# Patient Record
Sex: FEMALE | Race: OTHER | Employment: UNEMPLOYED | URBAN - METROPOLITAN AREA
[De-identification: names, ages, dates, MRNs, and addresses within clinical notes are randomized per-mention and may not be internally consistent; named-entity substitution may affect disease eponyms.]

---

## 2017-01-04 ENCOUNTER — HOSPITAL ENCOUNTER (EMERGENCY)
Age: 52
Discharge: HOME | End: 2017-01-04
Attending: EMERGENCY MEDICINE
Payer: SELF-PAY

## 2017-01-04 ENCOUNTER — HOSPITAL ENCOUNTER (EMERGENCY)
Age: 52
Discharge: STILL ADMITTED OR ADMINISTRATIVE | End: 2017-01-04
Attending: EMERGENCY MEDICINE

## 2017-01-04 ENCOUNTER — APPOINTMENT (OUTPATIENT)
Dept: GENERAL RADIOLOGY | Age: 52
End: 2017-01-04
Attending: PHYSICIAN ASSISTANT
Payer: SELF-PAY

## 2017-01-04 VITALS
WEIGHT: 285.5 LBS | RESPIRATION RATE: 20 BRPM | BODY MASS INDEX: 56.05 KG/M2 | TEMPERATURE: 97.8 F | HEIGHT: 60 IN | DIASTOLIC BLOOD PRESSURE: 71 MMHG | SYSTOLIC BLOOD PRESSURE: 147 MMHG | OXYGEN SATURATION: 97 % | HEART RATE: 87 BPM

## 2017-01-04 DIAGNOSIS — S43.004A SHOULDER DISLOCATION, RIGHT, INITIAL ENCOUNTER: Primary | ICD-10-CM

## 2017-01-04 DIAGNOSIS — S42.91XA SHOULDER FRACTURE, RIGHT, CLOSED, INITIAL ENCOUNTER: ICD-10-CM

## 2017-01-04 PROCEDURE — 73030 X-RAY EXAM OF SHOULDER: CPT

## 2017-01-04 PROCEDURE — 99283 EMERGENCY DEPT VISIT LOW MDM: CPT

## 2017-01-04 PROCEDURE — 75810000301 HC ER LEVEL 1 CLOSED TREATMNT FRACTURE/DISLOCATION

## 2017-01-04 PROCEDURE — L3650 SO 8 ABD RESTRAINT PRE OTS: HCPCS

## 2017-01-04 PROCEDURE — 73020 X-RAY EXAM OF SHOULDER: CPT

## 2017-01-04 RX ORDER — OXYCODONE AND ACETAMINOPHEN 5; 325 MG/1; MG/1
1 TABLET ORAL
Qty: 20 TAB | Refills: 0 | Status: SHIPPED | OUTPATIENT
Start: 2017-01-04 | End: 2017-03-04 | Stop reason: ALTCHOICE

## 2017-01-04 NOTE — DISCHARGE INSTRUCTIONS
Dislocated Shoulder: Care Instructions  Your Care Instructions    When the upper arm comes out of the shoulder socket, it is called a dislocated shoulder. After the doctor puts the shoulder back in place, he or she may put your arm in a sling or shoulder immobilizer. This will keep it from moving. Exercise and physical therapy can help your shoulder get strong and move normally again. It may take up to a year for your shoulder to heal and be free of pain. If your shoulder keeps coming out of place, talk to your doctor about surgery. It can prevent dislocations. You may have had a sedative to help you relax. You may be unsteady after having sedation. It can take a few hours for the medicine's effects to wear off. Common side effects of sedation include nausea, vomiting, and feeling sleepy or tired. The doctor has checked you carefully, but problems can develop later. If you notice any problems or new symptoms, get medical treatment right away. Follow-up care is a key part of your treatment and safety. Be sure to make and go to all appointments, and call your doctor if you are having problems. It's also a good idea to know your test results and keep a list of the medicines you take. How can you care for yourself at home? · If the doctor gave you a sedative:  ¨ For 24 hours, don't do anything that requires attention to detail. It takes time for the medicine's effects to completely wear off. ¨ For your safety, do not drive or operate any machinery that could be dangerous. Wait until the medicine wears off and you can think clearly and react easily. · If your doctor put your arm in a sling or shoulder immobilizer, wear it as directed. · Take pain medicines exactly as directed. ¨ If the doctor gave you a prescription medicine for pain, take it as prescribed. ¨ If you are not taking a prescription pain medicine, ask your doctor if you can take an over-the-counter medicine.   · Put ice or a cold pack on your shoulder for 10 to 20 minutes at a time. Try to do this every 1 to 2 hours for the next 3 days (when you are awake). Put a thin cloth between the ice and your skin. · You may use warm packs after the first 3 days for 15 to 20 minutes at a time. This can ease pain. · If your doctor gave you exercises to do at home, do them exactly as your doctor told you. · Do not do anything that makes the pain worse. When should you call for help? Call 911 anytime you think you may need emergency care. For example, call if:  · You have trouble breathing. · You passed out (lost consciousness). Call your doctor now or seek immediate medical care if:  · You have new or worse nausea or vomiting. · Your shoulder dislocates again. · Your pain gets worse. · Your hand turns cold or changes color. · You have tingling, weakness, or numbness in your arm, hand, or fingers. Watch closely for changes in your health, and be sure to contact your doctor if:  · You do not get better as expected. Where can you learn more? Go to http://shima-alyssa.info/. Enter A816 in the search box to learn more about \"Dislocated Shoulder: Care Instructions. \"  Current as of: May 23, 2016  Content Version: 11.1  © 6182-8054 [employer]. Care instructions adapted under license by [employer] (which disclaims liability or warranty for this information). If you have questions about a medical condition or this instruction, always ask your healthcare professional. [place] [address] any warranty or liability for your use of this information. We hope that we have addressed all of your medical concerns. The examination and treatment you received in the Emergency Department were for an emergent problem and were not intended as complete care. It is important that you follow up with your healthcare provider(s) for ongoing care.  If your symptoms worsen or do not improve as expected, and you are unable to reach your usual health care provider(s), you should return to the Emergency Department. Today's healthcare is undergoing tremendous change, and patient satisfaction surveys are one of the many tools to assess the quality of medical care. You may receive a survey from the [employer] regarding your experience in the Emergency Department. I hope that your experience has been completely positive, particularly the medical care that I provided. As such, please participate in the survey; anything less than excellent does not meet my expectations or intentions. [facility] and [facility] participate in nationally recognized quality of care measures. If your blood pressure is greater than 120/80, as reported below, we urge that you seek medical care to address the potential of high blood pressure, commonly known as hypertension. Hypertension can be hereditary or can be caused by certain medical conditions, pain, stress, or \"white coat syndrome. \"       Please make an appointment with your health care provider(s) for follow up of your Emergency Department visit. VITALS:   Patient Vitals for the past 8 hrs:   Temp Pulse Resp BP SpO2   01/04/17 1426 - - - - 97 %   01/04/17 1313 97.8 °F (36.6 °C) 87 20 147/71 97 %          Thank you for allowing us to provide you with medical care today. We realize that you have many choices for your emergency care needs. Please choose us in the future for any continued health care needs. Fernando Blevins [facility], [address] [facility] Dante Alfredo Daly: [phone]            No results found for this or any previous visit (from the past 24 hour(s)). Xr Shoulder Rt 1v    Result Date: 1/4/2017  EXAM:  CR shoulder one view right INDICATION:  Right shoulder dislocation status post reduction COMPARISON: 1/4/2017. TECHNIQUE: One view right shoulder.  FINDINGS: There is normal glenohumeral alignment. The previously seen Bankart fracture is not as conspicuous on the current exam. No new bony abnormality is demonstrated. IMPRESSION: Satisfactory right glenohumeral alignment. Xr Shoulder Rt Ap/lat Min 2 V    Addendum Date: 1/4/2017    Addendum: There is a typographical error in the findings section of the report. The findings section should read: 3 views of the right shoulder demonstrate anterior shoulder dislocation with an associated Bankart fracture of the glenoid. Result Date: 1/4/2017  EXAM:  XR SHOULDER RT AP/LAT MIN 2 V INDICATION:   right shoulder pain. COMPARISON: None. FINDINGS: Three views of the right shoulder demonstrate anterior shoulder dislocation with an associated pancreatic fracture of the glenoid. IMPRESSION:  Anterior right shoulder dislocation with associated Bankart fracture.

## 2017-01-04 NOTE — ED TRIAGE NOTES
Pt reports falling last night and hit her right shoulder against the wall. Has continued pain in shoulder. Denies hitting her head or LOC. Limited movement in arm/shoulder. Pulses equal bilat. Taken tylenol 650mg, last dose at 0100.

## 2017-01-04 NOTE — ED NOTES
Verbal shift change report given to Loly LOZA RN (oncoming nurse) by Alejandro Ornelas RN (offgoing nurse). Report included the following information SBAR, Kardex, ED Summary, [facility] and Recent Results.

## 2017-01-04 NOTE — ED PROVIDER NOTES
HPI Comments: 54 y.o. female with no significant past medical history presents with complaints of right shoulder pain. The pt reports tripping and falling into wall last night and \"over extended her right arm. \"  She denies hitting her head or any other injury. Her main complaint is the right shoulder pain. She reports taking tylenol at home last night with minimal relief of symptoms. The pt states that nothing makes the pain better or worse. The pt rates the pain as a 10/10 in severity. There are no other acute medical complaints at this time. Denies fever, chills, HA, dizziness, light headedness, dyspnea, chest pain, abdominal pain, N/V/D, melena, hematochezia, loss of bowel/bladder functioning, saddle anesthesia, urinary symptoms or any other acute medical conditions. PCP: DO Cassandra Payne PA-C      Patient is a 54 y.o. female presenting with fall and shoulder pain. Fall   Pertinent negatives include no fever, no numbness, no abdominal pain, no nausea, no vomiting and no hematuria. Shoulder Pain    Pertinent negatives include no numbness.         Past Medical History:   Diagnosis Date    Anxiety and depression     Flu      2 wks ago    GERD (gastroesophageal reflux disease)     Headache(784.0)     Hypertension     Incidental lung nodule, > 3mm and < 8mm 2016     left lung nodule in smoker, needs follow up CT in Oct 2016        Past Surgical History:   Procedure Laterality Date    Hx appendectomy      Hx  section       X 3 Births    Pr removal gallbladder      Pr appendectomy      Hx cholecystectomy      Hx  section      Hx wrist fracture tx       metal         Family History:   Problem Relation Age of Onset    Diabetes Mother     Hypertension Brother     Hypertension Brother     Hypertension Brother        Social History     Social History    Marital status:      Spouse name: N/A    Number of children: N/A    Years of education: N/A     Occupational History    Not on file. Social History Main Topics    Smoking status: Current Every Day Smoker     Packs/day: 0.25     Years: 25.00     Types: Cigarettes    Smokeless tobacco: Never Used    Alcohol use No    Drug use: No    Sexual activity: Yes     Partners: Male     Other Topics Concern    Not on file     Social History Narrative    ** Merged History Encounter **         ** Merged History Encounter **              ALLERGIES: Review of patient's allergies indicates no known allergies. Review of Systems   Constitutional: Negative for activity change, appetite change, diaphoresis and fever. HENT: Negative for ear discharge, ear pain, facial swelling, rhinorrhea, sore throat, tinnitus, trouble swallowing and voice change. Eyes: Negative for photophobia, pain, discharge, redness and visual disturbance. Respiratory: Negative for cough, chest tightness, shortness of breath, wheezing and stridor. Cardiovascular: Negative for chest pain and palpitations. Gastrointestinal: Negative for abdominal pain, constipation, diarrhea, nausea and vomiting. Endocrine: Negative for polydipsia and polyuria. Genitourinary: Negative for dysuria, flank pain and hematuria. Musculoskeletal: Positive for arthralgias and myalgias. Negative for back pain. Skin: Negative for color change and rash. Neurological: Negative for dizziness, syncope, speech difficulty, light-headedness and numbness. Psychiatric/Behavioral: Negative for behavioral problems. Vitals:    01/04/17 1313 01/04/17 1426   BP: 147/71    Pulse: 87    Resp: 20    Temp: 97.8 °F (36.6 °C)    SpO2: 97% 97%   Weight: 129.5 kg (285 lb 7.9 oz)    Height: 5' (1.524 m)             Physical Exam   Constitutional: She is oriented to person, place, and time. She appears well-developed and well-nourished. HENT:   Head: Normocephalic and atraumatic.    Eyes: Conjunctivae are normal. Pupils are equal, round, and reactive to light. Right eye exhibits no discharge. Left eye exhibits no discharge. Neck: Normal range of motion. Neck supple. No thyromegaly present. Cardiovascular: Normal rate, regular rhythm and normal heart sounds. Exam reveals no gallop and no friction rub. No murmur heard. Pulmonary/Chest: Effort normal and breath sounds normal. No respiratory distress. She has no wheezes. Musculoskeletal: Normal range of motion. She exhibits edema and tenderness. Pt right arm in adduction and internally rotated. Shoulder appears to be anteriorly dislocated. There is marked tenderness over the right shoulder. Pt is NVI. Neurological: She is alert and oriented to person, place, and time. Skin: Skin is warm. Psychiatric: She has a normal mood and affect. MDM  Number of Diagnoses or Management Options  Shoulder dislocation, right, initial encounter:   Shoulder fracture, right, closed, initial encounter:   Diagnosis management comments: Pt presents with right shoulder pain. Pt presents with arm adducted and internally rotated. Pt is markedly tender over the right shoulder. Imaging revealed anterior dislocation and bankart fracture. Was able to reduce the shoulder with traction countertraction method with attendings help. Will rx pain medicines and have pt follow up with Dr. Mlio Chong (ortho) for further evaluation. Reviewed treatment plan with attending and they agree.   Lucy Huertas PA-C      ED Course       Procedures

## 2017-01-04 NOTE — ED NOTES
Verbal shift change report given to VERA Maldonado (oncoming nurse) by Maikel Goodwin (offgoing nurse). Report included the following information SBAR, ED Summary and Recent Results.

## 2017-01-23 ENCOUNTER — HOSPITAL ENCOUNTER (OUTPATIENT)
Dept: PHYSICAL THERAPY | Age: 52
Discharge: HOME | End: 2017-01-23
Payer: SELF-PAY

## 2017-01-23 PROCEDURE — 97140 MANUAL THERAPY 1/> REGIONS: CPT

## 2017-01-23 PROCEDURE — 97162 PT EVAL MOD COMPLEX 30 MIN: CPT

## 2017-01-23 NOTE — PROGRESS NOTES
[address]  [facility]  [address] [facility] [address] [address]  [address]  [place]      INITIAL EVALUATION    NAME: Eliot Samuels AGE: 54 y.o. GENDER: female  DATE: 1/23/2017  REFERRING PHYSICIAN: Andrade Phan DO  HISTORY AND BACKGROUND:  Pt is a 53 y/o female with history of LE/UE swelling, present for about a year at current level of severity, with LE being focus of treatment today. Pt reports prolonged history of LE swelling which waxed and waned, however, currently edema is persistent. Pt reports \"my mom's legs looked like this\" per her daughter, who is present to translate for patient. Pt states she has attempted wear of over the counter compression garments without success. Pt reports LE swelling presented initially in feet, progressing to legs more recently. Pt states limited improvement noted with oral diuretic at this time regarding LE edema. Arrives with R UE in sling secondary to shoulder dislocation/Bankart lesion s/p fall in bedroom 2 weeks ago. ED at [facility] for dislocation, which was reduced at that time. Language barrier, however, dtr present to assist today. Primary Diagnosis:    · Primary lymphedema (Q82.0)  Other Treatment Diagnoses:   Abnormality of gait (other abnormalities of gait and mobility R26.89)   Swelling not relieved by elevation  R60.1 generalized edema)   Venous insufficiency I87.2; Morbid Obesity (E66.01)  Date of Onset: most recent severity of swelling 1/2015  Present Symptoms and Functional Limitations: bilateral LE swelling most concerning, with UE swelling also reported by swelling. Pt stating \"heaviness of legs\" limiting her ability to walk. Reports fall 2 weeks ago, resulting in R shoulder dislocation. LLIS functional outcomes measure:  59/72; 87% impairment.   Past Medical History:   Past Medical History   Diagnosis Date    Anxiety and depression     Flu      2 wks ago    GERD (gastroesophageal reflux disease)     Headache(784.0)     Hypertension     Incidental lung nodule, > 3mm and < 8mm 2016     left lung nodule in smoker, needs follow up CT in Oct 2016      Past Surgical History   Procedure Laterality Date    Hx appendectomy      Hx  section       X 3 Births    Pr removal gallbladder      Pr appendectomy      Hx cholecystectomy      Hx  section      Hx wrist fracture tx       metal     Current Medications:    Current Outpatient Prescriptions   Medication Sig    oxyCODONE-acetaminophen (PERCOCET) 5-325 mg per tablet Take 1 Tab by mouth every four (4) hours as needed for Pain. Max Daily Amount: 6 Tabs.  furosemide (LASIX) 20 mg tablet Take 2 Tabs by mouth daily.  HYDROcodone-acetaminophen (NORCO) 5-325 mg per tablet Take  by mouth.  fexofenadine (ALLEGRA) 180 mg tablet Take 1 Tab by mouth daily.  traMADol (ULTRAM) 50 mg tablet Take 1 Tab by mouth every eight (8) hours as needed for Pain. Max Daily Amount: 150 mg.    clotrimazole (LOTRIMIN) 1 % topical cream Apply  to affected area two (2) times a day.  losartan (COZAAR) 100 mg tablet Take 1 Tab by mouth daily.  albuterol (PROVENTIL HFA, VENTOLIN HFA, PROAIR HFA) 90 mcg/actuation inhaler Take 2 Puffs by inhalation every four (4) hours as needed for Wheezing. No current facility-administered medications for this encounter. Allergies: No Known Allergies   Prior Level of Function/Social/Work History/Personal factors and/or comorbidities impacting plan of care: Prior to swelling, pt reports being more active, improved ambulation prior to experiencing recent weight gain, attributed by patient to \"fluid building up. \"    Living Situation: daughter present with patient for translation, another child also lives with patient, along with her spouse. Family is [facility]. Dtr is bilingual and translates for patient. 3      Trainable Caregiver?: Dtr, 25years old, present to assist patient today. Self-care/ADLs: limited currently, possibly due to most recent shoulder injury. Daughter assists pt with lower body dressing in clinic today. Mobility: gait, increased base of support, decreased stride length bilateral LE. Sleeping Arrangement:  bed   Adaptive Equipment Owned: sling   Other: Recent fall. Previous Therapy:  Attempted wear of OTC circular knit compression stockings which she did not tolerate. Compression/Lymphedema Equipment:  none    SUBJECTIVE:   Pt via dtr as  reports LE swelling for \"years\", stating her mother also had LE swelling. States recent upper extremity swelling also, however, LE swelling has been prolonged. Pt reports foot swelling initially, spreading to legs. States swelling is limiting her ability to walk and exercise. Patients goals for therapy: reduce swelling. OBJECTIVE DATA SUMMARY:   EXAMINATION/PRESENTATION/DECISION MAKING:   Pain:  Pain Scale 1: Numeric (0 - 10)  Pain Intensity 1: 6  Pain Location 1: Leg  Self-Care and ADLs:  Grooming: assist secondary to R shoulder dislocation and reduction s/p 2 weeks. Bathing: assisted with limited use of R UE>   UB Dressing: not assessed. LB Dressing: daughter dons/doff's pants in clinic. Don/Doff Shoes/Socks: assisted by dtr in clinic. Toileting: indep   Other: na    Skin and Tissue Assessment:  Dermal Status:  ( )  Intact ( x)  Dry   ( )  Tenuous (x )  Flaky   ( )  Wound/lesion present (x )  Scars: large vertical abdominal incision bikini line above umbilicus, 24 cm. ( )  Dermatitis    Texture/Consistency:  (x )  Boggy ( )  Pitting Edema   ( )  Brawny ( )  Combination   ( x)  Fibrotic/Woody Bilateral foot involvement noted, along with full leg and lower quadrant involvement.    Pigmentation/Color Change:  ( )  Normal ( )  Hemosiderin   ( )  Red ( )  Erythematous   ( x)  Hyperpigmented ( )  Hyperlipodermatosclerosis   Anomalies: na  ( )  Lymphorrhea ( )  Vesicles   ( ) Petechiae ( )  Warty Vercusis   ( )  Bullae ( )  Papilloma   Circulatory:  ( )  MARCELO ( )  Varicosities:   (x )  Pulses: located dorsal pedal and posterior tibialis with doppler bilateral LE in clinic. ( )  Vascular studies ruled out DVT in past   ( )  DVT History    Nails:  ( )  Normal  ( x)  Fungus  Stemmers Sign: positive  Height:  Height: 5' 1\" (154.9 cm)  Weight:  Weight: 128.8 kg (284 lb)   BMI:  BMI (calculated): 53.8  (36 or greater: adversely affecting lymphedema)  Volumetric Measurements:   Right:  13,371.34 mL Left:  12,767. 44 mL   % Difference: 4.73%    (See scanned graph)  Range of Motion: bilateral ankle dorsiflexion to neutral.  LE ROM functional.  Strength: bilateral hip m strength 3+/5;  Quad/ham/anterior tib 4/5 with MMT. Sensation:  intact  Mobility:  Bed/Chair Mobility:  Mod I, additional time. Transfers: Mod I, additional time. Sitting Balance:  good Standing Balance:  good   Gait:  Increased base of support, reduced stride length bilateral.  Pt reports fall at home 2 weeks ago. Wheelchair Mobility:  na   Endurance: Tolerates gait community distances to ambulate in and out of clinic. Stairs:  Not assessed. Safety:  Patient is alert and oriented:  x4   Safety awareness:  Appears intact, 1 fall reported, 2 weeks ago. Fall Risk?:  moderate   Patient given written fall prevention handout: Yes   Precautions:  Standard lymphedema precautions to include avoiding blood pressure readings, injections and IVs or other procedures/acts that could lead to broken skin on affected area, and avoiding excessive heat, resistive activity or altitude without compression garment    Functional Measure:   LLIS  In compliance with CMSs Claims Based Outcome Reporting, the following G-code set was chosen for this patient based on their primary functional limitation being treated:     The outcome measure chosen to determine the severity of the functional limitation was the LLIS with a score of 59/72 which was correlated with the impairment scale. ? Other PT/OT Primary Functional Limitations:     - CURRENT STATUS: CM - 80%-99% impaired, limited or restricted    - GOAL STATUS: CK - 40%-59% impaired, limited or restricted    - D/C STATUS:  ---------------To be determined---------------     Physical Therapy Evaluation Charge Determination   History Examination Presentation Decision-Making   MEDIUM  Complexity : 1-2 comorbidities / personal factors will impact the outcome/ POC  MEDIUM Complexity : 3 Standardized tests and measures addressing body structure, function, activity limitation and / or participation in recreation  MEDIUM Complexity : Evolving with changing characteristics  MEDIUM Complexity : FOTO score of 26-74      Based on the above components, the patient evaluation is determined to be of the following complexity level: MEDIUM    Evaluation Time: 11:00-11:30 am    TREATMENT PROVIDED:   1. Treatment description: The patient and daughter were educated regarding the role and function of the lymphatic system, pathophysiology of primary lymphedema, and instructed in the lymphedema management protocol of complete decongestive therapy (CDT). This includes skin care to prevent breakdown or infection, lymphedema exercises, custom compression therapy options (bandaging, compression garments, compression pump, Rande Lair, [employer], [employer], etc. as needed), and decongestion with manual lymphatic drainage as indicated. We discussed the need for consistent compression system for lymphedema management. Diaphragmatic breathing instruction and skin care education was performed,applying low pH lotion to extremity using upward strokes to stimulate lymphatic vessels. Pt will likely require donated compression bandaging supplies due to financial hardship. Pt and daughter advises in s/s of cellulitis infection including redness/warmth to touch of skin, fever and flu-like symptoms.   Pt advised to see PCP immediately with onset of potential cellulitis. Pt and dtr educated in the need for lifetime management of primary lymphedema, with patient history, family history, and presentation, including position stemmer's sign and initial onset of condition being distal and progressing proximal, all indicative of primary lymphedema. Pt advised compression bandaging will be required to reduce LE limb volumes and fullness dorsal foot/ankle, prior to fit of appropriate compression garments. Pt advised that custom flat knit compression stockings with toe cap or velcro compression system with foot piece and toe cap likely indicated for appropriate home management of lymphedema. Pt and daughter both instructed in the importance of daily skin care to prevent onset of cellulitis infection. Treatment time: 11:32-12:15 am  Minutes: 43 minutes   Manual therapy 3       ASSESSMENT:   Meka Hood is a 54 y.o. female who presents with stage II primary lymphedema, longstanding LE swelling, with foot involvement, positive Stemmer's sign noted, as well as family history reported. Pt reporting accummulation of fluid in lower extremities resulting in limited activity, including ambulation. Note BMI 53.8, bilateral LE weakness, sedentary lifestyle all barriers to treatment. Pt's daughter is present to translate, with language barrier present also. Pt has [employer] care card, however, states she has been unable to find orthopedist to see regarding shoulder dislocation who will take the care card, and that she cannot afford to pay out of pocket to see physician. Financial barriers prevent patient from purchasing bandaging supplies potentially, with plan to discuss with patient at follow up appt. Patient will benefit from complete decongestive therapy (CDT) including manual lymphatic drainage (MLD) technique, short-stretch textile bandages/compression system to decongest limb, and kinesiotaping as appropriate.   Patient will receive instruction in proper skin care to recognize signs/symptoms of and prevent infection, therapeutic exercise, and self-MLD for independent home program and restorative lymphatic performance. Plan to address LE lymphedema at this time to improve mobility, reduce pain, and improve overall function. This care is medically necessary due to the infection risk with lymphedema, and to improve functional activities. CDT is necessary to resolve swelling to allow patient to return to wearing normal clothes/footwear, and prevent worsening of symptoms, such as venous stasis ulcerations, infections, or hospitalizations. Patient will be independent with home program strategies to allow improved ADL ability and mobility and to allow patient to return to greatest functional independence. Rehabilitation potential is considered to be Good/Fair. Factors which may influence rehabilitation potential include obesity, pain which limits mobility, longstanding lymphedema. Patient will benefit from 2x/week physical therapy visits over 6-8 weeks weeks to optimize improvement in these areas. PLAN OF CARE:   Recommendations and Planned Interventions:  Manual lymph drainage/complete decongestive therapy  Multi-layer compression bandaging (short-stretch)  Compression garment fitting/provision  Lymphedema therapeutic exercise  AROM/PROM/Strength/Coordination  Self-care training  Functional mobility training  Education in skin care and lymphedema precautions  Self-MLD education per home program  Self-bandaging education per home program  Caregiver education as needed  Wound care as needed     GOALS--upon initiation of treatment:  Short term goals  Time frame:2-4 weeks  1. Instruct the patient to be independent with proper skin care to prevent future skin breakdown and decrease the potential risk for infections that are associated with Lymphedema.   2. Patient will be independent with a personal lymphedema exercise program to assist with the lymphatic flow and reduce limb volume. 3. Patient will understand the signs and symptoms of acute infection. Long term goals  Time frame: 4-8 weeks    1. Patient will have knowledge of the compression options and acquire a safe and   appropriate daytime and night time compression system to prevent    re-accumulation of fluid. 2.  Patient or family will be able to don/doff garments independently. Garment   system effectiveness will be evaluated prior to discharge for better long-term   management and outcomes. 3.  Improvement in functional outcomes measure as noted above. 4.  Patient able to ambulate with 50% less pain community distances with appropriate compression garments in place bilateral LE. 5.   To transition patient to independent restorative phase of CDT. Patient has participated in goal setting and agrees to work toward plan of care. Patient was instructed to call if questions or concerns arise. Thank you for this referral.  Guilherme Blas, PT, CLT   Time Calculation: 75 mins    TREATMENT PLAN EFFECTIVE DATES:   1/23/2017 TO 3/20/2017  I have read the above plan of care for Blaire Savage. I certify the above prescribed services are required by this patient and are medically necessary.   The above plan of care has been developed in conjunction with the lymphedema/physical therapist.       Physician Signature: ____________________________________Date:______________

## 2017-01-24 VITALS
HEIGHT: 61 IN | BODY MASS INDEX: 53.62 KG/M2 | SYSTOLIC BLOOD PRESSURE: 142 MMHG | WEIGHT: 284 LBS | HEART RATE: 91 BPM | OXYGEN SATURATION: 94 % | DIASTOLIC BLOOD PRESSURE: 82 MMHG

## 2017-03-04 ENCOUNTER — OFFICE VISIT (OUTPATIENT)
Dept: FAMILY MEDICINE CLINIC | Age: 52
End: 2017-03-04

## 2017-03-04 VITALS
HEART RATE: 82 BPM | WEIGHT: 284.4 LBS | BODY MASS INDEX: 53.69 KG/M2 | HEIGHT: 61 IN | OXYGEN SATURATION: 90 % | SYSTOLIC BLOOD PRESSURE: 132 MMHG | TEMPERATURE: 98.1 F | DIASTOLIC BLOOD PRESSURE: 82 MMHG | RESPIRATION RATE: 18 BRPM

## 2017-03-04 DIAGNOSIS — Z72.0 TOBACCO USE: ICD-10-CM

## 2017-03-04 DIAGNOSIS — R06.2 WHEEZING: ICD-10-CM

## 2017-03-04 DIAGNOSIS — Z23 ENCOUNTER FOR IMMUNIZATION: ICD-10-CM

## 2017-03-04 DIAGNOSIS — R91.1 INCIDENTAL LUNG NODULE, > 3MM AND < 8MM: ICD-10-CM

## 2017-03-04 DIAGNOSIS — J06.9 UPPER RESPIRATORY TRACT INFECTION, UNSPECIFIED TYPE: Primary | ICD-10-CM

## 2017-03-04 DIAGNOSIS — R05.9 COUGH: ICD-10-CM

## 2017-03-04 DIAGNOSIS — F17.200 SMOKER: ICD-10-CM

## 2017-03-04 DIAGNOSIS — J20.9 ACUTE BRONCHITIS, UNSPECIFIED ORGANISM: ICD-10-CM

## 2017-03-04 PROBLEM — R73.09 ELEVATED HEMOGLOBIN A1C: Status: ACTIVE | Noted: 2017-03-04

## 2017-03-04 RX ORDER — IPRATROPIUM BROMIDE AND ALBUTEROL SULFATE 2.5; .5 MG/3ML; MG/3ML
3 SOLUTION RESPIRATORY (INHALATION)
Qty: 3 ML | Refills: 0
Start: 2017-03-04 | End: 2017-03-04

## 2017-03-04 RX ORDER — AZITHROMYCIN 250 MG/1
TABLET, FILM COATED ORAL
Qty: 6 TAB | Refills: 0 | Status: SHIPPED | OUTPATIENT
Start: 2017-03-04 | End: 2017-03-09

## 2017-03-04 RX ORDER — ALBUTEROL SULFATE 90 UG/1
2 AEROSOL, METERED RESPIRATORY (INHALATION)
Qty: 1 INHALER | Refills: 2 | Status: SHIPPED | OUTPATIENT
Start: 2017-03-04 | End: 2018-12-30

## 2017-03-04 RX ORDER — METHYLPREDNISOLONE 4 MG/1
TABLET ORAL
Qty: 1 DOSE PACK | Refills: 0 | Status: SHIPPED | OUTPATIENT
Start: 2017-03-04 | End: 2017-09-18

## 2017-03-04 NOTE — PROGRESS NOTES
Allen Lennon is a 54 y.o. female      Issues discussed today include:        Signs and symptoms:  Cough producing yellow sputum  Duration: one week  Context:  +exposures  Location:  Head and chest  Quality:  congestion  Severity:  Preventing rest  Timing:  now  Modifying factors:  No fevers, +smoker    She felt much better after JA neb Rx in the office      Data reviewed or ordered today:  CXR    Other problems include:  Patient Active Problem List   Diagnosis Code    History of normal resting EKG [id number]    Gallstone K80.20    Appendicitis K37    H/O  section Z98.891    Left hand fracture S62. 92XA    H. pylori infection A04.8    Thyromegaly E04.9    Smoker F17.200    DDD (degenerative disc disease), lumbar M51.36    Incidental lung nodule, > 3mm and < 8mm R91.1    Morbid obesity with BMI of 50.0-59.9, adult (HCC) E66.01, Z68.43    Venous insufficiency of both lower extremities I87.2    Prediabetes R73.03    Elevated hemoglobin A1c R73.09       Medications:  Current Outpatient Prescriptions   Medication Sig Dispense Refill    methylPREDNISolone (MEDROL DOSEPACK) 4 mg tablet Take with food 1 Dose Pack 0    albuterol (PROVENTIL HFA, VENTOLIN HFA, PROAIR HFA) 90 mcg/actuation inhaler Take 2 Puffs by inhalation every four (4) hours as needed for Wheezing. 1 Inhaler 2    azithromycin (ZITHROMAX) 250 mg tablet Take 2 tablets today, then take 1 tablet daily 6 Tab 0    furosemide (LASIX) 20 mg tablet Take 2 Tabs by mouth daily. 90 Tab 1    fexofenadine (ALLEGRA) 180 mg tablet Take 1 Tab by mouth daily. 90 Tab 1    losartan (COZAAR) 100 mg tablet Take 1 Tab by mouth daily. 90 Tab 2       Allergies:  No Known Allergies    LMP:  Patient's last menstrual period was 2012. Social History     Social History    Marital status:      Spouse name: N/A    Number of children: N/A    Years of education: N/A     Occupational History    Not on file.      Social History Main Topics    Smoking status: Current Every Day Smoker     Packs/day: 0.25     Years: 25.00     Types: Cigarettes    Smokeless tobacco: Never Used    Alcohol use No    Drug use: No    Sexual activity: Yes     Partners: Male     Other Topics Concern    Not on file     Social History Narrative    ** Merged History Encounter **         ** Merged History Encounter **              Family History   Problem Relation Age of Onset    Diabetes Mother     Hypertension Brother     Hypertension Brother     Hypertension Brother          Meaningful use:  done      ROS:  Headaches:  no  Chest Pain:  no  SOB:  +NOVAK  Fevers:  no  Other significant ROS:  +smoker, she feels under stress    Patient's last menstrual period was 12/31/2012. Physical Exam  Visit Vitals    /82 (BP 1 Location: Left arm, BP Patient Position: Sitting)    Pulse 84    Temp 98.1 °F (36.7 °C) (Oral)    Resp 18    Ht 5' 1\" (1.549 m)    Wt 284 lb 6.4 oz (129 kg)    LMP 12/31/2012    SpO2 90%    BMI 53.74 kg/m2     BP Readings from Last 3 Encounters:   03/04/17 132/82   01/23/17 142/82   01/04/17 147/71     Constitutional:  Appears well,  No Acute Distress, Vitals noted  Psychiatric:   Affect normal, Alert and cooperative, Oriented to person/place/time    Eyes:   Pupils equally round and reactive, EOMI, conjunctiva clear, eyelids normal  ENT:   External ears and nose normal/lips, teeth=OK/gums normal, TMs and Orophyarynx normal  Neck:   general inspection and Thyroid normal.  No abnormal cervical or supraclavicular nodes    Lungs:  +wheezing to auscultation, good respiratory effort  Heart: Ausculation normal.  Regular rhythm. No cardiac murmurs.   No carotid bruits or palpable thrills  Chest wall normal  Abd:  benign  Extremities:   without edema, good peripheral pulses  Skin:   Warm to palpation, without rashes, bruising, or suspicious lesions           Assessment:    Patient Active Problem List   Diagnosis Code    History of normal resting EKG [id number]    Gallstone K80.20    Appendicitis K37    H/O  section Z98.891    Left hand fracture S62. 92XA    H. pylori infection A04.8    Thyromegaly E04.9    Smoker F17.200    DDD (degenerative disc disease), lumbar M51.36    Incidental lung nodule, > 3mm and < 8mm R91.1    Morbid obesity with BMI of 50.0-59.9, adult (HCC) E66.01, Z68.43    Venous insufficiency of both lower extremities I87.2    Prediabetes R73.03    Elevated hemoglobin A1c R73.09       Today's diagnoses are:    ICD-10-CM ICD-9-CM    1. Upper respiratory tract infection, unspecified type J06.9 465.9 azithromycin (ZITHROMAX) 250 mg tablet   2. Cough R05 786.2 XR CHEST PA LAT   3. Smoker F17.200 305.1 XR CHEST PA LAT   4. Encounter for immunization Z23 V03.89 PNEUMOCOCCAL POLYSACCHARIDE VACCINE, 23-VALENT, ADULT OR IMMUNOSUPPRESSED PT DOSE,   5. Acute bronchitis, unspecified organism J20.9 466.0 albuterol (PROVENTIL HFA, VENTOLIN HFA, PROAIR HFA) 90 mcg/actuation inhaler   6. Tobacco use Z72.0 305.1 albuterol (PROVENTIL HFA, VENTOLIN HFA, PROAIR HFA) 90 mcg/actuation inhaler   7. Wheezing R06.2 786.07 methylPREDNISolone (MEDROL DOSEPACK) 4 mg tablet       Plan:  Orders Placed This Encounter    XR CHEST PA LAT     Standing Status:   Future     Number of Occurrences:   1     Standing Expiration Date:   2018     Order Specific Question:   Reason for Exam     Answer:   cough     Order Specific Question:   Is Patient Allergic to Contrast Dye? Answer:   Unknown     Order Specific Question:   Is Patient Pregnant? Answer:   Unknown    PNEUMOCOCCAL POLYSACCHARIDE VACCINE, 23-VALENT, ADULT OR IMMUNOSUPPRESSED PT DOSE,    methylPREDNISolone (MEDROL DOSEPACK) 4 mg tablet     Sig: Take with food     Dispense:  1 Dose Pack     Refill:  0    albuterol (PROVENTIL HFA, VENTOLIN HFA, PROAIR HFA) 90 mcg/actuation inhaler     Sig: Take 2 Puffs by inhalation every four (4) hours as needed for Wheezing.      Dispense:  1 Inhaler Refill:  2    azithromycin (ZITHROMAX) 250 mg tablet     Sig: Take 2 tablets today, then take 1 tablet daily     Dispense:  6 Tab     Refill:  0       See patient instructions  Patient Instructions   Focus on regular exercise (150 minutes each week) and healthy eating. Eat more fruits and vegetables. Eat more protein (egg whites, beans, and nuts you know you tolerate) and less carbohydrates (white bread, white rice, white pasta, white potatoes, sodas, and sweets). Eat appropriately small portion sizes.     Stop smoking    WELLNESS exam soon    If worse go to ER            refresh note:  done    AVS Printed:  done

## 2017-03-04 NOTE — PROGRESS NOTES
Chief Complaint   Patient presents with    Breathing Problem     cough, cold, body pains and hard time breathing since one week.

## 2017-03-04 NOTE — MR AVS SNAPSHOT
Visit Information Date & Time Provider Department Dept. Phone Encounter #  
 3/4/2017 10:30 AM Hugh Rowley MD [address] [place] [address] [phone] [id number] Upcoming Health Maintenance Date Due Pneumococcal 19-64 Medium Risk (1 of 1 - PPSV23) 10/10/1986 PAP AKA CERVICAL CYTOLOGY 10/10/1988 DTaP/Tdap/Td series (2 - Td) 11/25/2023 Allergies as of 3/4/2017  Review Complete On: 3/4/2017 By: Hugh Rowley MD  
 No Known Allergies Current Immunizations  Reviewed on 11/25/2013 Name Date Influenza Vaccine 10/22/2012 Pneumococcal Conjugate (PCV-7) 11/25/2013 Pneumococcal Polysaccharide (PPSV-23)  Incomplete Tdap 11/25/2013 Not reviewed this visit You Were Diagnosed With   
  
 Codes Comments Upper respiratory tract infection, unspecified type    -  Primary ICD-10-CM: J06.9 ICD-9-CM: 465.9 Cough     ICD-10-CM: R05 ICD-9-CM: 786.2 Smoker     ICD-10-CM: Y39.238 ICD-9-CM: 305.1 Encounter for immunization     ICD-10-CM: T50 ICD-9-CM: V03.89 Acute bronchitis, unspecified organism     ICD-10-CM: J20.9 ICD-9-CM: 466.0 Tobacco use     ICD-10-CM: Z72.0 ICD-9-CM: 305.1 Wheezing     ICD-10-CM: R06.2 ICD-9-CM: 786.07 Vitals BP Pulse Temp Resp Height(growth percentile) Weight(growth percentile) 132/82 (BP 1 Location: Left arm, BP Patient Position: Sitting) 84 98.1 °F (36.7 °C) (Oral) 18 5' 1\" (1.549 m) 284 lb 6.4 oz (129 kg) LMP SpO2 BMI OB Status Smoking Status 12/31/2012 90% 53.74 kg/m2 Postmenopausal Current Every Day Smoker Vitals History BMI and BSA Data Body Mass Index Body Surface Area 53.74 kg/m 2 2.36 m 2 Preferred Pharmacy Pharmacy Name Phone [facility], [address] [phone] Your Updated Medication List  
  
   
This list is accurate as of: 3/4/17 10:52 AM.  Always use your most recent med list.  
  
  
  
  
 albuterol 90 mcg/actuation inhaler Commonly known as:  PROVENTIL HFA, VENTOLIN HFA, PROAIR HFA Take 2 Puffs by inhalation every four (4) hours as needed for Wheezing. azithromycin 250 mg tablet Commonly known as:  Charrianta Primrose Take 2 tablets today, then take 1 tablet daily  
  
 clotrimazole 1 % topical cream  
Commonly known as:  Dmitri Macy Apply  to affected area two (2) times a day. fexofenadine 180 mg tablet Commonly known as:  José Antonio [place] Take 1 Tab by mouth daily. furosemide 20 mg tablet Commonly known as:  LASIX Take 2 Tabs by mouth daily. HYDROcodone-acetaminophen 5-325 mg per tablet Commonly known as:  Chely Humphrey Take  by mouth.  
  
 losartan 100 mg tablet Commonly known as:  COZAAR Take 1 Tab by mouth daily. methylPREDNISolone 4 mg tablet Commonly known as:  Dee Rist Take with food  
  
 oxyCODONE-acetaminophen 5-325 mg per tablet Commonly known as:  PERCOCET Take 1 Tab by mouth every four (4) hours as needed for Pain. Max Daily Amount: 6 Tabs. traMADol 50 mg tablet Commonly known as:  ULTRAM  
Take 1 Tab by mouth every eight (8) hours as needed for Pain. Max Daily Amount: 150 mg.  
  
  
  
  
Prescriptions Printed Refills  
 methylPREDNISolone (MEDROL DOSEPACK) 4 mg tablet 0 Sig: Take with food Class: Print  
 albuterol (PROVENTIL HFA, VENTOLIN HFA, PROAIR HFA) 90 mcg/actuation inhaler 2 Sig: Take 2 Puffs by inhalation every four (4) hours as needed for Wheezing. Class: Print Route: Inhalation  
 azithromycin (ZITHROMAX) 250 mg tablet 0 Sig: Take 2 tablets today, then take 1 tablet daily Class: Print We Performed the Following PNEUMOCOCCAL POLYSACCHARIDE VACCINE, 23-VALENT, ADULT OR IMMUNOSUPPRESSED PT DOSE, [39742 CPT(R)] To-Do List   
 03/04/2017 Imaging:  XR CHEST PA LAT Patient Instructions Focus on regular exercise (150 minutes each week) and healthy eating. Eat more fruits and vegetables. Eat more protein (egg whites, beans, and nuts you know you tolerate) and less carbohydrates (white bread, white rice, white pasta, white potatoes, sodas, and sweets). Eat appropriately small portion sizes. Stop smoking WELLNESS exam soon If worse go to ER Please provide this summary of care documentation to your next provider. Your primary care clinician is listed as Azalia Ibrahim. If you have any questions after today's visit, please call [phone].

## 2017-03-04 NOTE — LETTER
3/4/2017 10:49 AM 
 
Ms. Philip Onofre [id number] I strongly suggest that you stop smoking or use of any tobacco products. This may be the most important thing you can do for your health. While medicines may help, the most important thing for you is the decision to quit. If you need a \"Quit \", please call [phone] ([phone]). If you need help with nicotine withdrawal, I suggest over-the-counter nicotine replacement aids such as nicotine gum or patches, used as directed. As you may know, use of tobacco products increases your risk for many forms of cancer, heart disease, stroke, and blood clotting. Your body is very forgiving. Quit now and improve your health! Sincerely, Farhan Diego MD

## 2017-03-04 NOTE — PATIENT INSTRUCTIONS
Focus on regular exercise (150 minutes each week) and healthy eating. Eat more fruits and vegetables. Eat more protein (egg whites, beans, and nuts you know you tolerate) and less carbohydrates (white bread, white rice, white pasta, white potatoes, sodas, and sweets). Eat appropriately small portion sizes. Stop smoking    WELLNESS exam soon    If worse go to ER    Follow up soon with PCP.   Consider PFT and sleep evaluation

## 2017-03-07 ENCOUNTER — OFFICE VISIT (OUTPATIENT)
Dept: FAMILY MEDICINE CLINIC | Age: 52
End: 2017-03-07

## 2017-03-07 ENCOUNTER — HOSPITAL ENCOUNTER (OUTPATIENT)
Dept: CT IMAGING | Age: 52
Discharge: HOME | End: 2017-03-07
Attending: FAMILY MEDICINE
Payer: SELF-PAY

## 2017-03-07 VITALS
TEMPERATURE: 98 F | DIASTOLIC BLOOD PRESSURE: 85 MMHG | HEIGHT: 61 IN | BODY MASS INDEX: 52.67 KG/M2 | SYSTOLIC BLOOD PRESSURE: 142 MMHG | OXYGEN SATURATION: 92 % | WEIGHT: 279 LBS | RESPIRATION RATE: 22 BRPM | HEART RATE: 85 BPM

## 2017-03-07 DIAGNOSIS — R10.9 ABDOMINAL PAIN, UNSPECIFIED LOCATION: Primary | ICD-10-CM

## 2017-03-07 DIAGNOSIS — R10.9 ABDOMINAL PAIN, UNSPECIFIED LOCATION: ICD-10-CM

## 2017-03-07 LAB
BILIRUB UR QL STRIP: NEGATIVE
GLUCOSE UR-MCNC: NEGATIVE MG/DL
KETONES P FAST UR STRIP-MCNC: NEGATIVE MG/DL
PH UR STRIP: 5 [PH] (ref 4.6–8)
PROT UR QL STRIP: NORMAL MG/DL
SP GR UR STRIP: 1.02 (ref 1–1.03)
UA UROBILINOGEN AMB POC: NORMAL (ref 0.2–1)
URINALYSIS CLARITY POC: NORMAL
URINALYSIS COLOR POC: YELLOW
URINE BLOOD POC: NEGATIVE
URINE LEUKOCYTES POC: NEGATIVE
URINE NITRITES POC: POSITIVE

## 2017-03-07 PROCEDURE — 74177 CT ABD & PELVIS W/CONTRAST: CPT

## 2017-03-07 PROCEDURE — 74011636320 HC RX REV CODE- 636/320: Performed by: RADIOLOGY

## 2017-03-07 RX ADMIN — IOPAMIDOL 100 ML: 755 INJECTION, SOLUTION INTRAVENOUS at 18:00

## 2017-03-07 NOTE — MR AVS SNAPSHOT
Visit Information Date & Time Provider Department Dept. Phone Encounter #  
 3/7/2017  2:20 PM Belinda Phillips MD [address] [place] [address] [phone] [id number] Upcoming Health Maintenance Date Due  
 PAP AKA CERVICAL CYTOLOGY 3/4/2020 DTaP/Tdap/Td series (2 - Td) 11/25/2023 Allergies as of 3/7/2017  Review Complete On: 3/4/2017 By: Maxx Loredo MD  
 No Known Allergies Current Immunizations  Reviewed on 3/4/2017 Name Date Influenza Vaccine 10/22/2012 Pneumococcal Conjugate (PCV-7) 11/25/2013 Pneumococcal Polysaccharide (PPSV-23) 3/4/2017 Tdap 11/25/2013 Not reviewed this visit You Were Diagnosed With   
  
 Codes Comments Cough    -  Primary ICD-10-CM: W47 ICD-9-CM: 786.2 Abdominal pain, unspecified location     ICD-10-CM: R10.9 ICD-9-CM: 789.00 Vitals BP Pulse Temp Resp Height(growth percentile) Weight(growth percentile) 142/85 (BP 1 Location: Left arm, BP Patient Position: Sitting) 85 98 °F (36.7 °C) (Oral) 22 5' 1\" (1.549 m) 279 lb (126.6 kg) LMP SpO2 BMI OB Status Smoking Status 12/31/2012 92% 52.72 kg/m2 Postmenopausal Current Every Day Smoker Vitals History BMI and BSA Data Body Mass Index Body Surface Area 52.72 kg/m 2 2.33 m 2 Preferred Pharmacy Pharmacy Name Phone [facility], [address] [phone] Your Updated Medication List  
  
   
This list is accurate as of: 3/7/17  3:08 PM.  Always use your most recent med list.  
  
  
  
  
 albuterol 90 mcg/actuation inhaler Commonly known as:  PROVENTIL HFA, VENTOLIN HFA, PROAIR HFA Take 2 Puffs by inhalation every four (4) hours as needed for Wheezing. azithromycin 250 mg tablet Commonly known as:  Noe Wilson Take 2 tablets today, then take 1 tablet daily  
  
 fexofenadine 180 mg tablet Commonly known as:  Stefania Mueller  
 Take 1 Tab by mouth daily. furosemide 20 mg tablet Commonly known as:  LASIX Take 2 Tabs by mouth daily. losartan 100 mg tablet Commonly known as:  COZAAR Take 1 Tab by mouth daily. methylPREDNISolone 4 mg tablet Commonly known as:  Acquanetta Kanner Take with food We Performed the Following AMB POC URINALYSIS DIP STICK AUTO W/O MICRO [43598 CPT(R)] CBC WITH AUTOMATED DIFF [70645 CPT(R)] METABOLIC PANEL, COMPREHENSIVE [81862 CPT(R)] To-Do List   
 03/07/2017 Imaging:  CT ABD W WO CONT Patient Instructions Abdominal Pain: Care Instructions Your Care Instructions Abdominal pain has many possible causes. Some aren't serious and get better on their own in a few days. Others need more testing and treatment. If your pain continues or gets worse, you need to be rechecked and may need more tests to find out what is wrong. You may need surgery to correct the problem. Don't ignore new symptoms, such as fever, nausea and vomiting, urination problems, pain that gets worse, and dizziness. These may be signs of a more serious problem. Your doctor may have recommended a follow-up visit in the next 8 to 12 hours. If you are not getting better, you may need more tests or treatment. The doctor has checked you carefully, but problems can develop later. If you notice any problems or new symptoms, get medical treatment right away. Follow-up care is a key part of your treatment and safety. Be sure to make and go to all appointments, and call your doctor if you are having problems. It's also a good idea to know your test results and keep a list of the medicines you take. How can you care for yourself at home? · Rest until you feel better. · To prevent dehydration, drink plenty of fluids, enough so that your urine is light yellow or clear like water. Choose water and other caffeine-free clear liquids until you feel better.  If you have kidney, heart, or liver disease and have to limit fluids, talk with your doctor before you increase the amount of fluids you drink. · If your stomach is upset, eat mild foods, such as rice, dry toast or crackers, bananas, and applesauce. Try eating several small meals instead of two or three large ones. · Wait until 48 hours after all symptoms have gone away before you have spicy foods, alcohol, and drinks that contain caffeine. · Do not eat foods that are high in fat. · Avoid anti-inflammatory medicines such as aspirin, ibuprofen (Advil, Motrin), and naproxen (Aleve). These can cause stomach upset. Talk to your doctor if you take daily aspirin for another health problem. When should you call for help? Call 911 anytime you think you may need emergency care. For example, call if: 
· You passed out (lost consciousness). · You pass maroon or very bloody stools. · You vomit blood or what looks like coffee grounds. · You have new, severe belly pain. Call your doctor now or seek immediate medical care if: 
· Your pain gets worse, especially if it becomes focused in one area of your belly. · You have a new or higher fever. · Your stools are black and look like tar, or they have streaks of blood. · You have unexpected vaginal bleeding. · You have symptoms of a urinary tract infection. These may include: 
¨ Pain when you urinate. ¨ Urinating more often than usual. 
¨ Blood in your urine. · You are dizzy or lightheaded, or you feel like you may faint. Watch closely for changes in your health, and be sure to contact your doctor if: 
· You are not getting better after 1 day (24 hours). Where can you learn more? Go to http://shima-alyssa.info/. Enter H520 in the search box to learn more about \"Abdominal Pain: Care Instructions. \" Current as of: May 27, 2016 Content Version: 11.1 © 4087-2936 [employer].  Care instructions adapted under license by [address] (which disclaims liability or warranty for this information). If you have questions about a medical condition or this instruction, always ask your healthcare professional. [address] any warranty or liability for your use of this information. Please provide this summary of care documentation to your next provider. Your primary care clinician is listed as Kina Perez. If you have any questions after today's visit, please call [phone].

## 2017-03-07 NOTE — PROGRESS NOTES
Subjective:      Sanju Cope is a 54 y.o. female who presents for abdominal pain and cough. Comes in with daughter who serves as . Patient declines [facility] . Patient was recently seen in clinic 3 days ago for cough and shortness of breath. Patient was started on Azithromycin, Prednisone and Albuterol. XR chest showed no pleural effusion or consolidation at that visit. Reports onset of abdominal pain that started yesterday. Reports that pain has got worse today. Does endorse some nausea. Denies any fevers, chills, chest pain, vomiting or diarrhea. Is passing gas and having regular BM's  Exacerbated by coughing. Improved when she sits still. ROS:  General/Constitutional:   No headache, fever, fatigue,   Cardiac:    No chest pain      Respiratory:   Cough. No SOB. GI:   As above  :   No dysuria or  hematuria    Neurological:   No loss of consciousness, dizziness,   Skin: No rash     PMHx:  Past Medical History:   Diagnosis Date    Anxiety and depression     Flu     2 wks ago    GERD (gastroesophageal reflux disease)     Headache(784.0)     Hypertension     Incidental lung nodule, > 3mm and < 8mm 4/20/2016    left lung nodule in smoker, needs follow up CT in Oct 2016        Meds:   Current Outpatient Prescriptions   Medication Sig Dispense Refill    methylPREDNISolone (MEDROL DOSEPACK) 4 mg tablet Take with food 1 Dose Pack 0    albuterol (PROVENTIL HFA, VENTOLIN HFA, PROAIR HFA) 90 mcg/actuation inhaler Take 2 Puffs by inhalation every four (4) hours as needed for Wheezing. 1 Inhaler 2    azithromycin (ZITHROMAX) 250 mg tablet Take 2 tablets today, then take 1 tablet daily 6 Tab 0    furosemide (LASIX) 20 mg tablet Take 2 Tabs by mouth daily. 90 Tab 1    fexofenadine (ALLEGRA) 180 mg tablet Take 1 Tab by mouth daily. 90 Tab 1    losartan (COZAAR) 100 mg tablet Take 1 Tab by mouth daily.  90 Tab 2       Allergies:   No Known Allergies    Smoker:  History   Smoking Status    Current Every Day Smoker    Packs/day: 0.25    Years: 25.00    Types: Cigarettes   Smokeless Tobacco    Never Used       ETOH:   History   Alcohol Use No       FH:   Family History   Problem Relation Age of Onset    Diabetes Mother     Hypertension Brother     Hypertension Brother     Hypertension Brother          Objective:     Visit Vitals    /85 (BP 1 Location: Left arm, BP Patient Position: Sitting)    Pulse 85    Temp 98 °F (36.7 °C) (Oral)    Resp 22    Ht 5' 1\" (1.549 m)    Wt 279 lb (126.6 kg)    LMP 12/31/2012    SpO2 92%    BMI 52.72 kg/m2       Physical Examination:   GEN: No apparent distress. Alert and oriented and responds to all questions appropriately. OROPHYARYNX: No oral lesions or exudates. NECK:  Supple; no masses; thyroid normal           LUNGS: Respirations unlabored; clear to auscultation bilaterally  CARDIOVASCULAR: Regular, rate, and rhythm without murmurs, gallops or rubs   ABDOMEN: TTP over left lower abdomen. No rebound tenderness or guarding; no masses or organomegaly. EXT: Well perfused. No edema. SKIN: No obvious rashes. Assessment:     55 yo who comes in complaining of one day history of abdominal pain. ICD-10-CM ICD-9-CM    1. Abdominal pain, unspecified location R10.9 789.00 CBC WITH AUTOMATED DIFF      METABOLIC PANEL, COMPREHENSIVE      AMB POC URINALYSIS DIP STICK AUTO W/O MICRO      CANCELED: CT ABD W WO CONT           Plan:     Differential diagnosis includes diverticulitis, colitis and diverticulosis. Concern for diverticulitis due to prior history of diverticulitis. - Follow up CT abdomen  - Follow up CBC, CMP and urinalysis   - Hold of on antibiotics for now, start based on above results. Patient is counseled to return to the office if symptoms do not improve as expected. Urgent consultation with the nearest Emergency Department is strongly recommended if condition worsens. Patient is counseled to follow up as recommended and to inform the office if any changes in treatment are recommended.       Patient discussed with Dr. Grisel Souza By:  Salvador Mistry MD    Family Medicine Resident

## 2017-03-07 NOTE — PATIENT INSTRUCTIONS

## 2017-03-08 ENCOUNTER — TELEPHONE (OUTPATIENT)
Dept: FAMILY MEDICINE CLINIC | Age: 52
End: 2017-03-08

## 2017-03-08 LAB
ALBUMIN SERPL-MCNC: 4.3 G/DL (ref 3.5–5.5)
ALBUMIN/GLOB SERPL: 1.3 {RATIO} (ref 1.1–2.5)
ALP SERPL-CCNC: 61 IU/L (ref 39–117)
ALT SERPL-CCNC: 15 IU/L (ref 0–32)
AST SERPL-CCNC: 15 IU/L (ref 0–40)
BASOPHILS # BLD AUTO: 0.1 X10E3/UL (ref 0–0.2)
BASOPHILS NFR BLD AUTO: 0 %
BILIRUB SERPL-MCNC: 0.5 MG/DL (ref 0–1.2)
BUN SERPL-MCNC: 17 MG/DL (ref 6–24)
BUN/CREAT SERPL: 27 (ref 9–23)
CALCIUM SERPL-MCNC: 9.8 MG/DL (ref 8.7–10.2)
CHLORIDE SERPL-SCNC: 100 MMOL/L (ref 96–106)
CO2 SERPL-SCNC: 27 MMOL/L (ref 18–29)
CREAT SERPL-MCNC: 0.62 MG/DL (ref 0.57–1)
EOSINOPHIL # BLD AUTO: 0.1 X10E3/UL (ref 0–0.4)
EOSINOPHIL NFR BLD AUTO: 1 %
ERYTHROCYTE [DISTWIDTH] IN BLOOD BY AUTOMATED COUNT: 15.7 % (ref 12.3–15.4)
GLOBULIN SER CALC-MCNC: 3.3 G/DL (ref 1.5–4.5)
GLUCOSE SERPL-MCNC: 95 MG/DL (ref 65–99)
HCT VFR BLD AUTO: 45.2 % (ref 34–46.6)
HGB BLD-MCNC: 15.2 G/DL (ref 11.1–15.9)
IMM GRANULOCYTES # BLD: 0 X10E3/UL (ref 0–0.1)
IMM GRANULOCYTES NFR BLD: 0 %
LYMPHOCYTES # BLD AUTO: 4 X10E3/UL (ref 0.7–3.1)
LYMPHOCYTES NFR BLD AUTO: 25 %
MCH RBC QN AUTO: 30.3 PG (ref 26.6–33)
MCHC RBC AUTO-ENTMCNC: 33.6 G/DL (ref 31.5–35.7)
MCV RBC AUTO: 90 FL (ref 79–97)
MONOCYTES # BLD AUTO: 1.2 X10E3/UL (ref 0.1–0.9)
MONOCYTES NFR BLD AUTO: 7 %
NEUTROPHILS # BLD AUTO: 10.8 X10E3/UL (ref 1.4–7)
NEUTROPHILS NFR BLD AUTO: 67 %
PLATELET # BLD AUTO: 290 X10E3/UL (ref 150–379)
POTASSIUM SERPL-SCNC: 4.8 MMOL/L (ref 3.5–5.2)
PROT SERPL-MCNC: 7.6 G/DL (ref 6–8.5)
RBC # BLD AUTO: 5.02 X10E6/UL (ref 3.77–5.28)
SODIUM SERPL-SCNC: 144 MMOL/L (ref 134–144)
WBC # BLD AUTO: 16.2 X10E3/UL (ref 3.4–10.8)

## 2017-04-03 DIAGNOSIS — M79.89 LEG SWELLING: ICD-10-CM

## 2017-04-03 RX ORDER — FUROSEMIDE 20 MG/1
TABLET ORAL
Qty: 90 TAB | Refills: 0 | Status: SHIPPED | OUTPATIENT
Start: 2017-04-03 | End: 2017-05-22 | Stop reason: SDUPTHER

## 2017-04-03 NOTE — TELEPHONE ENCOUNTER
Patient needs a refill of the following  Requested Prescriptions     Pending Prescriptions Disp Refills    furosemide (LASIX) 20 mg tablet 90 Tab 1     Sig: Take 2 Tabs by mouth daily.

## 2017-04-05 RX ORDER — FUROSEMIDE 20 MG/1
40 TABLET ORAL DAILY
Qty: 90 TAB | Refills: 1 | OUTPATIENT
Start: 2017-04-05

## 2017-04-25 DIAGNOSIS — I10 ESSENTIAL HYPERTENSION: ICD-10-CM

## 2017-04-25 NOTE — TELEPHONE ENCOUNTER
Requested Prescriptions     Pending Prescriptions Disp Refills    losartan (COZAAR) 100 mg tablet 90 Tab 2     Sig: Take 1 Tab by mouth daily.      Qty 90

## 2017-04-26 RX ORDER — LOSARTAN POTASSIUM 100 MG/1
100 TABLET ORAL DAILY
Qty: 90 TAB | Refills: 2 | Status: SHIPPED | OUTPATIENT
Start: 2017-04-26 | End: 2017-08-19

## 2017-04-26 NOTE — TELEPHONE ENCOUNTER
This patient saw you recently for abdominal pain. He is looking for refill of losartan. Please advise.

## 2017-05-05 ENCOUNTER — OFFICE VISIT (OUTPATIENT)
Dept: FAMILY MEDICINE CLINIC | Age: 52
End: 2017-05-05

## 2017-05-05 VITALS
BODY MASS INDEX: 54.75 KG/M2 | SYSTOLIC BLOOD PRESSURE: 121 MMHG | WEIGHT: 290 LBS | HEIGHT: 61 IN | TEMPERATURE: 97.7 F | DIASTOLIC BLOOD PRESSURE: 75 MMHG | HEART RATE: 87 BPM | RESPIRATION RATE: 16 BRPM | OXYGEN SATURATION: 93 %

## 2017-05-05 DIAGNOSIS — F17.200 SMOKER: ICD-10-CM

## 2017-05-05 DIAGNOSIS — M99.03 SOMATIC DYSFUNCTION OF LUMBAR REGION: ICD-10-CM

## 2017-05-05 DIAGNOSIS — I87.2 VENOUS INSUFFICIENCY OF BOTH LOWER EXTREMITIES: ICD-10-CM

## 2017-05-05 DIAGNOSIS — M54.50 ACUTE LEFT-SIDED LOW BACK PAIN WITHOUT SCIATICA: Primary | ICD-10-CM

## 2017-05-05 DIAGNOSIS — F32.2 SEVERE SINGLE CURRENT EPISODE OF MAJOR DEPRESSIVE DISORDER, WITHOUT PSYCHOTIC FEATURES (HCC): ICD-10-CM

## 2017-05-05 DIAGNOSIS — E66.01 MORBID OBESITY WITH BMI OF 50.0-59.9, ADULT (HCC): ICD-10-CM

## 2017-05-05 RX ORDER — NAPROXEN 500 MG/1
500 TABLET ORAL 2 TIMES DAILY WITH MEALS
Qty: 30 TAB | Refills: 0 | Status: SHIPPED | OUTPATIENT
Start: 2017-05-05 | End: 2017-05-22 | Stop reason: SDUPTHER

## 2017-05-05 RX ORDER — BUPROPION HYDROCHLORIDE 100 MG/1
100 TABLET ORAL 2 TIMES DAILY
Qty: 60 TAB | Refills: 1 | Status: SHIPPED | OUTPATIENT
Start: 2017-05-05 | End: 2017-05-22 | Stop reason: SDUPTHER

## 2017-05-05 NOTE — PROGRESS NOTES
Subjective  Pt is Greenlandic speaking. Clay Dash is an 54 y.o. female who presents for back pain. Back pain: Pt shares that she developed lower back pain 10 days ago. Mostly on left. No trauma or falls. She woke up with pain. Localized mostly however, some motions lead to radiation to left ankle. 8/10 at worse. Nothing relief pain, attempted tylenol. Denies loss of bladder or bowls, inner thigh sensation loss. Depressed: Pt states that she has been depressed since her parents passed away in 2016. She lost interest in many things, easily cries, not taking care of herself resulting in 40 +lbs weight gain. No suicidal ideation    Lymphedema: unchanged reports the pt. Continue to take Lasix daily which seems to be helping from getting worse. Chart review:  -ECHO in 3/2016 normal. Refused CPAP or sleep study as she can not afford. Unable to tolerate compression stocking secondary to pain despite having them properly fitted. Too heavy to exercise and knee pain. Does not use salt anymore in diet. No elevation of feet at home.     -Physical therapy saw 1/2017. Recommended the following: Rehabilitation potential is considered to be Good/Fair. Factors which may influence rehabilitation potential include obesity, pain which limits mobility, longstanding lymphedema. Patient will benefit from 2x/week physical therapy visits over 6-8 weeks weeks to optimize improvement in these areas. However, patient did not follow-up as she does not have insurence    -Smokes secondary to stress. 10 cigarettes per day. Currently attempting to cut down. Denies SOB when upright, wheezing, or cough. 4/1 CTA chest found left lung nodule, did not follow up for CT in 10/2016. Repeated 12/2016 showed 5 mm pulmonary nodule laterally left upper lobe is unchanged. Follow-up noncontrast CT scan in 18 months.     -Previous workup showed normal CMP and TSH.  CT pelvis 2/2016 showed no pelvic mass.     Denies erythema or skin changes. Allergies - reviewed:   No Known Allergies      Medications - reviewed:   Current Outpatient Prescriptions   Medication Sig    buPROPion (WELLBUTRIN) 100 mg tablet Take 1 Tab by mouth two (2) times a day.  naproxen (NAPROSYN) 500 mg tablet Take 1 Tab by mouth two (2) times daily (with meals).  losartan (COZAAR) 100 mg tablet Take 1 Tab by mouth daily.  furosemide (LASIX) 20 mg tablet TAKE 1 TABLET BY MOUTH DAILY    methylPREDNISolone (MEDROL DOSEPACK) 4 mg tablet Take with food    albuterol (PROVENTIL HFA, VENTOLIN HFA, PROAIR HFA) 90 mcg/actuation inhaler Take 2 Puffs by inhalation every four (4) hours as needed for Wheezing.  fexofenadine (ALLEGRA) 180 mg tablet Take 1 Tab by mouth daily. No current facility-administered medications for this visit. Past Medical History - reviewed:  Past Medical History:   Diagnosis Date    Anxiety and depression     Flu     2 wks ago    GERD (gastroesophageal reflux disease)     Headache     Hypertension     Incidental lung nodule, > 3mm and < 8mm 2016    left lung nodule in smoker, needs follow up CT in Oct 2016          Past Surgical History - reviewed:   Past Surgical History:   Procedure Laterality Date    APPENDECTOMY      HX APPENDECTOMY      HX  SECTION      X 3 Births    HX  SECTION      HX CHOLECYSTECTOMY      HX WRIST FRACTURE TX      metal    REMOVAL GALLBLADDER           Social History - reviewed:  Social History     Social History    Marital status:      Spouse name: N/A    Number of children: N/A    Years of education: N/A     Occupational History    Not on file.      Social History Main Topics    Smoking status: Current Every Day Smoker     Packs/day: 0.50     Years: 25.00     Types: Cigarettes    Smokeless tobacco: Never Used    Alcohol use No    Drug use: No    Sexual activity: Yes     Partners: Male     Other Topics Concern    Not on file     Social History Narrative    ** Merged History Encounter **         ** Merged History Encounter **              Family History - reviewed:  Family History   Problem Relation Age of Onset    Diabetes Mother     Hypertension Brother     Hypertension Brother     Hypertension Brother          Immunizations - reviewed:   Immunization History   Administered Date(s) Administered    Influenza Vaccine 10/22/2012    Pneumococcal Conjugate (PCV-7) 11/25/2013    Pneumococcal Polysaccharide (PPSV-23) 03/04/2017    Tdap 11/25/2013     ROS  CONSTITUTIONAL: weakness, fatigue, weight gain, Denies: fever, chills  CARDIOVASCULAR: dyspnea on exertion, orthopnea, edema, Denies: chest pain, palpitations  RESPIRATORY: Denies: cough, wheezing  ENDOCRINE: Denies: polydipsia/polyuria, palpitations, skin changes, temperature intolerance  GI: Denies: abdominal pain, nausea, vomiting, diarrhea, constipation, blood in stool  : Denies: dysuria, frequency/urgency, pelvic pain  NEURO: Denies: dizzy/vertigo, headache, focal weakness, speech problems, confusion  MUSCULOSKELETAL: back pain, muscle weakness, Denies: joint stiffness, joint swelling  SKIN: itching, hives, Denies: rash  PSYCH: Depressed. Denies: anxiety, suicidal ideation      Physical Exam  Visit Vitals    /75    Pulse 87    Temp 97.7 °F (36.5 °C) (Oral)    Resp 16    Ht 5' 1\" (1.549 m)    Wt 290 lb (131.5 kg)    LMP 12/31/2012    SpO2 93%    BMI 54.8 kg/m2       GEN: No apparent distress. Alert and oriented and responds to all questions appropriately. Morbidly Obese. Faroese speaker. EYES: Conjunctiva clear; pupils round and reactive to light; extraocular movements are intact. EAR: External ears are normal.   NOSE: Turbinates are within normal limits. No drainage  OROPHYARYNX: No oral lesions or exudates. NECK: Supple; no masses; thyroid normal. Large neck circumference.   LUNGS: Respirations unlabored; clear to auscultation bilaterally  CARDIOVASCULAR: Regular, rate, and rhythm without murmurs, gallops or rubs   ABDOMEN: Soft; nontender; nondistended; normoactive bowel sounds  NEUROLOGIC: No focal neurologic deficits. Strength and sensation grossly intact. Coordination and gait grossly intact. BACK: No ecchymosis. Left paraspinal tenderness. No vertebral bone pain with palpation. Negative straight leg test.  OSTEOPATHIC: L2-4NSrRL. Left posterior innominate. EXT: 2+ pitting edema in lower ext extending to thighs. Non-pitting edema in BUE extending to shoulder. SKIN: No obvious rashes or erythema or hives. Edema present without signs of infection. PHQ over the last two weeks 5/5/2017   PHQ Not Done Medical Reason (indicate in comments)   Little interest or pleasure in doing things Nearly every day   Feeling down, depressed or hopeless Nearly every day   Total Score PHQ 2 6   Trouble falling or staying asleep, or sleeping too much Nearly every day   Feeling tired or having little energy Nearly every day   Poor appetite or overeating More than half the days   Feeling bad about yourself - or that you are a failure or have let yourself or your family down Several days   Trouble concentrating on things such as school, work, reading or watching TV More than half the days   Moving or speaking so slowly that other people could have noticed; or the opposite being so fidgety that others notice Not at all   Thoughts of being better off dead, or hurting yourself in some way Not at all   PHQ 9 Score 17   How difficult have these problems made it for you to do your work, take care of your home and get along with others Very difficult           Assessment/Plan    ICD-10-CM ICD-9-CM    1. Acute left-sided low back pain without sciatica M54.5 724.2 naproxen (NAPROSYN) 500 mg tablet   2. Smoker F17.200 305.1    3. Venous insufficiency of both lower extremities I87.2 459.81    4. Morbid obesity with BMI of 50.0-59.9, adult ([facility]) E66.01 278.01     Z68.43 V85.43    5.  Severe single current episode of major depressive disorder, without psychotic features ([facility] F32.2 296.23 NC PATIENT SCREENED FOR DEPRESSION      buPROPion (WELLBUTRIN) 100 mg tablet   6. Somatic dysfunction of lumbar region M99.03 739.3      Depression: PHQ showed pt moderatly severe depression. Educated pt about findings. Ok to start antidepressant. Pt will start Wellbutrin as she is depressed and hoping to stop smoking. Will follow-up in 2 weeks. Will call physician if having any side effects one being suicidal ideation. Back pain: No xray indicated. Will start Naproxen. Will use warm packs to apply to area. Gave pt back exercises  -osteopathic treatment given: Pt was educate about osteopathy and the science behind the treatment. Pt agreed and understood the benefits and risks (one being being more sore in the area of treatment). Muscle release and indirect ME used for lumbar region. After treatment completed, findings were rechecked and improvement illustrated. Pt had increased ROM. Will return in 2 weeks for second session as she does not have insurance for PT.    -Pt to start Vit D    LE edema and venous insufficiency:  Lasix. Since no insurance pt declined to follow up for referrals to lymphedema clinic or physical therapy. Pt declined to wear compression stockings. Will attempt elevation of LE. -Smoking cessation education. Pt agreed to start decreasing amount of cigarettes from 10 to 8 per day. Then decrease 1-2 cigarettes each week or 2. Wellbutrin will also help.    -Pt declined ordering sleep study to evaluate for sleep apnea, and for need for CPAP machine. Follow-up Disposition:  Return in about 2 weeks (around 5/19/2017) for back pain and depression.     I have discussed the diagnosis with the patient and the intended plan as seen in the above orders. Patient verbalized understanding of the plan and agrees with the plan. The patient has received an after-visit summary and questions were answered concerning future plans.  I have discussed medication side effects and warnings with the patient as well. Informed patient to return to the office if new symptoms arise.         Fanny Humphrey DO  Family Medicine Resident

## 2017-05-05 NOTE — PROGRESS NOTES
Chief Complaint   Patient presents with    Back Pain     pt states having lower back pain     1. Have you been to the ER, urgent care clinic since your last visit? Hospitalized since your last visit? No    2. Have you seen or consulted any other health care providers outside of the [facility] since your last visit? Include any pap smears or colon screening.  No

## 2017-05-05 NOTE — PATIENT INSTRUCTIONS
ÈæÈÑæÈíæä (Úä ØÑíÞ ÇáÝã  ÈæÈÑæÈíæä (akv-[id number]-bxq-on)  áÚáÇÌ ÇáÇßÊÆÇÈ æÇáãÓÇÚÏÉ Úáì ÇáÅÞáÇÚ Úä ÇáÊÏÎíä. æíãäÚ ÃíÖðÇ ÇáÇßÊÆÇÈ ÇáäÇÌã Úä [id number] ÇáÚÇØÝíÉ ÇáãæÓãíÉ (SAD). ÇÓãÇáãÇÑßÉÇáÊÌÇÑí : Aplenzin, Forfivo XL, Wellbutrin, Wellbutrin SR, Wellbutrin XL, Zyban   ÞÏ íßæä åäÇß ÃÓãÇÁ ãÇÑßÇÊ ÃÎÑì áåÐÇ ÇáÏæÇÁ. ãÇ åí ãæÇäÚ VVVHIHO åÐÇ ÇáÏæÇÁ:   áÇ íäÇÓÈ åÐÇ ÇáÏæÇÁ ÌãíÚ ÇáÃÔÎÇÕ. áÇ ÊÓÊÎÏã ÇáÏæÇÁ ÅÐÇ áÏíß ÊÝÇÚá ÊÍÓÓí ÊÌÇå ÈæÈÑæÈíæä¡ Ãæ Ýí ÍÇá ÊÚÑÖß áäæÈÇÊ¡ Ãæ ÅÕÇÈÊß ÈÝÞÏÇä ÇáÔåíÉ¡ Ãæ ÈÇáäåÇã. [facility] åÐÇ ÇáÏæÇÁ:   ÞÑÕ, ÞÑÕ ããÊÏ DZLWSFG  · ÊäÇæá ÇáÏæÇÁ æÝÞÇ OYFSNPOKR. ÞÏ Êßæä åäÇß ÍÇÌÉ Åáì ÊÛííÑ ÌÑÚÊß ÚÏÉ ãÑÇÊ ááæÕæá Åáì ÇáÌÑÚÉ ÇáãËáì áß. · ÞÏ ÊÍÊÇÌ Åáì ÊäÇæá Wellbutrin® áãÏÉ ÊÕá Åáì 4 ÃÓÇÈíÚ ÞÈá Ãä ÊÔÚÑ ÈÊÍÓä. æÞÏ ÊÍÊÇÌ Åáì ÊäÇæá Zyban®? áãÏÉ ÊÊÑÇæÍ ãä ÃÓÈæÚ æÇÍÏ Åáì ÃÓÈæÚíä ÞÈá ÇáÊÇÑíÎ ÇáÐí ÎØØÊ Ýíå ááÊæÞÝ Úä ÇáÊÏÎíä. · íÌÈ ÇÈÊáÇÚ ÇáÞÑÕ ßÇãáÇð. áÇ ÊÞã ÈßÓÑå Ãæ ÓÍÞå Ãæ ÊÞÓíãå Ãæ ãÖÛå. · íÝÖá ÊäÇæá Aplenzin®? Ýí ÇáÕÈÇÍ. · áÇ ÊÊäÇæá Wellbutrin® Ãæ Zyban® MYDW ÝÊÑÉ ÞÕíÑÉ ÞÈá Çáäæã ÅÐÇ ßäÊ ÊÚÇäì ãä ÕÚæÈÇÊ Ýì Çáäæã. · íãßäß ÊäÇæá åÐÇ ÇáÏæÇÁ ãÚ ÇáØÚÇã Ãæ ÈÏæäå. ÅÐÇ ßäÊ ÊÚÇäì ãä ÇáÛËíÇä ÝÞÏ íÝíÏ ÊäÇæá ÇáÏæÇÁ ãÚ ÇáØÚÇã. · ÅÐÇ RHFIPT ÃÞÑÇÕðÇ ããÊÏÉ BHQXYRJ¡ ÞÏ íäÊÞá ÌÒÁ ãä ÇáÞÑÕ Åáì ÈÑÇÒß. æåÐÇ ÃãÑ ØÈíÚí æáÇ íÏÚæ Åáì ÇáÞáÞ. · íÌÈ Ãä íÃÊí åÐÇ ÇáÏæÇÁ ãÚ Ïáíá ÅÑÔÇÏÇÊ ÇáÏæÇÁ. æÇØáÈ ãä ÇáÕíÏáí äÓÎÉ ãäå ÅÐÇ áã íßä íÊæÝÑ áÏíß. · ÇáÌÑÚÉ ÇáÝÇÆÊÉ: Þã ÈÊÌÇæÒ ÇáÌÑÚÉ ÇáÝÇÆÊÉ æÇáÚæÏÉ Åáì ÌÏæá ÌÑÚÊß ÇáãÚÊÇÏÉ. áÇ ÊÊäÇæá ÇáãÒíÏ ãä ÇáÏæÇÁ áÊÚæíÖ ÌÑÚÉ ãÝÞæÏÉ. · ÇÍÊÝÙ ÈÇáÏæÇÁ Ýí ÍÇæíÉ ãÛáÞÉ Ýí ÏÑÌÉ ÍÑÇÑÉ ÇáÛÑÝÉ¡ ÈÚíÏðÇ Úä ÇáÍÑÇÑÉ¡ æÇáÑØæÈÉ¡ æÇáÖæÁ ÇáãÈÇÔÑ. ÇáÚÞÇÞíÑ æÇáÃÛÐíÉ ÇáæÇÌÈ ÊÌäÈåÇ:   ÇÓÊÔíÑ ØÈíÈß Ãæ ÇáÕíÏáí áÏíß ÞÈá ÇÓÊÎÏÇã Ãí ÏæÇÁ ÂÎÑ¡ æíÊÖãä Ðáß ÇáÃÏæíÉ ÇáãÊÇÍÉ Ïæä æÕÝÉ¡ æÇáÝíÊÇãíäÇÊ¡ æãäÊÌÇÊ ÇáÃÚÔÇÈ. · áÇ ÊÓÊÎÏã åÐÇ ÇáÏæÇÁ æãËÈØ ÃßÓíÏÇÒ ÃÍÇÏí ÇáÃãíä (MAOI) ãËá áíäíÒæáíÏ¡ Ãæ ÍÞä ÇáãíËáíä ÇáÃÒÑÞ Ýí ÎáÇá 14 íæãðÇ ãä ÊÇÑíÎ ÇÓÊÎÏÇã ÃÍÏ åÐå ÇáÃÏæíÉ. áÇ ÊÓÊÎÏã Zyban® ááÅÞáÇÚ Úä ÇáÊÏÎíä ÅÐÇ ßäÊ LRFRYH ÈÇáÝÚá Aplenzin®?  Ãæ Wellbutrin® MZCKL LGPKLYPM¡ äÙÑðÇ áÃäåãÇ ãä ÇáÏæÇÁ äÝÓå. · íãßä Ãä ÊÄËÑ ÈÚÖ ÇáÃÏæíÉ Úáì ãÏì ãÝÚæá ÈæÈÑæÈíæä. ÃÎÈÑ ØÈíÈß ÅÐÇ ßäÊ ÊÓÊÎÏã ÇáÃÏæíÉ ÇáÊÇáíÉ:   ¨ ÃãÇäÊÇÏíä (amantadine)¡ ÓíãíÊíÏíä (cimetidine)¡ ßáæÈíÏæÌÑíá (clopidogrel)¡ ÓíßáæÝæÓÝÇãíÏ (cyclophosphamide)¡ áíÝæÏæÈÇ (levodopa)¡ áÕÞÉ ÇáäíßæÊíä¡ ÃæÑÝíäÇÏÑíä (orphenadrine)¡ ÊÇãæßÓíÝíä (tamoxifen)¡ ËíæÝíáíä (theophylline)¡ ËíæÊíÈÇ (thiotepa)¡ ÊíßáæÈíÏíä (ticlopidine)  ¨ ãÍÕÑÇÊ ÇáÈíÊÇ (ãËá IMTLHSARWP)¡ Ãæ ÇáÅäÓæáíä Ãæ ÏæÇÁ ÇáÓßÑí ÇáÐí GGFRBQS Úä ØÑíÞ ÇáÝã¡ Ãæ ÏæÇÁ áÚáÇÌ ãÔßáÇÊ ÈÅíÞÇÚ ÇáÞáÈ (ÈÑæÈÇÝíäæä æÝáíßÇíäíÏ)¡ Ãæ ÏæÇÁ áÚáÇÌ ÝíÑæÓ ÇáÚæÒ ÇáãäÇÚí ÇáÈÔÑí (HIV) Ãæ ÇáÅíÏÒ (ÅíÝÇÝíÑíäÒ¡ áæÈíäÇÝíÑ¡ äíáÝíäÇÝíÑ¡ ÑíÊæäÇÝíÑ)¡ Ãæ ÏæÇÁ áÚáÇÌ ÇáäæÈÇÊ (ßÑÈÇãÇÒíÈíä¡ ÝíäæÈÇÑÈíÊÇá¡ ÝäíÊæíä)¡ Ãæ ÃÏæíÉ ÃÎÑì áÚáÇÌ ÇáÇßÊÆÇÈ (ÏíÓíÈÑÇãíä¡ ÝáæßÓÊíä¡ ÅíãíÈÑÇãíä¡ äæÑÊÑíÈÊíáíä¡ ÈÇÑæßÓíÊíä¡ ÓíÑÊÑÇáíä)¡ Ãæ ÃÏæíÉ áÚáÇÌ ÇáãÑÖ ÇáÚÞáí (åÇáæÈíÑíÏæá¡ ÑíÓÈíÑíÏæä¡ ËíæÑíÏÇÒíä)¡ Ãæ ÃÏæíÉ ÓÊíÑæíÏíÉ (åíÏÑæßæÊíÒæä¡ ãíËíá ÇáÈÑíÏäíÒæáæä¡ ÇáÈÑíÏäíÒæä¡ ÇáÈÑíÏäíÒæáæä¡ ÏíßÓÇãíÊÇÒæä) Ãæ ÏæÇÁ ãÇäÚ ááÊÎËÑ  · Þáá ãä ÊäÇæá WFKOJKEJZ Ãæ áÇ HDSYGD TTWCNQ ãØáÞÇð ÃËäÇÁ QQUXVPB åÐÇ ÇáÏæÇÁ. WXUTXFW ÃËäÇÁ GEXFMTA åÐÇ ÇáÏæÇÁ:   · ÃÎÈÑí ÇáØÈíÈ ÈÃäß ÍÇãá Ãæ ÊÑÖÚíä ØÝáß ÑÖÇÚÉ ØÈíÚíÉ¡ ßãÇ íÌÈ Úáì ÇáãÑÖì ÚÇãÉð ÅÎÈÇÑ ÇáØÈíÈ Ýí ÍÇáÉ ÇáÅÕÇÈÉ ÈãÑÖ JQTJPE Ãæ ÈÇáßÈÏ Ãæ ÇáÓßÑí ÃæÇáãíÇå ÇáÒÑÞÇÁ Ãæ ÈãÑÖ RDLJKC¡ Ãæ Ýí ÍÇáÉ ÇÑÊÝÇÚ ÖÛØ ÇáÏã  · ÃÎÈÑ ØÈíÈß ÅÐÇ ßäÊ TJOKCK ÇáÈÇÑÈíæÑÇÊ (barbiturates)¡ Ãæ ÇáÈäÒæÏíÇÒíÈíäÇÊ (benzodiazepines)¡ Ãæ ÃÏæíÉ ãÖÇÏÉ PHUREBD¡ Ãæ ãåÏÆÇÊ¡ Ãæ Ýí ÍÇáÉ ÊæÞÝß Úä ÊäÇæá åÐå ÇáÃÏæíÉ ãÄÎÑðÇ. ÃÎÈÑ ØÈíÈß ÅÐÇ ßÇä áÏíß ÊÇÑíÎ ãÑÖí áÅÏãÇä OHAMCVFP¡ Ãæ ÅÐÇ ßäÊ EQPOSM JEXJNGDJS. · ÞÏ íÒíÏ åÐÇ ÇáÏæÇÁ ãä RXNFDRYR ÇáÚÞáíÉ Ãæ ÇáäÝÓíÉ ÚäÏ ÈÚÖ GCGFVLT¡ VSYGEANUHJ¡ æÇáÔÈÇÈ. æÞÏ íÄÏí Ðáß Åáì ÇáÊÝßíÑ Ýí CMZXJDOW Ãæ ÇáÚäÝ. ÃÎÈÑ ØÈíÈß Úáì ÇáÝæÑ ÅÐÇ ßÇä áÏíß ÃÝßÇÑ Ãæ ÊÛíÑÇÊ ÓáæßíÉ ÊãËá ãÔßáÇÊ áß. ÃÎÈÑ ØÈíÈß ÅÐÇ ßÇä áÏíß Ãæ áÏì Ãí ÝÑÏ ãä ÃÝÑÇÏ ÚÇÆáÊß ÊÇÑíÎ ÈÇÖØÑÇÈ ËäÇÆí ÇáÞØÈ Ãæ HAJHCKD ÇäÊÍÇÑ.   · ÞÏ Buddy Enter åÐÇ ÇáÏæÇÁ LDJZCDWG ÇáÊÇáíÉ:  ¨ ÃßËÑ ÚÑÖÉ ááäæÈÇÊ  ¨ ÊÛíÑÇÊ ãÒÇÌíÉ æÓáæßíÉ  ¨ ÇÑÊÝÇÚ ÖÛØ ÇáÏã  ¨ ÍÓÇÓíÉ ÎØíÑÉ ÈÇáÌáÏ  · ÞÏ íÌÚáß åÐÇ ÇáÏæÇÁ ÊÔÚÑ DZZYEXO Ãæ ÇáäÚÇÓ. áÇ ÊÞÏ ÇáÓíÇÑÉ æáÇ ÊÞã ÈÃí ÔíÁ íãßä Ãä íßæä ÎØÑðÇ Åáì Ãä ÊÚÑÝ ßíÝíÉ ÊÃËíÑ åÐÇ ÇáÏæÇÁ Úáíß. · íõÚÊÈÑ Zyban® ÌÒÁðÇ ÝÞØ ãä ÈÑäÇãÌ ßÇãá íÓÇÚÏß Úáì ÇáÅÞáÇÚ Úä ÇáÊÏÎíä. ÞÏ áÇ ÊÒÇá áÏíß ÑÛÈÉ Ýí ÇáÊÏÎíä Ýí ÈÚÖ ÇáÃÍíÇä. ÖÚ ÎØÉ OWDZHOY ãÚ åÐå ÇáãæÇÞÝ. · áÇ ÊÊæÞÝ Úä ÇÓÊÎÏÇã åÐÇ ÇáÏæÇÁ ÈÕæÑÉ ãÝÇÌÆÉ. ÓíÍÊÇÌ ØÈíÈß Åáì ÊÞáíá ÌÑÚÊß ÊÏÑíÌíÇ ÞÈá ÇáÊæÞÝ ÊãÇãðÇ. · ÃÎÈÑ Ãí ØÈíÈ Ãæ ØÈíÈ ÃÓäÇä íÚÇáÌß ÈÃäß ÊÓÊÎÏã åÐÇ ÇáÏæÇÁ. ÞÏ íÄËÑ åÐÇ ÇáÏæÇÁ Úáì äÊÇÆÌ ÈÚÖ ÇáÝÍæÕÇÊ ÇáØÈíÉ. · ÓíÞæã ØÈíÈß ÈÝÍÕ ÊÞÏãß æÊÃËíÑÇÊ åÐÇ ÇáÏæÇÁ Ýí ÒíÇÑÇÊ ãäÊÙãÉ. ÍÇÝÙ Úáì ÌãíÚ ÇáãæÇÚíÏ. · ÇÍÊÝÙ ÈÌãíÚ ÇáÃÏæíÉ ÈÚíÏðÇ Úä WOQJZP ÇáÃØÝÇá. áÇ ÊÊÔÇÑß Ýí ÏæÇÆß ÃÈÏðÇ ãÚ Ãí ÔÎÕ. ÇáÂËÇÑ ÇáÌÇäÈíÉ CLHSWNYP ÃËäÇÁ ÇÓÊÎÏÇã åÐÇ ÇáÏæÇÁ:   ÇÊÕá ÈØÈíÈß Úáì ÇáÝæÑ ÅÐÇ áÇÍÙÊ ÃíðÇ ãä ÇáÂËÇÑ ÇáÌÇäÈíÉ ÇáÊÇáíÉ:  · ÇáÍÓÇÓíÉ: KLOME¡ Ãæ ÇáÔÑì¡ Ãæ ÊæÑã ÈæÌåß¡ Ãæ íÏíß¡ Ãæ ÊæÑã Ãæ æÎÒ ÈÝãß¡ Ãæ ÍáÞß¡ Ãæ ÖíÞ ÈÇáÕÏÑ¡ Ãæ ÕÚæÈÉ Ýí ÇáÊäÝÓ. · Êßæä ÈËæÑ Ãæ ÇáÊÞÔÑ Ãæ ØÝÍ ÌáÏí ÃÍãÑ  · Ãáã Ýí ÇáÕÏÑ¡ Ãæ ãÔßáÇÊ Ýí ÇáÊäÝÓ¡ Ãæ äÈÖÇÊ ÞáÈ ÓÑíÚÉ Ãæ ÈØíÆÉ Ãæ ÛíÑ ãäÊÙãÉ  · Ãáã Ýí CYBPFXT Ãæ YJQMSQT¡ Íãì ãÚ ØÝÍ ÌáÏí  · ÑÄíÉ Ãæ ÓãÇÚ ÃÔíÇÁ ÛíÑ ãæÌæÏÉ¡ ÇáÔÚæÑ ÈÃä ÇáÃÔÎÇÕ ÇáÂÎÑíä ÖÏß  · ÇáäæÈÇÊ Ãæ ÇáÑÌÝÇÊ  · ÒíÇÏÉ ãÝÇÌÆÉ Ýí ÇáØÇÞÉ æÃÝßÇÑ ãÊáÇÍÞÉ æÕÚæÈÉ Ýí Çáäæã  · ÇáÊÝßíÑ Ýí ÅíÐÇÁ ÇáäÝÓ¡ ÊÝÇÞã ÇáÇßÊÆÇÈ¡ åíÇÌ Ãæ ÇÑÊÈÇß ÔÏíÏíä  ÇÓÊÔíÑí ØÈíÈß ÅÐÇ áÇÍÙÊ ÇáÂËÇÑ ÇáÌÇäÈíÉ ÇáÊÇáíÉ ÇáÃÞá ÎØæÑÉ:   · ÌÝÇÝ ÇáÝã  · Ãáã ÈÇáÚíä¡ ÊÛíÑÇÊ ÈÇáÑÄíÉ¡ ãÔÇåÏÉ åÇáÇÊ Íæá ÇáÃÖæÇÁ  · ÇáÕÏÇÚ Ãæ ÇáÏæÇÑ  · ÇáÔÚæÑ ÈÇáÛËíÇä Ãæ ÇáÞíÁ Ãæ ÇáÅãÓÇß Ãæ PSNHPBD Ãæ ÇáÛÇÒÇÊ Ãæ Ãáã ÈÇáãÚÏÉ  · ÒíÇÏÉ Ãæ ÝÞÏÇä ÇáæÒä  ÃÎÈÑ ØÈíÈß ÅÐÇ áÇÍÙÊ ÂËÇÑðÇ ÌÇäÈíÉ ÃÎÑì ÊÚÊÞÏ ÃäåÇ äÇÊÌÉ Úä åÐÇ ÇáÏæÇÁ. ÇÊÕá ÈØÈíÈß ááÍÕæá Úáì ÇÓÊÔÇÑÉ ØÈíÉ Íæá ÇáÂËÇÑ ÇáÌÇäÈíÉ.  íãßä Ãä ÊÈáÛ Úä åÐå ÇáÂËÇÑ ÇáÌÇäÈíÉ Åáì ÅÏÇÑÉ ÇáÃÛÐíÉ æÇáÃÏæíÉ ÇáÃãÑíßíÉ Úáì [phone]  © 2017 [address] Information is for End User's use only and may not be sold, redistributed or otherwise used for commercial purposes. The above information is an  only. It is not intended as medical advice for individual conditions or treatments. Talk to your doctor, nurse or pharmacist before following any medical regimen to see if it is safe and effective for you. Bupropion (By mouth)   Bupropion ([id number]-on)  Treats depression and aids in quitting smoking. Also prevents depression caused by seasonal affective disorder (SAD). Brand Name(s): Aplenzin, Forfivo XL, Wellbutrin, Wellbutrin SR, Wellbutrin XL, Zyban   There may be other brand names for this medicine. When This Medicine Should Not Be Used: This medicine is not right for everyone. Do not use it if you had an allergic reaction to bupropion, or if you have seizures, anorexia, or bulimia. How to Use This Medicine:   Tablet, Long Acting Tablet  · Take your medicine as directed. Your dose may need to be changed several times to find what works best for you. · You may need to take Wellbutrin® for up to 4 weeks before you feel better. You may need to take Zyban® for 1 to 2 weeks before the date that you plan to stop smoking. · Swallow the tablet whole. Do not break, crush, divide, or chew it. · It is best to take Aplenzin® in the morning. · Do not take Wellbutrin® or Zyban® close to bedtime if you have trouble sleeping. · You may take this medicine with or without food. If you have nausea, it may help to take it with food. · If you take the extended-release tablet, part of the tablet may pass into your stools. This is normal and is nothing to worry about. · This medicine should come with a Medication Guide. Ask your pharmacist for a copy if you do not have one. · Missed dose: Skip the missed dose and go back to your regular dosing schedule. Never take extra medicine to make up for a missed dose.   · Store the medicine in a closed container at room temperature, away from heat, moisture, and direct light. Drugs and Foods to Avoid:   Ask your doctor or pharmacist before using any other medicine, including over-the-counter medicines, vitamins, and herbal products. · Do not use this medicine and an MAO inhibitor (MAOI), such as linezolid or methylene blue injection, within 14 days of each other. Do not use Zyban® to quit smoking if you already take Aplenzin® or Wellbutrin® for depression, because they are the same medicine. · Some medicines can affect how bupropion works. Tell your doctor if you are using the following:   ¨ Amantadine, cimetidine, clopidogrel, cyclophosphamide, levodopa, nicotine patch, orphenadrine, tamoxifen, theophylline, thiotepa, ticlopidine  ¨ Beta blocker (such as metoprolol), insulin or diabetes medicine that you take by mouth, medicine for heart rhythm problems (propafenone, flecainide), medicine to treat HIV or AIDS (efavirenz, lopinavir, nelfinavir, ritonavir), medicine for seizures (carbamazepine, phenobarbital, phenytoin), other medicine for depression (desipramine, fluoxetine, imipramine, nortriptyline, paroxetine, sertraline), medicine to treat mental illness (haloperidol, risperidone, thioridazine), steroid medicine (hydrocortisone, methylprednisolone, prednisone, prednisolone, dexamethasone), or a blood thinner  · Limit alcohol, or do not drink alcohol at all while you are using this medicine. Warnings While Using This Medicine:   · Tell your doctor if you are pregnant or breastfeeding, or if you have kidney disease, liver disease, diabetes, glaucoma, heart disease, or high blood pressure. · Tell your doctor if you take barbiturates, benzodiazepines, antiseizure medicine, or sedatives, or if you recently stopped taking them. Tell your doctor if you have a history of drug addiction, or if you drink alcohol. · For some children, teenagers, and young adults, this medicine may increase mental or emotional problems. This may lead to thoughts of suicide and violence. Talk with your doctor right away if you have any thoughts or behavior changes that concern you. Tell your doctor if you or anyone in your family has a history of bipolar disorder or suicide attempts. · This medicine may cause the following problems:  ¨ An increased risk of seizures  ¨ Changes in mood or behavior  ¨ High blood pressure  ¨ Serious skin reactions  · This medicine may make you dizzy or drowsy. Do not drive or do anything that could be dangerous until you know how this medicine affects you. · Zyban® is only part of a complete program to help you quit smoking. You may still want to smoke at times. Have a plan to cope with these situations. · Do not stop using this medicine suddenly. Your doctor will need to slowly decrease your dose before you stop it completely. · Tell any doctor or dentist who treats you that you are using this medicine. This medicine may affect certain medical test results. · Your doctor will check your progress and the effects of this medicine at regular visits. Keep all appointments. · Keep all medicine out of the reach of children. Never share your medicine with anyone.   Possible Side Effects While Using This Medicine:   Call your doctor right away if you notice any of these side effects:  · Allergic reaction: Itching or hives, swelling in your face or hands, swelling or tingling in your mouth or throat, chest tightness, trouble breathing  · Blistering, peeling, or red skin rash  · Chest pain, trouble breathing, fast, slow, or uneven heartbeat  · Muscle or joint pain, fever with rash  · Seeing or hearing things that are not there, feeling like people are against you  · Seizures or tremors  · Sudden increase in energy, racing thoughts, trouble sleeping  · Thoughts of hurting yourself, worsening depression, severe agitation or confusion  If you notice these less serious side effects, talk with your doctor:   · Dry mouth  · Eye pain, vision changes, seeing halos around lights  · Headache or dizziness  · Nausea, vomiting, constipation, diarrhea, gas, stomach pain  · Weight gain or loss  If you notice other side effects that you think are caused by this medicine, tell your doctor. Call your doctor for medical advice about side effects. You may report side effects to FDA at 5-097-FDA-2900  © 2017 [address] Information is for End User's use only and may not be sold, redistributed or otherwise used for commercial purposes. The above information is an  only. It is not intended as medical advice for individual conditions or treatments. Talk to your doctor, nurse or pharmacist before following any medical regimen to see if it is safe and effective for you. Bipolar Disorder: Care Instructions  Your Care Instructions  Bipolar disorder is an illness that causes extreme mood changes, from times of very high energy (manic episodes) to times of depression. But many people with bipolar disorder show only the symptoms of depression. These moods may cause problems with your work, school, family life, friendships, and how well you function. This disease is also called manic-depression. There is no cure for bipolar disorder, but it can be helped with medicines. Counseling may also help. It is important to take your medicines exactly as prescribed, even when you feel well. You may need lifelong treatment. Follow-up care is a key part of your treatment and safety. Be sure to make and go to all appointments, and call your doctor if you are having problems. It's also a good idea to know your test results and keep a list of the medicines you take. How can you care for yourself at home? · Be safe with medicines. Take your medicines exactly as prescribed. Do not stop or change a medicine without talking to your doctor first. Dilan Enrique and your doctor may need to try different combinations of medicines to find what works for you.   · Take your medicines on schedule to keep your moods even. When you feel good, you may think that you do not need your medicines. But it is important to keep taking them. · Go to your counseling sessions. Call and talk with your counselor if you can't go to a session or if you don't think the sessions are helping. Do not just stop going. · Get at least 30 minutes of activity on most days of the week. Walking is a good choice. You also may want to do other things, such as running, swimming, or cycling. · Get enough sleep. Keep your room dark and quiet. Try to go to bed at the same time every night. · Eat a healthy diet. This includes whole grains, dairy, fruits, vegetables, and protein. Eat foods from each of these groups. · Try to lower your stress. Manage your time, build a strong support system, and lead a healthy lifestyle. To lower your stress, try physical activity, slow deep breathing, or getting a massage. · Do not use alcohol or illegal drugs. · Learn the early signs of your mood changes. You can then take steps to help yourself feel better. · Ask for help from friends and family when you need it. You may need help with daily chores when you are depressed. When you are manic, you may need support to control your high energy levels. What should you do if someone in your family has bipolar disorder? · Learn about the disease and the signs that it is getting worse. · Remind your family member that you love him or her. · Make a plan with all family members about how to take care of your loved one when his or her symptoms are bad. · Talk about your fears and concerns and those of other family members. Seek counseling if needed. · Do not focus attention only on the person who is in treatment. · Remind yourself that it will take time for changes to occur. · Do not blame yourself for the disease. · Know your legal rights and the legal rights of your family member. Support groups or counselors can help you with this information.   · Take care of yourself. Keep up with your own interests, such as your career, hobbies, and friends. Use exercise, positive self-talk, deep breathing, and other relaxing exercises to help lower your stress. · Give yourself time to grieve. You may need to deal with emotions such as anger, fear, and frustration. After you work through your feelings, you will be better able to care for yourself and your family. · If you are having a hard time with your feelings or with your relationship with your family member, talk with a counselor. When should you call for help? Call 911 anytime you think you may need emergency care. For example, call if:  · You feel like hurting yourself or someone else. · Someone who has bipolar disorder displays dangerous behavior, and you think the person might hurt himself or herself or someone else. Call your doctor now or seek immediate medical care if:  · You hear voices. · Someone you know has bipolar disorder and talks about suicide. Keep the numbers for these national suicide hotlines: 4-960-467-TALK (5-756-575-364.404.5590) and 7-631-FEABNGB (2-692.882.2691). If a suicide threat seems real, with a specific plan and a way to carry it out, stay with the person, or ask someone you trust to stay with the person, until you can get help. · Someone you know has bipolar disorder and:  ¨ Starts to give away possessions. ¨ Is using illegal drugs or drinking alcohol heavily. ¨ Talks or writes about death, including writing suicide notes or talking about guns, knives, or pills. ¨ Talks or writes about hurting someone else. ¨ Starts to spend a lot of time alone. ¨ Acts very aggressively or suddenly appears calm. ¨ Talks about beliefs that are not based in reality (delusions). Watch closely for changes in your health, and be sure to contact your doctor if:  · You cannot go to your counseling sessions. Where can you learn more? Go to http://shima-alyssa.info/.   Enter K052 in the search box to learn more about \"Bipolar Disorder: Care Instructions. \"  Current as of: July 26, 2016  Content Version: 11.2  © 6607-1304 [employer]. Care instructions adapted under license by [employer] (which disclaims liability or warranty for this information). If you have questions about a medical condition or this instruction, always ask your healthcare professional. [address] any warranty or liability for your use of this information. Back Pain: Care Instructions  Your Care Instructions    Back pain has many possible causes. It is often related to problems with muscles and ligaments of the back. It may also be related to problems with the nerves, discs, or bones of the back. Moving, lifting, standing, sitting, or sleeping in an awkward way can strain the back. Sometimes you don't notice the injury until later. Arthritis is another common cause of back pain. Although it may hurt a lot, back pain usually improves on its own within several weeks. Most people recover in 12 weeks or less. Using good home treatment and being careful not to stress your back can help you feel better sooner. Follow-up care is a key part of your treatment and safety. Be sure to make and go to all appointments, and call your doctor if you are having problems. Its also a good idea to know your test results and keep a list of the medicines you take. How can you care for yourself at home? · Sit or lie in positions that are most comfortable and reduce your pain. Try one of these positions when you lie down:  ¨ Lie on your back with your knees bent and supported by large pillows. ¨ Lie on the floor with your legs on the seat of a sofa or chair. [place]avian Greet on your side with your knees and hips bent and a pillow between your legs. ¨ Lie on your stomach if it does not make pain worse. · Do not sit up in bed, and avoid soft couches and twisted positions.  Bed rest can help relieve pain at first, but it delays healing. Avoid bed rest after the first day of back pain. · Change positions every 30 minutes. If you must sit for long periods of time, take breaks from sitting. Get up and walk around, or lie in a comfortable position. · Try using a heating pad on a low or medium setting for 15 to 20 minutes every 2 or 3 hours. Try a warm shower in place of one session with the heating pad. · You can also try an ice pack for 10 to 15 minutes every 2 to 3 hours. Put a thin cloth between the ice pack and your skin. · Take pain medicines exactly as directed. ¨ If the doctor gave you a prescription medicine for pain, take it as prescribed. ¨ If you are not taking a prescription pain medicine, ask your doctor if you can take an over-the-counter medicine. · Take short walks several times a day. You can start with 5 to 10 minutes, 3 or 4 times a day, and work up to longer walks. Walk on level surfaces and avoid hills and stairs until your back is better. · Return to work and other activities as soon as you can. Continued rest without activity is usually not good for your back. · To prevent future back pain, do exercises to stretch and strengthen your back and stomach. Learn how to use good posture, safe lifting techniques, and proper body mechanics. When should you call for help? Call your doctor now or seek immediate medical care if:  · You have new or worsening numbness in your legs. · You have new or worsening weakness in your legs. (This could make it hard to stand up.)  · You lose control of your bladder or bowels. Watch closely for changes in your health, and be sure to contact your doctor if:  · Your pain gets worse. · You are not getting better after 2 weeks. Where can you learn more? Go to http://shima-alyssa.info/. Enter K413 in the search box to learn more about \"Back Pain: Care Instructions. \"  Current as of: May 23, 2016  Content Version: 11.2  © 1584-5755 [employer]. Care instructions adapted under license by [employer] (which disclaims liability or warranty for this information). If you have questions about a medical condition or this instruction, always ask your healthcare professional. [address] any warranty or liability for your use of this information.

## 2017-05-22 ENCOUNTER — OFFICE VISIT (OUTPATIENT)
Dept: FAMILY MEDICINE CLINIC | Age: 52
End: 2017-05-22

## 2017-05-22 VITALS
TEMPERATURE: 98.1 F | OXYGEN SATURATION: 96 % | HEIGHT: 61 IN | HEART RATE: 83 BPM | SYSTOLIC BLOOD PRESSURE: 144 MMHG | BODY MASS INDEX: 55.32 KG/M2 | WEIGHT: 293 LBS | DIASTOLIC BLOOD PRESSURE: 82 MMHG | RESPIRATION RATE: 16 BRPM

## 2017-05-22 DIAGNOSIS — Z79.899 ENCOUNTER FOR LONG-TERM (CURRENT) USE OF MEDICATIONS: ICD-10-CM

## 2017-05-22 DIAGNOSIS — M54.50 ACUTE LOW BACK PAIN WITHOUT SCIATICA, UNSPECIFIED BACK PAIN LATERALITY: ICD-10-CM

## 2017-05-22 DIAGNOSIS — F17.200 SMOKER: ICD-10-CM

## 2017-05-22 DIAGNOSIS — F32.2 SEVERE SINGLE CURRENT EPISODE OF MAJOR DEPRESSIVE DISORDER, WITHOUT PSYCHOTIC FEATURES (HCC): Primary | ICD-10-CM

## 2017-05-22 DIAGNOSIS — M79.89 LEG SWELLING: ICD-10-CM

## 2017-05-22 RX ORDER — BUPROPION HYDROCHLORIDE 100 MG/1
100 TABLET ORAL 3 TIMES DAILY
Qty: 90 TAB | Refills: 0 | Status: SHIPPED | OUTPATIENT
Start: 2017-05-22 | End: 2017-09-01 | Stop reason: SDUPTHER

## 2017-05-22 RX ORDER — NAPROXEN 500 MG/1
500 TABLET ORAL 2 TIMES DAILY WITH MEALS
Qty: 30 TAB | Refills: 0 | Status: SHIPPED | OUTPATIENT
Start: 2017-05-22 | End: 2017-09-18

## 2017-05-22 RX ORDER — FUROSEMIDE 20 MG/1
20 TABLET ORAL 2 TIMES DAILY
Qty: 60 TAB | Refills: 2 | Status: SHIPPED | OUTPATIENT
Start: 2017-05-22 | End: 2017-09-01 | Stop reason: SDUPTHER

## 2017-05-22 NOTE — PATIENT INSTRUCTIONS
[place] 211. Eating Healthy Foods: Care Instructions  Your Care Instructions  Eating healthy foods can help lower your risk for disease. Healthy food gives you energy and keeps your heart strong, your brain active, your muscles working, and your bones strong. A healthy diet includes a variety of foods from the basic food groups: grains, vegetables, fruits, milk and milk products, and meat and beans. Some people may eat more of their favorite foods from only one food group and, as a result, miss getting the nutrients they need. So, it is important to pay attention not only to what you eat but also to what you are missing from your diet. You can eat a healthy, balanced diet by making a few small changes. Follow-up care is a key part of your treatment and safety. Be sure to make and go to all appointments, and call your doctor if you are having problems. Its also a good idea to know your test results and keep a list of the medicines you take. How can you care for yourself at home? Look at what you eat  · Keep a food diary for a week or two and record everything you eat or drink. Track the number of servings you eat from each food group. · For a balanced diet every day, eat a variety of:  ¨ 6 or more ounce-equivalents of grains, such as cereals, breads, crackers, rice, or pasta, every day. An ounce-equivalent is 1 slice of bread, 1 cup of ready-to-eat cereal, or ½ cup of cooked rice, cooked pasta, or cooked cereal.  ¨ 2½ cups of vegetables, especially:  § Dark-green vegetables such as broccoli and spinach. § Orange vegetables such as carrots and sweet potatoes. § Dry beans (such as lny and kidney beans) and peas (such as lentils). ¨ 2 cups of fresh, frozen, or canned fruit. A small apple or 1 banana or orange equals 1 cup. ¨ 3 cups of nonfat or low-fat milk, yogurt, or other milk products. ¨ 5½ ounces of meat and beans, such as chicken, fish, lean meat, beans, nuts, and seeds.  One egg, 1 tablespoon of peanut butter, ½ ounce nuts or seeds, or ¼ cup of cooked beans equals 1 ounce of meat. · Learn how to read food labels for serving sizes and ingredients. Fast-food and convenience-food meals often contain few or no fruits or vegetables. Make sure you eat some fruits and vegetables to make the meal more nutritious. · Look at your food diary. For each food group, add up what you have eaten and then divide the total by the number of days. This will give you an idea of how much you are eating from each food group. See if you can find some ways to change your diet to make it more healthy. Start small  · Do not try to make dramatic changes to your diet all at once. You might feel that you are missing out on your favorite foods and then be more likely to fail. · Start slowly, and gradually change your habits. Try some of the following:  ¨ Use whole wheat bread instead of white bread. ¨ Use nonfat or low-fat milk instead of whole milk. ¨ Eat brown rice instead of white rice, and eat whole wheat pasta instead of white-flour pasta. ¨ Try low-fat cheeses and low-fat yogurt. ¨ Add more fruits and vegetables to meals and have them for snacks. ¨ Add lettuce, tomato, cucumber, and onion to sandwiches. ¨ Add fruit to yogurt and cereal.  Enjoy food  · You can still eat your favorite foods. You just may need to eat less of them. If your favorite foods are high in fat, salt, and sugar, limit how often you eat them, but do not cut them out entirely. · Eat a wide variety of foods. Make healthy choices when eating out  · The type of restaurant you choose can help you make healthy choices. Even fast-food chains are now offering more low-fat or healthier choices on the menu. · Choose smaller portions, or take half of your meal home. · When eating out, try:  ¨ A veggie pizza with a whole wheat crust or grilled chicken (instead of sausage or pepperoni).   ¨ Pasta with roasted vegetables, grilled chicken, or marinara sauce instead of cream sauce. ¨ A vegetable wrap or grilled chicken wrap. ¨ Broiled or poached food instead of fried or breaded items. Make healthy choices easy  · Buy packaged, prewashed, ready-to-eat fresh vegetables and fruits, such as baby carrots, salad mixes, and chopped or shredded broccoli and cauliflower. · Buy packaged, presliced fruits, such as melon or pineapple. · Choose 100% fruit or vegetable juice instead of soda. Limit juice intake to 4 to 6 oz (½ to ¾ cup) a day. · Blend low-fat yogurt, fruit juice, and canned or frozen fruit to make a smoothie for breakfast or a snack. Where can you learn more? Go to http://shima-alyssa.info/. Enter T756 in the search box to learn more about \"Eating Healthy Foods: Care Instructions. \"  Current as of: November 20, 2015  Content Version: 11.2  © 5481-1302 [employer]. Care instructions adapted under license by [employer] (which disclaims liability or warranty for this information). If you have questions about a medical condition or this instruction, always ask your healthcare professional. [place] [address] any warranty or liability for your use of this information.

## 2017-05-22 NOTE — PROGRESS NOTES
Subjective  Pt is Icelandic speaking.     Oneal Quezada is an 54 y.o. female who presents for follow-up concerning back pain, depression, lymphedema.     Back pain: Pt states that Naproxen has helped. She has been attempting to exercises. She is only taking medication as needed. Today there a minor back pain on the lower back, mostly on right yet minor discomfort on the left. Worse when she changes position. No trauma or falls. Denies loss of bladder or bowls, inner thigh sensation loss.      Depressed: Pt started Wellbutrin last visit, 5/5 as it was concluded that she was depressed 2/2 to her parents passing away in 2016. She lost interest in many things, easily cries, not taking care of herself resulting in 40 +lbs weight gain. Pt noted that when she started to take the medication, she was more emotional. However last week she was at baseline, taking 100 mg BID. Today she states that the medication has not done anything except for making her mouth dry. She does not mind the dry mouth as it is making her drink more, 5 cups of water from her baseline 2 cups of water a day.      Lymphedema: Pt taking 20 mg BID. She states that she ran out of medication on Friday, 5/19. She states that she has pin and needle sensation on bilateral medial calves. Also not having medication has made her use 3 pillows rather her 1 pillow she used to use last week as she feels the extra fluids making her breathing harder.     Chart review:  -ECHO in 3/2016 normal (EF 65%). Refused CPAP or sleep study as she can not afford. Unable to tolerate compression stocking secondary to pain despite having them properly fitted. Too heavy to exercise and knee pain. Does not use salt anymore in diet. No elevation of feet at home.      -Physical therapy saw 1/2017. Recommended the following: Rehabilitation potential is considered to be Good/Fair.  Factors which may influence rehabilitation potential include obesity, pain which limits mobility, longstanding lymphedema. Patient will benefit from 2x/week physical therapy visits over 6-8 weeks weeks to optimize improvement in these areas. However, patient did not follow-up as she does not have insurence     -Smokes secondary to stress. 10 cigarettes per day. Currently attempting to cut down. Denies SOB when upright, wheezing, or cough. 4/1 CTA chest found left lung nodule, did not follow up for CT in 10/2016. Repeated 12/2016 showed 5 mm pulmonary nodule laterally left upper lobe is unchanged. Follow-up noncontrast CT scan in 18 months.      -Previous workup showed normal CMP and TSH. CT pelvis 2/2016 showed no pelvic mass.      Denies erythema or skin changes. Allergies - reviewed:   No Known Allergies      Medications - reviewed:   Current Outpatient Prescriptions   Medication Sig    buPROPion (WELLBUTRIN) 100 mg tablet Take 1 Tab by mouth three (3) times daily.  furosemide (LASIX) 20 mg tablet Take 1 Tab by mouth two (2) times a day.  naproxen (NAPROSYN) 500 mg tablet Take 1 Tab by mouth two (2) times daily (with meals).  losartan (COZAAR) 100 mg tablet Take 1 Tab by mouth daily.  methylPREDNISolone (MEDROL DOSEPACK) 4 mg tablet Take with food    albuterol (PROVENTIL HFA, VENTOLIN HFA, PROAIR HFA) 90 mcg/actuation inhaler Take 2 Puffs by inhalation every four (4) hours as needed for Wheezing.  fexofenadine (ALLEGRA) 180 mg tablet Take 1 Tab by mouth daily. No current facility-administered medications for this visit.           Past Medical History - reviewed:  Past Medical History:   Diagnosis Date    Anxiety and depression     Flu     2 wks ago    GERD (gastroesophageal reflux disease)     Headache     Hypertension     Incidental lung nodule, > 3mm and < 8mm 4/20/2016    left lung nodule in smoker, needs follow up CT in Oct 2016          Past Surgical History - reviewed:   Past Surgical History:   Procedure Laterality Date    APPENDECTOMY      HX APPENDECTOMY      HX  SECTION      X 3 Births    HX  SECTION      HX CHOLECYSTECTOMY      HX WRIST FRACTURE TX      metal    REMOVAL GALLBLADDER           Social History - reviewed:  Social History     Social History    Marital status:      Spouse name: N/A    Number of children: N/A    Years of education: N/A     Occupational History    Not on file. Social History Main Topics    Smoking status: Current Every Day Smoker     Packs/day: 0.50     Years: 25.00     Types: Cigarettes    Smokeless tobacco: Never Used    Alcohol use No    Drug use: No    Sexual activity: Yes     Partners: Male     Other Topics Concern    Not on file     Social History Narrative    ** Merged History Encounter **         ** Merged History Encounter **              Family History - reviewed:  Family History   Problem Relation Age of Onset    Diabetes Mother     Hypertension Brother     Hypertension Brother     Hypertension Brother          Immunizations - reviewed:   Immunization History   Administered Date(s) Administered    Influenza Vaccine 10/22/2012    Pneumococcal Conjugate (PCV-7) 2013    Pneumococcal Polysaccharide (PPSV-23) 2017    Tdap 2013     ROS  CONSTITUTIONAL: weakness, fatigue, weight gain, Denies: fever, chills  CARDIOVASCULAR: dyspnea on exertion, orthopnea, bilateral edema, Denies: chest pain, palpitations  RESPIRATORY: Denies: cough, wheezing  ENDOCRINE: Denies: polydipsia/polyuria, palpitations, skin changes, temperature intolerance  GI: Denies: abdominal pain, nausea, vomiting, diarrhea, constipation, blood in stool  : Denies: dysuria, frequency/urgency, pelvic pain  NEURO: Denies: dizzy/vertigo, headache, focal weakness, speech problems, confusion  MUSCULOSKELETAL: back pain Denies: joint stiffness, joint swelling  SKIN: Denies: rash  PSYCH: Depressed.  Denies: anxiety, suicidal ideation      Physical Exam  Visit Vitals    /82 (BP 1 Location: Left arm, BP Patient Position: Sitting)    Pulse 83    Temp 98.1 °F (36.7 °C) (Oral)    Resp 16    Ht 5' 1\" (1.549 m)    Wt 295 lb (133.8 kg)    LMP 12/31/2012    SpO2 96%    BMI 55.74 kg/m2       GEN: No apparent distress. Alert and oriented and responds to all questions appropriately. Morbidly Obese. Kiswahili speaker. EYES: Conjunctiva clear; pupils round and reactive to light; extraocular movements are intact. EAR: External ears are normal.   NOSE: Turbinates are within normal limits. No drainage  OROPHYARYNX: No oral lesions or exudates. NECK: Supple; no masses; thyroid normal. Large neck circumference. LUNGS: Respirations unlabored; clear to auscultation bilaterally  CARDIOVASCULAR: Regular, rate, and rhythm without murmurs, gallops or rubs   ABDOMEN: Soft; nontender; nondistended; normoactive bowel sounds  NEUROLOGIC: No focal neurologic deficits. Strength and sensation grossly intact. Coordination and gait grossly intact. BACK: No ecchymosis. Left paraspinal tenderness. No vertebral bone pain with palpation. Negative straight leg test.  EXT: 2+ pitting edema in lower ext extending to thighs bilaterally. Non-pitting edema in BUE extending to shoulder. SKIN: No obvious rashes or erythema or hives. Edema present without signs of infection      Assessment/Plan    ICD-10-CM ICD-9-CM    1. Smoker F17.200 305.1    2. Encounter for long-term (current) use of medications I22.873 X95.62 METABOLIC PANEL, COMPREHENSIVE   3. Severe single current episode of major depressive disorder, without psychotic features ([facility] F32.2 296.23 buPROPion (WELLBUTRIN) 100 mg tablet   4. Leg swelling M79.89 729.81 furosemide (LASIX) 20 mg tablet   5. Acute left-sided low back pain without sciatica M54.5 724.2 naproxen (NAPROSYN) 500 mg tablet     Depression (moderatly severe depression):  Current dosing of 100 mg BID Wellbutrin not helping pt. Will increase to 100 mg TID (Daily max dose of 300 mg), if dosing helps pt will switch to 150 mg BID.  Pt agreed to call physician if having any side effects one being suicidal ideation. Pt does not want counseling at this time.      Back pain: Naproxen PRN helping pt. She is to continue doing back exercises and use medication only if back pain restricts her from doing exercising. Will use warm packs to apply to area. -Plan to use Osteopathic treatment next visit or she may make appt prior to help pt obtain relief.     LE edema and venous insufficiency: Lasix. Since no insurance pt declined to follow up for referrals to lymphedema clinic or physical therapy. Pt declined to wear compression stockings. Will attempt elevation of LE.      -Smoking cessation education. Pt attempt to quite. Discussed possible options. Wellbutrin will also help.     -Pt declined ordering sleep study to evaluate for sleep apnea, and for need for CPAP machine.     -Pt states that she has submitted paper work for Care card. She will follow-up with resources clinic to make sure how processing is going. Also gave resource of \"[place] [address]\". Follow-up Disposition:  Return in about 1 month (around 6/22/2017) for depression follow-up. I have discussed the diagnosis with the patient and the intended plan as seen in the above orders. Patient verbalized understanding of the plan and agrees with the plan. The patient has received an after-visit summary and questions were answered concerning future plans. I have discussed medication side effects and warnings with the patient as well. Informed patient to return to the office if new symptoms arise.         Mehul Damian DO  Family Medicine Resident

## 2017-05-22 NOTE — MR AVS SNAPSHOT
Visit Information Date & Time Provider Department Dept. Phone Encounter #  
 5/22/2017 11:00 AM Tami Sol DO [facility] [phone] [id number] Follow-up Instructions Return in about 1 month (around 6/22/2017) for depression follow-up. Upcoming Health Maintenance Date Due INFLUENZA AGE 9 TO ADULT 8/1/2017 PAP AKA CERVICAL CYTOLOGY 3/4/2020 DTaP/Tdap/Td series (2 - Td) 11/25/2023 Allergies as of 5/22/2017  Review Complete On: 5/22/2017 By: Spencer Ohara LPN No Known Allergies Current Immunizations  Reviewed on 3/4/2017 Name Date Influenza Vaccine 10/22/2012 Pneumococcal Conjugate (PCV-7) 11/25/2013 Pneumococcal Polysaccharide (PPSV-23) 3/4/2017 Tdap 11/25/2013 Not reviewed this visit You Were Diagnosed With   
  
 Codes Comments Smoker    -  Primary ICD-10-CM: R74.201 ICD-9-CM: 305.1 Encounter for long-term (current) use of medications     ICD-10-CM: Z79.899 ICD-9-CM: V58.69 Severe single current episode of major depressive disorder, without psychotic features ([facility]     ICD-10-CM: F32.2 ICD-9-CM: 296.23 Leg swelling     ICD-10-CM: M79.89 ICD-9-CM: 729.81 Acute left-sided low back pain without sciatica     ICD-10-CM: M54.5 ICD-9-CM: 724.2 Vitals BP Pulse Temp Resp Height(growth percentile) Weight(growth percentile) 144/82 (BP 1 Location: Left arm, BP Patient Position: Sitting) 83 98.1 °F (36.7 °C) (Oral) 16 5' 1\" (1.549 m) 295 lb (133.8 kg) LMP SpO2 BMI OB Status Smoking Status 12/31/2012 96% 55.74 kg/m2 Postmenopausal Current Every Day Smoker Vitals History BMI and BSA Data Body Mass Index Body Surface Area 55.74 kg/m 2 2.4 m 2 Preferred Pharmacy Pharmacy Name Phone [facility] [address] [phone] Your Updated Medication List  
  
   
 This list is accurate as of: 5/22/17 12:40 PM.  Always use your most recent med list.  
  
  
  
  
 albuterol 90 mcg/actuation inhaler Commonly known as:  PROVENTIL HFA, VENTOLIN HFA, PROAIR HFA Take 2 Puffs by inhalation every four (4) hours as needed for Wheezing. buPROPion 100 mg tablet Commonly known as:  [facility] Take 1 Tab by mouth three (3) times daily. fexofenadine 180 mg tablet Commonly known as:  [facility] Take 1 Tab by mouth daily. furosemide 20 mg tablet Commonly known as:  LASIX Take 1 Tab by mouth two (2) times a day. losartan 100 mg tablet Commonly known as:  COZAAR Take 1 Tab by mouth daily. methylPREDNISolone 4 mg tablet Commonly known as:  [facility] Blew Take with food  
  
 naproxen 500 mg tablet Commonly known as:  NAPROSYN Take 1 Tab by mouth two (2) times daily (with meals). Prescriptions Printed Refills buPROPion (WELLBUTRIN) 100 mg tablet 0 Sig: Take 1 Tab by mouth three (3) times daily. Class: Print Route: Oral  
 furosemide (LASIX) 20 mg tablet 2 Sig: Take 1 Tab by mouth two (2) times a day. Class: Print Route: Oral  
 naproxen (NAPROSYN) 500 mg tablet 0 Sig: Take 1 Tab by mouth two (2) times daily (with meals). Class: Print Route: Oral  
  
We Performed the Following METABOLIC PANEL, COMPREHENSIVE [79591 CPT(R)] Follow-up Instructions Return in about 1 month (around 6/22/2017) for depression follow-up. Patient Instructions [place] [id number]. Eating Healthy Foods: Care Instructions Your Care Instructions Eating healthy foods can help lower your risk for disease. Healthy food gives you energy and keeps your heart strong, your brain active, your muscles working, and your bones strong. A healthy diet includes a variety of foods from the basic food groups: grains, vegetables, fruits, milk and milk products, and meat and beans. Some people may eat more of their favorite foods from only one food group and, as a result, miss getting the nutrients they need. So, it is important to pay attention not only to what you eat but also to what you are missing from your diet. You can eat a healthy, balanced diet by making a few small changes. Follow-up care is a key part of your treatment and safety. Be sure to make and go to all appointments, and call your doctor if you are having problems. Its also a good idea to know your test results and keep a list of the medicines you take. How can you care for yourself at home? Look at what you eat · Keep a food diary for a week or two and record everything you eat or drink. Track the number of servings you eat from each food group. · For a balanced diet every day, eat a variety of: ¨ 6 or more ounce-equivalents of grains, such as cereals, breads, crackers, rice, or pasta, every day. An ounce-equivalent is 1 slice of bread, 1 cup of ready-to-eat cereal, or ½ cup of cooked rice, cooked pasta, or cooked cereal. 
¨ 2½ cups of vegetables, especially: § Dark-green vegetables such as broccoli and spinach. § Orange vegetables such as carrots and sweet potatoes. § Dry beans (such as lyn and kidney beans) and peas (such as lentils). ¨ 2 cups of fresh, frozen, or canned fruit. A small apple or 1 banana or orange equals 1 cup. ¨ 3 cups of nonfat or low-fat milk, yogurt, or other milk products. ¨ 5½ ounces of meat and beans, such as chicken, fish, lean meat, beans, nuts, and seeds. One egg, 1 tablespoon of peanut butter, ½ ounce nuts or seeds, or ¼ cup of cooked beans equals 1 ounce of meat. · Learn how to read food labels for serving sizes and ingredients. Fast-food and convenience-food meals often contain few or no fruits or vegetables. Make sure you eat some fruits and vegetables to make the meal more nutritious. · Look at your food diary.  For each food group, add up what you have eaten and then divide the total by the number of days. This will give you an idea of how much you are eating from each food group. See if you can find some ways to change your diet to make it more healthy. Start small · Do not try to make dramatic changes to your diet all at once. You might feel that you are missing out on your favorite foods and then be more likely to fail. · Start slowly, and gradually change your habits. Try some of the following: ¨ Use whole wheat bread instead of white bread. ¨ Use nonfat or low-fat milk instead of whole milk. ¨ Eat brown rice instead of white rice, and eat whole wheat pasta instead of white-flour pasta. ¨ Try low-fat cheeses and low-fat yogurt. ¨ Add more fruits and vegetables to meals and have them for snacks. ¨ Add lettuce, tomato, cucumber, and onion to sandwiches. ¨ Add fruit to yogurt and cereal. 
Enjoy food · You can still eat your favorite foods. You just may need to eat less of them. If your favorite foods are high in fat, salt, and sugar, limit how often you eat them, but do not cut them out entirely. · Eat a wide variety of foods. Make healthy choices when eating out · The type of restaurant you choose can help you make healthy choices. Even fast-food chains are now offering more low-fat or healthier choices on the menu. · Choose smaller portions, or take half of your meal home. · When eating out, try: ¨ A veggie pizza with a whole wheat crust or grilled chicken (instead of sausage or pepperoni). ¨ Pasta with roasted vegetables, grilled chicken, or marinara sauce instead of cream sauce. ¨ A vegetable wrap or grilled chicken wrap. ¨ Broiled or poached food instead of fried or breaded items. Make healthy choices easy · Buy packaged, prewashed, ready-to-eat fresh vegetables and fruits, such as baby carrots, salad mixes, and chopped or shredded broccoli and cauliflower. · Buy packaged, presliced fruits, such as melon or pineapple. · Choose 100% fruit or vegetable juice instead of soda. Limit juice intake to 4 to 6 oz (½ to ¾ cup) a day. · Blend low-fat yogurt, fruit juice, and canned or frozen fruit to make a smoothie for breakfast or a snack. Where can you learn more? Go to http://shima-alyssa.info/. Enter T756 in the search box to learn more about \"Eating Healthy Foods: Care Instructions. \" Current as of: November 20, 2015 Content Version: 11.2 © 8667-6588 [employer]. Care instructions adapted under license by [employer] (which disclaims liability or warranty for this information). If you have questions about a medical condition or this instruction, always ask your healthcare professional. [address] any warranty or liability for your use of this information. Please provide this summary of care documentation to your next provider. Your primary care clinician is listed as Maria Ines Almanza. If you have any questions after today's visit, please call [phone].

## 2017-05-23 LAB
ALBUMIN SERPL-MCNC: 4 G/DL (ref 3.5–5.5)
ALBUMIN/GLOB SERPL: 1.3 {RATIO} (ref 1.2–2.2)
ALP SERPL-CCNC: 69 IU/L (ref 39–117)
ALT SERPL-CCNC: 14 IU/L (ref 0–32)
AST SERPL-CCNC: 15 IU/L (ref 0–40)
BILIRUB SERPL-MCNC: 0.5 MG/DL (ref 0–1.2)
BUN SERPL-MCNC: 12 MG/DL (ref 6–24)
BUN/CREAT SERPL: 24 (ref 9–23)
CALCIUM SERPL-MCNC: 9.1 MG/DL (ref 8.7–10.2)
CHLORIDE SERPL-SCNC: 103 MMOL/L (ref 96–106)
CO2 SERPL-SCNC: 22 MMOL/L (ref 18–29)
CREAT SERPL-MCNC: 0.49 MG/DL (ref 0.57–1)
GLOBULIN SER CALC-MCNC: 3.2 G/DL (ref 1.5–4.5)
GLUCOSE SERPL-MCNC: 87 MG/DL (ref 65–99)
POTASSIUM SERPL-SCNC: 4.3 MMOL/L (ref 3.5–5.2)
PROT SERPL-MCNC: 7.2 G/DL (ref 6–8.5)
SODIUM SERPL-SCNC: 143 MMOL/L (ref 134–144)

## 2017-08-19 ENCOUNTER — HOSPITAL ENCOUNTER (EMERGENCY)
Age: 52
Discharge: HOME | End: 2017-08-19
Attending: EMERGENCY MEDICINE
Payer: SELF-PAY

## 2017-08-19 VITALS
DIASTOLIC BLOOD PRESSURE: 90 MMHG | RESPIRATION RATE: 24 BRPM | HEART RATE: 90 BPM | WEIGHT: 293 LBS | SYSTOLIC BLOOD PRESSURE: 171 MMHG | TEMPERATURE: 97.8 F | HEIGHT: 61 IN | BODY MASS INDEX: 55.32 KG/M2 | OXYGEN SATURATION: 96 %

## 2017-08-19 DIAGNOSIS — T78.3XXA ANGIOEDEMA, INITIAL ENCOUNTER: Primary | ICD-10-CM

## 2017-08-19 LAB
ATRIAL RATE: 78 BPM
CALCULATED P AXIS, ECG09: 54 DEGREES
CALCULATED R AXIS, ECG10: 64 DEGREES
CALCULATED T AXIS, ECG11: 29 DEGREES
DIAGNOSIS, 93000: NORMAL
P-R INTERVAL, ECG05: 152 MS
Q-T INTERVAL, ECG07: 376 MS
QRS DURATION, ECG06: 80 MS
QTC CALCULATION (BEZET), ECG08: 428 MS
VENTRICULAR RATE, ECG03: 78 BPM

## 2017-08-19 PROCEDURE — 74011250636 HC RX REV CODE- 250/636: Performed by: EMERGENCY MEDICINE

## 2017-08-19 PROCEDURE — 74011000250 HC RX REV CODE- 250: Performed by: EMERGENCY MEDICINE

## 2017-08-19 PROCEDURE — 96374 THER/PROPH/DIAG INJ IV PUSH: CPT

## 2017-08-19 PROCEDURE — 96375 TX/PRO/DX INJ NEW DRUG ADDON: CPT

## 2017-08-19 PROCEDURE — 99284 EMERGENCY DEPT VISIT MOD MDM: CPT

## 2017-08-19 PROCEDURE — 93005 ELECTROCARDIOGRAM TRACING: CPT

## 2017-08-19 RX ORDER — FAMOTIDINE 10 MG/ML
20 INJECTION INTRAVENOUS
Status: COMPLETED | OUTPATIENT
Start: 2017-08-19 | End: 2017-08-19

## 2017-08-19 RX ORDER — DIPHENHYDRAMINE HYDROCHLORIDE 50 MG/ML
25 INJECTION, SOLUTION INTRAMUSCULAR; INTRAVENOUS ONCE
Status: COMPLETED | OUTPATIENT
Start: 2017-08-19 | End: 2017-08-19

## 2017-08-19 RX ORDER — LORATADINE 10 MG/1
10 TABLET ORAL
COMMUNITY
End: 2017-09-18

## 2017-08-19 RX ORDER — SODIUM CHLORIDE 0.9 % (FLUSH) 0.9 %
5-10 SYRINGE (ML) INJECTION AS NEEDED
Status: DISCONTINUED | OUTPATIENT
Start: 2017-08-19 | End: 2017-08-19 | Stop reason: HOSPADM

## 2017-08-19 RX ORDER — SODIUM CHLORIDE 0.9 % (FLUSH) 0.9 %
5-10 SYRINGE (ML) INJECTION EVERY 8 HOURS
Status: DISCONTINUED | OUTPATIENT
Start: 2017-08-19 | End: 2017-08-19 | Stop reason: HOSPADM

## 2017-08-19 RX ORDER — DEXAMETHASONE SODIUM PHOSPHATE 10 MG/ML
10 INJECTION INTRAMUSCULAR; INTRAVENOUS
Status: COMPLETED | OUTPATIENT
Start: 2017-08-19 | End: 2017-08-19

## 2017-08-19 RX ADMIN — DEXAMETHASONE SODIUM PHOSPHATE 10 MG: 10 INJECTION, SOLUTION INTRAMUSCULAR; INTRAVENOUS at 08:05

## 2017-08-19 RX ADMIN — DIPHENHYDRAMINE HYDROCHLORIDE 25 MG: 50 INJECTION, SOLUTION INTRAMUSCULAR; INTRAVENOUS at 06:11

## 2017-08-19 RX ADMIN — METHYLPREDNISOLONE SODIUM SUCCINATE 125 MG: 125 INJECTION, POWDER, FOR SOLUTION INTRAMUSCULAR; INTRAVENOUS at 06:11

## 2017-08-19 RX ADMIN — FAMOTIDINE 20 MG: 10 INJECTION, SOLUTION INTRAVENOUS at 08:09

## 2017-08-19 NOTE — ED NOTES
Pt resting in room with eyes closed. Vs stable as noted. Bed in low position and locked, call bell within reach. Pt has no new complaints at this time.  at bedside.

## 2017-08-19 NOTE — ED TRIAGE NOTES
Pt complaining of swelling and pt states that she feels like she cannot breathe and that her throat is closing, Dr Fredrick Ricci immediately to bedside.   Pt o2 sats on RA are 95%

## 2017-08-19 NOTE — ED NOTES
Pt snoring and resting with eyes closed and O2 Sats dropped to 86-89% on room air. RN stimulated patient, instructed to take deep breaths, pt sat up in bed and O2 sats immediately increased to 94$ on room air. Dr. Alejandro Traore told. No new orders at this time.

## 2017-08-19 NOTE — ED NOTES
The patient's ordered medications were given as directed. The patient is resting comfortably with her spouse at the bedside.

## 2017-08-19 NOTE — ED NOTES
Pt states she is feeling much better and feels comfortable going home at this time. Pt instructed to come back to ED if any more swelling occurs. Pt and family report understanding.

## 2017-08-19 NOTE — Clinical Note
Thank you for allowing us to provide you with medical care today. We realize that you have many choices for your emergency care needs. We thank you for choosing [facility]. Please choose us in the future for any continued health care needs. We hope we addressed all of your medical concerns. We strive to provide excellent quality care in the Emergency Department. Anything less than excellent does not meet our expectations. The exam and treatment you received in the Emergency Department  were for an emergent problem and are not intended as complete care. It is important that you follow up with a doctor, nurse practitioner, or physician's assistant for ongoing care. If your symptoms worsen or you do not improve as expected and you are un able to reach your usual health care provider, you should return to the Emergency Department. We are available 24 hours a day. Take this sheet with you when you go to your follow-up visit. If you have any problem arranging the follow-up visit, co ntact the Emergency Department immediately. Make an appointment your family doctor for follow up of this visit. Return to the ER if you are unable to be seen in a timely manner.

## 2017-08-19 NOTE — PROGRESS NOTES
7: 23 AM  Change of shift. Care of patient taken over from Dr Misael Villatoro; H&P reviewed, handoff complete. Pt speaking in foreign language, no stridor, handling secrections; will continue to monitor; Pt told of need to stop ARB;     7:55 AM  'feels worse/ unchanged'; still speaking clearly/ no stridor/ controlling secrections/ airway intact; will give additional meds, cont to monitor;     8:57 AM  'doing ok'; 'I think she is improving' per  *(); will continue to monitor;     10:24 AM 'feeling much better now'; will discharge home; Pt debby advised to stop ARB/ see PCP;   [place]  results have been reviewed with her. She has been counseled regarding her diagnosis. She verbally conveys understanding and agreement of the signs, symptoms, diagnosis, treatment and prognosis and additionally agrees to Call/ Arrange follow up as recommended with Dr. Carroll López,  in 24 - 48 hours. She also agrees with the care-plan and conveys that all of her questions have been answered. I have also put together some discharge instructions for her that include: 1) educational information regarding their diagnosis, 2) how to care for their diagnosis at home, as well a 3) list of reasons why they would want to return to the ED prior to their follow-up appointment, should their condition change or for concerns.

## 2017-08-19 NOTE — ED PROVIDER NOTES
HPI Comments: Hx HTN, anxiety/depression, GERD; presents with a throat-swelling sensation; her  (translating) states her lips began to swell some last evening; about 3 AM today (3 hours ago), he states she began complaining of a throat-swelling sensation; he says her lips looked swollen then but not now; she reports having difficulty breathing due to the sensation; she took an antihistamine (generic Claritin) at 0300 today with no relief. Her  states she's had similar spells for the past 2 years and that no one has been able to figure out what's causing them. No rash. Patient is a 54 y.o. female presenting with angioedema. Angioedema          Past Medical History:   Diagnosis Date    Anxiety and depression     Flu     2 wks ago    GERD (gastroesophageal reflux disease)     Headache     Hypertension     Incidental lung nodule, > 3mm and < 8mm 2016    left lung nodule in smoker, needs follow up CT in Oct 2016        Past Surgical History:   Procedure Laterality Date    APPENDECTOMY      HX APPENDECTOMY      HX  SECTION      X 3 Births    HX  SECTION      HX CHOLECYSTECTOMY      HX WRIST FRACTURE TX      metal    REMOVAL GALLBLADDER           Family History:   Problem Relation Age of Onset    Diabetes Mother     Hypertension Brother     Hypertension Brother     Hypertension Brother        Social History     Social History    Marital status:      Spouse name: N/A    Number of children: N/A    Years of education: N/A     Occupational History    Not on file.      Social History Main Topics    Smoking status: Current Every Day Smoker     Packs/day: 0.50     Years: 25.00     Types: Cigarettes    Smokeless tobacco: Never Used    Alcohol use No    Drug use: No    Sexual activity: Yes     Partners: Male     Other Topics Concern    Not on file     Social History Narrative    ** Merged History Encounter **         ** Merged History Encounter ** ALLERGIES: Review of patient's allergies indicates no known allergies. Review of Systems   All other systems reviewed and are negative. Vitals:    08/19/17 0603   BP: (!) 178/99   Pulse: 90   Resp: 28   Temp: 97.8 °F (36.6 °C)   SpO2: 98%   Weight: 133.8 kg (295 lb)   Height: 5' 1\" (1.549 m)            Physical Exam   Constitutional: She is oriented to person, place, and time. She appears well-developed and well-nourished. Overweight; appears anxious. HENT:   Head: Normocephalic and atraumatic. Mouth/Throat: Oropharynx is clear and moist.   No tongue or throat swelling appreciated. Eyes: Conjunctivae are normal. No scleral icterus. Neck: Neck supple. No tracheal deviation present. Cardiovascular: Normal rate, regular rhythm, normal heart sounds and intact distal pulses. Exam reveals no gallop and no friction rub. No murmur heard. Pulmonary/Chest: Effort normal and breath sounds normal. She has no wheezes. She has no rales. Abdominal: Soft. She exhibits no distension. There is no tenderness. There is no rebound and no guarding. Musculoskeletal: She exhibits no edema. Neurological: She is alert and oriented to person, place, and time. Skin: Skin is warm and dry. No rash noted. Psychiatric:   appears anxious   Nursing note and vitals reviewed. [facility]  ED Course       Procedures    EKG: NSR; rate - 78; NSSTTW abnl.   Mariano Streeter MD  6:27 AM

## 2017-08-19 NOTE — DISCHARGE INSTRUCTIONS
Angioedema: Care Instructions  Your Care Instructions  Angioedema is an allergic reaction. It causes swelling and welts in the deep layers of the skin. Angioedema can sometimes occur along with hives. Hives are an allergic reaction in the outer layers of the skin. Angioedema can range from mild to severe. Painful welts can develop on the face. Angioedema can also occur on other parts of the body. In severe cases, the inside of the throat can swell and make it hard to breathe. Many things can cause this condition, including foods, insect bites, and medicines (such as aspirin and some blood pressure medicines). It also can run in families. Sometimes you may know what caused the reaction, but other times you may not know. Follow-up care is a key part of your treatment and safety. Be sure to make and go to all appointments, and call your doctor if you are having problems. It's also a good idea to know your test results and keep a list of the medicines you take. How can you care for yourself at home? · Take your medicines exactly as prescribed. Call your doctor if you think you are having a problem with your medicine. You will get more details on the specific medicines your doctor prescribes. Some medicines used to treat angioedema can make you too sleepy to drive safely. Do not drive if you take medicine that may make you sleepy. · Avoid foods or medicine that may have triggered the swelling. · For comfort:  ¨ Try taking a cool bath. Or place a cool, wet towel on the swollen area. ¨ Avoid hot baths and showers. ¨ Wear loose clothing. · Your doctor may prescribe a shot of epinephrine to carry with you in case you have a severe reaction. Learn how to give yourself the shot and keep it with you at all times. Make sure it has not . When should you call for help? Give an epinephrine shot if:  · You think you are having a severe allergic reaction.   After giving an epinephrine shot call 911, even if you feel better. Call 911 if:  · You have symptoms of a severe allergic reaction. These may include:  ¨ Sudden raised, red areas (hives) all over your body. ¨ Swelling of the throat, mouth, lips, or tongue. ¨ Trouble breathing. ¨ Passing out (losing consciousness). Or you may feel very lightheaded or suddenly feel weak, confused, or restless. · You have been given an epinephrine shot, even if you feel better. Call your doctor now or seek immediate medical care if:  · You have symptoms of an allergic reaction, such as:  ¨ A rash or hives (raised, red areas on the skin). ¨ Itching. ¨ Swelling. ¨ Belly pain, nausea, or vomiting. Watch closely for changes in your health, and be sure to contact your doctor if:  · You do not get better as expected. Where can you learn more? Go to http://shima-alyssa.info/. Enter A292 in the search box to learn more about \"Angioedema: Care Instructions. \"  Current as of: April 3, 2017  Content Version: 11.3  © 8739-3215 [employer]. Care instructions adapted under license by [employer] (which disclaims liability or warranty for this information). If you have questions about a medical condition or this instruction, always ask your healthcare professional. [address] any warranty or liability for your use of this information.

## 2017-08-19 NOTE — ED NOTES
Pt resting in room with eyes closed. Pt intermittently grunting. RR's 22-26 and BP elevated. Dr. Alejandro Traore told. No new orders at this time.

## 2017-08-19 NOTE — ED NOTES
Pt speaks some English. Pt offered interpretor services. Pt refused and states she would like her  to interpret for her. Pt signed refusal of [facility] interpretor form.

## 2017-08-19 NOTE — ED NOTES
Bedside and Verbal shift change report given to Peterson Ayers RN (oncoming nurse) by Anju Treadwell RN (offgoing nurse). Report included the following information SBAR, Kardex, ED Summary, MAR and Cardiac Rhythm NSR.

## 2017-08-19 NOTE — ED NOTES
Bedside and Verbal shift change report given to Pamela Moreira RN (oncoming nurse) by Anju Treadwell RN (offgoing nurse).  Report included the following information SBAR, ED Summary, Intake/Output, MAR, Recent Results and Cardiac Rhythm SR.

## 2017-09-01 ENCOUNTER — OFFICE VISIT (OUTPATIENT)
Dept: FAMILY MEDICINE CLINIC | Age: 52
End: 2017-09-01

## 2017-09-01 VITALS
HEIGHT: 61 IN | SYSTOLIC BLOOD PRESSURE: 130 MMHG | TEMPERATURE: 97.9 F | HEART RATE: 84 BPM | DIASTOLIC BLOOD PRESSURE: 80 MMHG | WEIGHT: 291.2 LBS | RESPIRATION RATE: 18 BRPM | OXYGEN SATURATION: 97 % | BODY MASS INDEX: 54.98 KG/M2

## 2017-09-01 DIAGNOSIS — R53.83 FATIGUE, UNSPECIFIED TYPE: ICD-10-CM

## 2017-09-01 DIAGNOSIS — M79.89 LEG SWELLING: ICD-10-CM

## 2017-09-01 DIAGNOSIS — F32.2 SEVERE SINGLE CURRENT EPISODE OF MAJOR DEPRESSIVE DISORDER, WITHOUT PSYCHOTIC FEATURES (HCC): ICD-10-CM

## 2017-09-01 DIAGNOSIS — I10 ESSENTIAL HYPERTENSION: Primary | ICD-10-CM

## 2017-09-01 RX ORDER — FUROSEMIDE 20 MG/1
20 TABLET ORAL DAILY
Qty: 60 TAB | Refills: 2 | Status: SHIPPED | OUTPATIENT
Start: 2017-09-01 | End: 2017-09-18

## 2017-09-01 RX ORDER — BUPROPION HYDROCHLORIDE 100 MG/1
100 TABLET ORAL 3 TIMES DAILY
Qty: 90 TAB | Refills: 1 | Status: SHIPPED | OUTPATIENT
Start: 2017-09-01 | End: 2017-09-18

## 2017-09-01 RX ORDER — LOSARTAN POTASSIUM 100 MG/1
100 TABLET ORAL DAILY
COMMUNITY
End: 2017-09-01

## 2017-09-01 RX ORDER — HYDROCHLOROTHIAZIDE 12.5 MG/1
12.5 TABLET ORAL DAILY
Qty: 30 TAB | Refills: 1 | Status: SHIPPED | OUTPATIENT
Start: 2017-09-01 | End: 2017-09-19

## 2017-09-01 NOTE — PROGRESS NOTES
Subjective  Katey Lopez is an 54 y.o. female who presents for BP. Pt is Kiswahili speaking. ED visit on 8/19 for tingling of the face and having trouble swallowing. Per chart review, no anaphylactic rxn noted. She notes that Losartan was contributing to these symptoms as she was told by ED physician. Medication was stopped at that time. Pt has been checking BP at home, and readings have been high and error signs noted. She checked it at [facility] several times and reading was high enough to lead pharmacist to advice ED visit. Denies vision change, dizziness, N/V. Today 130/81. Only taking Lasix that she is taking for Lymphedema.       Depressed: depressed 2/2 to her parents passing away in 2016 . Pt started Wellbutrin 5/2017. Pt shares medication did work. Pt stopped taking medication after medication ran out, she did not know she was to follow-up for refills. She was instructed to return. Pt asking to continue medication. No SE noted when she did take it.      Lymphedema: Pt taking Lasix 20 mg BID. She states that she ran out of medication on 8/28. No increase or SOB noted since she has stopped medication. But pt is reluctant to discontinue this medication. She states that without this medication, she will have hard time breathing and swelling in arms and legs will increase. Asking for refills. This pt has not been compliant with treatment plans set in past 2/2 to not having insurance. Denies erythema or skin changes. States she is applying for care-card once again, unsure of when she will have it. Continues to smoke 10 cigarettes daily. Not ready to quit. Denies SOB when upright, wheezing, or cough      Chart review:  -ECHO in 3/2016 normal (EF 65%). Refused CPAP or sleep study as she can not afford. Unable to tolerate compression stocking secondary to pain despite having them properly fitted. Too heavy to exercise and knee pain. Does not use salt anymore in diet.  No elevation of feet at home.       -Physical therapy saw 1/2017. Recommended the following: Rehabilitation potential is considered to be Good/Fair. Factors which may influence rehabilitation potential include obesity, pain which limits mobility, longstanding lymphedema. Patient will benefit from 2x/week physical therapy visits over 6-8 weeks weeks to optimize improvement in these areas. However, patient did not follow-up as she does not have insurence      .4/1 CTA chest found left lung nodule, did not follow up for CT in 10/2016. Repeated 12/2016 showed 5 mm pulmonary nodule laterally left upper lobe is unchanged. Follow-up noncontrast CT scan in 18 months.      -Previous workup showed normal CMP and TSH. CT pelvis 2/2016 showed no pelvic mass. Allergies - reviewed:   No Known Allergies      Medications - reviewed:   Current Outpatient Prescriptions   Medication Sig    hydroCHLOROthiazide (HYDRODIURIL) 12.5 mg tablet Take 1 Tab by mouth daily.  furosemide (LASIX) 20 mg tablet Take 1 Tab by mouth daily.  buPROPion (WELLBUTRIN) 100 mg tablet Take 1 Tab by mouth three (3) times daily.  albuterol (PROVENTIL HFA, VENTOLIN HFA, PROAIR HFA) 90 mcg/actuation inhaler Take 2 Puffs by inhalation every four (4) hours as needed for Wheezing.  fexofenadine (ALLEGRA) 180 mg tablet Take 1 Tab by mouth daily.  loratadine (CLARITIN) 10 mg tablet Take 10 mg by mouth.  naproxen (NAPROSYN) 500 mg tablet Take 1 Tab by mouth two (2) times daily (with meals).  methylPREDNISolone (MEDROL DOSEPACK) 4 mg tablet Take with food     No current facility-administered medications for this visit.           Past Medical History - reviewed:  Past Medical History:   Diagnosis Date    Anxiety and depression     Flu     2 wks ago    GERD (gastroesophageal reflux disease)     Headache     Hypertension     Incidental lung nodule, > 3mm and < 8mm 4/20/2016    left lung nodule in smoker, needs follow up CT in Oct 2016          Past Surgical History - reviewed:   Past Surgical History:   Procedure Laterality Date    APPENDECTOMY      HX APPENDECTOMY      HX  SECTION      X 3 Births    HX  SECTION      HX CHOLECYSTECTOMY      HX WRIST FRACTURE TX      metal    REMOVAL GALLBLADDER           Social History - reviewed:  Social History     Social History    Marital status:      Spouse name: N/A    Number of children: N/A    Years of education: N/A     Occupational History    Not on file. Social History Main Topics    Smoking status: Current Every Day Smoker     Packs/day: 0.50     Years: 25.00     Types: Cigarettes    Smokeless tobacco: Never Used    Alcohol use No    Drug use: No    Sexual activity: Yes     Partners: Male     Other Topics Concern    Not on file     Social History Narrative    ** Merged History Encounter **         ** Merged History Encounter **              Family History - reviewed:  Family History   Problem Relation Age of Onset    Diabetes Mother     Hypertension Brother     Hypertension Brother     Hypertension Brother          Immunizations - reviewed:   Immunization History   Administered Date(s) Administered    Influenza Vaccine 10/22/2012    Pneumococcal Conjugate (PCV-7) 2013    Pneumococcal Polysaccharide (PPSV-23) 2017    Tdap 2013     Flu: denies today      ROS  CONSTITUTIONAL: Denies: fever, chills, weakness  CARDIOVASCULAR: bilateral edema, Denies: chest pain, palpitations, orthopnea   RESPIRATORY: Denies: cough, wheezing  ENDOCRINE: Denies: polydipsia/polyuria, palpitations, skin changes, temperature intolerance  GI: Denies: abdominal pain, nausea, vomiting, diarrhea, constipation, blood in stool  : Denies: dysuria, frequency/urgency, pelvic pain  NEURO: Denies: dizzy/vertigo, focal weakness, speech problems, confusion  MUSCULOSKELETAL: Denies: joint stiffness, joint swelling  SKIN: Denies: rash  PSYCH: Depressed.  Denies: anxiety, suicidal ideation      Physical Exam  Visit Vitals  /80    Pulse 84    Temp 97.9 °F (36.6 °C) (Oral)    Resp 18    Ht 5' 1\" (1.549 m)    Wt 291 lb 3.2 oz (132.1 kg)    LMP 12/31/2012    SpO2 97%    BMI 55.02 kg/m2       GEN: No apparent distress. Alert and oriented and responds to all questions appropriately. Morbidly Obese. Kazakh speaker. Daughter with pt. Pt laughs at time to potential treatment plans as she believes them unrealistic as she cannot afford. Not ready to quit  EYES: Conjunctiva clear; pupils round and reactive to light; extraocular movements are intact. NOSE: Turbinates are within normal limits. No drainage  OROPHYARYNX: No oral lesions or exudates. NECK: Supple; no masses; thyroid normal. Large neck circumference. LUNGS: Respirations unlabored; clear to auscultation bilaterally  CARDIOVASCULAR: Regular, rate, and rhythm without murmurs, gallops or rubs   ABDOMEN: Soft; nontender; nondistended; normoactive bowel sounds  NEUROLOGIC: No focal neurologic deficits. Strength and sensation grossly intact. Coordination and gait grossly intact. EXT: 2+ pitting edema in lower ext extending to thighs bilaterally. Non-pitting edema in BUE extending to shoulder. SKIN: No obvious rashes or erythema or hives. Edema present without signs of infection    Assessment/Plan    ICD-10-CM ICD-9-CM    1. Essential hypertension I10 401.9 hydroCHLOROthiazide (HYDRODIURIL) 12.5 mg tablet   2. Leg swelling M79.89 729.81 furosemide (LASIX) 20 mg tablet      METABOLIC PANEL, COMPREHENSIVE   3. Severe single current episode of major depressive disorder, without psychotic features ([facility] [address] F32.2 296.23 buPROPion (WELLBUTRIN) 100 mg tablet   4. Fatigue, unspecified type R53.83 780.79 TSH RFX ON ABNORMAL TO FREE T4     HTN: Discontinue Losartan since 8/18. Only taking Lasix 20 BID. Today BP ok and asymptomatic, but pt hx of obtaining high readings at home.  Chart review shows that she used to be on HCTZ (pt stopped taking electively) then started on Hyzaar (which she did well with, but developed edema developed therefore losartan aspect continued and lasix started in 2/2016 to avoid hypotn). Will restart HCTZ 12.5 mg daily today and decrease lasix 20 mg daily from BID. Pt will keep a BP log. Precautions given. Pt to bring BP cuff with her 2 wk follow-up. Will check CMP today for kidney fxn, electrolytes. If medication not effective will try BB. LE edema and venous insufficiency: Lasix decreased to once daily and HCTZ. Since no insurance pt continues to decline to follow up for referrals to lymphedema clinic or physical therapy. Pt declined to wear compression stockings. I have reviewed chart and medication. No cause found. Based on my conversation with pt, will check TSH once again to rule out hypothyroid leading to weight gain. At 2wk follow-up, will re-eval, if edema not improved will repeat echo, check chest xray (if PE and symptoms of occasional SOB arise), stress need for exercise, and compliance with treatment plan. Need care-card to follow-up with lymphedema clinic and potential cardiology referral.     Depression (moderatly severe depression):  Refilled Wellbutrin 100 mg TID (Daily max dose of 300 mg), if dosing helps pt will switch to 150 mg BID. Pt agreed to call physician if having any side effects one being suicidal ideation. Pt does not want counseling at this time. Smoking cessation education. Pt seems not willing to quit as she was before. Discussed possible options. Wellbutrin in place which will also help. Exercise and diet modification education given. Pt does not believe she can lose weight as she believes she is doing the best she can. I will continue to encourage and re-educate her in f/u.      Pt declined ordering sleep study to evaluate for sleep apnea, and for need for CPAP machine.      Pt states that she has submitted paper work for Care card.      Re-offer flu at f/u    This pt has hx of not returning to clinic as advised in past, close phone follow-up will be made to make sure symptoms are improving and not worsening. Follow-up Disposition:  Return in about 2 weeks (around 9/15/2017) for BP. I have discussed the diagnosis with the patient and the intended plan as seen in the above orders. Patient verbalized understanding of the plan and agrees with the plan. The patient has received an after-visit summary and questions were answered concerning future plans. I have discussed medication side effects and warnings with the patient as well. Informed patient to return to the office if new symptoms arise.         Margaret Brock, DO  Family Medicine Resident

## 2017-09-01 NOTE — PROGRESS NOTES
Chief Complaint   Patient presents with    Medication Evaluation     bp med; losartan     Pt states she has been depressed in the past 2 weeks.

## 2017-09-01 NOTE — MR AVS SNAPSHOT
Visit Information Date & Time Provider Department Dept. Phone Encounter #  
 9/1/2017  2:45 PM Kory Cortez DO [facility] [phone] [id number] Follow-up Instructions Return in about 2 weeks (around 9/15/2017) for BP. Your Appointments 9/22/2017  3:25 PM  
ACUTE CARE with Kory Cortez  [address] [facility] ([facility]) Appt Note: fv from 09/01/17  
 [address] [place], [place] [address]. [address] [facility]  
[phone]  
  
   
 [address] [place] [address] [address] [address] Upcoming Health Maintenance Date Due INFLUENZA AGE 9 TO ADULT 8/1/2017 PAP AKA CERVICAL CYTOLOGY 3/4/2020 DTaP/Tdap/Td series (2 - Td) 11/25/2023 Allergies as of 9/1/2017  Review Complete On: 9/1/2017 By: Janes Velasquez LPN No Known Allergies Current Immunizations  Reviewed on 3/4/2017 Name Date Influenza Vaccine 10/22/2012 Pneumococcal Conjugate (PCV-7) 11/25/2013 Pneumococcal Polysaccharide (PPSV-23) 3/4/2017 Tdap 11/25/2013 Not reviewed this visit You Were Diagnosed With   
  
 Codes Comments Essential hypertension    -  Primary ICD-10-CM: I10 
ICD-9-CM: 401.9 Leg swelling     ICD-10-CM: M79.89 ICD-9-CM: 729.81 Severe single current episode of major depressive disorder, without psychotic features ([facility].)     ICD-10-CM: F32.2 ICD-9-CM: 296.23 Fatigue, unspecified type     ICD-10-CM: R53.83 ICD-9-CM: 780.79 Vitals BP Pulse Temp Resp Height(growth percentile) Weight(growth percentile) 130/80 84 97.9 °F (36.6 °C) (Oral) 18 5' 1\" (1.549 m) 291 lb 3.2 oz (132.1 kg) LMP SpO2 BMI OB Status Smoking Status 12/31/2012 97% 55.02 kg/m2 Postmenopausal Current Every Day Smoker Vitals History BMI and BSA Data Body Mass Index Body Surface Area 55.02 kg/m 2 2.38 m 2 [facility] [address] [phone] Your Updated Medication List  
  
   
This list is accurate as of: 9/1/17  4:42 PM.  Always use your most recent med list.  
  
  
  
  
 albuterol 90 mcg/actuation inhaler Commonly known as:  PROVENTIL HFA, VENTOLIN HFA, PROAIR HFA Take 2 Puffs by inhalation every four (4) hours as needed for Wheezing. buPROPion 100 mg tablet Commonly known as:  [facility] Take 1 Tab by mouth three (3) times daily. fexofenadine 180 mg tablet Commonly known as:  Kenith Fitting Take 1 Tab by mouth daily. furosemide 20 mg tablet Commonly known as:  LASIX Take 1 Tab by mouth daily. hydroCHLOROthiazide 12.5 mg tablet Commonly known as:  HYDRODIURIL Take 1 Tab by mouth daily. loratadine 10 mg tablet Commonly known as:  [place] Take 10 mg by mouth.  
  
 methylPREDNISolone 4 mg tablet Commonly known as:  Charolotte Grand Take with food  
  
 naproxen 500 mg tablet Commonly known as:  NAPROSYN Take 1 Tab by mouth two (2) times daily (with meals). Prescriptions Printed Refills  
 hydroCHLOROthiazide (HYDRODIURIL) 12.5 mg tablet 1 Sig: Take 1 Tab by mouth daily. Class: Print Route: Oral  
 furosemide (LASIX) 20 mg tablet 2 Sig: Take 1 Tab by mouth daily. Class: Print Route: Oral  
 buPROPion (WELLBUTRIN) 100 mg tablet 1 Sig: Take 1 Tab by mouth three (3) times daily. Class: Print Route: Oral  
  
We Performed the Following METABOLIC PANEL, COMPREHENSIVE [26646 CPT(R)] TSH RFX ON ABNORMAL TO FREE T4 [[id number] Custom] Follow-up Instructions Return in about 2 weeks (around 9/15/2017) for BP. Patient Instructions Home Blood Pressure Test: About This Test 
What is it?  
A home blood pressure test allows you to keep track of your blood pressure at home. Blood pressure is a measure of the force of blood against the walls of your arteries. Blood pressure readings include two numbers, such as 130/80 (say \"130 over 80\"). The first number is the systolic pressure. The second number is the diastolic pressure. Why is this test done? You may do this test at home to: · Find out if you have high blood pressure. · Track your blood pressure if you have high blood pressure. · Track how well medicine is working to reduce high blood pressure. · Check how lifestyle changes, such as weight loss and exercise, are affecting blood pressure. How can you prepare for the test? 
· Do not use caffeine, tobacco, or medicines known to raise blood pressure (such as nasal decongestant sprays) for at least 30 minutes before taking your blood pressure. · Do not exercise for at least 30 minutes before taking your blood pressure. What happens before the test? 
Take your blood pressure while you feel comfortable and relaxed. Sit quietly with both feet on the floor for at least 5 minutes before the test. 
What happens during the test? 
· Sit with your arm slightly bent and resting on a table so that your upper arm is at the same level as your heart. · Roll up your sleeve or take off your shirt to expose your upper arm. · Wrap the blood pressure cuff around your upper arm so that the lower edge of the cuff is about 1 inch above the bend of your elbow. Proceed with the following steps depending on if you are using an automatic or manual pressure monitor. Automatic blood pressure monitors · Press the on/off button on the automatic monitor and wait until the ready-to-measure \"heart\" symbol appears next to zero in the display window. · Press the start button. The cuff will inflate and deflate by itself. · Your blood pressure numbers will appear on the screen. · Write your numbers in your log book, along with the date and time. Manual blood pressure monitors · Place the earpieces of a stethoscope in your ears, and place the bell of the stethoscope over the artery, just below the cuff. · Close the valve on the rubber inflating bulb. · Squeeze the bulb rapidly with your opposite hand to inflate the cuff until the dial or column of mercury reads about 30 mm Hg higher than your usual systolic pressure. If you do not know your usual pressure, inflate the cuff to 210 mm Hg or until the pulse at your wrist disappears. · Open the pressure valve just slightly by twisting or pressing the valve on the bulb. · As you watch the pressure slowly fall, note the level on the dial at which you first start to hear a pulsing or tapping sound through the stethoscope. This is your systolic blood pressure. · Continue letting the air out slowly. The sounds will become muffled and will finally disappear. Note the pressure when the sounds completely disappear. This is your diastolic blood pressure. Let out all the remaining air. · Write your numbers in your log book, along with the date and time. What else should you know about the test? 
Results for adults ages 25 and older (mm Hg): · Normal (ideal): Systolic 774 or below. Diastolic 79 or below. · Prehypertension: Systolic 262 to 109. Diastolic 80 to 89. · Hypertension: Systolic 746 or above. Diastolic 90 or above. Follow-up care is a key part of your treatment and safety. Be sure to make and go to all appointments, and call your doctor if you are having problems. It's also a good idea to keep a list of the medicines you take. Where can you learn more? Go to http://shima-alyssa.info/. Enter C427 in the search box to learn more about \"Home Blood Pressure Test: About This Test.\" Current as of: November 3, 2016 Content Version: 11.3 © 0616-1144 [employer].  Care instructions adapted under license by [employer] (which disclaims liability or warranty for this information). If you have questions about a medical condition or this instruction, always ask your healthcare professional. [address] any warranty or liability for your use of this information. Please provide this summary of care documentation to your next provider. Your primary care clinician is listed as Carroll López. If you have any questions after today's visit, please call [phone].

## 2017-09-01 NOTE — PATIENT INSTRUCTIONS
Home Blood Pressure Test: About This Test  What is it? A home blood pressure test allows you to keep track of your blood pressure at home. Blood pressure is a measure of the force of blood against the walls of your arteries. Blood pressure readings include two numbers, such as 130/80 (say \"130 over 80\"). The first number is the systolic pressure. The second number is the diastolic pressure. Why is this test done? You may do this test at home to:  · Find out if you have high blood pressure. · Track your blood pressure if you have high blood pressure. · Track how well medicine is working to reduce high blood pressure. · Check how lifestyle changes, such as weight loss and exercise, are affecting blood pressure. How can you prepare for the test?  · Do not use caffeine, tobacco, or medicines known to raise blood pressure (such as nasal decongestant sprays) for at least 30 minutes before taking your blood pressure. · Do not exercise for at least 30 minutes before taking your blood pressure. What happens before the test?  Take your blood pressure while you feel comfortable and relaxed. Sit quietly with both feet on the floor for at least 5 minutes before the test.  What happens during the test?  · Sit with your arm slightly bent and resting on a table so that your upper arm is at the same level as your heart. · Roll up your sleeve or take off your shirt to expose your upper arm. · Wrap the blood pressure cuff around your upper arm so that the lower edge of the cuff is about 1 inch above the bend of your elbow. Proceed with the following steps depending on if you are using an automatic or manual pressure monitor. Automatic blood pressure monitors  · Press the on/off button on the automatic monitor and wait until the ready-to-measure \"heart\" symbol appears next to zero in the display window. · Press the start button. The cuff will inflate and deflate by itself.   · Your blood pressure numbers will appear on the screen. · Write your numbers in your log book, along with the date and time. Manual blood pressure monitors  · Place the earpieces of a stethoscope in your ears, and place the bell of the stethoscope over the artery, just below the cuff. · Close the valve on the rubber inflating bulb. · Squeeze the bulb rapidly with your opposite hand to inflate the cuff until the dial or column of mercury reads about 30 mm Hg higher than your usual systolic pressure. If you do not know your usual pressure, inflate the cuff to 210 mm Hg or until the pulse at your wrist disappears. · Open the pressure valve just slightly by twisting or pressing the valve on the bulb. · As you watch the pressure slowly fall, note the level on the dial at which you first start to hear a pulsing or tapping sound through the stethoscope. This is your systolic blood pressure. · Continue letting the air out slowly. The sounds will become muffled and will finally disappear. Note the pressure when the sounds completely disappear. This is your diastolic blood pressure. Let out all the remaining air. · Write your numbers in your log book, along with the date and time. What else should you know about the test?  Results for adults ages 25 and older (mm Hg):  · Normal (ideal): Systolic 582 or below. Diastolic 79 or below. · Prehypertension: Systolic 870 to 869. Diastolic 80 to 89. · Hypertension: Systolic 084 or above. Diastolic 90 or above. Follow-up care is a key part of your treatment and safety. Be sure to make and go to all appointments, and call your doctor if you are having problems. It's also a good idea to keep a list of the medicines you take. Where can you learn more? Go to http://shima-alyssa.info/. Enter C427 in the search box to learn more about \"Home Blood Pressure Test: About This Test.\"  Current as of: November 3, 2016  Content Version: 11.3  © 1031-2559 [employer].  Care instructions adapted under license by [employer] (which disclaims liability or warranty for this information). If you have questions about a medical condition or this instruction, always ask your healthcare professional. [address] any warranty or liability for your use of this information.

## 2017-09-01 NOTE — PROGRESS NOTES
53 yo lady with depression, HTN, leg swelling    ED follow up for reaction to Losartan (tingling in face); resolved    Per chart review, patient appears to have poor compliance. Refusing CPAP, compression hose, life style modification and PT. Current smoker. Discussed with resident the need for extensive counseling for weight loss and smoking cessation in this patient. We also addressed patient being on 2 diuretics; resident will follow up labs and eval patient in 2 wks. I was NOT present for the history and exam of this patient by resident. I reviewed the patient's medical history, the resident's findings on physical examination, the patient's diagnoses, and treatment plan as documented in the resident note. I concur with the treatment plan as documented. Additional suggestions noted.

## 2017-09-02 LAB
ALBUMIN SERPL-MCNC: 4 G/DL (ref 3.5–5.5)
ALBUMIN/GLOB SERPL: 1.3 {RATIO} (ref 1.2–2.2)
ALP SERPL-CCNC: 68 IU/L (ref 39–117)
ALT SERPL-CCNC: 13 IU/L (ref 0–32)
AST SERPL-CCNC: 13 IU/L (ref 0–40)
BILIRUB SERPL-MCNC: 0.4 MG/DL (ref 0–1.2)
BUN SERPL-MCNC: 14 MG/DL (ref 6–24)
BUN/CREAT SERPL: 24 (ref 9–23)
CALCIUM SERPL-MCNC: 9.3 MG/DL (ref 8.7–10.2)
CHLORIDE SERPL-SCNC: 102 MMOL/L (ref 96–106)
CO2 SERPL-SCNC: 28 MMOL/L (ref 18–29)
CREAT SERPL-MCNC: 0.59 MG/DL (ref 0.57–1)
GLOBULIN SER CALC-MCNC: 3 G/DL (ref 1.5–4.5)
GLUCOSE SERPL-MCNC: 114 MG/DL (ref 65–99)
POTASSIUM SERPL-SCNC: 4.5 MMOL/L (ref 3.5–5.2)
PROT SERPL-MCNC: 7 G/DL (ref 6–8.5)
SODIUM SERPL-SCNC: 143 MMOL/L (ref 134–144)
TSH SERPL DL<=0.005 MIU/L-ACNC: 0.88 UIU/ML (ref 0.45–4.5)

## 2017-09-18 ENCOUNTER — HOSPITAL ENCOUNTER (INPATIENT)
Age: 52
LOS: 1 days | Discharge: HOME | DRG: 916 | End: 2017-09-19
Attending: EMERGENCY MEDICINE | Admitting: FAMILY MEDICINE
Payer: SELF-PAY

## 2017-09-18 DIAGNOSIS — T78.2XXA ANAPHYLAXIS, INITIAL ENCOUNTER: Primary | ICD-10-CM

## 2017-09-18 PROBLEM — R06.00 DYSPNEA: Status: ACTIVE | Noted: 2017-09-18

## 2017-09-18 PROBLEM — E66.01 MORBID OBESITY (HCC): Status: ACTIVE | Noted: 2017-09-18

## 2017-09-18 PROBLEM — T78.3XXA ANGIOEDEMA: Status: ACTIVE | Noted: 2017-09-18

## 2017-09-18 PROBLEM — J39.2 SWELLING OF THROAT: Status: ACTIVE | Noted: 2017-09-18

## 2017-09-18 LAB
ANION GAP SERPL CALC-SCNC: 9 MMOL/L (ref 5–15)
BASOPHILS # BLD: 0 K/UL (ref 0–0.1)
BASOPHILS NFR BLD: 0 % (ref 0–1)
BUN SERPL-MCNC: 14 MG/DL (ref 6–20)
BUN/CREAT SERPL: 21 (ref 12–20)
CALCIUM SERPL-MCNC: 9.2 MG/DL (ref 8.5–10.1)
CHLORIDE SERPL-SCNC: 102 MMOL/L (ref 97–108)
CO2 SERPL-SCNC: 28 MMOL/L (ref 21–32)
CREAT SERPL-MCNC: 0.67 MG/DL (ref 0.55–1.02)
EOSINOPHIL # BLD: 0.5 K/UL (ref 0–0.4)
EOSINOPHIL NFR BLD: 3 % (ref 0–7)
ERYTHROCYTE [DISTWIDTH] IN BLOOD BY AUTOMATED COUNT: 15.2 % (ref 11.5–14.5)
GLUCOSE BLD STRIP.AUTO-MCNC: 107 MG/DL (ref 65–100)
GLUCOSE BLD STRIP.AUTO-MCNC: 312 MG/DL (ref 65–100)
GLUCOSE SERPL-MCNC: 93 MG/DL (ref 65–100)
HCT VFR BLD AUTO: 45.5 % (ref 35–47)
HGB BLD-MCNC: 15.8 G/DL (ref 11.5–16)
LYMPHOCYTES # BLD: 5.4 K/UL (ref 0.8–3.5)
LYMPHOCYTES NFR BLD: 35 % (ref 12–49)
MCH RBC QN AUTO: 30.6 PG (ref 26–34)
MCHC RBC AUTO-ENTMCNC: 34.7 G/DL (ref 30–36.5)
MCV RBC AUTO: 88 FL (ref 80–99)
MONOCYTES # BLD: 0.9 K/UL (ref 0–1)
MONOCYTES NFR BLD: 6 % (ref 5–13)
NEUTS SEG # BLD: 8.4 K/UL (ref 1.8–8)
NEUTS SEG NFR BLD: 56 % (ref 32–75)
PLATELET # BLD AUTO: 339 K/UL (ref 150–400)
POTASSIUM SERPL-SCNC: 4.1 MMOL/L (ref 3.5–5.1)
RBC # BLD AUTO: 5.17 M/UL (ref 3.8–5.2)
SERVICE CMNT-IMP: ABNORMAL
SERVICE CMNT-IMP: ABNORMAL
SODIUM SERPL-SCNC: 139 MMOL/L (ref 136–145)
WBC # BLD AUTO: 15.2 K/UL (ref 3.6–11)

## 2017-09-18 PROCEDURE — 96372 THER/PROPH/DIAG INJ SC/IM: CPT

## 2017-09-18 PROCEDURE — 94761 N-INVAS EAR/PLS OXIMETRY MLT: CPT

## 2017-09-18 PROCEDURE — 74011250636 HC RX REV CODE- 250/636: Performed by: INTERNAL MEDICINE

## 2017-09-18 PROCEDURE — 65660000000 HC RM CCU STEPDOWN

## 2017-09-18 PROCEDURE — 85025 COMPLETE CBC W/AUTO DIFF WBC: CPT | Performed by: EMERGENCY MEDICINE

## 2017-09-18 PROCEDURE — 99285 EMERGENCY DEPT VISIT HI MDM: CPT

## 2017-09-18 PROCEDURE — 82962 GLUCOSE BLOOD TEST: CPT

## 2017-09-18 PROCEDURE — 74011000250 HC RX REV CODE- 250

## 2017-09-18 PROCEDURE — 0CJS8ZZ INSPECTION OF LARYNX, VIA NATURAL OR ARTIFICIAL OPENING ENDOSCOPIC: ICD-10-PCS | Performed by: OTOLARYNGOLOGY

## 2017-09-18 PROCEDURE — 96374 THER/PROPH/DIAG INJ IV PUSH: CPT

## 2017-09-18 PROCEDURE — 80048 BASIC METABOLIC PNL TOTAL CA: CPT | Performed by: EMERGENCY MEDICINE

## 2017-09-18 PROCEDURE — 74011250636 HC RX REV CODE- 250/636

## 2017-09-18 PROCEDURE — 74011636637 HC RX REV CODE- 636/637: Performed by: FAMILY MEDICINE

## 2017-09-18 PROCEDURE — 74011250637 HC RX REV CODE- 250/637: Performed by: OTOLARYNGOLOGY

## 2017-09-18 PROCEDURE — 74011250636 HC RX REV CODE- 250/636: Performed by: FAMILY MEDICINE

## 2017-09-18 PROCEDURE — 74011636637 HC RX REV CODE- 636/637: Performed by: EMERGENCY MEDICINE

## 2017-09-18 PROCEDURE — 74011250636 HC RX REV CODE- 250/636: Performed by: EMERGENCY MEDICINE

## 2017-09-18 PROCEDURE — 77030027138 HC INCENT SPIROMETER -A

## 2017-09-18 RX ORDER — DIPHENHYDRAMINE HYDROCHLORIDE 50 MG/ML
25 INJECTION, SOLUTION INTRAMUSCULAR; INTRAVENOUS EVERY 6 HOURS
Status: COMPLETED | OUTPATIENT
Start: 2017-09-18 | End: 2017-09-19

## 2017-09-18 RX ORDER — ENOXAPARIN SODIUM 100 MG/ML
40 INJECTION SUBCUTANEOUS EVERY 12 HOURS
Status: DISCONTINUED | OUTPATIENT
Start: 2017-09-18 | End: 2017-09-19 | Stop reason: HOSPADM

## 2017-09-18 RX ORDER — DEXAMETHASONE SODIUM PHOSPHATE 100 MG/10ML
10 INJECTION INTRAMUSCULAR; INTRAVENOUS
Status: DISCONTINUED | OUTPATIENT
Start: 2017-09-18 | End: 2017-09-18

## 2017-09-18 RX ORDER — SODIUM CHLORIDE 0.9 % (FLUSH) 0.9 %
5-10 SYRINGE (ML) INJECTION EVERY 8 HOURS
Status: DISCONTINUED | OUTPATIENT
Start: 2017-09-18 | End: 2017-09-19 | Stop reason: HOSPADM

## 2017-09-18 RX ORDER — IBUPROFEN 200 MG
1 TABLET ORAL DAILY
Status: DISCONTINUED | OUTPATIENT
Start: 2017-09-19 | End: 2017-09-19 | Stop reason: HOSPADM

## 2017-09-18 RX ORDER — ALBUTEROL SULFATE 1.25 MG/3ML
2.5 SOLUTION RESPIRATORY (INHALATION)
Status: DISCONTINUED | OUTPATIENT
Start: 2017-09-18 | End: 2017-09-19 | Stop reason: HOSPADM

## 2017-09-18 RX ORDER — DEXTROSE 50 % IN WATER (D50W) INTRAVENOUS SYRINGE
12.5-25 AS NEEDED
Status: DISCONTINUED | OUTPATIENT
Start: 2017-09-18 | End: 2017-09-19 | Stop reason: HOSPADM

## 2017-09-18 RX ORDER — DIPHENHYDRAMINE HCL 25 MG
25 CAPSULE ORAL
COMMUNITY
End: 2018-03-16

## 2017-09-18 RX ORDER — MAGNESIUM SULFATE 100 %
4 CRYSTALS MISCELLANEOUS AS NEEDED
Status: DISCONTINUED | OUTPATIENT
Start: 2017-09-18 | End: 2017-09-19 | Stop reason: HOSPADM

## 2017-09-18 RX ORDER — EPINEPHRINE 1 MG/ML
0.3 INJECTION, SOLUTION, CONCENTRATE INTRAVENOUS
Status: COMPLETED | OUTPATIENT
Start: 2017-09-18 | End: 2017-09-18

## 2017-09-18 RX ORDER — INSULIN LISPRO 100 [IU]/ML
INJECTION, SOLUTION INTRAVENOUS; SUBCUTANEOUS
Status: DISCONTINUED | OUTPATIENT
Start: 2017-09-18 | End: 2017-09-19 | Stop reason: HOSPADM

## 2017-09-18 RX ORDER — OXYMETAZOLINE HCL 0.05 %
2 SPRAY, NON-AEROSOL (ML) NASAL
Status: COMPLETED | OUTPATIENT
Start: 2017-09-18 | End: 2017-09-18

## 2017-09-18 RX ORDER — SODIUM CHLORIDE 0.9 % (FLUSH) 0.9 %
5-10 SYRINGE (ML) INJECTION AS NEEDED
Status: DISCONTINUED | OUTPATIENT
Start: 2017-09-18 | End: 2017-09-19 | Stop reason: HOSPADM

## 2017-09-18 RX ORDER — PREDNISONE 20 MG/1
60 TABLET ORAL
Status: COMPLETED | OUTPATIENT
Start: 2017-09-18 | End: 2017-09-18

## 2017-09-18 RX ORDER — FAMOTIDINE 10 MG/ML
INJECTION INTRAVENOUS
Status: COMPLETED
Start: 2017-09-18 | End: 2017-09-18

## 2017-09-18 RX ORDER — FUROSEMIDE 20 MG/1
20 TABLET ORAL
COMMUNITY
End: 2017-09-19

## 2017-09-18 RX ORDER — DIPHENHYDRAMINE HYDROCHLORIDE 50 MG/ML
INJECTION, SOLUTION INTRAMUSCULAR; INTRAVENOUS
Status: COMPLETED
Start: 2017-09-18 | End: 2017-09-18

## 2017-09-18 RX ADMIN — DIPHENHYDRAMINE HYDROCHLORIDE 50 MG: 50 INJECTION, SOLUTION INTRAMUSCULAR; INTRAVENOUS at 13:54

## 2017-09-18 RX ADMIN — Medication 10 ML: at 18:13

## 2017-09-18 RX ADMIN — ENOXAPARIN SODIUM 40 MG: 40 INJECTION SUBCUTANEOUS at 21:18

## 2017-09-18 RX ADMIN — PREDNISONE 60 MG: 20 TABLET ORAL at 16:26

## 2017-09-18 RX ADMIN — FAMOTIDINE: 10 INJECTION, SOLUTION INTRAVENOUS at 13:54

## 2017-09-18 RX ADMIN — OXYMETAZOLINE HYDROCHLORIDE 2 SPRAY: 5 SPRAY NASAL at 18:13

## 2017-09-18 RX ADMIN — Medication 10 ML: at 22:30

## 2017-09-18 RX ADMIN — EPINEPHRINE 0.3 MG: 1 INJECTION, SOLUTION, CONCENTRATE INTRAVENOUS at 13:53

## 2017-09-18 RX ADMIN — METHYLPREDNISOLONE SODIUM SUCCINATE 60 MG: 125 INJECTION, POWDER, FOR SOLUTION INTRAMUSCULAR; INTRAVENOUS at 18:11

## 2017-09-18 RX ADMIN — INSULIN LISPRO 4 UNITS: 100 INJECTION, SOLUTION INTRAVENOUS; SUBCUTANEOUS at 21:25

## 2017-09-18 NOTE — H&P
[address] [place] [address] Medicine  History & Physical        Date of admission: 2017    Patient name: Krista Christensen  MRN: [de-identified]  : [id number]  Age: 54 y.o. Primary care provider: David Mireles DO     Source of Information: patient                               Chief complain: Facial swelling and SOB    History of present illness  Krista Christensen is a 54 y.o. female with PMH of HTN, chronic LE lymphedema, depression, Lung nodule who presents with facial swelling, hives on back, and SOB. Pt states this same rxn occurred when she was on Losartan. She was switched to HCTZ on . She was taking this medication daily alongside Lasix. However, today she took medication and drank coffee. Started to feel that her face was swelling. Eye lid began to swell, then the lips, tongue, and then throat swelling. Denies new foods, new exposures, different medication brand. No hx of asthma. In the ED, Pt was in distress with stridor and facial swelling. CBC showed chronic leukocytosis. BMP unremarkable. Given epinephrine, prednisone, and afrin. Pharmacist reviewed medications, possible sulfa allergy.  Admitted for observation and ENT evaluation    Medications   albuterol (ACCUNEB) nebulizer solution 2.5 mg (not administered)   diphenhydrAMINE (BENADRYL) injection 25 mg (not administered)   famotidine (PF) (PEPCID) 20 mg in sodium chloride 0.9 % 10 mL injection (not administered)   methylPREDNISolone (PF) (SOLU-MEDROL) injection 60 mg (not administered)   sodium chloride (NS) flush 5-10 mL (not administered)   sodium chloride (NS) flush 5-10 mL (not administered)   enoxaparin (LOVENOX) injection 40 mg (not administered)   oxymetazoline (AFRIN) 0.05 % nasal spray 2 Spray (not administered)   insulin lispro (HUMALOG) injection (not administered)   glucose chewable tablet 16 g (not administered)   dextrose (D50W) injection syrg 12.5-25 g (not administered)   glucagon (GLUCAGEN) injection 1 mg (not administered) diphenhydrAMINE (BENADRYL) 50 mg/mL injection (50 mg IntraVENous Given 17 1354)   famotidine (PF) (PEPCID) 20 mg/2 mL injection ( IntraVENous Given 17 1354)   EPINEPHrine HCl (PF) (ADRENALIN) 1 mg/mL (1 mL) injection 0.3 mg (0.3 mg IntraMUSCular Given 17 1353)   predniSONE (DELTASONE) tablet 60 mg (60 mg Oral Given 17 1626)       Past Medical History:   Diagnosis Date    Anxiety and depression     Flu     2 wks ago    GERD (gastroesophageal reflux disease)     Headache     Hypertension     Incidental lung nodule, > 3mm and < 8mm 2016    left lung nodule in smoker, needs follow up CT in Oct 2016       Past Surgical History:   Procedure Laterality Date    APPENDECTOMY      HX APPENDECTOMY      HX  SECTION      X 3 Births    HX  SECTION      HX CHOLECYSTECTOMY      HX WRIST FRACTURE TX      metal    REMOVAL GALLBLADDER       Prior to Admission medications    Medication Sig Start Date End Date Taking? Authorizing Provider   diphenhydrAMINE (BENADRYL) 25 mg capsule Take 25 mg by mouth every six (6) hours as needed. Yes Phys Other, MD   furosemide (LASIX) 20 mg tablet Take 20 mg by mouth daily as needed for Other (SOB due to increased swelling/fluid build up). Yes Historical Provider   hydroCHLOROthiazide (HYDRODIURIL) 12.5 mg tablet Take 1 Tab by mouth daily. 17  Yes Paras Jamison DO   albuterol (PROVENTIL HFA, VENTOLIN HFA, PROAIR HFA) 90 mcg/actuation inhaler Take 2 Puffs by inhalation every four (4) hours as needed for Wheezing. 3/4/17  Yes Es Velazquez MD     Allergies   Allergen Reactions    Eggplant Other (comments)     Severe stomach pain       Family History   Problem Relation Age of Onset    Diabetes Mother     Hypertension Brother     Hypertension Brother     Hypertension Brother       Family history reviewed and non-contributory.      Social history  Patient resides  x  Independently      With family care      Assisted living      SNF Ambulates  x  Independently      With cane       Assisted walker         Alcohol history   x  None     Social     Chronic   Smoking history    None     Former smoker   x  Current smoker     History   Smoking Status    Current Every Day Smoker    Packs/day: 0.50    Years: 25.00    Types: Cigarettes   Smokeless Tobacco    Never Used       Code status  x  Full code     DNR/DNI        Code status discussed with the patient/caregivers. Full Code    Review of systems  Constitutional: Negative for chills and fever. HENT: Positive for facial swelling. Negative for ear pain and sore throat. Eyes: Negative for photophobia and pain. Respiratory: Negative for chest tightness. Cardiovascular: Negative for chest pain and leg swelling. Gastrointestinal: Negative for abdominal pain, nausea and vomiting. Genitourinary: Negative for dysuria and flank pain. Musculoskeletal: Negative for back pain and neck pain. Skin: Negative for rash and wound. Neurological: Negative for dizziness, light-headedness and headaches. Physical Examination   Visit Vitals    /65    Pulse 97    Temp 97.6 °F (36.4 °C)    Resp 25    Ht 5' 1\" (1.549 m)    Wt 230 lb (104.3 kg)    LMP 12/31/2012    SpO2 97%    BMI 43.46 kg/m2          O2 Device: Room air    General: No acute distress. Alert and oriented x 3. MSK:Normal muscle bulk. Psych:Mood and affect appropriate. Neuro: CN II - XII grossly intact bilaterally. Eyes: PERRL/EOMI, no nystagmus. ENT: TMs clear and intact with normal anatomic landmarks. Nasal cavity patent. Oral mucosa moist. Tonsils enlarged. Airway patent  Lymph:Neck soft and supple without lymphadenopathy. Resp:No distress. No audible stridor or wheezing. Heart  : RRR without murmurs or gallops  Skin:Head and neck skin is without suspicious lesions.     Data Review      24 Hour Results:  Recent Results (from the past 24 hour(s))   METABOLIC PANEL, BASIC    Collection Time: 09/18/17  1:57 PM   Result Value Ref Range    Sodium 139 136 - 145 mmol/L    Potassium 4.1 3.5 - 5.1 mmol/L    Chloride 102 97 - 108 mmol/L    CO2 28 21 - 32 mmol/L    Anion gap 9 5 - 15 mmol/L    Glucose 93 65 - 100 mg/dL    BUN 14 6 - 20 MG/DL    Creatinine 0.67 0.55 - 1.02 MG/DL    BUN/Creatinine ratio 21 (H) 12 - 20      GFR est AA >60 >60 ml/min/1.73m2    GFR est non-AA >60 >60 ml/min/1.73m2    Calcium 9.2 8.5 - 10.1 MG/DL   CBC WITH AUTOMATED DIFF    Collection Time: 09/18/17  1:57 PM   Result Value Ref Range    WBC 15.2 (H) 3.6 - 11.0 K/uL    RBC 5.17 3.80 - 5.20 M/uL    HGB 15.8 11.5 - 16.0 g/dL    HCT 45.5 35.0 - 47.0 %    MCV 88.0 80.0 - 99.0 FL    MCH 30.6 26.0 - 34.0 PG    MCHC 34.7 30.0 - 36.5 g/dL    RDW 15.2 (H) 11.5 - 14.5 %    PLATELET 463 806 - 066 K/uL    NEUTROPHILS 56 32 - 75 %    LYMPHOCYTES 35 12 - 49 %    MONOCYTES 6 5 - 13 %    EOSINOPHILS 3 0 - 7 %    BASOPHILS 0 0 - 1 %    ABS. NEUTROPHILS 8.4 (H) 1.8 - 8.0 K/UL    ABS. LYMPHOCYTES 5.4 (H) 0.8 - 3.5 K/UL    ABS. MONOCYTES 0.9 0.0 - 1.0 K/UL    ABS. EOSINOPHILS 0.5 (H) 0.0 - 0.4 K/UL    ABS. BASOPHILS 0.0 0.0 - 0.1 K/UL     Recent Labs      09/18/17   1357   WBC  15.2*   HGB  15.8   HCT  45.5   PLT  339     Recent Labs      09/18/17   1357   NA  139   K  4.1   CL  102   CO2  28   GLU  93   BUN  14   CREA  0.67   CA  9.2       Imaging  None    Assessment and Plan   Katey Lopez is a 54 y.o. female with PMH of chronic LE edema, HTN, and DDD who presents with Angioedema. Anaphylactic reaction: Started in morning of 9/18. Hives, angioedema, and throat tightness developed. No new medication. Had similar rxn to Losartan 8/2017. Has been on HCTZ and Lasix. No new food or animal exposure, or medication. Received Epinephrine IM in ED, with 60 mg prednisone.   Pharm states may have sulfa reaction  -Admit to telemetry, stable vitals and able to communicate without distress  -ENT consult  -Solumedrol 60 mg for 4 doses, Benadryl 25 mg q6h, and albuterol nebs q4h  -NPO until pass bedside swallow and seen by ENT. Then start cardiac diet.   -Plan to stop all diuretics and avoid sulfa agents until she is evaluated OP by allergy specialist     Chronic Leukocytosis: 15.2 POA. -Will monitor with CBC. HTN: Holding HCTZ and Lasix  -Will monitor BP  -If needed can add metoprolol    Chronic LE edema: Last ECHO 3/2016 showed EF 60-65% without wall abnormality. 1+ pitting edema noted.  -Holding Lasix  -No insurance making proper referral and treatement difficult     Tobacco abuse: Smokes. 10 cigarettes daily.  - Nicotine patch    Depression: Diagnosed in 5/2017.   - Pt used to be on Wellbutrin. However, does not want to be on medication as she believes it to be ineffective and not willing to try other anti-depressents. Diet: Cardiac diet  Activity: With assistance   DVT prophylaxis: Lovenox   Isolation precautions: None  Consultations: ENT  Anticipated disposition: TBD. Plan to add Losartan and HCTZ to allergy list    CODE STATUS: FULL CODE       Signed by: Maryan Dotson DO, - Family Medicine Resident    September 18, 2017 at 5:58 PM     I saw and evaluated the patient, performing the key elements of the service. I discussed the findings, assessment and plan with the resident and agree with the resident's findings and plan as documented in the resident's note.

## 2017-09-18 NOTE — CONSULTS
The patient was seen and examined by me, but her PCP is [address] [facility] residency. ED will consult them for admission.   I started IV steroids, benadryl, famotidine

## 2017-09-18 NOTE — PROGRESS NOTES
Pharmacist spoke with patient's family regarding possible cause of angioedema. Per patient's family members, patient had episode of angioedema approximately 2 weeks ago. At that time, all BP medications were stopped for about one week. All symptoms resolved. Patient resumed Furosemide and Hydrochlorothiazide and reaction recurred. Pharmacist discussed with family possibility that patient is allergic to Furosemide or Hydrochlorothiazide or possibly both. In light of allergic reaction, pharmacist also counseled that if patient is having reaction to Furosemide or Hydrochlorothiazide, she may be allergic to sulfa.  Pharmacist recommended discussing with PCP and considering consulting an allergy specialist.    Stephie Shine

## 2017-09-18 NOTE — PROGRESS NOTES
Primary Nurse Stephanie Shah and Sarita Zavaal, RN performed a dual skin assessment on this patient No impairment noted  Jose score is 23

## 2017-09-18 NOTE — ED NOTES
Patient dozing on and off -- easy to arouse. Patient in less distress than upon arrival. Stridor barely audible at this time.  RR normalized since arrival at 18 bpm.

## 2017-09-18 NOTE — PROGRESS NOTES
[facility] Dosing Services: 9/17/17    Pharmacy note for Tracee Aquino DO regarding Lovenox dose adjustment for increased BMI:    Wt Readings from Last 1 Encounters:   09/18/17 104.3 kg (230 lb)       Ht Readings from Last 1 Encounters:   09/18/17 154.9 cm (61\")           Previous Dose 40mg sq q24h   Creatinine Clearance Estimated Creatinine Clearance: 112.9 mL/min (based on Cr of 0.67). Creatinine Lab Results   Component Value Date/Time    Creatinine 0.67 09/18/2017 01:57 PM       Platelet Lab Results   Component Value Date/Time    PLATELET 925 83/78/8297 01:57 PM      H/H Lab Results   Component Value Date/Time    HGB 15.8 09/18/2017 01:57 PM          Pharmacist made change to enoxaparin therapy based on:    [ x ] BMI: dose changed to: 40mg sq q 12h    - Pharmacy to automatically make dose adjustment for obesity (BMI greater than 40)  - Pharmacy to make dose rounding adjustments per [facility] dose adjustment scale.       Cachorro Oconnor PHARMD . Contact information: [phone]

## 2017-09-18 NOTE — Clinical Note
Status[de-identified] Inpatient [101] Type of Bed: Intensive Care [6] Inpatient Hospitalization Certified Necessary for the Following Reasons: 4. Patient requires ICU level of care interventions (further clarification in H&P documentation) Admitting Diagnosis: Angioedema [561292] Admitting Physician: Jeana Fatima Attending Physician: Michelle Devi [[id number]] Estimated Length of Stay: 3-4 Midnights Discharge Plan[de-identified] Home with Office Follow-up

## 2017-09-18 NOTE — CONSULTS
Dear Dr. Heidy Hall,    Please see below for my assessment of Ms. Linda Randle. Thank you for involving me in her care. -BF    OTOLARYNGOLOGY - HEAD AND NECK SURGERY HISTORY AND PHYSICAL    Requesting Physician:    Jose Raul Correia, *     CC:   Airway swelling    HPI:     Balta Aburto is a 54 y.o. female seen today in consultation at the request of Dr. Heidy Hall for airway evaluation. Patient presently in ER for respiratory distress, hives, and generalized edema. Recently placed on HCTZ and developed allergic reaction. Had a similar reaction in the past to an ARB. Not on lisinopril. Breathing and stridor improved after 60mg prednisone, SQ epi and benadryl. No dysphagia. Sitting up makes breathing worse. Moderate in intensity at first and mild now.     Past Medical History:   Diagnosis Date    Anxiety and depression     Flu     2 wks ago    GERD (gastroesophageal reflux disease)     Headache     Hypertension     Incidental lung nodule, > 3mm and < 8mm 2016    left lung nodule in smoker, needs follow up CT in Oct 2016      Past Surgical History:   Procedure Laterality Date    APPENDECTOMY      HX APPENDECTOMY      HX  SECTION      X 3 Births    HX  SECTION      HX CHOLECYSTECTOMY      HX WRIST FRACTURE TX      metal    REMOVAL GALLBLADDER       Current Facility-Administered Medications   Medication Dose Route Frequency    albuterol (ACCUNEB) nebulizer solution 2.5 mg  2.5 mg Nebulization Q4H PRN    diphenhydrAMINE (BENADRYL) injection 25 mg  25 mg IntraVENous Q6H    famotidine (PF) (PEPCID) 20 mg in sodium chloride 0.9 % 10 mL injection  20 mg IntraVENous Q12H    methylPREDNISolone (PF) (SOLU-MEDROL) injection 60 mg  60 mg IntraVENous Q6H    sodium chloride (NS) flush 5-10 mL  5-10 mL IntraVENous Q8H    sodium chloride (NS) flush 5-10 mL  5-10 mL IntraVENous PRN    enoxaparin (LOVENOX) injection 40 mg  40 mg SubCUTAneous Q12H    insulin lispro (HUMALOG) injection SubCUTAneous QID WITH MEALS    glucose chewable tablet 16 g  4 Tab Oral PRN    dextrose (D50W) injection syrg 12.5-25 g  12.5-25 g IntraVENous PRN    glucagon (GLUCAGEN) injection 1 mg  1 mg IntraMUSCular PRN     Current Outpatient Prescriptions   Medication Sig    diphenhydrAMINE (BENADRYL) 25 mg capsule Take 25 mg by mouth every six (6) hours as needed.  furosemide (LASIX) 20 mg tablet Take 20 mg by mouth daily as needed for Other (SOB due to increased swelling/fluid build up).  hydroCHLOROthiazide (HYDRODIURIL) 12.5 mg tablet Take 1 Tab by mouth daily.  albuterol (PROVENTIL HFA, VENTOLIN HFA, PROAIR HFA) 90 mcg/actuation inhaler Take 2 Puffs by inhalation every four (4) hours as needed for Wheezing. Allergies   Allergen Reactions    Eggplant Other (comments)     Severe stomach pain      Family History   Problem Relation Age of Onset    Diabetes Mother     Hypertension Brother     Hypertension Brother     Hypertension Brother      Social History   Substance Use Topics    Smoking status: Current Every Day Smoker     Packs/day: 0.50     Years: 25.00     Types: Cigarettes    Smokeless tobacco: Never Used    Alcohol use No         REVIEW OF SYSTEMS  A full 10 point review of systems was performed today. The review was non-pertinent other than the details already listed in the history of present illness. Visit Vitals    /65    Pulse 97    Temp 97.6 °F (36.4 °C)    Resp 25    Ht 5' 1\" (1.549 m)    Wt 104.3 kg (230 lb)    LMP 12/31/2012    SpO2 97%    BMI 43.46 kg/m2        PHYSICAL EXAM  General:  No acute distress. Alert and oriented x 3. Translation with daughter. MSK:   Normal muscle bulk. Psych:  Mood and affect appropriate. Neuro:   CN II - XII grossly intact bilaterally. Eyes:  PERRL/EOMI, no nystagmus. ENT:   EACs are patent, clean and dry. TMs clear and intact with normal anatomic landmarks. No middle ear fluid.   Anterior rhinoscopy without mucopurulence or polyps. OC/OP clear without masses or lesions. 2+ tonsils. Lymph:  Neck soft and supple without lymphadenopathy. Resp:   No audible stridor or wheezing. Skin:   Head and neck skin is without suspicious lesions. PROCEDURE:    Flexible Nasolaryngoscopy  Indication: stridor and anaphylaxis. Findings: No mass in NP. Fossae of Rosenmüller are free of tumor. Bilateral eustachian tube orifices are well defined with no overlying mass. Base of tongue symmetric. Epiglottis is crisp. No masses in pyriform sinuses. Left AE fold with watery edema. Partially obscured view of left TVC until I manipulate around the edema, then a full view of both cords. No edema on the glottis itself. Vocal cords move symmetrically. A timeout was performed in which the patient's name and procedure were verified. The patient's nose was topicalized with afrin. A flexible laryngoscope was then inserted into the left nostril and advanced posteriorly to the nasopharynx. Findings noted above. DATA REVIEW:  Lab Results   Component Value Date/Time    WBC 15.2 09/18/2017 01:57 PM    HGB 15.8 09/18/2017 01:57 PM    HCT 45.5 09/18/2017 01:57 PM    PLATELET 944 07/23/3315 01:57 PM    MCV 88.0 09/18/2017 01:57 PM    I personally discussed the case with the family medicine resident. Plan for overnight obs on telemetry. ASSESSMENT/PLAN:  53 yo F with moderate edema on the left AE fold only. I consider her at low risk for airway compromise. Continue IV steroids, benadryl, and H2 blocker. Keep HOB elevated. I rec allergy evaluation to determine which drug she reacted to. Please page overnight if subjective worsened breathing or stridor. Ezequiel Founds A. Levander Canavan, [address], [facility]  [address] [facility], [address]   (I) [phone]  (L) [phone]  (J) [phone]

## 2017-09-18 NOTE — IP AVS SNAPSHOT
[address] [facility] [address] [facility] 
[phone] Patient: Katey Lopez MRN: [id number] :1965 You are allergic to the following Allergen Reactions Eggplant Other (comments) Severe stomach pain Recent Documentation Height Weight Breastfeeding? BMI OB Status Smoking Status 1.549 m 135.5 kg No 56.44 kg/m2 Postmenopausal Current Every Day Smoker Unresulted Labs Order Current Status C1 ESTERASE INHIB, FUNCT In process Emergency Contacts Name Discharge Info Relation Home Work Mobile Anya Tiwari DISCHARGE CAREGIVER [3] Spouse [3] [phone] Philomena Amos CAREGIVER [3] Daughter [21] [phone] About your hospitalization You were admitted on:  2017 You last received care in the:  [facility] 3 PRO CARE TELE 2 You were discharged on:  2017 Unit phone number:  [phone] Why you were hospitalized Your primary diagnosis was: Angioedema Your diagnoses also included:  Swelling Of Throat, Dyspnea, Morbid Obesity (Hcc), Tobacco Abuse Providers Seen During Your Hospitalizations Provider Role Specialty Primary office phone Meghana Cote MD Attending Provider Emergency Medicine [phone] Janis Champagne MD Attending Provider [facility] [phone] Your Primary Care Physician (PCP) Primary Care Physician Office Phone Office Fax Zhao Brooke [phone] Follow-up Information Follow up With Details Comments Contact Info Amada Garza DO On 2017 @3:25pm Alfred Bowles 906 100[address] [facility] 
[phone] Maria Luz Blanchard MD Schedule an appointment as soon as possible for a visit for allergy testing [address] [place] [address] 
[phone] Your Appointments  2017  3:25 PM EDT  
 ACUTE CARE with Kasia Gordon DO [address] [facility] ([facility])  
 [address] [facility] 151 05[address] [facility]  
[phone] Current Discharge Medication List  
  
START taking these medications Dose & Instructions Dispensing Information Comments Morning Noon Evening Bedtime EPINEPHrine 0.3 mg/0.3 mL injection Commonly known as:  Ari Lopez Your last dose was: Your next dose is:    
   
   
 Dose:  0.3 mg  
0.3 mL by IntraMUSCular route once as needed for up to 1 dose. Quantity:  1 Syringe Refills:  1  
     
   
   
   
  
 metoprolol tartrate 25 mg tablet Commonly known as:  LOPRESSOR Your last dose was: Your next dose is:    
   
   
 Dose:  25 mg Take 1 Tab by mouth two (2) times a day. Quantity:  30 Tab Refills:  1  
     
   
   
   
  
 nicotine 21 mg/24 hr  
Commonly known as:  Katrin Kwong Your last dose was: Your next dose is:    
   
   
 Dose:  1 Patch 1 Patch by TransDERmal route every twenty-four (24) hours for 30 days. Quantity:  30 Patch Refills:  0  
     
   
   
   
  
 predniSONE 20 mg tablet Commonly known as:  Garrel Moron Your last dose was: Your next dose is:    
   
   
 Dose:  60 mg Take 3 Tabs by mouth daily (with breakfast) for 3 days. Quantity:  9 Tab Refills:  0 CONTINUE these medications which have NOT CHANGED Dose & Instructions Dispensing Information Comments Morning Noon Evening Bedtime  
 albuterol 90 mcg/actuation inhaler Commonly known as:  PROVENTIL HFA, VENTOLIN HFA, PROAIR HFA Your last dose was: Your next dose is:    
   
   
 Dose:  2 Puff Take 2 Puffs by inhalation every four (4) hours as needed for Wheezing. Quantity:  1 Inhaler Refills:  2 BENADRYL 25 mg capsule Generic drug:  diphenhydrAMINE Your last dose was: Your next dose is:    
   
   
 Dose:  25 mg Take 25 mg by mouth every six (6) hours as needed. Refills:  0 STOP taking these medications   
 hydroCHLOROthiazide 12.5 mg tablet Commonly known as:  HYDRODIURIL  
   
  
 LASIX 20 mg tablet Generic drug:  furosemide Where to Get Your Medications Information on where to get these meds will be given to you by the nurse or doctor. ! Ask your nurse or doctor about these medications EPINEPHrine 0.3 mg/0.3 mL injection  
 metoprolol tartrate 25 mg tablet  
 nicotine 21 mg/24 hr  
 predniSONE 20 mg tablet Discharge Instructions HOME DISCHARGE INSTRUCTIONS Jeana Justin / [id number] : 1965 Admission date: 2017 Discharge date: 2017 11:39 AM  
 
Please bring this form with you to show your care provider at your follow-up appointment. Primary care provider: Cipriano Pagan DO Discharging provider: Cipriano Pagan DO  - Family Medicine Resident Dr. Rocio Loredo MD - Family Medicine Attending FINAL DIAGNOSES: 
Anaphylactic rxn Mervin Case . . . . . . . . . . . . . . . . . . . . . . . . . . . . . . . . . . . . . . . . . . . . . . . . . . . . . . . . . . . . . . . . . . . . . . Mervin Case FOLLOW-UP CARE RECOMMENDATIONS: 
 
 
· We stopped HCTZ (blood pressure medication). We will add 25mg metoprolol BID to control · Please see allergist, to see if allergic to hctz, losartan, and lasix · Please continue 3 days of 60 mg prednisone · Prescription given for Adrenaclick + coupon for pt to get it for $60 total.   
 
Follow-up tests needed: None Pending test results: At the time of your discharge the following test results are still pending: C1 ESTERASE INHIB, FUNCT Mervin Case Please make sure you review these results with your outpatient follow-up provider(s).  
 
Specific symptoms to watch for: chest pain, shortness of breath, fever, chills, nausea, vomiting, diarrhea, change in mentation, falling, weakness, bleeding. DIET/what to eat:  Regular Diet ACTIVITY:  Activity as tolerated Wound care: none Equipment needed:  none What to do if new or unexpected symptoms occur? If you experience any of the above symptoms (or should other concerns or questions arise after discharge) please call your primary care physician. Return to the emergency room if you cannot get hold of your doctor. · It is very important that you keep your follow-up appointment(s). · Please bring discharge papers, medication list (and/or medication bottles) to your follow-up appointments for review by your outpatient provider(s). · Please check the list of medications and be sure it includes every medication (even non-prescription medications) that your provider wants you to take. · It is important that you take the medication exactly as they are prescribed. · Keep your medication in the bottles provided by the pharmacist and keep a list of the medication names, dosages, and times to be taken in your wallet. · Do not take other medications without consulting your doctor. · If you have any questions about your medications or other instructions, please talk to your nurse or care provider before you leave the hospital.  
 
Information obtained by:  
 
I understand that if any problems occur once I am at home I am to contact my physician. These instructions were explained to me and I had the opportunity to ask questions. I understand and acknowledge receipt of the instructions indicated above. Physician's or R.N.'s Signature                                                                  Date/Time Patient or Representative Signature                                                          Date/Time Discharge Orders None [facility] Announcement We are excited to announce that we are making your provider's discharge notes available to you in MyChart. You will see these notes when they are completed and signed by the physician that discharged you from your recent hospital stay. If you have any questions or concerns about any information you see in MyChart, please call the Health Information Department where you were seen or reach out to your Primary Care Provider for more information about your plan of care. General Information Please provide this summary of care documentation to your next provider. Patient Signature:  ____________________________________________________________ Date:  ____________________________________________________________  
  
[facility] Signature:  ____________________________________________________________ Date:  ____________________________________________________________

## 2017-09-18 NOTE — PROGRESS NOTES
Problem: Falls - Risk of  Goal: *Absence of Falls  Document Michelle Fall Risk and appropriate interventions in the flowsheet.   Outcome: Progressing Towards Goal  Fall Risk Interventions:  Mobility Interventions: Bed/chair exit alarm                 Elimination Interventions: Call light in reach

## 2017-09-18 NOTE — PROGRESS NOTES
Bedside shift change report given to [address] (oncoming nurse) by Bryan Mratinez RN (offgoing nurse). Report included the following information SBAR, Kardex, Intake/Output, MAR, Recent Results and Cardiac Rhythm NSR.

## 2017-09-18 NOTE — ED NOTES
TRANSFER - OUT REPORT:    Verbal report given to [facility] (name) on Doris Singh  being transferred to [facility] - room 329 (unit) for routine progression of care       Report consisted of patients Situation, Background, Assessment and   Recommendations(SBAR). Information from the following report(s) SBAR, ED Summary, Procedure Summary, Intake/Output, MAR and Recent Results was reviewed with the receiving nurse. Lines:   Peripheral IV 09/18/17 Inner;Left Arm (Active)   Site Assessment Clean, dry, & intact 9/18/2017  1:55 PM   Phlebitis Assessment 0 9/18/2017  1:55 PM   Infiltration Assessment 0 9/18/2017  1:55 PM   Dressing Status Clean, dry, & intact 9/18/2017  1:55 PM   Dressing Type Transparent 9/18/2017  1:55 PM   Hub Color/Line Status Pink;Flushed 9/18/2017  1:55 PM   Alcohol Cap Used Yes 9/18/2017  1:55 PM        Opportunity for questions and clarification was provided.       Patient to be transported after ENT consult with:   Monitor  Tech

## 2017-09-18 NOTE — ED PROVIDER NOTES
HPI Comments: 54 y.o. female with past medical history significant for HTN, GERD, anxiety, depression, and lung nodule who presents to the ED with chief complaint of SOB. Pt reports sudden onset SOB accompanied by facial swelling just prior to arrival. Pt states she was recently started on HCTZ to treat HTN. Denies any new foods. Pt denies hx of asthma. There are no other acute medical complaints voiced at this time. Social Hx: Smoker. PCP: Julien Velasquez DO    Note written by Erik Hyatt, as dictated by Faisal Davis MD 1:42 PM     The history is provided by the patient and a relative. The history is limited by a language barrier. No  was used. Past Medical History:   Diagnosis Date    Anxiety and depression     Flu     2 wks ago    GERD (gastroesophageal reflux disease)     Headache     Hypertension     Incidental lung nodule, > 3mm and < 8mm 2016    left lung nodule in smoker, needs follow up CT in Oct 2016        Past Surgical History:   Procedure Laterality Date    APPENDECTOMY      HX APPENDECTOMY      HX  SECTION      X 3 Births    HX  SECTION      HX CHOLECYSTECTOMY      HX WRIST FRACTURE TX      metal    REMOVAL GALLBLADDER           Family History:   Problem Relation Age of Onset    Diabetes Mother     Hypertension Brother     Hypertension Brother     Hypertension Brother        Social History     Social History    Marital status:      Spouse name: N/A    Number of children: N/A    Years of education: N/A     Occupational History    Not on file.      Social History Main Topics    Smoking status: Current Every Day Smoker     Packs/day: 0.50     Years: 25.00     Types: Cigarettes    Smokeless tobacco: Never Used    Alcohol use No    Drug use: No    Sexual activity: Yes     Partners: Male     Other Topics Concern    Not on file     Social History Narrative    ** Merged History Encounter ** ** Merged History Encounter **              ALLERGIES: Review of patient's allergies indicates no known allergies. Review of Systems   Constitutional: Negative for chills and fever. HENT: Positive for facial swelling. Negative for ear pain and sore throat. Eyes: Negative for photophobia and pain. Respiratory: Positive for shortness of breath. Negative for chest tightness. Cardiovascular: Negative for chest pain and leg swelling. Gastrointestinal: Negative for abdominal pain, nausea and vomiting. Genitourinary: Negative for dysuria and flank pain. Musculoskeletal: Negative for back pain and neck pain. Skin: Negative for rash and wound. Neurological: Negative for dizziness, light-headedness and headaches. All other systems reviewed and are negative. Vitals:    09/18/17 1341   BP: (!) 160/95   Pulse: 95   Resp: 28   Temp: 97.6 °F (36.4 °C)   SpO2: 97%   Weight: 104.3 kg (230 lb)   Height: 5' 1\" (1.549 m)            Physical Exam   Constitutional: She is oriented to person, place, and time. She appears well-developed and well-nourished. She appears distressed. HENT:   Head: Normocephalic and atraumatic. Mouth/Throat: Oropharynx is clear and moist.   Mild facial swelling noted   Eyes: Conjunctivae and EOM are normal. Pupils are equal, round, and reactive to light. Neck: Normal range of motion. Cardiovascular: Normal rate, regular rhythm, normal heart sounds and intact distal pulses. No murmur heard. Pulmonary/Chest: Effort normal. Stridor present. No respiratory distress. She has wheezes. Abdominal: Soft. Bowel sounds are normal. There is no tenderness. Musculoskeletal: Normal range of motion. She exhibits no edema or tenderness. Neurological: She is alert and oriented to person, place, and time. No cranial nerve deficit. Skin: Skin is warm and dry. She is not diaphoretic. Psychiatric: Her mood appears anxious. tearful   Nursing note and vitals reviewed. MDM  Number of Diagnoses or Management Options  Anaphylaxis, initial encounter:   Diagnosis management comments: Patient with anaphylaxis - treat in the ED with meds and observe in the ED.    1600 - plan is to stop diuretics and avoid all sulfa agents, BP is better, will consider metoprolol daily as alternative until can get back to PCP for re-eval and to arrange allergy testing. 1630 - patient has not improved enough to go home, still with throat tightness  Will admit, spoke to [facility] resident after Dr. Bre Beltran the hospitalist realized she was a family practice patient. Amount and/or Complexity of Data Reviewed  Clinical lab tests: ordered and reviewed  Discuss the patient with other providers: yes (Pharmacy staff reviewed meds and there is now a concern for a possible sulfa allergy)    Risk of Complications, Morbidity, and/or Mortality  Presenting problems: high  Diagnostic procedures: high  Management options: high    Critical Care  Total time providing critical care: 30-74 minutes (Total critical care time spend exclusive of procedures:  35 minutes)    ED Course       Procedures    CONSULT NOTE:  4:43 PM Marisela Christiansen MD spoke with Dr. Bre Beltran, Consult for Hospitalist.  Discussed available diagnostic tests and clinical findings. He is in agreement with care plans as outlined. Dr. Bre Beltran will admit pt. Pt is still experiencing tightness in her throat. Plan is to admit to the ICU for observation. Dr. Bre Beltran asks that ENT also be consulted. 65 - Dr. Bre Beltran advised me this is a FP patient and I discontinued his consult and contact family practice to admit, patient improved.   Still awaiting ENT to call back

## 2017-09-18 NOTE — ED NOTES
Patient requested to sit on side of bed and swing legs over. Stated, \"I feel like I'm suffocating when I'm laying down. \" Patient and family reassured -- O2 sats 98-99, patient with minimal to no stridor, and RR of 18. Questions answered.

## 2017-09-18 NOTE — ED NOTES
Patient still with facial swelling similar to presentation. Patient with minimal to no stridor -- still complains of \"throat and chest with tightness. \" Family remains at bedside.

## 2017-09-18 NOTE — ED NOTES
Family member c/o patient having SOB. LS CTA, no stridor exhibited, O2 sat 100%. VSS. RR 22 pt appears to be in no acute distress. Family remains at bedside.

## 2017-09-18 NOTE — ED TRIAGE NOTES
Pt brought into ED by her daughter for sudden SOB with angioedema. Daughter states the patient started a new BP medication. MD at bedside immediately. +stridor. VSS. Pt states she has generalized pain 10/10, first language is Tajik.

## 2017-09-18 NOTE — PROGRESS NOTES
[facility]: MED RECONCILIATION  Comments/Recommendations:    Patient alert and oriented for medication review and knew home medications. Family also present for interview.  Patient stopped taking losartan 3 weeks ago due to episode of angioedema. At that time patient was also told to stop her furosemide and HCTZ. She was off medications furosemide and HCTZ for one week.  Patient went to PCP who resumed her furosemide and HCTZ and patient did not have a reaction for 2 weeks until today.  Patient takes her furosemide PRN based on her level of SOB due to swelling/increased fluid build up. Per patient she takes it an average of 3 times/week.  Patient takes benadryl 25mg at the onset of tightness. She started taking 3 weeks ago due to her first angioedema incident. Patient last took this morning.  Patient checks her BP at home and it usually is in the range of 145-155/.  Updated patient allergies to include eggplant which causes severe stomach pain (intolerance). Allergies will also need to be reviewed prior to her discharge regarding losartan, HCTZ and furosemide.  Updated patients pharmacy. Medications added:     · none    Medications removed:    · none    Medications adjusted:    · Furosemide 20mg PRN adjusted from 20mg daily    Information obtained from: Patient and family    Allergies: Review of patient's allergies indicates no known allergies. Prior to Admission Medications:     Medication Documentation Review Audit      Prior to Admission Medications   Prescriptions Last Dose Informant Patient Reported? Taking? albuterol (PROVENTIL HFA, VENTOLIN HFA, PROAIR HFA) 90 mcg/actuation inhaler 8/4/2017 Self No Yes   Sig: Take 2 Puffs by inhalation every four (4) hours as needed for Wheezing. diphenhydrAMINE (BENADRYL) 25 mg capsule 9/18/2017 at AM Self Yes Yes   Sig: Take 25 mg by mouth every six (6) hours as needed.    furosemide (LASIX) 20 mg tablet 9/17/2017 at Afternoon Self Yes Yes Sig: Take 20 mg by mouth daily as needed for Other (SOB due to increased swelling/fluid build up). hydroCHLOROthiazide (HYDRODIURIL) 12.5 mg tablet 9/18/2017 at AM Self No Yes   Sig: Take 1 Tab by mouth daily.       Facility-Administered Medications: None              Thank You,  Kimberly Joseph,PharmD  Contact: [phone]

## 2017-09-19 VITALS
HEART RATE: 91 BPM | HEIGHT: 61 IN | OXYGEN SATURATION: 92 % | TEMPERATURE: 98.3 F | DIASTOLIC BLOOD PRESSURE: 66 MMHG | WEIGHT: 293 LBS | RESPIRATION RATE: 18 BRPM | BODY MASS INDEX: 55.32 KG/M2 | SYSTOLIC BLOOD PRESSURE: 119 MMHG

## 2017-09-19 LAB
ALBUMIN SERPL-MCNC: 3.2 G/DL (ref 3.5–5)
ALBUMIN/GLOB SERPL: 0.7 {RATIO} (ref 1.1–2.2)
ALP SERPL-CCNC: 69 U/L (ref 45–117)
ALT SERPL-CCNC: 21 U/L (ref 12–78)
ANION GAP SERPL CALC-SCNC: 12 MMOL/L (ref 5–15)
AST SERPL-CCNC: 13 U/L (ref 15–37)
BASOPHILS # BLD: 0 K/UL (ref 0–0.1)
BASOPHILS NFR BLD: 0 % (ref 0–1)
BILIRUB SERPL-MCNC: 0.4 MG/DL (ref 0.2–1)
BUN SERPL-MCNC: 13 MG/DL (ref 6–20)
BUN/CREAT SERPL: 14 (ref 12–20)
CALCIUM SERPL-MCNC: 8.9 MG/DL (ref 8.5–10.1)
CHLORIDE SERPL-SCNC: 103 MMOL/L (ref 97–108)
CO2 SERPL-SCNC: 25 MMOL/L (ref 21–32)
CREAT SERPL-MCNC: 0.91 MG/DL (ref 0.55–1.02)
EOSINOPHIL # BLD: 0 K/UL (ref 0–0.4)
EOSINOPHIL NFR BLD: 0 % (ref 0–7)
ERYTHROCYTE [DISTWIDTH] IN BLOOD BY AUTOMATED COUNT: 15.2 % (ref 11.5–14.5)
EST. AVERAGE GLUCOSE BLD GHB EST-MCNC: 154 MG/DL
GLOBULIN SER CALC-MCNC: 4.7 G/DL (ref 2–4)
GLUCOSE BLD STRIP.AUTO-MCNC: 173 MG/DL (ref 65–100)
GLUCOSE BLD STRIP.AUTO-MCNC: 215 MG/DL (ref 65–100)
GLUCOSE SERPL-MCNC: 288 MG/DL (ref 65–100)
HBA1C MFR BLD: 7 % (ref 4.2–6.3)
HCT VFR BLD AUTO: 44 % (ref 35–47)
HGB BLD-MCNC: 14.7 G/DL (ref 11.5–16)
LYMPHOCYTES # BLD: 1.7 K/UL (ref 0.8–3.5)
LYMPHOCYTES NFR BLD: 15 % (ref 12–49)
MCH RBC QN AUTO: 29.8 PG (ref 26–34)
MCHC RBC AUTO-ENTMCNC: 33.4 G/DL (ref 30–36.5)
MCV RBC AUTO: 89.2 FL (ref 80–99)
MONOCYTES # BLD: 0.2 K/UL (ref 0–1)
MONOCYTES NFR BLD: 2 % (ref 5–13)
NEUTS SEG # BLD: 9.4 K/UL (ref 1.8–8)
NEUTS SEG NFR BLD: 83 % (ref 32–75)
PLATELET # BLD AUTO: 240 K/UL (ref 150–400)
POTASSIUM SERPL-SCNC: 3.8 MMOL/L (ref 3.5–5.1)
PROT SERPL-MCNC: 7.9 G/DL (ref 6.4–8.2)
RBC # BLD AUTO: 4.93 M/UL (ref 3.8–5.2)
SERVICE CMNT-IMP: ABNORMAL
SERVICE CMNT-IMP: ABNORMAL
SODIUM SERPL-SCNC: 140 MMOL/L (ref 136–145)
WBC # BLD AUTO: 11.3 K/UL (ref 3.6–11)

## 2017-09-19 PROCEDURE — 82962 GLUCOSE BLOOD TEST: CPT

## 2017-09-19 PROCEDURE — 83036 HEMOGLOBIN GLYCOSYLATED A1C: CPT

## 2017-09-19 PROCEDURE — 74011636637 HC RX REV CODE- 636/637: Performed by: FAMILY MEDICINE

## 2017-09-19 PROCEDURE — 74011250637 HC RX REV CODE- 250/637: Performed by: FAMILY MEDICINE

## 2017-09-19 PROCEDURE — 74011250636 HC RX REV CODE- 250/636: Performed by: INTERNAL MEDICINE

## 2017-09-19 PROCEDURE — 36415 COLL VENOUS BLD VENIPUNCTURE: CPT | Performed by: FAMILY MEDICINE

## 2017-09-19 PROCEDURE — 74011250636 HC RX REV CODE- 250/636: Performed by: FAMILY MEDICINE

## 2017-09-19 PROCEDURE — 80053 COMPREHEN METABOLIC PANEL: CPT | Performed by: FAMILY MEDICINE

## 2017-09-19 PROCEDURE — 85025 COMPLETE CBC W/AUTO DIFF WBC: CPT | Performed by: FAMILY MEDICINE

## 2017-09-19 PROCEDURE — 86161 COMPLEMENT/FUNCTION ACTIVITY: CPT | Performed by: INTERNAL MEDICINE

## 2017-09-19 PROCEDURE — 97535 SELF CARE MNGMENT TRAINING: CPT

## 2017-09-19 PROCEDURE — 74011000250 HC RX REV CODE- 250: Performed by: INTERNAL MEDICINE

## 2017-09-19 PROCEDURE — 97165 OT EVAL LOW COMPLEX 30 MIN: CPT

## 2017-09-19 RX ORDER — METOPROLOL TARTRATE 25 MG/1
25 TABLET, FILM COATED ORAL 2 TIMES DAILY
Qty: 30 TAB | Refills: 1 | Status: SHIPPED | OUTPATIENT
Start: 2017-09-19 | End: 2018-03-19

## 2017-09-19 RX ORDER — EPINEPHRINE 0.3 MG/.3ML
0.3 INJECTION SUBCUTANEOUS
Qty: 1 SYRINGE | Refills: 1 | Status: SHIPPED | OUTPATIENT
Start: 2017-09-19 | End: 2018-12-30 | Stop reason: SDUPTHER

## 2017-09-19 RX ORDER — PREDNISONE 20 MG/1
60 TABLET ORAL
Qty: 3 TAB | Refills: 0 | Status: SHIPPED | OUTPATIENT
Start: 2017-09-19 | End: 2017-09-19

## 2017-09-19 RX ORDER — EPINEPHRINE 0.3 MG/.3ML
0.3 INJECTION SUBCUTANEOUS
Qty: 1 SYRINGE | Refills: 0 | Status: SHIPPED | OUTPATIENT
Start: 2017-09-19 | End: 2017-09-19

## 2017-09-19 RX ORDER — PREDNISONE 20 MG/1
60 TABLET ORAL
Qty: 9 TAB | Refills: 0 | Status: SHIPPED | OUTPATIENT
Start: 2017-09-19 | End: 2017-09-22

## 2017-09-19 RX ORDER — IBUPROFEN 200 MG
1 TABLET ORAL EVERY 24 HOURS
Qty: 30 PATCH | Refills: 0 | Status: SHIPPED | OUTPATIENT
Start: 2017-09-19 | End: 2017-10-19

## 2017-09-19 RX ORDER — DIPHENHYDRAMINE HYDROCHLORIDE 50 MG/ML
25 INJECTION, SOLUTION INTRAMUSCULAR; INTRAVENOUS ONCE
Status: DISCONTINUED | OUTPATIENT
Start: 2017-09-19 | End: 2017-09-19 | Stop reason: HOSPADM

## 2017-09-19 RX ORDER — METOPROLOL TARTRATE 25 MG/1
25 TABLET, FILM COATED ORAL EVERY 12 HOURS
Status: DISCONTINUED | OUTPATIENT
Start: 2017-09-19 | End: 2017-09-19 | Stop reason: HOSPADM

## 2017-09-19 RX ORDER — EPINEPHRINE 0.3 MG/.3ML
0.3 INJECTION SUBCUTANEOUS
Qty: 1 SYRINGE | Refills: 1 | Status: SHIPPED | OUTPATIENT
Start: 2017-09-19 | End: 2017-09-19

## 2017-09-19 RX ADMIN — DIPHENHYDRAMINE HYDROCHLORIDE 25 MG: 50 INJECTION, SOLUTION INTRAMUSCULAR; INTRAVENOUS at 12:41

## 2017-09-19 RX ADMIN — DIPHENHYDRAMINE HYDROCHLORIDE 25 MG: 50 INJECTION, SOLUTION INTRAMUSCULAR; INTRAVENOUS at 00:20

## 2017-09-19 RX ADMIN — FAMOTIDINE 20 MG: 10 INJECTION, SOLUTION INTRAVENOUS at 00:20

## 2017-09-19 RX ADMIN — ENOXAPARIN SODIUM 40 MG: 40 INJECTION SUBCUTANEOUS at 09:34

## 2017-09-19 RX ADMIN — METHYLPREDNISOLONE SODIUM SUCCINATE 60 MG: 125 INJECTION, POWDER, FOR SOLUTION INTRAMUSCULAR; INTRAVENOUS at 12:41

## 2017-09-19 RX ADMIN — METHYLPREDNISOLONE SODIUM SUCCINATE 60 MG: 125 INJECTION, POWDER, FOR SOLUTION INTRAMUSCULAR; INTRAVENOUS at 05:28

## 2017-09-19 RX ADMIN — DIPHENHYDRAMINE HYDROCHLORIDE 25 MG: 50 INJECTION, SOLUTION INTRAMUSCULAR; INTRAVENOUS at 05:28

## 2017-09-19 RX ADMIN — Medication 10 ML: at 12:41

## 2017-09-19 RX ADMIN — INSULIN LISPRO 2 UNITS: 100 INJECTION, SOLUTION INTRAVENOUS; SUBCUTANEOUS at 12:00

## 2017-09-19 RX ADMIN — Medication 10 ML: at 05:28

## 2017-09-19 RX ADMIN — METHYLPREDNISOLONE SODIUM SUCCINATE 60 MG: 125 INJECTION, POWDER, FOR SOLUTION INTRAMUSCULAR; INTRAVENOUS at 00:21

## 2017-09-19 RX ADMIN — INSULIN LISPRO 3 UNITS: 100 INJECTION, SOLUTION INTRAVENOUS; SUBCUTANEOUS at 09:34

## 2017-09-19 RX ADMIN — FAMOTIDINE 20 MG: 10 INJECTION, SOLUTION INTRAVENOUS at 09:35

## 2017-09-19 NOTE — PROGRESS NOTES
6674  Report received pt lying in bed daughter at bedside denies any pain call bell within reach     0934  Pt given meds without difficulty.  Nurse able to see reduction in facial swelling from this morning     1156  Pt vitals obtained denies any pain  at bedside called for lunch tray     1200  Pt given 2 units humalog for elevated blood sugar     1241  Held lopressor secondary to blood pressure Dr. Tracey Patten notified     1300  Pt made aware of discharge orders awaiting discharge nurse for instructions

## 2017-09-19 NOTE — DISCHARGE INSTRUCTIONS
HOME DISCHARGE INSTRUCTIONS    Cristian Celestin / [de-identified] : 1965    Admission date: 2017 Discharge date: 2017 11:39 AM     Please bring this form with you to show your care provider at your follow-up appointment. Primary care provider: Carroll López DO    Discharging provider: Carroll López DO  - Family Medicine Resident  Dr. Shahla Abraham MD - Family Medicine Attending      FINAL DIAGNOSES:  Anaphylactic rxn  . . . . . . . . . . . . . . . . . . . . . . . . . . . . . . . . . . . . . . . . . . . . . . . . . . . . . . . . . . . . . . . . . . . . . . . Rula Boles FOLLOW-UP CARE RECOMMENDATIONS:      · We stopped HCTZ (blood pressure medication). We will add 25mg metoprolol BID to control  · Please see allergist, to see if allergic to hctz, losartan, and lasix  · Please continue 3 days of 60 mg prednisone  · Prescription given for Adrenaclick + coupon for pt to get it for $60 total.      Follow-up tests needed: None    Pending test results: At the time of your discharge the following test results are still pending: C1 ESTERASE INHIB, FUNCT  . Please make sure you review these results with your outpatient follow-up provider(s). Specific symptoms to watch for: chest pain, shortness of breath, fever, chills, nausea, vomiting, diarrhea, change in mentation, falling, weakness, bleeding. DIET/what to eat:  Regular Diet    ACTIVITY:  Activity as tolerated    Wound care: none    Equipment needed:  none    What to do if new or unexpected symptoms occur? If you experience any of the above symptoms (or should other concerns or questions arise after discharge) please call your primary care physician. Return to the emergency room if you cannot get hold of your doctor. · It is very important that you keep your follow-up appointment(s).   · Please bring discharge papers, medication list (and/or medication bottles) to your follow-up appointments for review by your outpatient provider(s). · Please check the list of medications and be sure it includes every medication (even non-prescription medications) that your provider wants you to take. · It is important that you take the medication exactly as they are prescribed. · Keep your medication in the bottles provided by the pharmacist and keep a list of the medication names, dosages, and times to be taken in your wallet. · Do not take other medications without consulting your doctor. · If you have any questions about your medications or other instructions, please talk to your nurse or care provider before you leave the hospital.     Information obtained by:     I understand that if any problems occur once I am at home I am to contact my physician. These instructions were explained to me and I had the opportunity to ask questions. I understand and acknowledge receipt of the instructions indicated above.                                                                                                                                                Physician's or R.N.'s Signature                                                                  Date/Time                                                                                                                                              Patient or Representative Signature                                                          Date/Time

## 2017-09-19 NOTE — PROGRESS NOTES
Bedside and Verbal shift change report given to Ida GAMEZ (oncoming nurse) by Merissa Conley (offgoing nurse). Report included the following information SBAR, Kardex and MAR.     0212 Pt sleeping, daughter in room with pt.    0436 C1 ESTERASE INHIB, FUNCT collected. It got missed yestarday afternoon. 9501 Pt wasn't in any distress during the shift. Bedside and Verbal shift change report given to Roseanne (oncoming nurse) by Shelby Huitron RN (offgoing nurse). Report included the following information SBAR, Kardex and MAR, sinus rhythm.

## 2017-09-19 NOTE — PROGRESS NOTES
Shift Summary    1900  Bedside and Verbal shift change report given to Danial Carranza RN (oncoming nurse) by Krista Crabtree RN (offgoing nurse). Report included the following information SBAR, Kardex, MAR, Recent Results and Cardiac Rhythm NSR.       STAND completed with off going nurse, Krista Crabtree. Patient given cardiac meal. MD at bedside. Patient doing well at this time. BP has come down. Patient still has many family members at bedside. Patient given IV pepcid, benadryl, and solumedrol. No complaints at this time. Lungs clear. No SOB. Facial swelling slowly decreasing. Lab requesting single red tube for send out lab. Phlebotomy and oncoming nurse notified. 0200  Bedside and Verbal shift change report given to Mattie Palomino RN (oncoming nurse) by Danial Carranza RN (offgoing nurse). Report included the following information SBAR, Kardex, MAR, Recent Results and Cardiac Rhythm NSR.

## 2017-09-19 NOTE — PROGRESS NOTES
Problem: Falls - Risk of  Goal: *Absence of Falls  Document Michelle Fall Risk and appropriate interventions in the flowsheet.    Outcome: Progressing Towards Goal  Fall Risk Interventions:  Mobility Interventions: Bed/chair exit alarm, Communicate number of staff needed for ambulation/transfer, Patient to call before getting OOB                 Elimination Interventions: Bed/chair exit alarm, Call light in reach

## 2017-09-19 NOTE — DIABETES MGMT
Progress Note     Chart reviewed for elevated blood glucose ( > 180 mg/dL x 2 in the past 24 hours) . Recommendations/ Comments: May benefit from intensifying correction scale - from normal sensitivity to insulin resistant (obese, steroids). Fasting glucose today: 215 mg/dL (per am POCT Glucose). Required 7 units of correction since correction was started 9/18 at 1751. Receiving methylprednisolone 60mg every 6 hours. Inpatient medications:  1. Correction Scale: Lispro (Humalog) Normal Sensitivity scale to cover for glucose > 139 mg/dL before meals and for glucose >199 at bedtime      POC Glucose last 24hrs:   Lab Results   Component Value Date/Time     (H) 09/19/2017 01:11 AM    GLU 93 09/18/2017 01:57 PM    GLUCPOC 215 (H) 09/19/2017 07:32 AM    GLUCPOC 312 (H) 09/18/2017 09:12 PM    GLUCPOC 107 (H) 09/18/2017 05:54 PM        Estimated Creatinine Clearance: 97.9 mL/min (based on Cr of 0.91). Diet order:   Active Orders   Diet    DIET DIABETIC CONSISTENT CARB Regular      PO intake: No data found. HPI:   Avril Minor is a 54 y.o. female with no past medical history significant for DM per Dr. Sachi Parekh, 's H&P dated 9/18/2017. Prior to admission medications: per past medical records  -none for DM    A1C:   Lab Results   Component Value Date/Time    Hemoglobin A1c 6.2 12/09/2016 04:39 PM    Hemoglobin A1c 5.9 04/14/2016 03:11 PM       Reference range*:  Increased risk for diabetes: 5.7 - 6.4%  Diabetes: >6.4%  Glycemic control for adults with diabetes: <7.0 %    *CONSTANTIN JEFFERSON (2014). Diagnosis and classification of diabetes mellitus. Diabetes care, 37, S81. Thank you. Dominguez Leon. MIRNA Mcgovern, MPH  Diabetes [address] [place] [address]  [phone]    -For most hospitalized persons with hyperglycemia in the intensive care unit (ICU), a glucose range of 140 to 180 mg/dL is recommended, provided this target can be safely achieved. *  - For general medicine and surgery patients in non-ICU settings, a premeal glucose target <140 mg/dL and a random blood glucose <180 mg/dL are recommended. *    BERNIE Toscano., Juan Tilley., Emigdio Guillen, ... & Elsie Mosquera (0702). AMERICAN ASSOCIATION OF CLINICAL ENDOCRINOLOGISTS AND AMERICAN COLLEGE OF ENDOCRINOLOGY-CLINICAL PRACTICE GUIDELINES FOR DEVELOPING A DIABETES MELLITUS COMPREHENSIVE CARE PLAN-2015-EXECUTIVE SUMMARY: Complete guidelines are available at https://www. aace. com/publications/guidelines. Endocrine Practice, 21(4), [id number].

## 2017-09-19 NOTE — PROGRESS NOTES
Occupational Therapy EVALUATION/discharge  Patient: Lazarus Gowda (85 y.o. female)  Date: 2017  Primary Diagnosis: Angioedema  Angioedema        Precautions: universal       ASSESSMENT:   Based on the objective data described below, the patient presents with good overall activity tolerance following admission on  for angioedema. PTA, patient lives with her , was completely independent with care, required no AD for transfers/ambulation, remained active, and drives. Patient was independent with ADLs and functional transfers today. Mild dyspnea noted with activity however SpO2 remained >92% on room air with all activity. Education provided regarding pursed lip breathing, pacing, and energy conservation techniques. Patient is anticipates discharge home today and will have no OT needs at discharge. Further skilled acute occupational therapy is not indicated at this time. Discharge Recommendations: None  Further Equipment Recommendations for Discharge: none       SUBJECTIVE:   Patient agreeable to OT evaluation. OBJECTIVE DATA SUMMARY:   HISTORY:   Past Medical History:   Diagnosis Date    Anxiety and depression     Flu     2 wks ago    GERD (gastroesophageal reflux disease)     Headache     Hypertension     Incidental lung nodule, > 3mm and < 8mm 2016    left lung nodule in smoker, needs follow up CT in Oct 2016      Past Surgical History:   Procedure Laterality Date    APPENDECTOMY      HX APPENDECTOMY      HX  SECTION      X 3 Births    HX  SECTION      HX CHOLECYSTECTOMY      HX WRIST FRACTURE TX      metal    REMOVAL GALLBLADDER         Prior Level of Function/Home Situation: Patient lives with her . She was independent with all care PTA.       Home Situation  Home Environment: Private residence  # Steps to Enter: 2  One/Two Story Residence: One story  Living Alone: No  Support Systems: Spouse/Significant Other/Partner, Family member(s)  Patient Expects to be Discharged to[de-identified] Private residence  Current DME Used/Available at Home: None  Tub or Shower Type: Tub/Shower combination  [x]  Right hand dominant   []  Left hand dominant    EXAMINATION OF PERFORMANCE DEFICITS:  Cognitive/Behavioral Status:  Neurologic State: Alert  Orientation Level: Oriented X4  Cognition: Appropriate decision making; Appropriate for age attention/concentration; Appropriate safety awareness  Perception: Appears intact  Perseveration: No perseveration noted  Safety/Judgement: Awareness of environment;Good awareness of safety precautions    Skin: Intact in the uppers    Edema: None noted in the uppers    Hearing: Auditory  Auditory Impairment: None    Vision/Perceptual:    Tracking: Able to track stimulus in all quadrants w/o difficulty    Diplopia: No    Acuity: Within Defined Limits       Range of Motion:  WDL in the uppers     Strength:  WDL in the uppers    Coordination:  Fine Motor Skills-Upper: Left Intact; Right Intact    Gross Motor Skills-Upper: Left Intact; Right Intact    Tone & Sensation:  Tone: normal  Sensation: intact    Balance:  Sitting: Intact  Standing: Intact    Functional Mobility and Transfers for ADLs:  Bed Mobility:  Sit to Supine:  (pt was received up and remained up)  Scooting: Independent    Transfers:  Sit to Stand: Independent  Stand to Sit: Independent  Bed to Chair: Independent  Toilet Transfer : Independent    ADL Assessment:  Feeding: Independent    Oral Facial Hygiene/Grooming: Independent    Bathing: Independent    Upper Body Dressing: Independent    Lower Body Dressing: Independent    Toileting: Independent     Cognitive Retraining  Safety/Judgement: Awareness of environment;Good awareness of safety precautions    Functional Measure:  Barthel Index:    Bathin  Bladder: 10  Bowels: 10  Groomin  Dressing: 10  Feeding: 10  Mobility: 15  Stairs: 10  Toilet Use: 10  Transfer (Bed to Chair and Back): 15  Total: 100       Barthel and G-code impairment scale:  Percentage of impairment CH  0% CI  1-19% CJ  20-39% CK  40-59% CL  60-79% CM  80-99% CN  100%   Barthel Score 0-100 100 99-80 79-60 59-40 20-39 1-19   0   Barthel Score 0-20 20 17-19 13-16 9-12 5-8 1-4 0      The Barthel ADL Index: Guidelines  1. The index should be used as a record of what a patient does, not as a record of what a patient could do. 2. The main aim is to establish degree of independence from any help, physical or verbal, however minor and for whatever reason. 3. The need for supervision renders the patient not independent. 4. A patient's performance should be established using the best available evidence. Asking the patient, friends/relatives and nurses are the usual sources, but direct observation and common sense are also important. However direct testing is not needed. 5. Usually the patient's performance over the preceding 24-48 hours is important, but occasionally longer periods will be relevant. 6. Middle categories imply that the patient supplies over 50 per cent of the effort. 7. Use of aids to be independent is allowed. Marisela Man., Barthel, DAmberW. (4835). Functional evaluation: the Barthel Index. [address] [facility] [employer] (14)2. [facility] Press kendra Fannieoutcecilio, TONEF, Po Vasquez., [facility], [address] [place] [address] (1999). Measuring the change indisability after inpatient rehabilitation; comparison of the responsiveness of the Barthel Index and Functional Patrick Measure. Journal of Neurology, Neurosurgery, and Psychiatry, 66(4), 451-385. Nilo Mccormack, SUSI.HAYLEY.HEVER, COURTNEY Torre, & Chiquita Pena, M.A. (2004.) Assessment of post-stroke quality of life in cost-effectiveness studies: The usefulness of the Barthel Index and the EuroQoL-5D. Quality of Life Research, 13, 230-25       G codes: In compliance with CMSs Claims Based Outcome Reporting, the following G-code set was chosen for this patient based on their primary functional limitation being treated:     The outcome measure chosen to determine the severity of the functional limitation was the Barthel Index with a score of 100/100 which was correlated with the impairment scale. ? Self Care:     - CURRENT STATUS: CH - 0% impaired, limited or restricted    - GOAL STATUS: CH - 0% impaired, limited or restricted    - D/C STATUS:  CH - 0% impaired, limited or restricted       Occupational Therapy Evaluation Charge Determination   History Examination Decision-Making   LOW Complexity : Brief history review  LOW Complexity : 1-3 performance deficits relating to physical, cognitive , or psychosocial skils that result in activity limitations and / or participation restrictions  LOW Complexity : No comorbidities that affect functional and no verbal or physical assistance needed to complete eval tasks       Based on the above components, the patient evaluation is determined to be of the following complexity level: LOW   Pain:Pain Scale 1: Numeric (0 - 10)  Pain Intensity 1: 0              Activity Tolerance:   Patient tolerated eval well. After treatment:   [x]  Patient left in no apparent distress sitting up in chair  []  Patient left in no apparent distress in bed  [x]  Call bell left within reach  [x]  Nursing notified  []  Caregiver present  []  Bed alarm activated    COMMUNICATION/EDUCATION:   Communication/Collaboration:  [x]      Home safety education was provided and the patient/caregiver indicated understanding. [x]      Patient/family have participated as able and agree with findings and recommendations. []      Patient is unable to participate in plan of care at this time. Findings and recommendations were discussed with:  Registered Nurse and patient.     LIONEL Gutiérrez/L  Time Calculation: 16 mins

## 2017-09-19 NOTE — DISCHARGE SUMMARY
[facility] [facility] [address] [place] [address]   Office [phone]  Fax [phone]       Discharge / Transfer / Off-Service Note     Name: Katiuska Tomlinson MRN: [[id number]]  Sex: Female   YOB: 1965  Age: 54 y.o. PCP: Margaret Brock DO     Date of admission: 9/18/2017  Date of discharge/transfer: 9/19/2017    Attending physician at admission: Dr. Violeta Milligan  Attending physician at discharge/transfer: Dr. Violeta Milligan  Resident physician at discharge/transfer: Shadi Ku MD     Consultants during hospitalization  Dr. Harris Horton, ENT     Admission diagnoses   Angioedema  Angioedema    Recommended follow-up after discharge  · PCP - Dr. Afia Thao  · Allergist: Vincenzo Lemus, for allergy testing     History of Present Illness    Katiuska Tomlinson is a 54 y.o. female with PMH of HTN, chronic LE lymphedema, depression, Lung nodule who presents with facial swelling, hives on back, and SOB. Pt states this same rxn occurred when she was on Losartan. She was switched to HCTZ on 9/1. She was taking this medication daily alongside Lasix. However, today she took medication and drank coffee. Started to feel that her face was swelling. Eye lid began to swell, then the lips, tongue, and then throat swelling. Denies new foods, new exposures, different medication brand. No hx of asthma. Hospital course    Anaphylactic reaction: Started in morning of 9/18. Hives, angioedema, and throat tightness developed. No new medication. Had similar rxn to Losartan 8/2017. Has been on HCTZ and Lasix. No new food or animal exposure, or medication. Received Epinephrine IM in ED, with 60 mg prednisone. Pharmacy stated that patient may have sulfa reaction. ENT was consulted and after evaluation determined that patient only had moderate edema of left aryepiglottic fold only. Thus decision made to observe patient overnight. She was given IV steroids, epinephrine, benadryl, and famotidine during her hospital stay. It was thought that patient's reaction was most likely a sulfa allergy given the use of HCTZ. Patient will follow up with allergist for skin testing. Plan to stop all diuretics and avoid sulfa agents until she is evaluated OP by allergy specialist. Patient given coupon for [facility] Tirado for emergency treatment in case of a repeat anaphylactic attack.      Chronic Leukocytosis: 15.2 POA. At discharge, this decreased to 11.3. Patient will follow up with PCP.      HTN: Held HCTZ and lasix given their potential role in patient's anaphylactic attack. Patient not restart HCTZ or lasix until after being evaluated by allergist. Patient  will start metoprolol 25mg twice daily. Patient will follow up with PCP.      Chronic LE edema: Last ECHO 3/2016 showed EF 60-65% without wall abnormality. 1+ pitting edema noted. Patient has no insurance, making proper referral and treatement difficult. Lasix held during hospital stay. Patient will follow up with PCP.      Tobacco abuse: Smokes half a pack daily. Patient given Nicotine patch during hospital stay. Patient will follow up with PCP.      Depression: Diagnosed in 5/2017. Pt used to be on Wellbutrin. However, does not want to be on medication as she believes it to be ineffective and not willing to try other anti-depressents. Patient will follow up with PCP. Physical exam at discharge:    Vitals Reviewed. Visit Vitals    /66 (BP 1 Location: Left arm, BP Patient Position: At rest)    Pulse 91    Temp 98.3 °F (36.8 °C)    Resp 18    Ht 5' 1\" (1.549 m)    Wt 298 lb 11.6 oz (135.5 kg)    SpO2 92%    Breastfeeding No    BMI 56.44 kg/m2        General Oriented to person, place, and time and well-developed. Appears well-nourished, no distress. Not diaphoretic. HENT Head Normocephalic and atraumatic. Eyes Conjunctivae are normal, no discharge. No scleral icterus. Mild edema of L eyelid. Nose Nose normal, clear turbinates.    Oral Oropharynx is clear and moist. No oropharyngeal exudate. Neck No thyromegaly present. No cervical adenopathy. Cardio Normal rate, regular rhythm. Exam reveals no gallop and no friction rub. No murmur heard. No chest wall tenderness. Pulmonary Effort normal and breath sounds normal. No respiratory distress. No wheezes, no rales. Abdominal Soft. Bowel sounds normal. No distension. No tenderness.  Deferred. Extremities No edema of lower extremities. No tenderness. Distal pulses intact. Neurological Alert and oriented to person, place, and time. Dermatology Skin is warm and dry. No rash noted. No erythema or pallor. Psychiatric Affect and judgment normal.        Condition at discharge: Stable. Labs  Recent Labs      09/19/17   0111  09/18/17   1357   WBC  11.3*  15.2*   HGB  14.7  15.8   HCT  44.0  45.5   PLT  240  339     Recent Labs      09/19/17   0111  09/18/17   1357   NA  140  139   K  3.8  4.1   CL  103  102   CO2  25  28   BUN  13  14   CREA  0.91  0.67   GLU  288*  93   CA  8.9  9.2     Recent Labs      09/19/17   0111   SGOT  13*   ALT  21   AP  69   TBILI  0.4   TP  7.9   ALB  3.2*   GLOB  4.7*     Recent Labs      09/19/17   1118  09/19/17   0732  09/18/17   2112  09/18/17   1754   GLUCPOC  173*  215*  312*  107*     Cultures  · None     Procedures / Diagnostic Studies  · ENT: Flexible Nasolaryngoscopy which showed moderate edema on the left aryepiglottic fold only. Imaging         No results found for this or any previous visit. No procedure found. Chronic diagnoses   Problem List as of 9/19/2017  Date Reviewed: 9/18/2017          Codes Class Noted - Resolved    * (Principal)Angioedema ICD-10-CM: T78. 3XXA  ICD-9-CM: 995.1  9/18/2017 - Present        Swelling of throat ICD-10-CM: J39.2  ICD-9-CM: 478.25  9/18/2017 - Present        Dyspnea ICD-10-CM: R06.00  ICD-9-CM: 786.09  9/18/2017 - Present        Morbid obesity ([facility] [address] ICD-10-CM: E66.01  ICD-9-CM: 278.01  9/18/2017 - Present        Elevated hemoglobin A1c ICD-10-CM: R73.09  ICD-9-CM: 790.29  3/4/2017 - Present    Overview Signed 3/4/2017 11:12 AM by Nichol Chan MD     6.2.12/2106             Wheezing ICD-10-CM: R06.2  ICD-9-CM: 786.07  3/4/2017 - Present    Overview Signed 3/4/2017  1:09 PM by Nichol Chan MD     Consider PFT, ECHO, sleep evaluation             Venous insufficiency of both lower extremities ICD-10-CM: I87.2  ICD-9-CM: 459.81  5/26/2016 - Present        Morbid obesity with BMI of 50.0-59.9, adult [facility]) ICD-10-CM: E66.01, Z68.43  ICD-9-CM: 278.01, V85.43  5/20/2016 - Present        Incidental lung nodule, > 3mm and < 8mm ICD-10-CM: R91.1  ICD-9-CM: 793.11  4/20/2016 - Present    Overview Addendum 3/4/2017 11:18 AM by Nichol Chan MD     left lung nodule in smoker, needs follow up CT in 6/2018             DDD (degenerative disc disease), lumbar ICD-10-CM: M51.36  ICD-9-CM: 722.52  2/20/2013 - Present    Overview Signed 2/20/2013  2:43 PM by MD Dr. Tim Farias Gist: E01.0  ICD-9-CM: 240.9  2/12/2013 - Present    Overview Addendum 2/26/2013  3:30 PM by MD Dr. Celina Farias  Normal thyroid US 2/2013             Tobacco abuse ICD-10-CM: Z72.0  ICD-9-CM: 305.1  2/12/2013 - Present    Overview Addendum 3/4/2017  1:09 PM by Nichol Chan MD     Advised to quit  See letter 3/4/2017             H. pylori infection ICD-10-CM: A04.8  ICD-9-CM: 041.86  1/26/2013 - Present    Overview Addendum 11/27/2013  7:27 AM by Nichol Chan MD     1/2013  Referred to Dr. John Chow who speaks Setswana to explain treatments  Stool test after Abx negative 2013               History of normal resting EKG ICD-10-CM: [id number]  ICD-9-CM: V49.89  1/22/2013 - Present    Overview Signed 1/22/2013  4:57 PM by Nichol Chan MD     2011             Gallstone ICD-10-CM: K80.20  ICD-9-CM: 574.20  1/22/2013 - Present    Overview Signed 1/22/2013  5:02 PM by Nichol Chan MD Cholecystectomy age 29             Appendicitis ICD-10-CM: K37  ICD-9-CM: 043  2013 - Present    Overview Signed 2013  5:02 PM by Cher Demarco MD     As a child             H/O  section ICD-10-CM: Z98.891  ICD-9-CM: V45.89  2013 - Present    Overview Signed 2013  5:03 PM by Cher Demarco MD     3             Left hand fracture ICD-10-CM: N21.11OM  ICD-9-CM: 815.00  2013 - Present    Overview Signed 2013  5:03 PM by Cher Demarco MD     S/p surgery             RESOLVED: Prediabetes ICD-10-CM: R73.03  ICD-9-CM: 790.29  2016 - 3/4/2017              Discharge/Transfer Medications  Current Discharge Medication List      START taking these medications    Details   EPINEPHrine (EPIPEN) 0.3 mg/0.3 mL injection 0.3 mL by IntraMUSCular route once as needed for up to 1 dose. Qty: 1 Syringe, Refills: 0      nicotine (NICODERM CQ) 21 mg/24 hr 1 Patch by TransDERmal route every twenty-four (24) hours for 30 days. Qty: 30 Patch, Refills: 0         CONTINUE these medications which have NOT CHANGED    Details   diphenhydrAMINE (BENADRYL) 25 mg capsule Take 25 mg by mouth every six (6) hours as needed. albuterol (PROVENTIL HFA, VENTOLIN HFA, PROAIR HFA) 90 mcg/actuation inhaler Take 2 Puffs by inhalation every four (4) hours as needed for Wheezing. Qty: 1 Inhaler, Refills: 2    Associated Diagnoses: Acute bronchitis, unspecified organism; Tobacco use         STOP taking these medications       hydroCHLOROthiazide (HYDRODIURIL) 12.5 mg tablet Comments:   Reason for Stopping: potential cause of allergic reaction                Diet:  Regular diet.     Activity:  As tolerated    Disposition: Home or Self Care    Discharge instructions to patient/family  Please seek medical attention for any new or worsening symptoms particularly fever, chest pain, shortness of breath, abdominal pain, nausea, vomiting    Follow up plans/appointments  Follow-up Information     Follow up With Details Comments Contact Info    aMster Dietrich  On 9/22/2017 @3:25pm [address]  [phone]      Pito Carballo MD Schedule an appointment as soon as possible for a visit for allergy testing [address] [place] [address] MD Lucrecia  Family Medicine Resident       For Billing    Chief Complaint   Patient presents with   [facility] Problems  Date Reviewed: 9/18/2017          Codes Class Noted POA    * (Principal)Angioedema ICD-10-CM: T78. 3XXA  ICD-9-CM: 995.1  9/18/2017 Unknown        Swelling of throat ICD-10-CM: J39.2  ICD-9-CM: 478.25  9/18/2017 Yes        Dyspnea ICD-10-CM: R06.00  ICD-9-CM: 786.09  9/18/2017 Yes        Morbid obesity ([address] ICD-10-CM: E66.01  ICD-9-CM: 278.01  9/18/2017 Yes        Tobacco abuse ICD-10-CM: Z72.0  ICD-9-CM: 305.1  2/12/2013 Yes    Overview Addendum 3/4/2017  1:09 PM by Philomena Chan MD     Advised to quit  See letter 3/4/2017                    I saw and evaluated the patient, performing the key elements of the service. I discussed the findings, assessment and plan with the resident and agree with the resident's findings and plan as documented in the resident's note.

## 2017-09-19 NOTE — PROGRESS NOTES
Physical Therapy  Order received and acknowledged. Spoke with nursing prior to attempting to see the patient and RN stating patient Christine Whalen in room. PT introduced to patient and spouse and discussed consult for PT. Patient denies any need for equipment or our services. As a lymphedema therapist I noted patient with h/o LE lymphedema and current diagnosis to include LE swelling. PT offering education regarding out patient [facility] Lymphedema clinic. In chart review, she was seen 1/2017 by a colleague and at that time she was recommended to return for compression bandaging. Patient did not return to the clinic. Today she expresses concern with lack of insurance and ability to pay for visits and unknown status of care card. PT educating patient and spouse on benefits of Lymphedema treatment. PT suggesting when she is d/c from hospital to follow up with PCP to coordinate returning to clinic if she wants to. At this time she is requesting contact info for clinic and PT provided Lymphedema brochure. As far as acute needs none needed and PT order completed.   Thank you      John Brewster, PT, DPT, CLT

## 2017-09-19 NOTE — PROGRESS NOTES
1:25 PM  I reached out to [facility] as pt has not received her care card. Thanks CRISTINA Becerril       I met with pt and pt's  Delvis Dooley cell is [phone]. Pt lives in a one story home with one step to enter the home. Pt drives and is independent with all of her ADLs. Pt does not have insurance coverage for medications. She gets most of her medications at [facility]. Consult noted for - find affordable epipen for this pt without insurance I gave her a coupon through [employer] and the best price was at [employer] for $142.00. Pt's  has the script and the coupon. Pt and pt's  verbalized understanding. Pt's PCP  Dr. Jeremi Correa. LUZ MARINA Gonzales notified of hospital admission and possible dc for today. F/U appointment is on pt's AVS. Pt has never had home health before and does not have any DME. No other issues or concerns at this time. Thanks CRISTINA Becerril  Care Management Interventions  PCP Verified by CM:  Yes  MyChart Signup: No  Discharge Durable Medical Equipment: No  Physical Therapy Consult: Yes  Occupational Therapy Consult: Yes  Speech Therapy Consult: No  Current Support Network: Lives with Spouse  Confirm Follow Up Transport: Family  Plan discussed with Pt/Family/Caregiver: Yes  Freedom of Choice Offered: Yes  Discharge Location  Discharge Placement: Home

## 2017-09-19 NOTE — PROGRESS NOTES
Otolaryngology - Head & Neck Surgery Progress Note        PATIENT NAME: Eller Halsted  MRN: [de-identified]  DATE: 9/19/2017  ADMISSION DATE: 9/18/2017      Subjective:     Stable overnight. She feels that neck and throat swelling have resolved. No dyspnea. Objective:     Visit Vitals    /82    Pulse 93    Temp 97.9 °F (36.6 °C)    Resp 18    Ht 5' 1\" (1.549 m)    Wt 135.5 kg (298 lb 11.6 oz)    LMP 12/31/2012    SpO2 93%    Breastfeeding No    BMI 56.44 kg/m2          Physical Exam:     General - NAD, normal voice. Oral/oropharynx - 2+ tonsils with mild erythema, no exudate. No uvula/palate/tongue edema. Neck - obese but soft, no mass or adenopathy. PROCEDURE NOTE: Flexible fiberoptic laryngoscope passed transorally. Oropharynx, larynx and hypopharynx clear. Resolved supraglottic edema. Airway widely patent and clear. Assessment:     Angioedema with resolved AE fold edema. Plan:     Should be OK to stop Solumedrol. D/w family practice team about allergy testing. OK for discharge from ENT standpoint.        River Garzon MD   [phone] - Office   [phone] - Cell

## 2017-09-20 LAB — C1INH FUNCTIONAL/C1INH TOTAL MFR SERPL: 99 %MEAN NORMAL

## 2017-09-22 ENCOUNTER — OFFICE VISIT (OUTPATIENT)
Dept: FAMILY MEDICINE CLINIC | Age: 52
End: 2017-09-22

## 2017-09-22 VITALS
TEMPERATURE: 97.1 F | SYSTOLIC BLOOD PRESSURE: 121 MMHG | HEART RATE: 89 BPM | WEIGHT: 293 LBS | RESPIRATION RATE: 18 BRPM | HEIGHT: 61 IN | DIASTOLIC BLOOD PRESSURE: 80 MMHG | BODY MASS INDEX: 55.32 KG/M2 | OXYGEN SATURATION: 95 %

## 2017-09-22 DIAGNOSIS — T78.2XXA ANAPHYLACTIC REACTION, INITIAL ENCOUNTER: Primary | ICD-10-CM

## 2017-09-22 DIAGNOSIS — E01.0 THYROMEGALY: ICD-10-CM

## 2017-09-22 DIAGNOSIS — I10 ESSENTIAL HYPERTENSION: ICD-10-CM

## 2017-09-22 DIAGNOSIS — F17.200 SMOKER: ICD-10-CM

## 2017-09-22 DIAGNOSIS — I89.0 LYMPHEDEMA: ICD-10-CM

## 2017-09-22 DIAGNOSIS — Z13.220 SCREENING, LIPID: ICD-10-CM

## 2017-09-22 NOTE — MR AVS SNAPSHOT
Visit Information Date & Time Provider Department Dept. Phone Encounter #  
 9/22/2017  3:30 PM Kathie Wheeler, [facility] [phone] [id number] Follow-up Instructions Return in about 1 week (around 9/29/2017). Upcoming Health Maintenance Date Due INFLUENZA AGE 9 TO ADULT 8/1/2017 PAP AKA CERVICAL CYTOLOGY 3/4/2020 DTaP/Tdap/Td series (2 - Td) 11/25/2023 Allergies as of 9/22/2017  Review Complete On: 9/22/2017 By: Cortland Curling, LPN Severity Noted Reaction Type Reactions Eggplant Medium 09/18/2017   Intolerance Other (comments) Severe stomach pain Current Immunizations  Reviewed on 3/4/2017 Name Date Influenza Vaccine 10/22/2012 Pneumococcal Conjugate (PCV-7) 11/25/2013 Pneumococcal Polysaccharide (PPSV-23) 3/4/2017 Tdap 11/25/2013 Not reviewed this visit You Were Diagnosed With   
  
 Codes Comments Anaphylactic reaction, initial encounter    -  Primary ICD-10-CM: T78. 2XXA ICD-9-CM: 995.0 BMI 50.0-59.9, adult [facility])     ICD-10-CM: J38.68 
ICD-9-CM: V85.43 Smoker     ICD-10-CM: E94.255 ICD-9-CM: 305.1 Lymphedema     ICD-10-CM: I89.0 ICD-9-CM: 338.5 Essential hypertension     ICD-10-CM: I10 
ICD-9-CM: 401.9 Vitals BP Pulse Temp Resp Height(growth percentile) Weight(growth percentile) 144/79 89 97.1 °F (36.2 °C) (Oral) 18 5' 1\" (1.549 m) 297 lb (134.7 kg) LMP SpO2 BMI OB Status Smoking Status 12/31/2012 95% 56.12 kg/m2 Postmenopausal Current Every Day Smoker Vitals History BMI and BSA Data Body Mass Index Body Surface Area  
 56.12 kg/m 2 2.41 m 2 Preferred Pharmacy Pharmacy Name Phone [facility] [address] [phone] Your Updated Medication List  
  
   
This list is accurate as of: 9/22/17  4:32 PM.  Always use your most recent med list.  
  
  
  
  
 albuterol 90 mcg/actuation inhaler Commonly known as:  PROVENTIL HFA, VENTOLIN HFA, PROAIR HFA Take 2 Puffs by inhalation every four (4) hours as needed for Wheezing. BENADRYL 25 mg capsule Generic drug:  diphenhydrAMINE Take 25 mg by mouth every six (6) hours as needed. EPINEPHrine 0.3 mg/0.3 mL injection Commonly known as:  ADRENACLICK  
0.3 mL by IntraMUSCular route once as needed for up to 1 dose. metoprolol tartrate 25 mg tablet Commonly known as:  LOPRESSOR Take 1 Tab by mouth two (2) times a day. nicotine 21 mg/24 hr  
Commonly known as:  NICODERM CQ  
1 Patch by TransDERmal route every twenty-four (24) hours for 30 days. predniSONE 20 mg tablet Commonly known as:  [facility] Take 3 Tabs by mouth daily (with breakfast) for 3 days. We Performed the Following CORTISOL, URINE FREE 24 HR [46894 CPT(R)] CREATININE, UR, RANDOM [id number] CPT(R)] METABOLIC PANEL, COMPREHENSIVE [60549 CPT(R)] POTASSIUM, URINE [[id number] Custom] Teo Arredondo [[id number] Custom] SODIUM, URINE [46393 CPT(R)] Follow-up Instructions Return in about 1 week (around 9/29/2017). Patient Instructions Eating Healthy Foods: Care Instructions Your Care Instructions Eating healthy foods can help lower your risk for disease. Healthy food gives you energy and keeps your heart strong, your brain active, your muscles working, and your bones strong. A healthy diet includes a variety of foods from the basic food groups: grains, vegetables, fruits, milk and milk products, and meat and beans. Some people may eat more of their favorite foods from only one food group and, as a result, miss getting the nutrients they need. So, it is important to pay attention not only to what you eat but also to what you are missing from your diet. You can eat a healthy, balanced diet by making a few small changes. Follow-up care is a key part of your treatment and safety. Be sure to make and go to all appointments, and call your doctor if you are having problems. It's also a good idea to know your test results and keep a list of the medicines you take. How can you care for yourself at home? Look at what you eat · Keep a food diary for a week or two and record everything you eat or drink. Track the number of servings you eat from each food group. · For a balanced diet every day, eat a variety of: ¨ 6 or more ounce-equivalents of grains, such as cereals, breads, crackers, rice, or pasta, every day. An ounce-equivalent is 1 slice of bread, 1 cup of ready-to-eat cereal, or ½ cup of cooked rice, cooked pasta, or cooked cereal. 
¨ 2½ cups of vegetables, especially: § Dark-green vegetables such as broccoli and spinach. § Orange vegetables such as carrots and sweet potatoes. § Dry beans (such as lyn and kidney beans) and peas (such as lentils). ¨ 2 cups of fresh, frozen, or canned fruit. A small apple or 1 banana or orange equals 1 cup. ¨ 3 cups of nonfat or low-fat milk, yogurt, or other milk products. ¨ 5½ ounces of meat and beans, such as chicken, fish, lean meat, beans, nuts, and seeds. One egg, 1 tablespoon of peanut butter, ½ ounce nuts or seeds, or ¼ cup of cooked beans equals 1 ounce of meat. · Learn how to read food labels for serving sizes and ingredients. Fast-food and convenience-food meals often contain few or no fruits or vegetables. Make sure you eat some fruits and vegetables to make the meal more nutritious. · Look at your food diary. For each food group, add up what you have eaten and then divide the total by the number of days. This will give you an idea of how much you are eating from each food group. See if you can find some ways to change your diet to make it more healthy. Start small · Do not try to make dramatic changes to your diet all at once.  You might feel that you are missing out on your favorite foods and then be more likely to fail. · Start slowly, and gradually change your habits. Try some of the following: ¨ Use whole wheat bread instead of white bread. ¨ Use nonfat or low-fat milk instead of whole milk. ¨ Eat brown rice instead of white rice, and eat whole wheat pasta instead of white-flour pasta. ¨ Try low-fat cheeses and low-fat yogurt. ¨ Add more fruits and vegetables to meals and have them for snacks. ¨ Add lettuce, tomato, cucumber, and onion to sandwiches. ¨ Add fruit to yogurt and cereal. 
Enjoy food · You can still eat your favorite foods. You just may need to eat less of them. If your favorite foods are high in fat, salt, and sugar, limit how often you eat them, but do not cut them out entirely. · Eat a wide variety of foods. Make healthy choices when eating out · The type of restaurant you choose can help you make healthy choices. Even fast-food chains are now offering more low-fat or healthier choices on the menu. · Choose smaller portions, or take half of your meal home. · When eating out, try: ¨ A veggie pizza with a whole wheat crust or grilled chicken (instead of sausage or pepperoni). ¨ Pasta with roasted vegetables, grilled chicken, or marinara sauce instead of cream sauce. ¨ A vegetable wrap or grilled chicken wrap. ¨ Broiled or poached food instead of fried or breaded items. Make healthy choices easy · Buy packaged, prewashed, ready-to-eat fresh vegetables and fruits, such as baby carrots, salad mixes, and chopped or shredded broccoli and cauliflower. · Buy packaged, presliced fruits, such as melon or pineapple. · Choose 100% fruit or vegetable juice instead of soda. Limit juice intake to 4 to 6 oz (½ to ¾ cup) a day. · Blend low-fat yogurt, fruit juice, and canned or frozen fruit to make a smoothie for breakfast or a snack. Where can you learn more? Go to http://shima-alyssa.info/. Enter T756 in the search box to learn more about \"Eating Healthy Foods: Care Instructions. \" Current as of: April 3, 2017 Content Version: 11.3 © 9278-7510 [employer], [employer]. Care instructions adapted under license by [employer] (which disclaims liability or warranty for this information). If you have questions about a medical condition or this instruction, always ask your healthcare professional. [address] any warranty or liability for your use of this information. Please provide this summary of care documentation to your next provider. Your primary care clinician is listed as [place] Sakina. If you have any questions after today's visit, please call [phone].

## 2017-09-22 NOTE — PATIENT INSTRUCTIONS

## 2017-09-22 NOTE — PROGRESS NOTES
Chief Complaint   Patient presents with   [facility] Follow Up    Angioedema     1. Have you been to the ER, urgent care clinic since your last visit? Hospitalized since your last visit? Yes. [facility]. 2. Have you seen or consulted any other health care providers outside of the [address] [place] [address] since your last visit? Include any pap smears or colon screening.  No

## 2017-09-22 NOTE — PROGRESS NOTES
[address] [address] [place]   [phone]             Date of visit: 9/24/2017   Subjective:      History obtained from:  the patient. Olivier Echeverria is a 54 y.o. female who presents today after she was discharged from [facility] facility on 9/19. She was admitted for anaphylactic rxn. Pt had a similar even late 8/2017, at that time losartan was discontinued. She was seen in clinic 9/1, HCTZ started for HTN and decreased lasix which she takes for chronic lymphedema. Unfortunately she presented to ED on 9/18 with another anaphylactic rxn. Pharm was consulted, they stated that pt may have a sulf allergy which is a component of HCTZ and losartan. ENT consulted and they assessed pt after IV steroid tx was given, ENT cleared for discharge.  have done her medication reconciliation. She was started on Epipen, metoprolol for BP control, and 3 days of prednisone. She has not picked up any of the prescriptions that she was discharged with. No similar rxns. BP was checked at home and it was labile, 120/80s in morning but 160/90 at 6 pm usually. States that the lower extremity edema was improved after discharge, but continued to increase over time, not back to where it was prior to admission. Has not made an appt with allergist to see if the medication was the cause of rxn. Pt has number of who to contact. C1 Est.Inhib. Function was within normal range. Therefore unlikely to have familial angioedema.     Hgb A1C recheck 7.0% from 6.2 (12/2016)    Patient Active Problem List    Diagnosis Date Noted    Angioedema 09/18/2017    Swelling of throat 09/18/2017    Dyspnea 09/18/2017    Morbid [facility] ([facility] 09/18/2017    Elevated hemoglobin A1c 03/04/2017    Wheezing 03/04/2017    Venous insufficiency of both lower extremities 05/26/2016    Morbid obesity with BMI of 50.0-59.9, adult ([facility] 05/20/2016    Incidental lung nodule, > 3mm and < 8mm 04/20/2016    DDD (degenerative disc disease), lumbar 2013    Thyromegaly 2013    Tobacco abuse 2013    H. pylori infection 2013    History of normal resting EKG 2013    Gallstone 2013    Appendicitis 2013    H/O  section 2013    Left hand fracture 2013     Current Outpatient Prescriptions   Medication Sig Dispense Refill    nicotine (NICODERM CQ) 21 mg/24 hr 1 Patch by TransDERmal route every twenty-four (24) hours for 30 days. 30 Patch 0    metoprolol tartrate (LOPRESSOR) 25 mg tablet Take 1 Tab by mouth two (2) times a day. 30 Tab 1    EPINEPHrine (ADRENACLICK) 0.3 XR/5.4 mL injection 0.3 mL by IntraMUSCular route once as needed for up to 1 dose. 1 Syringe 1    diphenhydrAMINE (BENADRYL) 25 mg capsule Take 25 mg by mouth every six (6) hours as needed.  albuterol (PROVENTIL HFA, VENTOLIN HFA, PROAIR HFA) 90 mcg/actuation inhaler Take 2 Puffs by inhalation every four (4) hours as needed for Wheezing. 1 Inhaler 2     Allergies   Allergen Reactions    Hydrochlorothiazide Anaphylaxis and Unproven on Challenge    Losartan Anaphylaxis and Unproven on Challenge    Sulfa (Sulfonamide Antibiotics) Anaphylaxis     When she was on Losartan (2017), and then again when she was on HCTZ (2017). She was also on lasix since .     Eggplant Other (comments)     Severe stomach pain     Lasix [Furosemide] Unproven on Challenge     May lead to anaphylaxis      Past Medical History:   Diagnosis Date    Anxiety and depression     Flu     2 wks ago    GERD (gastroesophageal reflux disease)     Headache     Hypertension     Incidental lung nodule, > 3mm and < 8mm 2016    left lung nodule in smoker, needs follow up CT in Oct 2016      Past Surgical History:   Procedure Laterality Date    APPENDECTOMY      HX APPENDECTOMY      HX  SECTION      X 3 Births    HX  SECTION      HX CHOLECYSTECTOMY      HX WRIST FRACTURE TX      metal    REMOVAL GALLBLADDER       Family History   Problem Relation Age of Onset    Diabetes Mother     Hypertension Brother     Hypertension Brother     Hypertension Brother      Social History   Substance Use Topics    Smoking status: Current Every Day Smoker     Packs/day: 0.50     Years: 25.00     Types: Cigarettes    Smokeless tobacco: Never Used    Alcohol use No      Social History     Social History Narrative    ** Merged History Encounter **         ** Merged History Encounter **             Review of Systems  CONSTITUTIONAL: Denies: fever, chills, weakness  CARDIOVASCULAR: bilateral edema, Denies: chest pain, palpitations, orthopnea   RESPIRATORY: Denies: cough, wheezing  ENDOCRINE: Denies: polydipsia/polyuria, palpitations, skin changes, temperature intolerance  GI: Denies: abdominal pain, nausea, vomiting, diarrhea, constipation, blood in stool  : Denies: dysuria, frequency/urgency, pelvic pain  NEURO: Denies: dizzy/vertigo, focal weakness, speech problems, confusion  MUSCULOSKELETAL: Denies: joint stiffness, joint swelling  SKIN: Denies: rash  PSYCH: Depressed. Denies: anxiety, suicidal ideation        Objective:     Vitals:    09/22/17 1526 09/22/17 1634   BP: 144/79 121/80   Pulse: 89    Resp: 18    Temp: 97.1 °F (36.2 °C)    TempSrc: Oral    SpO2: 95%    Weight: 297 lb (134.7 kg)    Height: 5' 1\" (1.549 m)      Body mass index is 56.12 kg/(m^2). GEN: No apparent distress. Alert and oriented and responds to all questions appropriately. Morbidly Obese, cushinoid appearence. English speaker. Not ready to quit smoking  EYES: Conjunctiva clear; pupils round and reactive to light; extraocular movements are intact. NOSE: Turbinates are within normal limits. No drainage  OROPHYARYNX: No oral lesions or exudates. NECK: Supple; no masses; thyromegaly noted on left. Large neck circumference.   LUNGS: Respirations unlabored; clear to auscultation bilaterally  CARDIOVASCULAR: Regular, rate, and rhythm without murmurs, gallops or rubs   ABDOMEN: Soft; nontender; nondistended; normoactive bowel sounds  NEUROLOGIC: No focal neurologic deficits. Strength and sensation grossly intact. Coordination and gait grossly intact. EXT: 2+ pitting edema in lower ext extending to thighs bilaterally. Non-pitting edema in BUE extending to shoulder. SKIN: No obvious rashes or erythema or hives. Edema present without signs of infection    Lab Results   Component Value Date/Time    Hemoglobin A1c 7.0 09/19/2017 01:11 AM    Hemoglobin A1c (POC) 5.0 01/31/2013 03:09 PM       Lab Results   Component Value Date/Time    WBC 11.3 09/19/2017 01:11 AM    HGB 14.7 09/19/2017 01:11 AM    HCT 44.0 09/19/2017 01:11 AM    PLATELET 780 72/08/0280 01:11 AM    MCV 89.2 09/19/2017 01:11 AM     Lab Results   Component Value Date/Time    TSH 0.884 09/01/2017 03:32 PM    TSH 0.765 02/26/2016 11:38 AM     Labs reviewed       Assessment/Plan:       ICD-10-CM ICD-9-CM    1. Anaphylactic reaction, initial encounter T78. 2XXA 995.0    2. BMI 50.0-59.9, adult ([facility] Z68.43 V85.43    3. Smoker F17.200 305.1    4. Lymphedema I89.0 457.1 CREATININE, UR, RANDOM      SODIUM, URINE      POTASSIUM, URINE      CORTISOL, URINE FREE 24 HR      PROTEIN,TOTAL,URINE      METABOLIC PANEL, COMPREHENSIVE   5. Essential hypertension I10 401.9    6. Thyromegaly E01.0 240.9 US THYROID/PARATHYROID/SOFT TISS   7. Screening, lipid Z13.220 V77.91 LIPID PANEL       Orders Placed This Encounter    US THYROID/PARATHYROID/SOFT TISS    CREATININE, UR, RANDOM    SODIUM, URINE    POTASSIUM, URINE    CORTISOL, URINE FREE 24 HR    PROTEIN,TOTAL,URINE    METABOLIC PANEL, COMPREHENSIVE    LIPID PANEL     -Pt to  epipen to have incase she has another reaction. Pt agreed  -Pt to discard HCTZ and Losartan that is left at home  -Pt was educated that lasix may not be taken as I am not sure if that was the cause of the anaphylaxis and angioedema.  She is very hesitant to discard the medication as she feels that fluid build up may be a cause of SOB. She states unless she takes lasix then she is unable to urinate enough for fluid not to effect her breathing. Will recheck urine study and CMP. Pt not instructed to not take Lasix, if she feels like she is fluid overloaded she needs to be seen in clinic. Of note, pt has been lasix since 2016. Needs to be evaluated by allergist to be sure what led to rxn.  -Thyromegaly noted on exam. TSH WNL. But will check US. -Cushingoid appearance: will check 24 hr cortisol. -HTN: instructed to take BB once daily for BP control. Pt to keep BP log. Follow-up next week for adjustment. Side effects reviewed. CCB not stated to avoid SE of LE edema. -BMI: Pt referred to gym program.  -Lipid screening: lipid panel. Discussed the diagnosis and plan and she expressed understanding. Follow-up Disposition:  Return in about 1 week (around 9/29/2017) for HTN.     Sachi Parekh, DO

## 2017-09-23 LAB
CREAT UR-MCNC: 107.5 MG/DL
POTASSIUM UR-SCNC: 39.4 MMOL/L
PROT UR-MCNC: 12.3 MG/DL
SODIUM 24H UR-SCNC: 158 MMOL/L

## 2017-09-25 ENCOUNTER — LAB ONLY (OUTPATIENT)
Dept: FAMILY MEDICINE CLINIC | Age: 52
End: 2017-09-25

## 2017-09-25 NOTE — PROGRESS NOTES
I saw and evaluated the patient, performing the key elements of the service. I discussed the findings, assessment and plan with the resident and agree with the resident's findings and plan as documented in the resident's note.     2+ pitting edema in LE bilaterally  Pt reporting significant swelling in hands and arms as well at times  Etiology of swelling unclear as her cardiac w/up has been negative    Resident ordered further studies to alexander

## 2017-09-25 NOTE — MR AVS SNAPSHOT
Visit Information Date & Time Provider Department Dept. Phone Encounter #  
 9/25/2017  9:45 AM LAB [facility] [phone] [id number] Your Appointments 9/29/2017  2:45 PM  
ROUTINE CARE with Edwin Wang DO [facility] ([facility]) Appt Note: f/u on BP  
 [address] [facility] [place],[facility] [address] [facility]  
[phone]  
  
   
 [address] [place] [address] 3 [facility] [address] [id number] Upcoming Health Maintenance Date Due INFLUENZA AGE 9 TO ADULT 8/1/2017 PAP AKA CERVICAL CYTOLOGY 3/4/2020 DTaP/Tdap/Td series (2 - Td) 11/25/2023 Allergies as of 9/25/2017  Review Complete On: 9/24/2017 By: Edwin Wang DO Severity Noted Reaction Type Reactions Hydrochlorothiazide High 09/24/2017    Anaphylaxis, Unproven on Challenge Losartan High 09/24/2017    Anaphylaxis, Unproven on Challenge Sulfa (Sulfonamide Antibiotics) High 09/24/2017    Anaphylaxis When she was on Losartan (8/2017), and then again when she was on HCTZ (9/2017). She was also on lasix since 2016. Eggplant Medium 09/18/2017   Intolerance Other (comments) Severe stomach pain Lasix [Furosemide]  09/24/2017    Unproven on Challenge May lead to anaphylaxis Current Immunizations  Reviewed on 3/4/2017 Name Date Influenza Vaccine 10/22/2012 Pneumococcal Conjugate (PCV-7) 11/25/2013 Pneumococcal Polysaccharide (PPSV-23) 3/4/2017 Tdap 11/25/2013 Not reviewed this visit Vitals LMP OB Status Smoking Status 12/31/2012 Postmenopausal Current Every Day Smoker Preferred Pharmacy Pharmacy Name Phone [facility], [email or web address] [phone] Your Updated Medication List  
  
   
This list is accurate as of: 9/25/17  4:51 PM.  Always use your most recent med list.  
  
  
  
  
 albuterol 90 mcg/actuation inhaler Commonly known as:  PROVENTIL HFA, VENTOLIN HFA, PROAIR HFA Take 2 Puffs by inhalation every four (4) hours as needed for Wheezing. BENADRYL 25 mg capsule Generic drug:  diphenhydrAMINE Take 25 mg by mouth every six (6) hours as needed. EPINEPHrine 0.3 mg/0.3 mL injection Commonly known as:  ADRENACLICK  
0.3 mL by IntraMUSCular route once as needed for up to 1 dose. metoprolol tartrate 25 mg tablet Commonly known as:  LOPRESSOR Take 1 Tab by mouth two (2) times a day. nicotine 21 mg/24 hr  
Commonly known as:  NICODERM CQ  
1 Patch by TransDERmal route every twenty-four (24) hours for 30 days. Please provide this summary of care documentation to your next provider. Your primary care clinician is listed as Master Dietrich. If you have any questions after today's visit, please call [phone].

## 2017-09-26 LAB
ALBUMIN SERPL-MCNC: 3.9 G/DL (ref 3.5–5.5)
ALBUMIN/GLOB SERPL: 1.4 {RATIO} (ref 1.2–2.2)
ALP SERPL-CCNC: 65 IU/L (ref 39–117)
ALT SERPL-CCNC: 14 IU/L (ref 0–32)
AST SERPL-CCNC: 12 IU/L (ref 0–40)
BILIRUB SERPL-MCNC: 0.7 MG/DL (ref 0–1.2)
BUN SERPL-MCNC: 11 MG/DL (ref 6–24)
BUN/CREAT SERPL: 17 (ref 9–23)
CALCIUM SERPL-MCNC: 9.5 MG/DL (ref 8.7–10.2)
CHLORIDE SERPL-SCNC: 100 MMOL/L (ref 96–106)
CHOLEST SERPL-MCNC: 184 MG/DL (ref 100–199)
CO2 SERPL-SCNC: 27 MMOL/L (ref 18–29)
CREAT SERPL-MCNC: 0.66 MG/DL (ref 0.57–1)
GLOBULIN SER CALC-MCNC: 2.7 G/DL (ref 1.5–4.5)
GLUCOSE SERPL-MCNC: 121 MG/DL (ref 65–99)
HDLC SERPL-MCNC: 33 MG/DL
INTERPRETATION, 910389: NORMAL
LDLC SERPL CALC-MCNC: 93 MG/DL (ref 0–99)
POTASSIUM SERPL-SCNC: 4.7 MMOL/L (ref 3.5–5.2)
PROT SERPL-MCNC: 6.6 G/DL (ref 6–8.5)
SODIUM SERPL-SCNC: 140 MMOL/L (ref 134–144)
TRIGL SERPL-MCNC: 289 MG/DL (ref 0–149)
VLDLC SERPL CALC-MCNC: 58 MG/DL (ref 5–40)

## 2017-09-28 LAB
CORTIS F 24H UR-MRATE: 26 UG/24 HR (ref 0–50)
CORTIS F UR-MCNC: 19 UG/L

## 2017-09-29 ENCOUNTER — OFFICE VISIT (OUTPATIENT)
Dept: FAMILY MEDICINE CLINIC | Age: 52
End: 2017-09-29

## 2017-09-29 VITALS
SYSTOLIC BLOOD PRESSURE: 133 MMHG | DIASTOLIC BLOOD PRESSURE: 83 MMHG | HEART RATE: 95 BPM | TEMPERATURE: 98.4 F | BODY MASS INDEX: 55.32 KG/M2 | RESPIRATION RATE: 16 BRPM | OXYGEN SATURATION: 95 % | WEIGHT: 293 LBS | HEIGHT: 61 IN

## 2017-09-29 DIAGNOSIS — I89.0 LYMPHEDEMA: ICD-10-CM

## 2017-09-29 DIAGNOSIS — E78.5 HYPERLIPIDEMIA, UNSPECIFIED HYPERLIPIDEMIA TYPE: ICD-10-CM

## 2017-09-29 DIAGNOSIS — Z72.821 POOR SLEEP HYGIENE: ICD-10-CM
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
## 2017-09-29 DIAGNOSIS — I10 ESSENTIAL HYPERTENSION: Primary | ICD-10-CM

## 2017-09-29 RX ORDER — ATORVASTATIN CALCIUM 40 MG/1
40 TABLET, FILM COATED ORAL DAILY
Qty: 30 TAB | Refills: 0 | Status: SHIPPED | OUTPATIENT
Start: 2017-09-29 | End: 2017-09-29

## 2017-09-29 RX ORDER — NAPROXEN 500 MG/1
500 TABLET ORAL 2 TIMES DAILY WITH MEALS
Qty: 12 TAB | Refills: 0 | Status: SHIPPED | OUTPATIENT
Start: 2017-09-29 | End: 2018-03-16

## 2017-09-29 NOTE — PATIENT INSTRUCTIONS
Eating Healthy Foods: Care Instructions  Your Care Instructions  Eating healthy foods can help lower your risk for disease. Healthy food gives you energy and keeps your heart strong, your brain active, your muscles working, and your bones strong. A healthy diet includes a variety of foods from the basic food groups: grains, vegetables, fruits, milk and milk products, and meat and beans. Some people may eat more of their favorite foods from only one food group and, as a result, miss getting the nutrients they need. So, it is important to pay attention not only to what you eat but also to what you are missing from your diet. You can eat a healthy, balanced diet by making a few small changes. Follow-up care is a key part of your treatment and safety. Be sure to make and go to all appointments, and call your doctor if you are having problems. It's also a good idea to know your test results and keep a list of the medicines you take. How can you care for yourself at home? Look at what you eat  · Keep a food diary for a week or two and record everything you eat or drink. Track the number of servings you eat from each food group. · For a balanced diet every day, eat a variety of:  ¨ 6 or more ounce-equivalents of grains, such as cereals, breads, crackers, rice, or pasta, every day. An ounce-equivalent is 1 slice of bread, 1 cup of ready-to-eat cereal, or ½ cup of cooked rice, cooked pasta, or cooked cereal.  ¨ 2½ cups of vegetables, especially:  § Dark-green vegetables such as broccoli and spinach. § Orange vegetables such as carrots and sweet potatoes. § Dry beans (such as lyn and kidney beans) and peas (such as lentils). ¨ 2 cups of fresh, frozen, or canned fruit. A small apple or 1 banana or orange equals 1 cup. ¨ 3 cups of nonfat or low-fat milk, yogurt, or other milk products. ¨ 5½ ounces of meat and beans, such as chicken, fish, lean meat, beans, nuts, and seeds.  One egg, 1 tablespoon of peanut butter, ½ ounce nuts or seeds, or ¼ cup of cooked beans equals 1 ounce of meat. · Learn how to read food labels for serving sizes and ingredients. Fast-food and convenience-food meals often contain few or no fruits or vegetables. Make sure you eat some fruits and vegetables to make the meal more nutritious. · Look at your food diary. For each food group, add up what you have eaten and then divide the total by the number of days. This will give you an idea of how much you are eating from each food group. See if you can find some ways to change your diet to make it more healthy. Start small  · Do not try to make dramatic changes to your diet all at once. You might feel that you are missing out on your favorite foods and then be more likely to fail. · Start slowly, and gradually change your habits. Try some of the following:  ¨ Use whole wheat bread instead of white bread. ¨ Use nonfat or low-fat milk instead of whole milk. ¨ Eat brown rice instead of white rice, and eat whole wheat pasta instead of white-flour pasta. ¨ Try low-fat cheeses and low-fat yogurt. ¨ Add more fruits and vegetables to meals and have them for snacks. ¨ Add lettuce, tomato, cucumber, and onion to sandwiches. ¨ Add fruit to yogurt and cereal.  Enjoy food  · You can still eat your favorite foods. You just may need to eat less of them. If your favorite foods are high in fat, salt, and sugar, limit how often you eat them, but do not cut them out entirely. · Eat a wide variety of foods. Make healthy choices when eating out  · The type of restaurant you choose can help you make healthy choices. Even fast-food chains are now offering more low-fat or healthier choices on the menu. · Choose smaller portions, or take half of your meal home. · When eating out, try:  ¨ A veggie pizza with a whole wheat crust or grilled chicken (instead of sausage or pepperoni).   ¨ Pasta with roasted vegetables, grilled chicken, or marinara sauce instead of cream sauce. ¨ A vegetable wrap or grilled chicken wrap. ¨ Broiled or poached food instead of fried or breaded items. Make healthy choices easy  · Buy packaged, prewashed, ready-to-eat fresh vegetables and fruits, such as baby carrots, salad mixes, and chopped or shredded broccoli and cauliflower. · Buy packaged, presliced fruits, such as melon or pineapple. · Choose 100% fruit or vegetable juice instead of soda. Limit juice intake to 4 to 6 oz (½ to ¾ cup) a day. · Blend low-fat yogurt, fruit juice, and canned or frozen fruit to make a smoothie for breakfast or a snack. Where can you learn more? Go to http://shima-alyssa.info/. Enter T756 in the search box to learn more about \"Eating Healthy Foods: Care Instructions. \"  Current as of: April 3, 2017  Content Version: 11.3  © 4313-5324 [employer]. Care instructions adapted under license by [employer] (which disclaims liability or warranty for this information). If you have questions about a medical condition or this instruction, always ask your healthcare professional. [place] [address] any warranty or liability for your use of this information. Statins: Care Instructions  Your Care Instructions  Statins are medicines that lower your cholesterol and your risk for a heart attack and stroke. Cholesterol is a type of fat in your blood. If you have too much cholesterol, it can build up in blood vessels. This raises your risk of heart disease, heart attack, and stroke. Statins lower cholesterol by blocking how much cholesterol your body makes. This prevents cholesterol from building up in your blood vessels. This is called hardening of the arteries. It is the starting point for some heart and blood flow problems, such as heart disease. Statins may also reduce inflammation around the buildup (called plaque).  This can lower the risk that the plaque will break apart and lead to a heart attack or stroke. A heart-healthy lifestyle is important for lowering your risk whether you take statins or not. This includes eating healthy foods, being active, staying at a healthy weight, and not smoking. You must take statins regularly for them to work well. If you stop, your cholesterol and your risk will go back up. Examples of statins include:  · Atorvastatin (Lipitor). · Lovastatin (Mevacor). · Pravastatin (Pravachol). · Simvastatin (Zocor). Statins interact with many medicines. So tell your doctor all of the other medicines that you take. These include prescription medicines, over-the-counter medicines, dietary supplements, and herbal products. Follow-up care is a key part of your treatment and safety. Be sure to make and go to all appointments, and call your doctor if you are having problems. It's also a good idea to know your test results and keep a list of the medicines you take. How can you care for yourself at home? · Take statins exactly as your doctor tells you. High cholesterol has no symptoms. So it is easy to forget to take the pills. Try to make a system that reminds you to take them. · Do not take two or more medicines at the same time unless the doctor told you to. Statins can interact with other medicines. · Always tell your doctor if you think you are having a side effect. If side effects are a problem with one medicine, a different one may be used. · Keep making the lifestyle changes your doctor suggests. Eat heart-healthy foods, be active, don't smoke, and stay at a healthy weight. · Talk to your doctor about avoiding grapefruit juice if you take statins. Grapefruit juice can raise the level of this medicine in your blood. This could increase side effects. When should you call for help? Watch closely for changes in your health, and be sure to contact your doctor if:  · You have side effects of statins. These include:  ¨ Fatigue. ¨ Upset stomach. ¨ Gas. ¨ Constipation.   ¨ Pain or cramps in the belly. ¨ Muscle aches. · You have any new symptoms or side effects. Where can you learn more? Go to http://shima-alyssa.info/. Enter R358 in the search box to learn more about \"Statins: Care Instructions. \"  Current as of: April 3, 2017  Content Version: 11.3  © 7004-0882 [employer]. Care instructions adapted under license by [employer] (which disclaims liability or warranty for this information). If you have questions about a medical condition or this instruction, always ask your healthcare professional. [place] [address] any warranty or liability for your use of this information.

## 2017-09-29 NOTE — MR AVS SNAPSHOT
Visit Information Date & Time Provider Department Dept. Phone Encounter #  
 9/29/2017  2:45 PM DO Leidy Thomson [phone] [id number] Follow-up Instructions Return in about 1 week (around 10/6/2017). Upcoming Health Maintenance Date Due INFLUENZA AGE 9 TO ADULT 8/1/2017 PAP AKA CERVICAL CYTOLOGY 3/4/2020 DTaP/Tdap/Td series (2 - Td) 11/25/2023 Allergies as of 9/29/2017  Review Complete On: 9/29/2017 By: Devonte Shin LPN Severity Noted Reaction Type Reactions Hydrochlorothiazide High 09/24/2017    Anaphylaxis, Unproven on Challenge Losartan High 09/24/2017    Anaphylaxis, Unproven on Challenge Sulfa (Sulfonamide Antibiotics) High 09/24/2017    Anaphylaxis When she was on Losartan (8/2017), and then again when she was on HCTZ (9/2017). She was also on lasix since 2016. Eggplant Medium 09/18/2017   Intolerance Other (comments) Severe stomach pain Lasix [Furosemide]  09/24/2017    Unproven on Challenge May lead to anaphylaxis Current Immunizations  Reviewed on 3/4/2017 Name Date Influenza Vaccine 10/22/2012 Pneumococcal Conjugate (PCV-7) 11/25/2013 Pneumococcal Polysaccharide (PPSV-23) 3/4/2017 Tdap 11/25/2013 Not reviewed this visit You Were Diagnosed With   
  
 Codes Comments Essential hypertension    -  Primary ICD-10-CM: I10 
ICD-9-CM: 401.9 Hyperlipidemia, unspecified hyperlipidemia type     ICD-10-CM: E78.5 ICD-9-CM: 272.4 Fall, initial encounter     ICD-10-CM: W19. Ambreen  ICD-9-CM: E888.9 Lymphedema     ICD-10-CM: I89.0 ICD-9-CM: 806.4 Vitals BP Pulse Temp Resp Height(growth percentile) Weight(growth percentile) 133/83 95 98.4 °F (36.9 °C) (Oral) 16 5' 1\" (1.549 m) 294 lb (133.4 kg) LMP SpO2 BMI OB Status Smoking Status 12/31/2012 95% 55.55 kg/m2 Postmenopausal Current Every Day Smoker Vitals History BMI and BSA Data Body Mass Index Body Surface Area 55.55 kg/m 2 2.4 m 2 Preferred Pharmacy Pharmacy Name Phone [facility], [address] [phone] Your Updated Medication List  
  
   
This list is accurate as of: 9/29/17  3:37 PM.  Always use your most recent med list.  
  
  
  
  
 albuterol 90 mcg/actuation inhaler Commonly known as:  PROVENTIL HFA, VENTOLIN HFA, PROAIR HFA Take 2 Puffs by inhalation every four (4) hours as needed for Wheezing. BENADRYL 25 mg capsule Generic drug:  diphenhydrAMINE Take 25 mg by mouth every six (6) hours as needed. EPINEPHrine 0.3 mg/0.3 mL injection Commonly known as:  ADRENACLICK  
0.3 mL by IntraMUSCular route once as needed for up to 1 dose. metoprolol tartrate 25 mg tablet Commonly known as:  LOPRESSOR Take 1 Tab by mouth two (2) times a day. naproxen 500 mg tablet Commonly known as:  NAPROSYN Take 1 Tab by mouth two (2) times daily (with meals). nicotine 21 mg/24 hr  
Commonly known as:  NICODERM CQ  
1 Patch by TransDERmal route every twenty-four (24) hours for 30 days. Prescriptions Printed Refills  
 naproxen (NAPROSYN) 500 mg tablet 0 Sig: Take 1 Tab by mouth two (2) times daily (with meals). Class: Print Route: Oral  
  
Follow-up Instructions Return in about 1 week (around 10/6/2017). Patient Instructions Eating Healthy Foods: Care Instructions Your Care Instructions Eating healthy foods can help lower your risk for disease. Healthy food gives you energy and keeps your heart strong, your brain active, your muscles working, and your bones strong. A healthy diet includes a variety of foods from the basic food groups: grains, vegetables, fruits, milk and milk products, and meat and beans.  Some people may eat more of their favorite foods from only one food group and, as a result, miss getting the nutrients they need. So, it is important to pay attention not only to what you eat but also to what you are missing from your diet. You can eat a healthy, balanced diet by making a few small changes. Follow-up care is a key part of your treatment and safety. Be sure to make and go to all appointments, and call your doctor if you are having problems. It's also a good idea to know your test results and keep a list of the medicines you take. How can you care for yourself at home? Look at what you eat · Keep a food diary for a week or two and record everything you eat or drink. Track the number of servings you eat from each food group. · For a balanced diet every day, eat a variety of: ¨ 6 or more ounce-equivalents of grains, such as cereals, breads, crackers, rice, or pasta, every day. An ounce-equivalent is 1 slice of bread, 1 cup of ready-to-eat cereal, or ½ cup of cooked rice, cooked pasta, or cooked cereal. 
¨ 2½ cups of vegetables, especially: § Dark-green vegetables such as broccoli and spinach. § Orange vegetables such as carrots and sweet potatoes. § Dry beans (such as lyn and kidney beans) and peas (such as lentils). ¨ 2 cups of fresh, frozen, or canned fruit. A small apple or 1 banana or orange equals 1 cup. ¨ 3 cups of nonfat or low-fat milk, yogurt, or other milk products. ¨ 5½ ounces of meat and beans, such as chicken, fish, lean meat, beans, nuts, and seeds. One egg, 1 tablespoon of peanut butter, ½ ounce nuts or seeds, or ¼ cup of cooked beans equals 1 ounce of meat. · Learn how to read food labels for serving sizes and ingredients. Fast-food and convenience-food meals often contain few or no fruits or vegetables. Make sure you eat some fruits and vegetables to make the meal more nutritious. · Look at your food diary. For each food group, add up what you have eaten and then divide the total by the number of days.  This will give you an idea of how much you are eating from each food group. See if you can find some ways to change your diet to make it more healthy. Start small · Do not try to make dramatic changes to your diet all at once. You might feel that you are missing out on your favorite foods and then be more likely to fail. · Start slowly, and gradually change your habits. Try some of the following: ¨ Use whole wheat bread instead of white bread. ¨ Use nonfat or low-fat milk instead of whole milk. ¨ Eat brown rice instead of white rice, and eat whole wheat pasta instead of white-flour pasta. ¨ Try low-fat cheeses and low-fat yogurt. ¨ Add more fruits and vegetables to meals and have them for snacks. ¨ Add lettuce, tomato, cucumber, and onion to sandwiches. ¨ Add fruit to yogurt and cereal. 
Enjoy food · You can still eat your favorite foods. You just may need to eat less of them. If your favorite foods are high in fat, salt, and sugar, limit how often you eat them, but do not cut them out entirely. · Eat a wide variety of foods. Make healthy choices when eating out · The type of restaurant you choose can help you make healthy choices. Even fast-food chains are now offering more low-fat or healthier choices on the menu. · Choose smaller portions, or take half of your meal home. · When eating out, try: ¨ A veggie pizza with a whole wheat crust or grilled chicken (instead of sausage or pepperoni). ¨ Pasta with roasted vegetables, grilled chicken, or marinara sauce instead of cream sauce. ¨ A vegetable wrap or grilled chicken wrap. ¨ Broiled or poached food instead of fried or breaded items. Make healthy choices easy · Buy packaged, prewashed, ready-to-eat fresh vegetables and fruits, such as baby carrots, salad mixes, and chopped or shredded broccoli and cauliflower. · Buy packaged, presliced fruits, such as melon or pineapple. · Choose 100% fruit or vegetable juice instead of soda.  Limit juice intake to 4 to 6 oz (½ to ¾ cup) a day. · Blend low-fat yogurt, fruit juice, and canned or frozen fruit to make a smoothie for breakfast or a snack. Where can you learn more? Go to http://shima-alyssa.info/. Enter T756 in the search box to learn more about \"Eating Healthy Foods: Care Instructions. \" Current as of: April 3, 2017 Content Version: 11.3 © 4615-3612 [employer]. Care instructions adapted under license by [employer] (which disclaims liability or warranty for this information). If you have questions about a medical condition or this instruction, always ask your healthcare professional. [address] any warranty or liability for your use of this information. Statins: Care Instructions Your Care Instructions Statins are medicines that lower your cholesterol and your risk for a heart attack and stroke. Cholesterol is a type of fat in your blood. If you have too much cholesterol, it can build up in blood vessels. This raises your risk of heart disease, heart attack, and stroke. Statins lower cholesterol by blocking how much cholesterol your body makes. This prevents cholesterol from building up in your blood vessels. This is called hardening of the arteries. It is the starting point for some heart and blood flow problems, such as heart disease. Statins may also reduce inflammation around the buildup (called plaque). This can lower the risk that the plaque will break apart and lead to a heart attack or stroke. A heart-healthy lifestyle is important for lowering your risk whether you take statins or not. This includes eating healthy foods, being active, staying at a healthy weight, and not smoking. You must take statins regularly for them to work well. If you stop, your cholesterol and your risk will go back up. Examples of statins include: · Atorvastatin (Lipitor). · Lovastatin (Mevacor). · Pravastatin (Pravachol). · Simvastatin (Zocor). Statins interact with many medicines. So tell your doctor all of the other medicines that you take. These include prescription medicines, over-the-counter medicines, dietary supplements, and herbal products. Follow-up care is a key part of your treatment and safety. Be sure to make and go to all appointments, and call your doctor if you are having problems. It's also a good idea to know your test results and keep a list of the medicines you take. How can you care for yourself at home? · Take statins exactly as your doctor tells you. High cholesterol has no symptoms. So it is easy to forget to take the pills. Try to make a system that reminds you to take them. · Do not take two or more medicines at the same time unless the doctor told you to. Statins can interact with other medicines. · Always tell your doctor if you think you are having a side effect. If side effects are a problem with one medicine, a different one may be used. · Keep making the lifestyle changes your doctor suggests. Eat heart-healthy foods, be active, don't smoke, and stay at a healthy weight. · Talk to your doctor about avoiding grapefruit juice if you take statins. Grapefruit juice can raise the level of this medicine in your blood. This could increase side effects. When should you call for help? Watch closely for changes in your health, and be sure to contact your doctor if: 
· You have side effects of statins. These include: ¨ Fatigue. ¨ Upset stomach. ¨ Gas. ¨ Constipation. ¨ Pain or cramps in the belly. ¨ Muscle aches. · You have any new symptoms or side effects. Where can you learn more? Go to http://shima-alyssa.info/. Enter R358 in the search box to learn more about \"Statins: Care Instructions. \" Current as of: April 3, 2017 Content Version: 11.3 © 0537-9691 [employer].  Care instructions adapted under license by [address] (which disclaims liability or warranty for this information). If you have questions about a medical condition or this instruction, always ask your healthcare professional. [address] any warranty or liability for your use of this information. Please provide this summary of care documentation to your next provider. Your primary care clinician is listed as Julien Velasquez. If you have any questions after today's visit, please call [phone].

## 2017-09-29 NOTE — PROGRESS NOTES
Chief Complaint   Patient presents with    Blood Pressure Check     1. Have you been to the ER, urgent care clinic since your last visit? Hospitalized since your last visit? No    2. Have you seen or consulted any other health care providers outside of the [address] [place] [address] since your last visit? Include any pap smears or colon screening.  No      Swelling and BP issues

## 2017-09-29 NOTE — PROGRESS NOTES
Subjective  Evelyn Soni is an 54 y.o. female who presents for BP management evaluation    Pt started on BB for BP control last week. She has been keeping log of BP, range daily from 130//90. States she took medication once for 2 days when her  SPB, which decreased to 130s. No headache or dizziness. Chronic lymphedema continues, unchanged to pt. Has been told to avoid lasix (pt had an anaphylactic rxn while on medication unsure if it was the culprit). Urinating volume continues to be low. Alleen Hurl while attempting to come out of the bathtub 2 days ago. Bruise developed on the medial thigh. No pain with ambulating. Area is sore. 5/10 pain. Does not want xray. Sensation and ROM and strength intact according to pt. Pt continues to not sleep comfortably throughout the night and feels that she has more energy during the night than the day. Eats with family at 7 pm. Falls asleep for 15 min and wakes up and has a hard time sleeping. This leads to her sleeping throughout the day. She also states that her lack of taking lasix leads to fluid building up in lungs and there for she is concerned that this may also lead to increased worrying and lack of restful sleep. Plans to go to [facility] gym on Monday. Wants to lose weight. Reviewed labs with pt    Continues to smoke    Allergies - reviewed: Allergies   Allergen Reactions    Hydrochlorothiazide Anaphylaxis and Unproven on Challenge    Losartan Anaphylaxis and Unproven on Challenge    Sulfa (Sulfonamide Antibiotics) Anaphylaxis     When she was on Losartan (8/2017), and then again when she was on HCTZ (9/2017). She was also on lasix since 2016.  Eggplant Other (comments)     Severe stomach pain     Lasix [Furosemide] Unproven on Challenge     May lead to anaphylaxis          Medications - reviewed:   Current Outpatient Prescriptions   Medication Sig    naproxen (NAPROSYN) 500 mg tablet Take 1 Tab by mouth two (2) times daily (with meals).  nicotine (NICODERM CQ) 21 mg/24 hr 1 Patch by TransDERmal route every twenty-four (24) hours for 30 days.  metoprolol tartrate (LOPRESSOR) 25 mg tablet Take 1 Tab by mouth two (2) times a day.  EPINEPHrine (ADRENACLICK) 0.3 DA/6.0 mL injection 0.3 mL by IntraMUSCular route once as needed for up to 1 dose.  diphenhydrAMINE (BENADRYL) 25 mg capsule Take 25 mg by mouth every six (6) hours as needed.  albuterol (PROVENTIL HFA, VENTOLIN HFA, PROAIR HFA) 90 mcg/actuation inhaler Take 2 Puffs by inhalation every four (4) hours as needed for Wheezing. No current facility-administered medications for this visit. Past Medical History - reviewed:  Past Medical History:   Diagnosis Date    Anxiety and depression     Flu     2 wks ago    GERD (gastroesophageal reflux disease)     Headache     Hypertension     Incidental lung nodule, > 3mm and < 8mm 2016    left lung nodule in smoker, needs follow up CT in Oct 2016          Past Surgical History - reviewed:   Past Surgical History:   Procedure Laterality Date    APPENDECTOMY      HX APPENDECTOMY      HX  SECTION      X 3 Births    HX  SECTION      HX CHOLECYSTECTOMY      HX WRIST FRACTURE TX      metal    REMOVAL GALLBLADDER           Social History - reviewed:  Social History     Social History    Marital status:      Spouse name: N/A    Number of children: N/A    Years of education: N/A     Occupational History    Not on file.      Social History Main Topics    Smoking status: Current Every Day Smoker     Packs/day: 0.50     Years: 25.00     Types: Cigarettes    Smokeless tobacco: Never Used    Alcohol use No    Drug use: No    Sexual activity: Yes     Partners: Male     Other Topics Concern    Not on file     Social History Narrative    ** Merged History Encounter **         ** Merged History Encounter **              Family History - reviewed:  Family History   Problem Relation Age of Onset    Diabetes Mother     Hypertension Brother     Hypertension Brother     Hypertension Brother          Immunizations - reviewed:   Immunization History   Administered Date(s) Administered    Influenza Vaccine 10/22/2012    Pneumococcal Conjugate (PCV-7) 11/25/2013    Pneumococcal Polysaccharide (PPSV-23) 03/04/2017    Tdap 11/25/2013       ROS  CONSTITUTIONAL: Denies: fever, chills, weakness  CARDIOVASCULAR: bilateral edema, Denies: chest pain, palpitations, orthopnea   RESPIRATORY: Denies: cough, wheezing  ENDOCRINE: Denies: polydipsia/polyuria, palpitations, skin changes, temperature intolerance  GI: Denies: abdominal pain, nausea, vomiting, diarrhea, constipation, blood in stool  : Denies: dysuria, frequency/urgency, pelvic pain  NEURO: Denies: dizzy/vertigo, focal weakness, speech problems, confusion  MUSCULOSKELETAL: Pain the medial thigh after fall. Denies: joint stiffness, joint swelling  SKIN: Denies: rash  PSYCH: Depressed. Denies: anxiety, suicidal ideation      Physical Exam  Visit Vitals    /83    Pulse 95    Temp 98.4 °F (36.9 °C) (Oral)    Resp 16    Ht 5' 1\" (1.549 m)    Wt 294 lb (133.4 kg)    LMP 12/31/2012    SpO2 95%    BMI 55.55 kg/m2       GEN: No apparent distress. Alert and oriented and responds to all questions appropriately. Morbidly Obese, cushinoid appearence. Sinhala speaker. Not ready to quit smoking  EYES: Conjunctiva clear; pupils round and reactive to light; extraocular movements are intact. NOSE: Turbinates are within normal limits. No drainage  OROPHYARYNX: No oral lesions or exudates. NECK: Supple; no masses; thyromegaly noted on left. Large neck circumference. LUNGS: Respirations unlabored; clear to auscultation bilaterally  CARDIOVASCULAR: Regular, rate, and rhythm without murmurs, gallops or rubs   ABDOMEN: Soft; nontender; nondistended; normoactive bowel sounds  NEUROLOGIC: No focal neurologic deficits. Strength and sensation grossly intact.  Coordination and gait grossly intact. EXT: 2+ pitting edema in lower ext extending to thighs bilaterally. Non-pitting edema in BUE extending to shoulder. SKIN: No obvious rashes or erythema or hives. Edema present without signs of infection. New ecchymosis on the medial right thigh, tender to touch. Assessment/Plan    ICD-10-CM ICD-9-CM    1. Essential hypertension I10 401.9    2. Hyperlipidemia, unspecified hyperlipidemia type E78.5 272.4 DISCONTINUED: atorvastatin (LIPITOR) 40 mg tablet   3. Fall, initial encounter [address]. XXXA E888.9 naproxen (NAPROSYN) 500 mg tablet   4. Lymphedema I89.0 457.1      BP: Based on BP log. Pt to start BB as prescribed and continue to log BP. Follow-up next week. Lymphedema: unchanged. Lungs are clear. Pt notes signs of sleep apnea, discussed in length the benefit of having a sleep study. Pt declines sleep study and need for CPAP. Plans to work hard in the gym and lose weight, which we discussed last visit, hoping this will decrease edema. Avoid lasix. Precaution given    Sleep hygiene education given    Based on last lipid panel, current 10 year ascvd risk is 15.5%. Lifetime ASCVD risk 50 %. Does not want medication and asking to have a trial of diet and exercise. This is what the pt also requested for starting metformin for increasing A1C. Risk and benefits discussed of this plan, pt wants to proceed with not being prescribed any medication    Naproxen prescribed for pain control PRN after fall      Follow-up Disposition:  Return in about 1 week (around 10/6/2017). I have discussed the diagnosis with the patient and the intended plan as seen in the above orders. Patient verbalized understanding of the plan and agrees with the plan. The patient has received an after-visit summary and questions were answered concerning future plans. I have discussed medication side effects and warnings with the patient as well.  Informed patient to return to the office if new symptoms arise.         Tracey Patten, DO  Family Medicine Resident

## 2017-10-06 ENCOUNTER — OFFICE VISIT (OUTPATIENT)
Dept: FAMILY MEDICINE CLINIC | Age: 52
End: 2017-10-06

## 2017-10-06 VITALS
HEIGHT: 61 IN | BODY MASS INDEX: 55.32 KG/M2 | HEART RATE: 94 BPM | SYSTOLIC BLOOD PRESSURE: 153 MMHG | RESPIRATION RATE: 18 BRPM | WEIGHT: 293 LBS | OXYGEN SATURATION: 96 % | TEMPERATURE: 98.1 F | DIASTOLIC BLOOD PRESSURE: 85 MMHG

## 2017-10-06 DIAGNOSIS — I89.0 LYMPHEDEMA: ICD-10-CM

## 2017-10-06 DIAGNOSIS — J06.9 UPPER RESPIRATORY TRACT INFECTION, UNSPECIFIED TYPE: ICD-10-CM

## 2017-10-06 DIAGNOSIS — I10 ESSENTIAL HYPERTENSION: Primary | ICD-10-CM

## 2017-10-06 DIAGNOSIS — R05.9 COUGH: ICD-10-CM

## 2017-10-06 DIAGNOSIS — F17.200 SMOKER: ICD-10-CM

## 2017-10-06 NOTE — PROGRESS NOTES
Chief Complaint   Patient presents with    Cold Symptoms     nasal congestion, sore throat, non productive cough, headache x 5 days - Using OTC Sudafed     Hypertension    Follow-up     One Week Follow Up     .  1. Have you been to the ER, urgent care clinic since your last visit? Hospitalized since your last visit? No    2. Have you seen or consulted any other health care providers outside of the [address] [place] [address] since your last visit? Include any pap smears or colon screening.  No

## 2017-10-06 NOTE — PROGRESS NOTES
Georgina Nunez is an 54 y.o. female who presents for BP follow-up and productive cough. Pt states that she has been taking Metoprolol daily. BP at home are in 130s/90s. Check three times a day. Was [facility]. However, Sunday (10/1) pt developed URI symptoms. Today, has productive cough with green sputum. Feels SOB. Worse day was 2 days ago. Tx at home with sudafed. No sick contacts. Denies sore throat or fever. LE edema unchanged. Pt has been without lasix for almost a month. Weight stable. Feels like fluid building in lungs. Not open to sleep study as we discussed that she should think about a referral. States that friends has the CPAP machine and don't use it, does not want to waste money    Continues to smoke daily    Controlling diet. Hoping to lose weight to avoid starting DM and HLD medications     Allergies - reviewed: Allergies   Allergen Reactions    Hydrochlorothiazide Anaphylaxis and Unproven on Challenge    Losartan Anaphylaxis and Unproven on Challenge    Sulfa (Sulfonamide Antibiotics) Anaphylaxis     When she was on Losartan (8/2017), and then again when she was on HCTZ (9/2017). She was also on lasix since 2016.  Eggplant Other (comments)     Severe stomach pain     Lasix [Furosemide] Unproven on Challenge     May lead to anaphylaxis          Medications - reviewed:   Current Outpatient Prescriptions   Medication Sig    naproxen (NAPROSYN) 500 mg tablet Take 1 Tab by mouth two (2) times daily (with meals).  nicotine (NICODERM CQ) 21 mg/24 hr 1 Patch by TransDERmal route every twenty-four (24) hours for 30 days.  metoprolol tartrate (LOPRESSOR) 25 mg tablet Take 1 Tab by mouth two (2) times a day.  EPINEPHrine (ADRENACLICK) 0.3 AR/0.5 mL injection 0.3 mL by IntraMUSCular route once as needed for up to 1 dose.  diphenhydrAMINE (BENADRYL) 25 mg capsule Take 25 mg by mouth every six (6) hours as needed.     albuterol (PROVENTIL HFA, VENTOLIN HFA, PROAIR HFA) 90 mcg/actuation inhaler Take 2 Puffs by inhalation every four (4) hours as needed for Wheezing. No current facility-administered medications for this visit. Past Medical History - reviewed:  Past Medical History:   Diagnosis Date    Anxiety and depression     Flu     2 wks ago    GERD (gastroesophageal reflux disease)     Headache(784.0)     Hypertension     Incidental lung nodule, > 3mm and < 8mm 2016    left lung nodule in smoker, needs follow up CT in Oct 2016          Past Surgical History - reviewed:   Past Surgical History:   Procedure Laterality Date    APPENDECTOMY      HX APPENDECTOMY      HX  SECTION      X 3 Births    HX  SECTION      HX CHOLECYSTECTOMY      HX WRIST FRACTURE TX      metal    REMOVAL GALLBLADDER           Social History - reviewed:  Social History     Social History    Marital status:      Spouse name: N/A    Number of children: N/A    Years of education: N/A     Occupational History    Not on file.      Social History Main Topics    Smoking status: Current Every Day Smoker     Packs/day: 0.50     Years: 25.00     Types: Cigarettes    Smokeless tobacco: Never Used    Alcohol use No    Drug use: No    Sexual activity: Yes     Partners: Male     Other Topics Concern    Not on file     Social History Narrative    ** Merged History Encounter **         ** Merged History Encounter **              Family History - reviewed:  Family History   Problem Relation Age of Onset    Diabetes Mother     Hypertension Brother     Hypertension Brother     Hypertension Brother          Immunizations - reviewed:   Immunization History   Administered Date(s) Administered    Influenza Vaccine 10/22/2012    Pneumococcal Conjugate (PCV-7) 2013    Pneumococcal Polysaccharide (PPSV-23) 2017    Tdap 2013         ROS  Headaches:  no  Chest Pain:  no  SOB:  yes  Fevers:  no      Physical Exam  Visit Vitals    /85 (BP 1 Location: Right arm, BP Patient Position: Sitting)    Pulse 94    Temp 98.1 °F (36.7 °C) (Oral)    Resp 18    Ht 5' 1\" (1.549 m)    Wt 295 lb 6.4 oz (134 kg)    LMP 12/31/2012    SpO2 96%    BMI 55.82 kg/m2       GEN: No apparent distress. Alert and oriented and responds to all questions appropriately. Morbidly Obese, cushinoid appearence. Bulgarian speaker. Not ready to quit smoking  EYES: Conjunctiva clear; pupils round and reactive to light; extraocular movements are intact. NOSE: Turbinates are within normal limits. No drainage  OROPHYARYNX: No oral lesions or exudates. NECK: Supple; no masses; thyromegaly noted on left. Large neck circumference. LUNGS: Respirations unlabored; potential wheeze on the right upper lobe (difficult to decern 2/2 pt's habitus). Productive cough with clear sputum  CARDIOVASCULAR: Regular, rate, and rhythm without murmurs, gallops or rubs   ABDOMEN: Soft; nontender; nondistended; normoactive bowel sounds  NEUROLOGIC: No focal neurologic deficits. Strength and sensation grossly intact. Coordination and gait grossly intact. EXT: 2+ pitting edema in lower ext extending to thighs bilaterally. Non-pitting edema in BUE extending to shoulder. SKIN: No obvious rashes or erythema or hives. Edema present without signs of infection. Improved ecchymosis on the medial right thigh. Assessment/Plan    ICD-10-CM ICD-9-CM    1. Essential hypertension I10 401.9    2. Cough R05 786.2 XR CHEST PA LAT   3. Upper respiratory tract infection, unspecified type J06.9 465.9    4. BMI 50.0-59.9, [facility] Z68.43 V85.43    5. Lymphedema I89.0 457.1    6. Smoker F17.200 305.1      HTN: elevated in clinic. Pt is sure of the BP cuff reading at home. Will continue BB daily. Pt to monitor BP at home. Will call pt next week to discuss readings. Pt to stop sudafed as this may increase BP. Pt understands.      URI with productive cough: Chest PA/Lat was completed as pt feeling SOB, wheeze noted on lung exam (not obvious but noted), feeling of fluid retention (as off lasix). Imaging was without acute process or effusion. Pt to use Flonase, mucinex, and cough drops for symptom control    Weight loss: plans to go to gym next week    Sleep study: pt not open at this time for sleep apnea eval even though she has all symptoms. Information given. She is obese, likely has sleep apnea, and has slept very poorly lately, these things are likely playing a large role in her symptoms    Tobacco use: educated pt to quit but remains not open to idea. Follow-up Disposition:  Return if symptoms worsen or fail to improve. I have discussed the diagnosis with the patient and the intended plan as seen in the above orders. Patient verbalized understanding of the plan and agrees with the plan. The patient has received an after-visit summary and questions were answered concerning future plans. I have discussed medication side effects and warnings with the patient as well. Informed patient to return to the office if new symptoms arise.         Portia Rangel, DO  Family Medicine Resident

## 2017-10-06 NOTE — MR AVS SNAPSHOT
Visit Information Date & Time Provider Department Dept. Phone Encounter #  
 10/6/2017 11:15 AM Adama Urbina DO [facility] [phone] [id number] Follow-up Instructions Return if symptoms worsen or fail to improve. Upcoming Health Maintenance Date Due INFLUENZA AGE 9 TO ADULT 8/1/2017 PAP AKA CERVICAL CYTOLOGY 3/4/2020 DTaP/Tdap/Td series (2 - Td) 11/25/2023 Allergies as of 10/6/2017  Review Complete On: 10/6/2017 By: Jose Camacho LPN Severity Noted Reaction Type Reactions Hydrochlorothiazide High 09/24/2017    Anaphylaxis, Unproven on Challenge Losartan High 09/24/2017    Anaphylaxis, Unproven on Challenge Sulfa (Sulfonamide Antibiotics) High 09/24/2017    Anaphylaxis When she was on Losartan (8/2017), and then again when she was on HCTZ (9/2017). She was also on lasix since 2016. Eggplant Medium 09/18/2017   Intolerance Other (comments) Severe stomach pain Lasix [Furosemide]  09/24/2017    Unproven on Challenge May lead to anaphylaxis Current Immunizations  Reviewed on 3/4/2017 Name Date Influenza Vaccine 10/22/2012 Pneumococcal Conjugate (PCV-7) 11/25/2013 Pneumococcal Polysaccharide (PPSV-23) 3/4/2017 Tdap 11/25/2013 Not reviewed this visit You Were Diagnosed With   
  
 Codes Comments Cough    -  Primary ICD-10-CM: Q90 ICD-9-CM: 505. 2 Vitals BP Pulse Temp Resp Height(growth percentile) Weight(growth percentile) 156/86 (BP 1 Location: Right arm, BP Patient Position: Sitting) 94 98.1 °F (36.7 °C) (Oral) 18 5' 1\" (1.549 m) 295 lb 6.4 oz (134 kg) LMP SpO2 BMI OB Status Smoking Status 12/31/2012 96% 55.82 kg/m2 Postmenopausal Current Every Day Smoker Vitals History BMI and BSA Data Body Mass Index Body Surface Area 55.82 kg/m 2 2.4 m 2 Preferred Pharmacy Pharmacy Name Phone [facility] [address], [address] [phone] Your Updated Medication List  
  
   
This list is accurate as of: 10/6/17 12:06 PM.  Always use your most recent med list.  
  
  
  
  
 albuterol 90 mcg/actuation inhaler Commonly known as:  PROVENTIL HFA, VENTOLIN HFA, PROAIR HFA Take 2 Puffs by inhalation every four (4) hours as needed for Wheezing. BENADRYL 25 mg capsule Generic drug:  diphenhydrAMINE Take 25 mg by mouth every six (6) hours as needed. EPINEPHrine 0.3 mg/0.3 mL injection Commonly known as:  ADRENACLICK  
0.3 mL by IntraMUSCular route once as needed for up to 1 dose. metoprolol tartrate 25 mg tablet Commonly known as:  LOPRESSOR Take 1 Tab by mouth two (2) times a day. naproxen 500 mg tablet Commonly known as:  NAPROSYN Take 1 Tab by mouth two (2) times daily (with meals). nicotine 21 mg/24 hr  
Commonly known as:  NICODERM CQ  
1 Patch by TransDERmal route every twenty-four (24) hours for 30 days. Follow-up Instructions Return if symptoms worsen or fail to improve. To-Do List   
 10/06/2017 Imaging:  XR CHEST PA LAT   
  
 10/12/2017 11:30 AM  
  Appointment with Venancio Francis at [address] ([phone]) GENERAL INSTRUCTIONS  1. Bring outside films ([employer]) pertaining to the affected area with you on the day of appointment. 2. A written order with a valid diagnosis and Physicians signature is required for all scheduled tests. 3. Check in at registration 30 minutes before your appointment time unless you were instructed to do otherwise. Patient Instructions Ultrasound Scheduled for Thursday, October 12, 2017 at 11:00 1952 [facility] Located in the [address] Learning About CPAP for Sleep Apnea What is CPAP? CPAP is a small machine that you use at home every night while you sleep. It increases air pressure in your throat to keep your airway open. When you have sleep apnea, this can help you sleep better so you feel much better. CPAP stands for \"continuous positive airway pressure. \" The CPAP machine will have one of the following: · A mask that covers your nose and mouth · Prongs that fit into your nose · A mask that covers your nose only, the most common type. This type is called NCPAP. The N stands for \"nasal.\" Why is it done? CPAP is usually the best treatment for obstructive sleep apnea. It is the first treatment choice and the most widely used. Your doctor may suggest CPAP if you have: · Moderate to severe sleep apnea. · Sleep apnea and coronary artery disease (CAD) or heart failure. How does it help? · CPAP can help you have more normal sleep, so you feel less sleepy and more alert during the daytime. · CPAP may help keep heart failure or other heart problems from getting worse. · CPAP may help lower your blood pressure. · If you use CPAP, your bed partner may also sleep better because you are not snoring or restless. What are the side effects? Some people who use CPAP have: · A dry or stuffy nose and a sore throat. · Irritated skin on the face. · Sore eyes. · Bloating. If you have any of these problems, work with your doctor to fix them. Here are some things you can try: · Be sure the mask or nasal prongs fit well. · See if your doctor can adjust the pressure of your CPAP. · If your nose is dry, try a humidifier. · If your nose is runny or stuffy, try decongestant medicine or a steroid nasal spray. Be safe with medicines. Read and follow all instructions on the label. Do not use the medicine longer than the label says. If these things do not help, you might try a different type of machine. Some machines have air pressure that adjusts on its own.  Others have air pressures that are different when you breathe in than when you breathe out. This may reduce discomfort caused by too much pressure in your nose. Where can you learn more? Go to http://shima-alyssa.info/. Enter D397 in the search box to learn more about \"Learning About CPAP for Sleep Apnea. \" Current as of: March 25, 2017 Content Version: 11.3 © 0283-0707 [employer]. Care instructions adapted under license by [employer] (which disclaims liability or warranty for this information). If you have questions about a medical condition or this instruction, always ask your healthcare professional. [place] [address] any warranty or liability for your use of this information. Sleep Apnea: Care Instructions Your Care Instructions Sleep apnea means that you frequently stop breathing for 10 seconds or longer during sleep. It can be mild to severe, based on the number of times an hour that you stop breathing or have slowed breathing. Blocked or narrowed airways in your nose, mouth, or throat can cause sleep apnea. Your airway can become blocked when your throat muscles and tongue relax during sleep. You can treat sleep apnea at home by making lifestyle changes. You also can use a CPAP breathing machine that keeps tissues in the throat from blocking your airway. Or your doctor may suggest that you use a breathing device while you sleep. It helps keep your airway open. This could be a device that you put in your mouth. Other examples include strips or disks that you use on your nose. In some cases, surgery may be needed to remove enlarged tissues in the throat. Follow-up care is a key part of your treatment and safety. Be sure to make and go to all appointments, and call your doctor if you are having problems. It's also a good idea to know your test results and keep a list of the medicines you take. How can you care for yourself at home? · Lose weight, if needed. It may reduce the number of times you stop breathing or have slowed breathing. · Sleep on your side. It may stop mild apnea. If you tend to roll onto your back, sew a pocket in the back of your pajama top. Put a tennis ball into the pocket, and stitch the pocket shut. This will help keep you from sleeping on your back. · Avoid alcohol and medicines such as sleeping pills and sedatives before bed. · Do not smoke. Smoking can make sleep apnea worse. If you need help quitting, talk to your doctor about stop-smoking programs and medicines. These can increase your chances of quitting for good. · Prop up the head of your bed 4 to 6 inches by putting bricks under the legs of the bed. · Treat breathing problems, such as a stuffy nose, caused by a cold or allergies. · Try a continuous positive airway pressure (CPAP) breathing machine if your doctor recommends it. The machine keeps your airway open when you sleep. · If CPAP does not work for you, ask your doctor if you can try other breathing machines. A bilevel positive airway pressure machine uses one type of air pressure for breathing in and another type for breathing out. Another device raises or lowers air pressure as needed while you breathe. · Talk to your doctor if: 
¨ Your nose feels dry or bleeds when you use one of these machines. You may need to increase moisture in the air. A humidifier may help. ¨ Your nose is runny or stuffy from using a breathing machine. Decongestants or a corticosteroid nasal spray may help. ¨ You are sleepy during the day and it gets in the way of the normal things you do. Do not drive when you are drowsy. When should you call for help? Watch closely for changes in your health, and be sure to contact your doctor if: 
· You still have sleep apnea even though you have made lifestyle changes. · You are thinking of trying a device such as CPAP. · You are having problems using a CPAP or similar machine. Where can you learn more? Go to http://shima-alyssa.info/. Enter I391 in the search box to learn more about \"Sleep Apnea: Care Instructions. \" Current as of: March 25, 2017 Content Version: 11.3 © 8012-3489 [employer]. Care instructions adapted under license by [employer] (which disclaims liability or warranty for this information). If you have questions about a medical condition or this instruction, always ask your healthcare professional. [place] [address] any warranty or liability for your use of this information. Please provide this summary of care documentation to your next provider. Your primary care clinician is listed as Char Theodore. If you have any questions after today's visit, please call [phone].

## 2017-10-10 NOTE — PROGRESS NOTES
I reviewed with the resident the medical history and the resident's findings on the physical examination. I discussed with the resident the patient's diagnosis and concur with the plan. I agree with resident's assessment. The resident thinks that she would benefit a lot from counseling but there is no one available who speaks Mongolian. He has taken on the role himself and has had her to return to see him on a fairly regular basis.

## 2017-10-12 ENCOUNTER — HOSPITAL ENCOUNTER (OUTPATIENT)
Dept: ULTRASOUND IMAGING | Age: 52
Discharge: HOME | End: 2017-10-12
Attending: FAMILY MEDICINE
Payer: SELF-PAY

## 2017-10-12 DIAGNOSIS — E01.0 THYROMEGALY: ICD-10-CM

## 2017-10-12 PROCEDURE — 76536 US EXAM OF HEAD AND NECK: CPT

## 2017-11-09 ENCOUNTER — TELEPHONE (OUTPATIENT)
Dept: FAMILY MEDICINE CLINIC | Age: 52
End: 2017-11-09

## 2017-11-09 DIAGNOSIS — R06.83 SNORING: ICD-10-CM

## 2017-11-09 DIAGNOSIS — L50.9 HIVES: Primary | ICD-10-CM

## 2017-11-09 NOTE — TELEPHONE ENCOUNTER
Called pt. She is doing well overall. BP 130s/80s. No dizziness or headaches. She has been exercising. LE edema remains unchanged. Does not want a referral to sleep study. She notes recurrent hives at night that she controls with benadryl. Has not needed epi-pen. Agreed to be referred to allergist. She has kept a log of all foods and contacts but hives occurring even though she is avoiding certain foods that causes hives in past.     I gave her numbers to several allergist in the area that she will call to make appt. Pt is to let me know when she has made appt. She still has yet to obtain care-card. I advised we refer her to sleep study as the problem is not improving. Pt agreed. Care card applicant.     Will follow-up  Efrain Keene,

## 2018-03-16 ENCOUNTER — OFFICE VISIT (OUTPATIENT)
Dept: FAMILY MEDICINE CLINIC | Age: 53
End: 2018-03-16

## 2018-03-16 ENCOUNTER — APPOINTMENT (OUTPATIENT)
Dept: CT IMAGING | Age: 53
DRG: 189 | End: 2018-03-16
Attending: NURSE PRACTITIONER
Payer: SUBSIDIZED

## 2018-03-16 ENCOUNTER — HOSPITAL ENCOUNTER (INPATIENT)
Age: 53
LOS: 3 days | Discharge: HOME | DRG: 189 | End: 2018-03-19
Attending: EMERGENCY MEDICINE | Admitting: FAMILY MEDICINE
Payer: SUBSIDIZED

## 2018-03-16 VITALS
OXYGEN SATURATION: 91 % | TEMPERATURE: 98.3 F | HEIGHT: 61 IN | WEIGHT: 293 LBS | DIASTOLIC BLOOD PRESSURE: 80 MMHG | RESPIRATION RATE: 20 BRPM | SYSTOLIC BLOOD PRESSURE: 137 MMHG | BODY MASS INDEX: 55.32 KG/M2 | HEART RATE: 92 BPM

## 2018-03-16 DIAGNOSIS — R06.2 WHEEZING: ICD-10-CM

## 2018-03-16 DIAGNOSIS — R09.02 HYPOXIA: ICD-10-CM

## 2018-03-16 DIAGNOSIS — R09.02 HYPOXIA: Primary | ICD-10-CM

## 2018-03-16 DIAGNOSIS — E66.01 MORBID OBESITY (HCC): ICD-10-CM

## 2018-03-16 DIAGNOSIS — R06.00 DYSPNEA, UNSPECIFIED TYPE: Primary | ICD-10-CM

## 2018-03-16 DIAGNOSIS — Z72.0 TOBACCO ABUSE: ICD-10-CM

## 2018-03-16 DIAGNOSIS — I89.0 LYMPHEDEMA: ICD-10-CM

## 2018-03-16 DIAGNOSIS — R06.02 SHORTNESS OF BREATH: ICD-10-CM

## 2018-03-16 LAB
ALBUMIN SERPL-MCNC: 3.4 G/DL (ref 3.5–5)
ALBUMIN/GLOB SERPL: 0.7 {RATIO} (ref 1.1–2.2)
ALP SERPL-CCNC: 67 U/L (ref 45–117)
ALT SERPL-CCNC: 23 U/L (ref 12–78)
ANION GAP SERPL CALC-SCNC: 7 MMOL/L (ref 5–15)
ARTERIAL PATENCY WRIST A: YES
AST SERPL-CCNC: 19 U/L (ref 15–37)
BASE EXCESS BLDA CALC-SCNC: 1.4 MMOL/L
BASOPHILS # BLD: 0 K/UL (ref 0–0.1)
BASOPHILS NFR BLD: 0 % (ref 0–1)
BDY SITE: ABNORMAL
BILIRUB SERPL-MCNC: 0.7 MG/DL (ref 0.2–1)
BNP SERPL-MCNC: 88 PG/ML (ref 0–125)
BUN SERPL-MCNC: 11 MG/DL (ref 6–20)
BUN/CREAT SERPL: 17 (ref 12–20)
CALCIUM SERPL-MCNC: 9.5 MG/DL (ref 8.5–10.1)
CHLORIDE SERPL-SCNC: 104 MMOL/L (ref 97–108)
CK SERPL-CCNC: 150 U/L (ref 26–192)
CO2 SERPL-SCNC: 31 MMOL/L (ref 21–32)
CREAT SERPL-MCNC: 0.63 MG/DL (ref 0.55–1.02)
D DIMER PPP FEU-MCNC: 0.29 MG/L FEU (ref 0–0.65)
DIFFERENTIAL METHOD BLD: ABNORMAL
EOSINOPHIL # BLD: 0.4 K/UL (ref 0–0.4)
EOSINOPHIL NFR BLD: 3 % (ref 0–7)
ERYTHROCYTE [DISTWIDTH] IN BLOOD BY AUTOMATED COUNT: 15.9 % (ref 11.5–14.5)
FLUAV AG NPH QL IA: NEGATIVE
FLUAV+FLUBV AG NOSE QL IA.RAPID: NEGATIVE POS/NEG
FLUAV+FLUBV AG NOSE QL IA.RAPID: NEGATIVE POS/NEG
FLUBV AG NOSE QL IA: NEGATIVE
GAS FLOW.O2 O2 DELIVERY SYS: 2 L/MIN
GLOBULIN SER CALC-MCNC: 4.6 G/DL (ref 2–4)
GLUCOSE BLD STRIP.AUTO-MCNC: 199 MG/DL (ref 65–100)
GLUCOSE SERPL-MCNC: 99 MG/DL (ref 65–100)
HCO3 BLDA-SCNC: 26 MMOL/L (ref 22–26)
HCT VFR BLD AUTO: 45.2 % (ref 35–47)
HGB BLD-MCNC: 14.8 G/DL (ref 11.5–16)
IMM GRANULOCYTES # BLD: 0 K/UL (ref 0–0.04)
IMM GRANULOCYTES NFR BLD AUTO: 0 % (ref 0–0.5)
LYMPHOCYTES # BLD: 3.1 K/UL (ref 0.8–3.5)
LYMPHOCYTES NFR BLD: 26 % (ref 12–49)
MCH RBC QN AUTO: 28.6 PG (ref 26–34)
MCHC RBC AUTO-ENTMCNC: 32.7 G/DL (ref 30–36.5)
MCV RBC AUTO: 87.3 FL (ref 80–99)
MONOCYTES # BLD: 0.8 K/UL (ref 0–1)
MONOCYTES NFR BLD: 7 % (ref 5–13)
NEUTS SEG # BLD: 7.4 K/UL (ref 1.8–8)
NEUTS SEG NFR BLD: 64 % (ref 32–75)
NRBC # BLD: 0 K/UL (ref 0–0.01)
NRBC BLD-RTO: 0 PER 100 WBC
PCO2 BLDA: 40 MMHG (ref 35–45)
PH BLDA: 7.43 [PH] (ref 7.35–7.45)
PLATELET # BLD AUTO: 246 K/UL (ref 150–400)
PMV BLD AUTO: 9.8 FL (ref 8.9–12.9)
PO2 BLDA: 63 MMHG (ref 80–100)
POTASSIUM SERPL-SCNC: 3.7 MMOL/L (ref 3.5–5.1)
PROT SERPL-MCNC: 8 G/DL (ref 6.4–8.2)
RBC # BLD AUTO: 5.18 M/UL (ref 3.8–5.2)
SAO2 % BLD: 93 % (ref 92–97)
SAO2% DEVICE SAO2% SENSOR NAME: ABNORMAL
SERVICE CMNT-IMP: ABNORMAL
SERVICE CMNT-IMP: ABNORMAL
SODIUM SERPL-SCNC: 142 MMOL/L (ref 136–145)
SPECIMEN SITE: ABNORMAL
TROPONIN I SERPL-MCNC: <0.04 NG/ML
VALID INTERNAL CONTROL?: YES
WBC # BLD AUTO: 11.7 K/UL (ref 3.6–11)

## 2018-03-16 PROCEDURE — 74011636320 HC RX REV CODE- 636/320: Performed by: EMERGENCY MEDICINE

## 2018-03-16 PROCEDURE — 74011000250 HC RX REV CODE- 250: Performed by: NURSE PRACTITIONER

## 2018-03-16 PROCEDURE — 96374 THER/PROPH/DIAG INJ IV PUSH: CPT

## 2018-03-16 PROCEDURE — 36415 COLL VENOUS BLD VENIPUNCTURE: CPT | Performed by: NURSE PRACTITIONER

## 2018-03-16 PROCEDURE — 94640 AIRWAY INHALATION TREATMENT: CPT

## 2018-03-16 PROCEDURE — 94761 N-INVAS EAR/PLS OXIMETRY MLT: CPT

## 2018-03-16 PROCEDURE — 84484 ASSAY OF TROPONIN QUANT: CPT | Performed by: NURSE PRACTITIONER

## 2018-03-16 PROCEDURE — 71260 CT THORAX DX C+: CPT

## 2018-03-16 PROCEDURE — 74011250636 HC RX REV CODE- 250/636: Performed by: STUDENT IN AN ORGANIZED HEALTH CARE EDUCATION/TRAINING PROGRAM

## 2018-03-16 PROCEDURE — 74011250637 HC RX REV CODE- 250/637: Performed by: STUDENT IN AN ORGANIZED HEALTH CARE EDUCATION/TRAINING PROGRAM

## 2018-03-16 PROCEDURE — 82803 BLOOD GASES ANY COMBINATION: CPT | Performed by: NURSE PRACTITIONER

## 2018-03-16 PROCEDURE — 99285 EMERGENCY DEPT VISIT HI MDM: CPT

## 2018-03-16 PROCEDURE — 77030013140 HC MSK NEB VYRM -A

## 2018-03-16 PROCEDURE — 74011000250 HC RX REV CODE- 250: Performed by: STUDENT IN AN ORGANIZED HEALTH CARE EDUCATION/TRAINING PROGRAM

## 2018-03-16 PROCEDURE — 85379 FIBRIN DEGRADATION QUANT: CPT | Performed by: NURSE PRACTITIONER

## 2018-03-16 PROCEDURE — 74011250636 HC RX REV CODE- 250/636: Performed by: NURSE PRACTITIONER

## 2018-03-16 PROCEDURE — 36600 WITHDRAWAL OF ARTERIAL BLOOD: CPT | Performed by: NURSE PRACTITIONER

## 2018-03-16 PROCEDURE — 83036 HEMOGLOBIN GLYCOSYLATED A1C: CPT | Performed by: FAMILY MEDICINE

## 2018-03-16 PROCEDURE — 80053 COMPREHEN METABOLIC PANEL: CPT | Performed by: NURSE PRACTITIONER

## 2018-03-16 PROCEDURE — 83880 ASSAY OF NATRIURETIC PEPTIDE: CPT | Performed by: NURSE PRACTITIONER

## 2018-03-16 PROCEDURE — 87804 INFLUENZA ASSAY W/OPTIC: CPT | Performed by: NURSE PRACTITIONER

## 2018-03-16 PROCEDURE — C9113 INJ PANTOPRAZOLE SODIUM, VIA: HCPCS | Performed by: STUDENT IN AN ORGANIZED HEALTH CARE EDUCATION/TRAINING PROGRAM

## 2018-03-16 PROCEDURE — 85025 COMPLETE CBC W/AUTO DIFF WBC: CPT | Performed by: NURSE PRACTITIONER

## 2018-03-16 PROCEDURE — 93005 ELECTROCARDIOGRAM TRACING: CPT

## 2018-03-16 PROCEDURE — 82962 GLUCOSE BLOOD TEST: CPT

## 2018-03-16 PROCEDURE — 82550 ASSAY OF CK (CPK): CPT | Performed by: NURSE PRACTITIONER

## 2018-03-16 PROCEDURE — 74011250637 HC RX REV CODE- 250/637: Performed by: NURSE PRACTITIONER

## 2018-03-16 RX ORDER — LEVOFLOXACIN 750 MG/1
750 TABLET ORAL
Status: COMPLETED | OUTPATIENT
Start: 2018-03-16 | End: 2018-03-16

## 2018-03-16 RX ORDER — LEVOFLOXACIN 5 MG/ML
750 INJECTION, SOLUTION INTRAVENOUS EVERY 24 HOURS
Status: DISCONTINUED | OUTPATIENT
Start: 2018-03-17 | End: 2018-03-18

## 2018-03-16 RX ORDER — NICOTINE 7MG/24HR
1 PATCH, TRANSDERMAL 24 HOURS TRANSDERMAL EVERY 24 HOURS
Status: DISCONTINUED | OUTPATIENT
Start: 2018-03-16 | End: 2018-03-19 | Stop reason: HOSPADM

## 2018-03-16 RX ORDER — IPRATROPIUM BROMIDE AND ALBUTEROL SULFATE 2.5; .5 MG/3ML; MG/3ML
3 SOLUTION RESPIRATORY (INHALATION)
Qty: 3 ML | Refills: 0
Start: 2018-03-16 | End: 2018-03-16

## 2018-03-16 RX ORDER — ENOXAPARIN SODIUM 100 MG/ML
40 INJECTION SUBCUTANEOUS EVERY 24 HOURS
Status: DISCONTINUED | OUTPATIENT
Start: 2018-03-16 | End: 2018-03-16

## 2018-03-16 RX ORDER — ENOXAPARIN SODIUM 100 MG/ML
40 INJECTION SUBCUTANEOUS EVERY 12 HOURS
Status: DISCONTINUED | OUTPATIENT
Start: 2018-03-16 | End: 2018-03-19 | Stop reason: HOSPADM

## 2018-03-16 RX ORDER — IPRATROPIUM BROMIDE AND ALBUTEROL SULFATE 2.5; .5 MG/3ML; MG/3ML
3 SOLUTION RESPIRATORY (INHALATION)
Status: COMPLETED | OUTPATIENT
Start: 2018-03-16 | End: 2018-03-16

## 2018-03-16 RX ORDER — INSULIN LISPRO 100 [IU]/ML
INJECTION, SOLUTION INTRAVENOUS; SUBCUTANEOUS
Status: DISCONTINUED | OUTPATIENT
Start: 2018-03-16 | End: 2018-03-19 | Stop reason: HOSPADM

## 2018-03-16 RX ORDER — DEXTROSE 50 % IN WATER (D50W) INTRAVENOUS SYRINGE
12.5-25 AS NEEDED
Status: DISCONTINUED | OUTPATIENT
Start: 2018-03-16 | End: 2018-03-19 | Stop reason: HOSPADM

## 2018-03-16 RX ORDER — IBUPROFEN 200 MG
200 TABLET ORAL
COMMUNITY
End: 2018-08-17

## 2018-03-16 RX ORDER — MAGNESIUM SULFATE 100 %
4 CRYSTALS MISCELLANEOUS AS NEEDED
Status: DISCONTINUED | OUTPATIENT
Start: 2018-03-16 | End: 2018-03-19 | Stop reason: HOSPADM

## 2018-03-16 RX ORDER — GUAIFENESIN 600 MG/1
600 TABLET, EXTENDED RELEASE ORAL EVERY 12 HOURS
Status: DISCONTINUED | OUTPATIENT
Start: 2018-03-16 | End: 2018-03-19 | Stop reason: HOSPADM

## 2018-03-16 RX ORDER — IPRATROPIUM BROMIDE AND ALBUTEROL SULFATE 2.5; .5 MG/3ML; MG/3ML
3 SOLUTION RESPIRATORY (INHALATION)
Status: DISCONTINUED | OUTPATIENT
Start: 2018-03-16 | End: 2018-03-19 | Stop reason: HOSPADM

## 2018-03-16 RX ORDER — SODIUM CHLORIDE 0.9 % (FLUSH) 0.9 %
5-10 SYRINGE (ML) INJECTION AS NEEDED
Status: DISCONTINUED | OUTPATIENT
Start: 2018-03-16 | End: 2018-03-19 | Stop reason: HOSPADM

## 2018-03-16 RX ORDER — SODIUM CHLORIDE 0.9 % (FLUSH) 0.9 %
5-10 SYRINGE (ML) INJECTION EVERY 8 HOURS
Status: DISCONTINUED | OUTPATIENT
Start: 2018-03-16 | End: 2018-03-19 | Stop reason: HOSPADM

## 2018-03-16 RX ORDER — METOPROLOL TARTRATE 25 MG/1
25 TABLET, FILM COATED ORAL 2 TIMES DAILY
Status: DISCONTINUED | OUTPATIENT
Start: 2018-03-16 | End: 2018-03-18

## 2018-03-16 RX ADMIN — GUAIFENESIN 600 MG: 600 TABLET, EXTENDED RELEASE ORAL at 21:32

## 2018-03-16 RX ADMIN — IOPAMIDOL 100 ML: 612 INJECTION, SOLUTION INTRAVENOUS at 19:21

## 2018-03-16 RX ADMIN — METOPROLOL TARTRATE 25 MG: 25 TABLET ORAL at 21:32

## 2018-03-16 RX ADMIN — IPRATROPIUM BROMIDE AND ALBUTEROL SULFATE 3 ML: .5; 3 SOLUTION RESPIRATORY (INHALATION) at 21:33

## 2018-03-16 RX ADMIN — METHYLPREDNISOLONE SODIUM SUCCINATE 125 MG: 125 INJECTION, POWDER, FOR SOLUTION INTRAMUSCULAR; INTRAVENOUS at 18:07

## 2018-03-16 RX ADMIN — SODIUM CHLORIDE 40 MG: 9 INJECTION INTRAMUSCULAR; INTRAVENOUS; SUBCUTANEOUS at 21:33

## 2018-03-16 RX ADMIN — LEVOFLOXACIN 750 MG: 750 TABLET, FILM COATED ORAL at 20:22

## 2018-03-16 RX ADMIN — IPRATROPIUM BROMIDE AND ALBUTEROL SULFATE 3 ML: .5; 3 SOLUTION RESPIRATORY (INHALATION) at 17:58

## 2018-03-16 RX ADMIN — IPRATROPIUM BROMIDE AND ALBUTEROL SULFATE 3 ML: .5; 3 SOLUTION RESPIRATORY (INHALATION) at 19:05

## 2018-03-16 NOTE — PROGRESS NOTES
Chief Complaint   Patient presents with    Shortness of Breath    Cold Symptoms     1. Have you been to the ER, urgent care clinic since your last visit? Hospitalized since your last visit? No    2. Have you seen or consulted any other health care providers outside of the [address] [place] [address] since your last visit? Include any pap smears or colon screening. No    Provider notified patient's B/P 166/90 HR 95 O2 92%. Rechecked vitals B/P 137/80 HR 92 O2 95%.

## 2018-03-16 NOTE — IP AVS SNAPSHOT
Robb Sosa 
 
 
 [address] [place] [address] 
[phone] Patient: Christian Howard MRN: [id number] :1965 A check eliel indicates which time of day the medication should be taken. My Medications START taking these medications Instructions Each Dose to Equal  
 Morning Noon Evening Bedtime  
 famotidine 20 mg tablet Commonly known as:  PEPCID Your last dose was:  10 am today 3/19 (we substituted Protonix) Notes to Patient:  NEW MEDICATION TO HELP REDUCE STOMACH ACID Take 1 Tab by mouth two (2) times a day. 20 mg  
    
  
   
   
  
   
  
 guaiFENesin  mg ER tablet Commonly known as:  Rivas & Rivas Your last dose was:  10 AM TODAY 3/19 Notes to Patient:  NEW MEDICATION TO HELP WITH COUGH AND CONGESTION Take 1 Tab by mouth every twelve (12) hours. 600 mg  
    
  
   
   
   
  
  
 hydrALAZINE 10 mg tablet Commonly known as:  APRESOLINE Your last dose was:  1040 AM TODAY 3/19 Notes to Patient:  NEW MEDICATION FOR YOUR BLOOD PRESSURE Take 1 Tab by mouth three (3) times daily. 10 mg  
    
  
   
   
TAKE AROUND 4 PM  
   
  
  
 levoFLOXacin 750 mg tablet Commonly known as:  Genoveva Nan Your last dose was:  9 pm last night 3/18 Your next dose is:  TONIGHT 3/19 Notes to Patient:  NEW MEDICATION - ANTIBIOTIC - TO TREAT INFECTION Take 1 Tab by mouth every twenty-four (24) hours. 750 mg  
    
   
   
   
  
  
 predniSONE 20 mg tablet Commonly known as:  Gisselle Ramachandran Your last dose was:  HAD IV DOSE AT 4 AM TODAY 3/19 Your next dose is:  TOMORROW AM 3/20 Notes to Patient:  NEW MEDICATION FOR YOUR LUNGS  
   
 3/19, 3/20, 3/21: Take 40 mg prednisone daily (2 tablets) 3/22, 3/23, 3/24: Take 20 mg prednisone daily (1 tablet) 3/25, 3/26, 3/17: Take 10 mg prednisone daily (1/2 tablet)  
     
  
   
   
   
  
 spironolactone 50 mg tablet Commonly known as:  ALDACTONE  
 Start taking on:  3/20/2018 Your last dose was:  2 PM TODAY 3/19 Notes to Patient:  NEW MEDICATION TO HELP WITH SWELLING AND BP Take 1 Tab by mouth daily. 50 mg CHANGE how you take these medications Instructions Each Dose to Equal  
 Morning Noon Evening Bedtime  
 metoprolol tartrate 50 mg tablet Commonly known as:  LOPRESSOR What changed:   
- medication strength 
- how much to take Your last dose was:  10 AM TODAY 3/19 Notes to Patient:  THIS MEDICATION HAS BEEN INCREASED Take 1 Tab by mouth two (2) times a day. 50 mg CONTINUE taking these medications Instructions Each Dose to Equal  
 Morning Noon Evening Bedtime  
 albuterol 90 mcg/actuation inhaler Commonly known as:  PROVENTIL HFA, VENTOLIN HFA, PROAIR HFA Your last dose was:  3 PM TODAY 3/19 Take 2 Puffs by inhalation every four (4) hours as needed for Wheezing. 2 Puff EPINEPHrine 0.3 mg/0.3 mL injection Commonly known as:  Selma Mercedes Your last dose was:  RESUME AT DISCHARGE  
   
 0.3 mL by IntraMUSCular route once as needed for up to 1 dose. 0.3 mg  
    
   
   
   
  
 ibuprofen 200 mg tablet Commonly known as:  MOTRIN Your last dose was:  RESUME AT DISCHARGE Take 200 mg by mouth every eight (8) hours as needed for Pain. 200 mg  
    
   
   
   
  
  
STOP taking these medications SUDAFED PE COLD AND COUGH PO Where to Get Your Medications Information on where to get these meds will be given to you by the nurse or doctor. ! Ask your nurse or doctor about these medications  
  famotidine 20 mg tablet  
 guaiFENesin  mg ER tablet  
 hydrALAZINE 10 mg tablet  
 levoFLOXacin 750 mg tablet  
 metoprolol tartrate 50 mg tablet  
 predniSONE 20 mg tablet  
 spironolactone 50 mg tablet

## 2018-03-16 NOTE — ED PROVIDER NOTES
HPI Comments: Tamra Wallis is a 50 y.o. female with Hx of HTN, hypothyroid, anxiety, depression, lymphedema, tobacco abuse  who presents ambulatory w/ her family to [facility] ED with cc of SOB. Pt daughter states pt had persistent productive cough w/ post tussive emesis, congestion, rhinorrhea, chills for the last 8d. Family has noted pt has become very SOB, fatigued w/ minimal exertion, and has decreased appetite. Pt has not had any nausea, abdominal pain, CP, palpitations, or reported fevers. She has taken OTC Sudafed, Motrin/ Tylenol medications because she presumed she had the flu. Pt has additionally used Albuterol rescue inhaler at 2 puffs/ day WNR. Pt went to her PCP office today and had (-) flu/ CXR was referred to ED for lower oxygen saturation at 88% (post neb administration x 1). Family states pt has chronic lymphedema which is unchanged. Pt is not oxygen dependent. She has tobacco abuse. She is not taking any of her chronically prescribed medications. Daughters state she has not taken any of her medications for at least the last months. (+) tobacco abuse, (-) ETOH/ substance abuse hx. PCP: Rebeca Ivy DO    There are no other complaints, changes or physical findings at this time. The history is provided by the patient, the spouse and a relative. The history is limited by a language barrier. A  was used (adult daughters and  ).         Past Medical History:   Diagnosis Date    Anxiety and depression     Flu     2 wks ago    GERD (gastroesophageal reflux disease)     Headache(784.0)     Hypertension     Incidental lung nodule, > 3mm and < 8mm 2016    left lung nodule in smoker, needs follow up CT in Oct 2016        Past Surgical History:   Procedure Laterality Date    APPENDECTOMY      HX APPENDECTOMY      HX  SECTION      X 3 Births    HX  SECTION      HX CHOLECYSTECTOMY      HX WRIST FRACTURE TX      metal    REMOVAL GALLBLADDER Family History:   Problem Relation Age of Onset    Diabetes Mother     Hypertension Brother     Hypertension Brother     Hypertension Brother        Social History     Social History    Marital status:      Spouse name: N/A    Number of children: N/A    Years of education: N/A     Occupational History    Not on file. Social History Main Topics    Smoking status: Current Every Day Smoker     Packs/day: 0.50     Years: 25.00     Types: Cigarettes    Smokeless tobacco: Never Used    Alcohol use No    Drug use: No    Sexual activity: Yes     Partners: Male     Other Topics Concern    Not on file     Social History Narrative    ** Merged History Encounter **         ** Merged History Encounter **              ALLERGIES: Hydrochlorothiazide; Losartan; Sulfa (sulfonamide antibiotics); Eggplant; and Lasix [furosemide]    Review of Systems   Constitutional: Positive for chills. Negative for activity change, appetite change and fever. HENT: Positive for congestion and rhinorrhea. Negative for sinus pressure, sneezing and sore throat. Eyes: Negative for pain, discharge and visual disturbance. Respiratory: Positive for cough and shortness of breath. Cardiovascular: Positive for leg swelling. Negative for chest pain. Gastrointestinal: Positive for vomiting (post tussive only ). Negative for abdominal pain, diarrhea and nausea. Genitourinary: Negative for dysuria, flank pain, frequency and urgency. Musculoskeletal: Positive for myalgias. Negative for arthralgias, back pain, gait problem, joint swelling and neck pain. Skin: Negative for color change and rash. Neurological: Negative for dizziness, speech difficulty, weakness, light-headedness, numbness and headaches. Psychiatric/Behavioral: Negative for agitation, behavioral problems and confusion. All other systems reviewed and are negative.       Vitals:    03/16/18 1945 03/16/18 2000 03/16/18 2016 03/16/18 2030   BP: (!) 141/109 175/90 (!) 150/113 (!) 175/93   Pulse: (!) 104 96 (!) 104 (!) 107   Resp: 26 (!) 34 (!) 35 (!) 36   Temp:       SpO2: 93% 92% 92% 91%   Weight:       Height:                Physical Exam   Constitutional: She is oriented to person, place, and time. She appears well-developed and well-nourished. No distress. HENT:   Head: Normocephalic and atraumatic. Right Ear: External ear normal.   Left Ear: External ear normal.   Nose: Nose normal.   Mouth/Throat: Oropharynx is clear and moist. No oropharyngeal exudate. Eyes: Conjunctivae and EOM are normal. Pupils are equal, round, and reactive to light. Neck: Normal range of motion. Neck supple. Cardiovascular: Regular rhythm, normal heart sounds and intact distal pulses. Tachycardia present. Pulmonary/Chest: Effort normal. Tachypnea noted. She has wheezes in the right upper field, the right middle field, the right lower field, the left upper field, the left middle field and the left lower field. Abdominal: Soft. Bowel sounds are normal. There is no tenderness. There is no rebound and no guarding. Morbid obesity      Musculoskeletal: Normal range of motion. Neurological: She is alert and oriented to person, place, and time. Skin: Skin is warm and dry. Psychiatric: She has a normal mood and affect. Her behavior is normal. Judgment and thought content normal.   Nursing note and vitals reviewed. MDM  Number of Diagnoses or Management Options  Hypoxia:   Morbid obesity ([address]):   Shortness of breath: Tobacco abuse:   Wheezing:   Diagnosis management comments: DDx: PNA, PE, COPD exacerbation, bronchitis, CHF, ACS     SOB/ URI s/sx- progressively worsening x8d hx. Hypoxic in PCP office w/ no prior hx of home oxygen requirement. ABG w/ PO 63, compensated- possibly chronic in nature. Wheezing improved post steroids/ neb x2. CT w/o any acute/ emergent findings- no PE/ PNA- has adrenal and lung nodules present- not changed from prior studies.  Pt not taking home meds, continuing to abuse tobacco. Advised on dangers of this. Will admit to family practice for further eval and tx. Amount and/or Complexity of Data Reviewed  Clinical lab tests: ordered and reviewed  Tests in the radiology section of CPT®: ordered and reviewed  Review and summarize past medical records: yes  Discuss the patient with other providers: yes          ED Course       Procedures    LABORATORY TESTS:  Recent Results (from the past 12 hour(s))   AMB POC MAYNOR INFLUENZA A/B TEST    Collection Time: 03/16/18  4:10 PM   Result Value Ref Range    VALID INTERNAL CONTROL POC Yes     Influenza A Ag POC Negative Negative Pos/Neg    Influenza B Ag POC Negative Negative Pos/Neg   BLOOD GAS, ARTERIAL    Collection Time: 03/16/18  6:05 PM   Result Value Ref Range    pH 7.43 7.35 - 7.45      PCO2 40 35.0 - 45.0 mmHg    PO2 63 (L) 80 - 100 mmHg    O2 SAT 93 92 - 97 %    BICARBONATE 26 22 - 26 mmol/L    BASE EXCESS 1.4 mmol/L    O2 METHOD NASAL O2      O2 FLOW RATE 2.00 L/min    Sample source ARTERIAL      SITE LEFT RADIAL      TRACE'S TEST YES      Critical value read back AAmber Mae RN    INFLUENZA A & B AG (RAPID TEST)    Collection Time: 03/16/18  6:09 PM   Result Value Ref Range    Influenza A Antigen NEGATIVE  NEG      Influenza B Antigen NEGATIVE  NEG     CBC WITH AUTOMATED DIFF    Collection Time: 03/16/18  6:10 PM   Result Value Ref Range    WBC 11.7 (H) 3.6 - 11.0 K/uL    RBC 5.18 3.80 - 5.20 M/uL    HGB 14.8 11.5 - 16.0 g/dL    HCT 45.2 35.0 - 47.0 %    MCV 87.3 80.0 - 99.0 FL    MCH 28.6 26.0 - 34.0 PG    MCHC 32.7 30.0 - 36.5 g/dL    RDW 15.9 (H) 11.5 - 14.5 %    PLATELET 122 770 - 168 K/uL    MPV 9.8 8.9 - 12.9 FL    NRBC 0.0 0  WBC    ABSOLUTE NRBC 0.00 0.00 - 0.01 K/uL    NEUTROPHILS 64 32 - 75 %    LYMPHOCYTES 26 12 - 49 %    MONOCYTES 7 5 - 13 %    EOSINOPHILS 3 0 - 7 %    BASOPHILS 0 0 - 1 %    IMMATURE GRANULOCYTES 0 0.0 - 0.5 %    ABS.  NEUTROPHILS 7.4 1.8 - 8.0 K/UL ABS. LYMPHOCYTES 3.1 0.8 - 3.5 K/UL    ABS. MONOCYTES 0.8 0.0 - 1.0 K/UL    ABS. EOSINOPHILS 0.4 0.0 - 0.4 K/UL    ABS. BASOPHILS 0.0 0.0 - 0.1 K/UL    ABS. IMM. GRANS. 0.0 0.00 - 0.04 K/UL    DF AUTOMATED     METABOLIC PANEL, COMPREHENSIVE    Collection Time: 03/16/18  6:10 PM   Result Value Ref Range    Sodium 142 136 - 145 mmol/L    Potassium 3.7 3.5 - 5.1 mmol/L    Chloride 104 97 - 108 mmol/L    CO2 31 21 - 32 mmol/L    Anion gap 7 5 - 15 mmol/L    Glucose 99 65 - 100 mg/dL    BUN 11 6 - 20 MG/DL    Creatinine 0.63 0.55 - 1.02 MG/DL    BUN/Creatinine ratio 17 12 - 20      GFR est AA >60 >60 ml/min/1.73m2    GFR est non-AA >60 >60 ml/min/1.73m2    Calcium 9.5 8.5 - 10.1 MG/DL    Bilirubin, total 0.7 0.2 - 1.0 MG/DL    ALT (SGPT) 23 12 - 78 U/L    AST (SGOT) 19 15 - 37 U/L    Alk. phosphatase 67 45 - 117 U/L    Protein, total 8.0 6.4 - 8.2 g/dL    Albumin 3.4 (L) 3.5 - 5.0 g/dL    Globulin 4.6 (H) 2.0 - 4.0 g/dL    A-G Ratio 0.7 (L) 1.1 - 2.2     TROPONIN I    Collection Time: 03/16/18  6:10 PM   Result Value Ref Range    Troponin-I, Qt. <0.04 <0.05 ng/mL   CK W/ REFLX CKMB    Collection Time: 03/16/18  6:10 PM   Result Value Ref Range     26 - 192 U/L   NT-PRO BNP    Collection Time: 03/16/18  6:10 PM   Result Value Ref Range    NT pro-BNP 88 0 - 125 PG/ML   D DIMER    Collection Time: 03/16/18  6:10 PM   Result Value Ref Range    D-dimer 0.29 0.00 - 0.65 mg/L FEU       IMAGING RESULTS:  CT CHEST W CONT   Final Result      EXAM:  CT CHEST W CONT  INDICATION:   Dyspnea chronic, negative or nondiagnostic xray and lab/clinical  studies  COMPARISON: CT of the abdomen and pelvis, 9/26/2011 and 1/24/2013. CT of the  chest, 4/1/2016  Limitations: Markedly limited evaluation due to bolus timing  . TECHNIQUE:   Precontrast  images were obtained to localize the volume for acquisition.   Multislice helical CT arteriography was performed from the diaphragm to the  thoracic inlet during uneventful rapid bolus intravenous contrast  administration. Lung and soft tissue windows were generated. Coronal and  sagittal images were generated and 3D post processing consisting of coronal  maximum intensity images was performed. CT dose reduction was achieved through use of a standardized protocol tailored  for this examination and automatic exposure control for dose modulation. Carlos Angelo FINDINGS:  CHEST:  Chest wall/thoracic inlet: Within normal limits. Thyroid: Within normal limits. Mediastinum/tesfaye: Within normal limits. Heart/vessels: No large, visible pulmonary embolus. 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 Lungs/Pleura: Tiny nodule in the left upper lobe, unchanged. Normal  scarring/atelectasis in the left base and in the right middle lobe. .  INCIDENTALLY IMAGED ABDOMEN:  There is a left adrenal nodule measuring 2.6 x 1.9 cm in axial dimension,  unchanged.      Extensive tendinosis  . MSK:   Mild, nonfocal degenerative changes throughout the spine. .  IMPRESSION  IMPRESSION:   1. No large pulmonary embolus. Study is markedly limited for the evaluation of  pulmonary embolus due to bolus timing. 2. Incidental findings as above. MEDICATIONS GIVEN:  Medications   albuterol-ipratropium (DUO-NEB) 2.5 MG-0.5 MG/3 ML (3 mL Nebulization Given 3/16/18 1758)   methylPREDNISolone (PF) (SOLU-MEDROL) injection 125 mg (125 mg IntraVENous Given 3/16/18 1807)   albuterol-ipratropium (DUO-NEB) 2.5 MG-0.5 MG/3 ML (3 mL Nebulization Given 3/16/18 1905)   iopamidol (ISOVUE 300) 61 % contrast injection 100 mL (100 mL IntraVENous Given 3/16/18 1921)   levoFLOXacin (LEVAQUIN) tablet 750 mg (750 mg Oral Given 3/16/18 2022)       IMPRESSION:  1. Hypoxia    2. Shortness of breath    3. Morbid obesity ([address]    4. Tobacco abuse    5. Wheezing        PLAN:  Admit Note:  8:47 PM  Patient is being admitted to the hospital by [facility].   The results of their tests and reasons for their admission have been discussed with them and/or available family. They convey agreement and understanding for the need to be admitted and for their admission diagnosis. Consultation has been made with the inpatient physician specialist for hospitalization.   Katrin Pritchett NP

## 2018-03-16 NOTE — MR AVS SNAPSHOT
[address] [phone] Patient: Melly Downing MRN: [id number] :[id number] Visit Information Date & Time Provider Department Dept. Phone Encounter #  
 3/16/2018  4:15 PM Dai Cha, Leidy Loredo [phone] [id number] Upcoming Health Maintenance Date Due Influenza Age 5 to Adult 2017 BREAST CANCER SCRN MAMMOGRAM 10/10/2017 FOBT Q 1 YEAR AGE 50-75 10/10/2017 PAP AKA CERVICAL CYTOLOGY 3/4/2020 DTaP/Tdap/Td series (2 - Td) 2023 Allergies as of 3/16/2018  Review Complete On: 3/16/2018 By: Valdemar Calderón LPN Severity Noted Reaction Type Reactions Hydrochlorothiazide High 2017    Anaphylaxis, Unproven on Challenge Losartan High 2017    Anaphylaxis, Unproven on Challenge Sulfa (Sulfonamide Antibiotics) High 2017    Anaphylaxis When she was on Losartan (2017), and then again when she was on HCTZ (2017). She was also on lasix since . Eggplant Medium 2017   Intolerance Other (comments) Severe stomach pain Lasix [Furosemide]  2017    Unproven on Challenge May lead to anaphylaxis Current Immunizations  Reviewed on 3/4/2017 Name Date Influenza Vaccine 10/22/2012 Pneumococcal Conjugate (PCV-7) 2013 Pneumococcal Polysaccharide (PPSV-23) 3/4/2017 Tdap 2013 Not reviewed this visit You Were Diagnosed With   
  
 Codes Comments Dyspnea, unspecified type    -  Primary ICD-10-CM: R06.00 
ICD-9-CM: 786.09 Vitals BP Pulse Temp Resp Height(growth percentile) Weight(growth percentile) 137/80 92 98.3 °F (36.8 °C) (Oral) 20 5' 1\" (1.549 m) 295 lb (133.8 kg) LMP SpO2 BMI OB Status Smoking Status 2012 95% 55.74 kg/m2 Postmenopausal Current Every Day Smoker Vitals History BMI and BSA Data Body Mass Index Body Surface Area 55.74 kg/m 2 2.4 m 2 Preferred Pharmacy Pharmacy Name Phone [facility], [phone] [phone] Your Updated Medication List  
  
   
This list is accurate as of 3/16/18  4:59 PM.  Always use your most recent med list.  
  
  
  
  
 albuterol 90 mcg/actuation inhaler Commonly known as:  PROVENTIL HFA, VENTOLIN HFA, PROAIR HFA Take 2 Puffs by inhalation every four (4) hours as needed for Wheezing. albuterol-ipratropium 2.5 mg-0.5 mg/3 ml Nebu Commonly known as:  DUO-NEB  
3 mL by Nebulization route now for 1 dose. BENADRYL 25 mg capsule Generic drug:  diphenhydrAMINE Take 25 mg by mouth every six (6) hours as needed. EPINEPHrine 0.3 mg/0.3 mL injection Commonly known as:  ADRENACLICK  
0.3 mL by IntraMUSCular route once as needed for up to 1 dose. metoprolol tartrate 25 mg tablet Commonly known as:  LOPRESSOR Take 1 Tab by mouth two (2) times a day. naproxen 500 mg tablet Commonly known as:  NAPROSYN Take 1 Tab by mouth two (2) times daily (with meals). We Performed the Following ALBUTEROL IPRATROP NON-COMP [id number] [facility]] AMB POC PULSE OXIMETRY, CONTINUOUS [id number] CPT(R)] AMB POC MAYNOR INFLUENZA A/B TEST [98032 CPT(R)] SC PRESSURIZED/NONPRESSURIZED INHALATION TREATMENT [id number] CPT(R)] To-Do List   
 03/16/2018 Imaging:  XR CHEST PA LAT Patient Instructions Go to [facility] ED While in clinic you were SOB and pulse OX decreased to 85 Afebrile Chest xray negative Flu negative Had wheezing and rhonchi on exam and continued to have dyspnea on exertion Gave duo-neb treatment x1 Daughter to take pt to ED Talked to ED and made them aware Pt may need a d-dimer, CTA chest, EKG. Pt not followed by pulmonology. Boubacar Wong ÇáÊäÝÓ: ÅÑÔÇÏÇÊ ÇáÑÚÇíÉ [ Shortness of Breath: Care Instructions ] ÅÑÔÇÏÇÊ ÇáÑÚÇíÉ ÇáÎÇÕÉ Èß íÍÏË ÖíÞ ÇáÊäÝÓ äÊíÌÉ ÇáÚÏíÏ ãä ÇáÃÓÈÇÈ. ÝÝí ÈÚÖ ÇáÃÍíÇä¡ íãßä ááÃãÑÇÖ ãËá ÇáÞáÞ Ãä ÊÄÏí Åáì ÖíÞ ÇáÊäÝÓ. ßãÇ íõÕÇÈ ÈÚÖ ÇáÃÝÑÇÏ ÈÖíÞ ÇáÊäÝÓ ÃËäÇÁ ããÇÑÓÉ ÇáÑíÇÖÉ. æßÐáß íãßä Ãä Êßæä ÕÚæÈÉ ÇáÊäÝÓ [id number] íÏá Úáì HEFMRFBJ ÇáÎØíÑÉ ãËá ÇáÑÈæ æÃãÑÇÖ ÇáÑÆÉ æÇäÊÝÇÎ ÇáÑÆÉ æãÔßáÇÊ ÇáÞáÈ [id number] ÇáÑÆæí. æÅÐÇ ÇÓÊãÑ ÖíÞ ÇáÊäÝÓ ÝÞÏ ÊÍÊÇÌ Åáì ÅÌÑÇÁ PMUQNHPO æÇáÚáÇÌ. ÑÇÞÈ ÇáÊÛííÑÇÊ Ýí ÇáÃáã NYOPKQIY ÇáÃÎÑì. ÊõÚÏ ÑÚÇíÉ ÇáãÊÇÈÚÉ ÌÒÁðÇ ÃÓÇÓíðÇ Ýí ÚáÇÌß æÓáÇãÊß. ÝÚáíß ÇáÍÑÕ Úáì ÊÑÊíÈ ÌãíÚ ãæÇÚíÏ ÒíÇÑÉ ÇáØÈíÈ ZOSKMJOGN ÈåÇ¡ BBFIFCNR ÈØÈíÈß ÚäÏ YDTIRWEK ãä Ãí ãÔßáÇÊ. æãä ÇáÌíÏ ÃíÖðÇ Ãä ÊÚÑÝ äÊÇÆÌ KKVSOHDA æßÐáß YMYNKWGU ÈÞÇÆãÉ ÇáÃÏæíÉ ÇáÊí YLCSWEWZ. ßíÝ íãßäß ÇáÇÚÊäÇÁ ÈäÝÓß Ýí ÇáãäÒá ? · áÇ ÊÏÎä æáÇ ÊÏÚ ÇáÂÎÑíä íÏÎäæä ãä Íæáß. ÅÐÇ ßäÊ ÈÍÇÌÉ Åáì ÇáãÓÇÚÏÉ ááÅÞáÇÚ Úä ÇáÊÏÎíä¡ ÝÚáíß ÇáÊÍÏË ãÚ ÇáØÈíÈ Íæá ÇáÈÑÇãÌ æÇáÃÏæíÉ ÇáÎÇÕÉ ÈÇáÊæÞÝ Úä ÇáÊÏÎíä. íãßä Ãä íÒíÏ åÐÇ ãä ÝÑÕ ÇáÅÞáÇÚ Úä ÇáÊÏÎíä äåÇÆíðÇ. ? · ÎõÐ ÞÓØðÇ æÇÝÑðÇ ãä ÇáÑÇÍÉ æÇáäæã. ? · ÊäÇæá ÇáÃÏæíÉ ÊãÇãðÇ ÍÓÈ ÊæÌíåÇÊ ÇáØÈíÈ. ÇÊÕá ÈÇáØÈíÈ ÅÐÇ ßäÊ ÊÚÊÞÏ Ãäß ÊæÇÌå ãÔßáÉ ÊÊÚáÞ ÈÇáÏæÇÁ. ? · ÇßÊÔÝ ÇáØÑÞ ÇáÕÍíÉ ááÊÚÇãá ãÚ ÇáÖÛæØ. o o ãÇÑÓ ÇáÊãÇÑíä ÇáÑíÇÖíÉ íæãíðÇ. o o ÎÐ ãÇ íßÝí ãä Çáäæã. o o ÊäÇæá ÇáØÚÇã ÈÇäÊÙÇã æÈÔßá ÌíÏ. ãÊì íäÈÛí áß YHRSEXU áØáÈ ÇáãÓÇÚÏÉ
 
íÊã OFZVSPO ÈÇáÑÞã 911 ÚäÏ ÇáÇÚÊÞÇÏ Ãäß ÈÍÇÌÉ Åáì ÑÚÇíÉ ØÇÑÆÉ. Úáì ÓÈíá CAYMBV¡ ÇÊÕá Ýí OZRWYCR ÇáÊÇáíÉ: ? · ÊõÚÇäí ãä ÖíÞ ÍÇÏ ÈÇáÊäÝÓ. ? · ÅÐÇ ßäÊ ÊÚÇäí ãä ÃÚÑÇÖ ÃÒãÉ ÞáÈíÉ. ÊÔÊãá åÐå ÇáÃÚÑÇÖ Úáì ãÇ íáí:  
o o Ãáã Ýí ÇáÕÏÑ Ãæ ÇáÖÛØ¡ Ãæ ÔÚæÑ ÛÑíÈ Ýí ÇáÕÏÑ. o o ÇáÊÚÑÞ. o o ÖíÞ Ýí ÇáÊäÝÓ. o o ÇáÛËíÇä Ãæ ÇáÞíÁ. o o Ãáã¡ Ãæ ÖÛØ¡ Ãæ ÔÚæÑ ÛÑíÈ Ýí ÇáÙåÑ¡ Ãæ ÇáÚäÞ¡ Ãæ ÇáÝß¡ Ãæ ÃÚáì ÇáÈØä Ãæ Ýí ÃÍÏ ÇáßÊÝíä¡ Ãæ ÇáÐÑÇÚíä Ãæ ßáíåãÇ Úáì ÍÏò ÓæÇÁ. o o ÇáÏæÇÑ Ãæ ÇáÖÚÝ ÇáãÝÇÌÆ. o o ÓÑÚÉ äÈÖÇÊ ÇáÞáÈ Ãæ ÚÏã ÇäÊÙÇãåÇ. ÈÚÏ FQYLOLP ÈÇáÑÞã 911¡ ÞÏ íäÕÍß ãæÙÝ ÇáØæÇÑÆ ÈãÖÛ ÞÑÕ ÃÓÈíÑíä æÇÍÏ ááßÈÇÑ¡ Ãæ ÊäÇæá ãä 2 Åáì 4 ÃÞÑÇÕ ãä ÇáÃÓÈíÑíä ãäÎÝÖ ÇáÌÑÚÉ.  ÇäÊÙÑ ÞÏæã ÓíÇÑÉ ÇáÅÓÚÇÝ. áÇ ÊÍÇæá ÇáÞíÇÏÉ ÈäÝÓß. Úáíß ÇáÇÊÕÇá ÈØÈíÈß Úáì ÇáÝæÑ Ãæ ØáÈ ÑÚÇíÉ ØÈíÉ ÝæÑíÉ Ýí JPIVFPW ÇáÊÇáíÉ: ? · íÒÏÇÏ ÍÇáÉ ÇáÖíÞ ÈÇáÊäÝÓ ÓæÁðÇ Ãæ ÊÈÏÃ Ýí ÅÕÏÇÑ ÕÝíÑ ÃËäÇÁ ÇáÊäÝÓ. æÇáÕÝíÑ ÃËäÇÁ ÇáÊäÝÓ åæ ÕæÊ ÚÇáí ÇáäÈÑÉ íÍÏË ÚäÏ ÇáÊäÝÓ. ? · ÊÓÊíÞÙ ÈÇáíá ÈÓÈÈ ÇáÊäÝÓ Ãæ ÊÖØÑ Åáì ÓäÏ ÑÃÓß áÃÚáì Úáì ÚÏÉ æÓÇÆÏ áÊÊäÝÓ. ? · ÊÚÇäí ãä ÖíÞ ÇáÊäÝÓ ÈÚÏ ÇáÞíÇã ÈäÔÇØ ÎÝíÝ ÝÞØ Ãæ ÚäÏãÇ Êßæä Ýí ÇáÑÇÍÉ. Úáíß ãÑÇÞÈÉ Ãí ÊÛíÑÇÊ ÊØÑÃ Úáì ÕÍÊß ÌíÏðÇ¡ NELBOQ Úáì AIPXJRA ÈÇáØÈíÈ Ýí DRABHR ÇáÊÇáíÉ: ? · áÇ ÊÔÚÑ ÈÇáÊÍÓä ÈÚÏ ãÑæÑ ÝÊÑÉ ãä íæã Åáì íæãíä. Ãíä íãßä ãÚÑÝÉ ÇáãÒíÏ ÇäÊÞÇá Åáì [email or web address] ÏÎæá S780 íãßäß ãÚÑÝÉ ÇáãÒíÏ ãä ÎáÇá ãÑÈÚ ÇáÈÍË \"ÖíÞ Ýí ÇáÊäÝÓ: ÅÑÔÇÏÇÊ ÇáÑÚÇíÉ - [ Shortness of Breath: Care Instructions ]. \" 
© 3200-6786 [employer], [employer]. ÊãÊ ÊåíÆÉ ÅÑÔÇÏÇÊ ÇáÚäÇíÉ ÈãæÌÈ ÊÑÎíÕ ãä ãÎÊÕ ÇáÑÚÇíÉ ÇáÕÍíÉ áÏíß. ÅÐÇ ßÇäÊ áÏíß ÃíÉ NAAIMQMSG Úä ÍÇáÉ ØÈíÉ Ãæ Ãí ãä åÐå NHYIOXDGH¡ ÝÊæÌå ÏæãðÇ XBLEKCT Åáì ãÎÊÕ ÇáÑÚÇíÉ ÇáÕÍíÉ. ÊäÝí ãäÙãÉ [employer] Leticia James ÖãÇä Ãæ [employer] ALJUEUT åÐå EEZCSHMRK. ÅÕÏÇÑ ÇáãÍÊæì: 11.4 ãÍÏøË [id number] ãä: 26 BLWDH OHKHTZL 3060 Introducing [facility]! Dear Ana Luisa Carpenter: Thank you for requesting a [employer] account. Our records indicate that you have previously registered for a [employer] account but its currently inactive. Please call our [employer] support line at [phone]. Additional Information If you have questions, please visit the Frequently Asked Questions section of the [employer] website at [email or web address] Remember, [employer] is NOT to be used for urgent needs. For medical emergencies, dial 911. Now available from your iPhone and Android! Please provide this summary of care documentation to your next provider. Your primary care clinician is listed as Pema Edwards. If you have any questions after today's visit, please call [phone].

## 2018-03-16 NOTE — IP AVS SNAPSHOT
[address] [facility] 
 
 
 [address] 70 [facility] 
[phone] Patient: Abdulaziz Adkins MRN: [id number] :[id number] About your hospitalization You were admitted on:  2018 You last received care in the:  [facility] 3 PROG CARE TELE 1 You were discharged on:  2018 Why you were hospitalized Your primary diagnosis was:  Hypoxia Your diagnoses also included: Tobacco Abuse, Morbid Obesity With Bmi Of 50.0-59.9, Adult (Hcc), Ddd (Degenerative Disc Disease), Lumbar, Elevated Hemoglobin A1c, Incidental Lung Nodule, > 3mm And < 8mm, Venous Insufficiency Of Both Lower Extremities, Dyspnea, Morbid Obesity (Hcc), Patient Noncompliance Follow-up Information Follow up With Details Comments Contact Info Kailey Boles DO Go on 3/22/2018 4:15pm (hospital follow up - please arrive 15 mins early) Alfred Strangesus [address] 
[phone] Orlando Allen DO On 2018 $200 up front co-pay, 10:00am. Please arrive at 9:30am for paperwork. (LUNG DOCTOR) [facility] [address] [place] [address] 
[phone] Your Scheduled Appointments 2018  4:15 PM EDT TRANSITIONAL CARE MANAGEMENT with Kailey Boles DO [address] [facility] ([address])  
 [address] [place] [address] 70 [facility]  
[phone] Discharge Orders None A check eliel indicates which time of day the medication should be taken. My Medications START taking these medications Instructions Each Dose to Equal  
 Morning Noon Evening Bedtime  
 famotidine 20 mg tablet Commonly known as:  PEPCID Your last dose was:  10 am today 3/19 (we substituted Protonix) Notes to Patient:  NEW MEDICATION TO HELP REDUCE STOMACH ACID Take 1 Tab by mouth two (2) times a day. 20 mg  
    
  
   
   
  
   
  
 guaiFENesin  mg ER tablet Commonly known as:  Rivas [employer] Rivas Your last dose was:  10 AM TODAY 3/19 Notes to Patient:  NEW MEDICATION TO HELP WITH COUGH AND CONGESTION Take 1 Tab by mouth every twelve (12) hours. 600 mg  
    
  
   
   
   
  
  
 hydrALAZINE 10 mg tablet Commonly known as:  APRESOLINE Your last dose was:  1040 AM TODAY 3/19 Notes to Patient:  NEW MEDICATION FOR YOUR BLOOD PRESSURE Take 1 Tab by mouth three (3) times daily. 10 mg  
    
  
   
   
TAKE AROUND 4 PM  
   
  
  
 levoFLOXacin 750 mg tablet Commonly known as:  Isis Gardner Your last dose was:  9 pm last night 3/18 Your next dose is:  TONIGHT 3/19 Notes to Patient:  NEW MEDICATION - ANTIBIOTIC - TO TREAT INFECTION Take 1 Tab by mouth every twenty-four (24) hours. 750 mg  
    
   
   
   
  
  
 predniSONE 20 mg tablet Commonly known as:  Ophelia Mota Your last dose was:  HAD IV DOSE AT 4 AM TODAY 3/19 Your next dose is:  TOMORROW AM 3/20 Notes to Patient:  NEW MEDICATION FOR YOUR LUNGS  
   
 3/19, 3/20, 3/21: Take 40 mg prednisone daily (2 tablets) 3/22, 3/23, 3/24: Take 20 mg prednisone daily (1 tablet) 3/25, 3/26, 3/17: Take 10 mg prednisone daily (1/2 tablet)  
     
  
   
   
   
  
 spironolactone 50 mg tablet Commonly known as:  ALDACTONE Start taking on:  3/20/2018 Your last dose was:  2 PM TODAY 3/19 Notes to Patient:  NEW MEDICATION TO HELP WITH SWELLING AND BP Take 1 Tab by mouth daily. 50 mg CHANGE how you take these medications Instructions Each Dose to Equal  
 Morning Noon Evening Bedtime  
 metoprolol tartrate 50 mg tablet Commonly known as:  LOPRESSOR What changed:   
- medication strength 
- how much to take Your last dose was:  10 AM TODAY 3/19 Notes to Patient:  THIS MEDICATION HAS BEEN INCREASED Take 1 Tab by mouth two (2) times a day. 50 mg CONTINUE taking these medications Instructions Each Dose to Equal  
 Morning Noon Evening Bedtime  
 albuterol 90 mcg/actuation inhaler Commonly known as:  PROVENTIL HFA, VENTOLIN HFA, PROAIR HFA Your last dose was:  3 PM TODAY 3/19 Take 2 Puffs by inhalation every four (4) hours as needed for Wheezing. 2 Puff EPINEPHrine 0.3 mg/0.3 mL injection Commonly known as:  Selma Haver Your last dose was:  RESUME AT DISCHARGE  
   
 0.3 mL by IntraMUSCular route once as needed for up to 1 dose. 0.3 mg  
    
   
   
   
  
 ibuprofen 200 mg tablet Commonly known as:  MOTRIN Your last dose was:  RESUME AT DISCHARGE Take 200 mg by mouth every eight (8) hours as needed for Pain. 200 mg  
    
   
   
   
  
  
STOP taking these medications SUDAFED PE COLD AND COUGH PO Where to Get Your Medications Information on where to get these meds will be given to you by the nurse or doctor. ! Ask your nurse or doctor about these medications  
  famotidine 20 mg tablet  
 guaiFENesin  mg ER tablet  
 hydrALAZINE 10 mg tablet  
 levoFLOXacin 750 mg tablet  
 metoprolol tartrate 50 mg tablet  
 predniSONE 20 mg tablet  
 spironolactone 50 mg tablet Discharge Instructions HOME DISCHARGE INSTRUCTIONS Laura Vega / [id number] : 1965 Admission date: 3/16/2018 Discharge date: 3/19/2018 Please bring this form with you to show your care provider at your follow-up appointment. Primary care provider: Kita Villavicencio DO Discharging provider:  Teo Harris MD  - Family Medicine Resident Dr. Pio Key - Attending, Family Medicine You have been admitted to the hospital with the following diagnoses: 
 
ACUTE DIAGNOSES: 
Hypoxia Rhonda Dahl . . . . . . . . . . . . . . . . . . . . . . . . . . . . . . . . . . . . . . . . . . . . . . . . . . . . . . . . . . . . . . . . . . . . . . Rhonda Dahl FOLLOW-UP CARE RECOMMENDATIONS: 
 
Appointments Follow-up Information Follow up With Details Comments Contact Info Chris Roht, DO Go on 3/22/2018 4:15pm (hospital follow up - please arrive 15 mins early) Alfred Bowles 906 [address] [facility] 
[phone] Ciara Thurman, DO On 2018 $200 up front co-pay, 10:00am. Please arrive at 9:30am for paperwork. (LUNG DOCTOR) [address] [facility] [address] [place] [address] 
[phone] Please call to cancel if you are not able to make your lung doctor appointment above. Please follow up with your PCP regardin. Incidental lung nodule seen on CT (previously followed outpatient by PCP, had recommended chest CT 2018) 2. Your breathing - you may need to see pulmonology for outpatient pulmonary function tests (lung function tests) 3. Chronic lymphedema (the swelling in your legs) 4. Incidental adrenal nodule seen on imaging - 2.6 x 1.9 cm 
5. Your diabetes and diet MEDICATION CHANGES: 
1. Your metoprolol was changed from 25mg twice a day to 50 mg twice a day 2. Take the mucinex twice a day to help with the cough. 3. Take the pepcid two times a day. This will help protect your stomach from the steroids. 4. Take the levaquin 750mg every day, before bedtime, for 4 days. This is an antibiotic for your lungs. 5. Take the prednisone as written: 40mg daily (2 tablets) on 3/19, 3/20, and 3/21. Then take 20mg daily (1 tablet) on 3/22, 3/23, 3/24. Then take 10mg daily (1/2 tablet) on 3/25, 3/26, and 3/27. 6. Take the spironolactone every day. This will help with the swelling in your legs and your blood pressure 7. Take the hydralazine three times a day. This will help with your blood pressure. 8. Stop taking the Sudafed cough medicine. Do NOT use medications with phenylephrine or pseudoephedrine or medicines with \"decongestants\" Follow-up tests needed: Pulmonary Function Tests, follow up and monitoring for adrenal nodule and lung nodule. Pending test results: At the time of your discharge the following test results are still pending: None. Please make sure you review these results with your outpatient follow-up provider(s). Specific symptoms to watch for: chest pain, shortness of breath, fever, chills, nausea, vomiting, diarrhea, change in mentation, falling, weakness, bleeding. DIET/what to eat:  Diabetic Diet ACTIVITY:  Activity as tolerated Wound care: None Equipment needed:  None What to do if new or unexpected symptoms occur? If you experience any of the above symptoms (or should other concerns or questions arise after discharge) please call your primary care physician. Return to the emergency room if you cannot get hold of your doctor. · It is very important that you keep your follow-up appointment(s). · Please bring discharge papers, medication list (and/or medication bottles) to your follow-up appointments for review by your outpatient provider(s). · Please check the list of medications and be sure it includes every medication (even non-prescription medications) that your provider wants you to take. · It is important that you take the medication exactly as they are prescribed. · Keep your medication in the bottles provided by the pharmacist and keep a list of the medication names, dosages, and times to be taken in your wallet. · Do not take other medications without consulting your doctor. · If you have any questions about your medications or other instructions, please talk to your nurse or care provider before you leave the hospital.  
 
Information obtained by:  
 
I understand that if any problems occur once I am at home I am to contact my physician. These instructions were explained to me and I had the opportunity to ask questions. I understand and acknowledge receipt of the instructions indicated above. Physician's or R.N.'s Signature                                                                  Date/Time Patient or Representative Signature                                                          Date/Time ãÑÖ ÇáÇäÓÏÇÏ ÇáÑÆæí ÇáãÒãä (COPD): ÅÑÔÇÏÇÊ ÇáÑÚÇíÉ [ Chronic Obstructive Pulmonary Disease (COPD): Care Instructions ] ÅÑÔÇÏÇÊ ÇáÚäÇíÉ ÇáÎÇÕÉ Èß ãÑÖ ÇáÇäÓÏÇÏ ÇáÑÆæí ÇáãÒãä (COPD) åæ ãÕØáÍ ÚÇã áãÌãæÚÉ ãä ÃãÑÇÖ ÇáÑÆÉ¡ ÈãÇ Ýí Ðáß ÇäÊÝÇÎ ÇáÑÆÉ æÇáÊåÇÈ ÇáÔÚÈ ÇáåæÇÆíÉ ÇáãÒãä. Arvilla Nettle TBGQSCLH ÈåÐÇ ÇáãÑÖ ãä ÇäÎÝÇÖ ÊÏÝÞ ÇáåæÇÁ ÏÇÎá ÇáÑÆÊíä æÎÇÑÌåãÇ¡ ããÇ íÄÏí Åáì ÕÚæÈÉ Ýí ÇáÊäÝÓ. íãßä ÃíÖðÇ Ãä ÊäÓÏ [id number] ÇáåæÇÆíÉ Ýí Ùá æÌæÏ ãÎÇØ ßËíÝ. íÚÏ ÊÏÎíä ÇáÓÌÇÆÑ åæ ÇáÓÈÈ ÇáÑÆíÓí áåÐÇ ÇáãÑÖ. æÑÛã ÚÏã æÌæÏ ÚáÇÌ áåÐÇ ÇáãÑÖ¡ ÅáÇ Åäå ãä Çáããßä ÅÈØÇÁ ÊÞÏãå. ßãÇ Ãä ÇÊÈÇÚ ÎØÉ ZZKINP ZEVTSVYLV Úáì ÕÍÊß ÊÓÇÚÏ Úáì ÇáÔÚæÑ ÈÇáÊÍÓä æÇáÚíÔ ÍíÇÉ ÃØæá. Åä ãÊÇÈÚÉ ÇáÑÚÇíÉ ÌÒÁ ÑÆíÓí Ýí ÚáÇÌß æÓáÇãÊß. ÊÃßÏ ãä ÅÌÑÇÁ ÊÑÊíÈÇÊ ÌãíÚ ãæÇÚíÏ ÒíÇÑÉ ÇáØÈíÈ LXEGBVE ÅáíåÇ JSQYKJEO ÈØÈíÈß ÅÐÇ ßÇäÊ áÏíß ãÔßáÇÊ. æãä ÇáÃÝßÇÑ ÇáÌíÏÉ ÃíÖðÇ ãÚÑÝÉ äÊÇÆÌ EYBRKCJI FEBUWCGQR ÈÞÇÆãÉ NGYFAAFQ ÇáÊí FSXUZGWV. ßíÝ íãßäß ÇáÇÚÊäÇÁ ÈäÝÓß Ýí ÇáãäÒá Orien  Úáì ÇáÕÍÉ ? · ÃÞáÚ Úä ÇáÊÏÎíä. æåÐå Ãåã ÎØæÉ íãßäß ÇÊÎÇÐåÇ áãäÚ ãÒíÏ ãä ÇáÊÏãíÑ ááÑÆÊíä. ÅÐÇ ßäÊ ÈÍÇÌÉ Åáì ãÓÇÚÏÉ ãä ÃÌá ÇáÅÞáÇÚ Úä ÇáÊÏÎíä¡ ÝÊÍÏË ãÚ ÇáØÈíÈ Íæá ÈÑÇãÌ æÃÏæíÉ æÞÝ ÇáÊÏÎíä. íãßä Ãä íÒíÏ åÐÇ ãä ÝÑÕ ÇáÅÞáÇÚ Úä ÇáÊÏÎíä Åáì ÇáÃÈÏ. ? · ÊÌäÈ ÇáÅÕÇÈÉ ÈÇáÈÑÏ NZUUENEBZLJ. ÊäÇæá ÍÞä áÞÇÍ ÇáãßæÑÇÊ ÇáÑÆæíÉ. ÅÐÇ ßäÊ ÞÏ ÊäÇæáÊ ÌÑÚÉ ãä ÞÈá¡ ÝÇÓÊÔÑ ØÈíÈß ÚãÇ ÅÐÇ ßäÊ ÊÍÊÇÌ Åáì ÌÑÚÉ ËÇäíÉ ãä ÚÏãå. ÊäÇæá áÞÇÍ [id number] ßá ÎÑíÝ. ÅÐÇ ßÇä ÊæÇÌÏß ÈÇáÞÑÈ ãä ÃÔÎÇÕ ãÕÇÈíä ÈÇáÈÑÏ Ãæ OTFPIAURGI ÖÑæÑÉ¡ ÝÞã ÈÛÓá íÏíß. MRXGWEDI. ? · ÊÌäÈ ÇáÊÏÎíä ÇáÓáÈí æÇáåæÇÁ ÇáãáæË æÇáÇÑÊÝÇÚÇÊ ÇáÔÇåÞÉ.  ÊÌäÈ ÃíÖðÇ ÇáåæÇÁ ÇáÈÇÑÏ ÇáÌÇÝ æÇáÓÇÎä ÇáÑØÈ. ÇãßË Ýí ÇáãäÒá ãÚ ÅÛáÇÞ ÇáäæÇÝÐ ÚäÏãÇ íßæä ÊáæË ÇáåæÇÁ ÔÏíÏðÇ. ÇáÃÏæíÉ MVSZTBT ÈÇáÃßÓÌíä ? · ÊäÇæá ÇáÃÏæíÉ ÊãÇãðÇ æÝÞÇ áÊæÌíåÇÊ ÇáØÈíÈ. ÇÊÕá ÈØÈíÈß ÅÐÇ ßäÊ ÊÚÊÞÏ Ãä áÏíß ãÔßáÉ ÎÇÕÉ ÈÇáÏæÇÁ. ? · ÞÏ ZVLIVM ÃÏæíÉ ãËá: o o ãæÓÚÇÊ ÇáÔÚÈ ÇáåæÇÆíÉ. ÊÓÇÚÏ åÐå ÇáÃÏæíÉ Úáì ÝÊÍ ÇáããÑÇÊ ÇáåæÇÆíÉ æÊÌÚá ÇáÊäÝÓ ÃÓåá ÈÇáäÓÈÉ áß. Êßæä ãæÓÚÇÊ ÇáÔÚÈ ÇáåæÇÆíÉ ÅãÇ ÞÕíÑÉ ÇáãÝÚæá (ÊÚãá áãÏÉ ãä 6 Åáì 9 ÓÇÚÇÊ) Ãæ ØæíáÉ ÇáãÝÚæá (ÊÚãá áãÏÉ 24 ÓÇÚÉ). æÈãÌÑÏ ÇÓÊäÔÇÞ ãÚÙã ãæÓÚÇÊ ÇáÔÚÈ ÇáåæÇÆíÉ¡ ÊÈÏÃ ãÝÚæáåÇ ÓÑíÚðÇ. ÇÍãá ãÚß ÏÇÆãðÇ ÌåÇÒ QMVWCGHYI ÓÑíÚ ÇáãÝÚæá Ýí ÍÇá ßäÊ Ýí ÍÇÌÉ Åáíå ÃËäÇÁ æÌæÏß ÈÚíÏðÇ Úä ÇáãäÒá. o o ÇáßæÑÊíßæÓÊÑæíÏÇÊ (ÈÑíÏäíÒæä¡ ÈæÏíÒæäÇíÏ). ÝåÐå ÇáÃÏæíÉ ÊÞáá ÇáÊåÇÈ ÇáããÑÇÊ ÇáåæÇÆíÉ. æÊßæä ÅãÇ Ýí Ôßá ÃÞÑÇÕ Ãæ ÇÓÊäÔÇÞ. æáÇ ÈÏ ãä ÊäÇæá åÐå ÇáÃÏæíÉ íæãíðÇ áßí ÊÚãá ÈÔßá ÌíÏ. ? · ÞÏ íÓÇÚÏß ÇáãÝÓÇÍ (ÝÇÕá GEKKYPLUX) Ýí ÍÕæá ÑÆÊíß Úáì ãÒíÏ ãä ÇáÏæÇÁ ÇáãÓÊäÔÞ. ÇÓÊÔÑ ÇáØÈíÈ Ãæ ÇáÕíÏáí ÚãÇ ÅÐÇ ßÇä ÇáãÝÓÇÍ ãäÇÓÈðÇ áß. ÅÐÇ ßÇä SHGCKPM¡ ÝÇÓÃáå Úä ßíÝíÉ TNYJMDGOG ÇáÕÍíÍ. ? · áÇ ÊÊäÇæá Ãí ÝíÊÇãíäÇÊ Ãæ ÃÏæíÉ ÈÏæä æÕÝÉ ØÈíÉ Ãæ ãäÊÌÇÊ ÚÔÈíÉ Ïæä ÇáÊÍÏË Åáì ÇáØÈíÈ ÃæáÇð. ? · ÅÐÇ æÕÝ áß ØÈíÈß ãÖÇÏðÇ ÍíæíðÇ¡ MPHYJVI æÝÞ ÅÑÔÇÏÇÊå. áÇ ÊÊæÞÝ Úä ÊäÇæáå áãÌÑÏ Ãäß ÊÔÚÑ ÈÊÍÓä. æíáÒã Ãä ÊÊäÇæá ßæÑÓ ÇáãÖÇÏ ÇáÍíæí ÈÇáßÇãá. ? · íÒíÏ ÇáÚáÇÌ ÈÇáÃßÓÌíä ãä ßãíÉ ÇáÃßÓÌíä Ýí ÇáÏã¡ æíÓÇÚÏß Úáì ÇáÊäÝÓ ÈÓåæáÉ. ÇÓÊÎÏã ãÚÏá ÇáÊÏÝÞ ÇáÐí ÃæÕì Èå ÇáØÈíÈ æáÇ ÊÛíÑå Ïæä ÇáÑÌæÚ Åáì ÇáØÈíÈ ÃæáÇð. ÇáäÔÇØ ? · ãÇÑÓ ÊãÇÑíä ãäÊÙãÉ. æíÚÏ ÇáãÔí ØÑíÞÉ ÓåáÉ áããÇÑÓÉ ÇáÊãÑíäÇÊ. ÇÈÏÃ ÈÇáãÔí áãÓÇÝÉ ÞáíáÉ¡ Ëã ÒÏ ÇáãÓÇÝÉ íæãíðÇ. ? · ÇäÊÈå áÊäÝÓß. ÅÐÇ ßäÊ áÇ ÊÓÊØíÚ MQSWJL ÃËäÇÁ ããÇÑÓÉ ÇáÊãÇÑíä¡ ÝÃäÊ ÈÐáß ÊãÇÑÓ ÊãÇÑíä ÔÇÞÉ. ? · ÎõÐ JSTKIQU ÞÕíÑÉ ãä OPPBIP ÚäÏ ÇáÞíÇã FQJVLLCE ÇáãäÒáíÉ æÇáÃäÔØÉ ÇáÃÎÑì. ? · ÊÚáã ØÑÞ ÇáÊäÝÓ ãËá ÇáÊäÝÓ ãä ÎáÇá ÛáÞ ÇáÔÝÊíä áãÓÇÚÏÊß Úáì ÊÞáíá ÖíÞ ÇáÊäÝÓ. ? · ÅÐÇ áã íßä ÇáØÈíÈ ÞÏ ÞÇã ÈÅÚÏÇÏ ÈÑäÇãÌ ÅÚÇÏÉ ÇáÊÃåíá ÇáÑÆæí áß ÈÚÏ¡ ÝÊÍÏË ãÚå áãÚÑÝÉ ãÇ ÅÐÇ ßÇäÊ ÅÚÇÏÉ ÇáÊÃåíá ãäÇÓÈÉ áß ãä ÚÏãå.  æÊÔãá ÅÚÇÏÉ ÇáÊÃåíá ÈÑÇãÌ ÇáÊÏÑíÈ æÇáÊËÞíÝ Íæá ÇáãÑÖ æßíÝíÉ ãÚÇáÌÊå¡ ãÚ ãÓÇÚÏÉ ÇáäÙÇã ÇáÛÐÇÆí æÇáÊÛíÑÇÊ ÇáÃÎÑì æÇáÏÚã ÇáÚÇØÝí. ÇáäÙÇã ÇáÛÐÇÆí ? · ÊäÇæá æÌÈÇÊ ÕÍíÉ ãäÊÙãÉ. ÇÓÊÎÏã ãæÓÚðÇ ááÔÚÈ ÇáåæÇÆíÉ ÞÈá ÊäÇæá ÇáØÚÇã ÈÍæÇáí ÓÇÚÉ áÊÓåíá ÇáÊäÇæá. ÊäÇæá æÌÈÇÊ ÕÛíÑÉ æãÊÚÏÏÉ ÈÏáÇ ãä ËáÇË æÌÈÇÊ ßÈíÑÉ. ÊäÇæá ÇáãÔÑæÈÇÊ Ýí äåÇíÉ ÇáæÌÈÉ. ÊÌäÈ ÇáØÚÇã ÕÚÈ ÇáãÖÛ. ? · ÊäÇæá ÇáÃØÚãÉ ÇáÊí ÊÍÊæí Úáì ÇáÏåæä QEUSLOYJJFH ÍÊì áÇ ÊÝÞÏ ÇáæÒä æßÊáÉ ÇáÚÖáÇÊ. æÊÔãá åÐå ÇáÃØÚãÉ ÇáÂíÓ ßÑíã æÍáæì ÇáÈæÏíäÌ æÇáÌÈä æÇáÈíÖ æÒÈÏÉ ÇáÝæá ÇáÓæÏÇäí. Cephus Thomas ? · ÊÍÏË Åáì ÚÇÆáÊß Ãæ ÃÕÏÞÇÆß Ãæ Åáì ÇáØÈíÈ GHHKVTP Úä ãÔÇÚÑß. ãä ÇáØÈíÚí Ãä ÊÍÓ ÈÇáÎæÝ æÇáÛÖÈ æÇáíÃÓ æÇáÚÌÒ æÍÊì ÇáÐäÈ. ÝÇáÊÍÏË ÈÕÑÇÍÉ Úä ÇáãÔÇÚÑ ÇáÓíÆÉ íÓÇÚÏß Úáì ÇáÊÛáÈ ÚáíåÇ. ÅÐÇ ÇÓÊãÑÊ åÐå ÇáãÔÇÚÑ¡ ÝÊÍÏË Åáì ÇáØÈíÈ. ãÊì íÌÈ Úáíß ZILPJBW áØáÈ ÇáãÓÇÚÏÉ ÇÊÕá ÈÇáÑÞã 911 Ýí Ãí æÞÊ ÊÑì Ýíå ÍÇÌÊß Åáì ÑÚÇíÉ ØÇÑÆÉ. ÝãËáÇð¡ ÇÊÕá Ýí ÍÇáÉ: 
? · ßäÊ ÊÚÇäí ãä ÕÚæÈÉ ÔÏíÏÉ Ýí ÇáÊäÝÓ. ÇÊÕá ÈØÈíÈß ÇáÂä Ãæ ÇÈÍË Úä ÇáÑÚÇíÉ ÇáØÈíÉ ÇáÝæÑíÉ Ýí KKVONWM ÇáÊÇáíÉ: ? · ÅÐÇ ßäÊ ÊÚÇäí ãä ãÔÇßá ÌÏíÏÉ Ãæ ÔÏíÏÉ Ýí ÇáÊäÝÓ. ? · ÅÐÇ ßÇä åäÇß ÓÚÇá ãÕÍæÈ ÈÏã. ? · ÅÕÇÈÊß ÈÍãì. íÊÚíä ÇáãÑÇÞÈÉ ÇáÔÏíÏÉ áÃíÉ ÊÛííÑÇÊ ÊØÑÃ Úáì ÇáÕÍÉ SGHQMM Úáì ÇáÇÊÕÇá ÈÇáØÈíÈ Ýí ÇáÍÇáÇÊ ÇáÊÇáíÉ: ? · ÅÐÇ ßÇä åäÇß ÓÚÇá ÚãíÞ Ãæ ãÊßÑÑ¡ ÎÇÕÉ ÅÐÇ áÇÍÙÊ ãÒíÏðÇ ãä ÇáãÎÇØ Ãæ ÊÛíÑðÇ Ýí áæä ÇáãÎÇØ. ? · ÅÐÇ ßäÊ ÊÚÇäí ãä ÊæÑã ÌÏíÏ Ãæ ãÊÝÇÞã Ýí ÇáÓÇÞíä Ãæ ÇáÈØä. ? · ÅÐÇ áã ÊÊÍÓä æÝÞ ãÇ ÊÊæÞÚå. Ãíä íãßä ãÚÑÝÉ ÇáãÒíÏ ÇäÊÞÇá Åáì [email or web address] ÏÎæá Flaviana.Monk íãßäß ãÚÑÝÉ ÇáãÒíÏ ãä ÎáÇá ãÑÈÚ ÇáÈÍË \"ãÑÖ AZADWRBE ÇáÑÆæí ÇáãÒãä (COPD): ÅÑÔÇÏÇÊ ÇáÑÚÇíÉ - [ Chronic Obstructive Pulmonary Disease (COPD): Care Instructions ]. \" 
© 0115-0456 [employer]wise, [employer]. ÊãÊ ÊåíÆÉ ÅÑÔÇÏÇÊ ÇáÚäÇíÉ ÈãæÌÈ ÊÑÎíÕ ãä ãÎÊÕ ÇáÑÚÇíÉ ÇáÕÍíÉ áÏíß. ÅÐÇ ßÇäÊ áÏíß ÃíÉ JIHJNSDYK Úä ÍÇáÉ ØÈíÉ Ãæ Ãí ãä åÐå GIBJCNSWX¡ ÝÊæÌå ÏæãðÇ TQXCWHV Åáì ãÎÊÕ ÇáÑÚÇíÉ ÇáÕÍíÉ. ÊäÝí ãäÙãÉ [employer], [employer] Amanda Manuel ÖãÇä Ãæ Shashank Matar JEQVTKR åÐå KPGZCCSVZ. ÅÕÏÇÑ ÇáãÍÊæì: 11.4 ãÍÏøË GLKGFYEA ãä: 26 HZTRM EIENHWL 5946 ÇáÊÚÑÝ Úáì COPD [ Learning About COPD ] ãÇ åæ COPD
 
 
COPD åæ ãÑÖ ÑÆæí íõÕÚÈ ÇáÊäÝÓ. íÑãÒ COPD Åáì ãÑÖ ÇáÇäÓÏÇÏ ÇáÑÆæí ÇáãÒãä. ÇáÓÈÈ Ýí Ðáß åæ ÊáÝ íÍÏË Ýí ÇáÑÆÊíä ÚÈÑ ÇáÚÏíÏ ãä ÇáÓäæÇÊ¡ ÚÇÏÉð íßæä ÇáÓÈÈ Ýí Ðáß åæ ÇáÊÏÎíä. COPD åæ ãÒíÌ ãä ãÑÖíä: ÇáÊåÇÈ ÇáÔÚÈ ÇáåæÇÆíÉ ÇáãÒãä æÇäÊÝÇÎ ÇáÑÆÉ. ÊÊÖãä ÇáÃÔíÇÁ ÇáÃÎÑì ÇáÊí ÊÚÑÖß áÎØæÑÉ ÇáÅÕÇÈÉ ÈãÑÖ COPD ÇÓÊäÔÇÞ ÇáÃÈÎÑÉ ÇáßíãíÇÆíÉ¡ Ãæ ÇáÛÈÇÑ¡ Ãæ ÇáåæÇÁ ÇáãáæË áÝÊÑÉ ÒãäíÉ ØæíáÉ. ßãÇ Ãä ÇáÊÏÎíä ÇáÓáÈí ãä ÇáÃãæÑ ÇáÓíÆÉ ÃíÖðÇ. Ýí ÇáÊåÇÈ ÇáÔÚÈ ÇáåæÇÆíÉ ÇáãÒãä¡ ÊáÊåÈ ÇáããÑÇÊ ÇáåæÇÆíÉ ÇáÊí ÊäÞá ÇáåæÇÁ Åáì ÇáÑÆÊíä (ÇáÔÚóÈ ÇáåæÇÆíÉ) æÊßæøä ÇáßËíÑ ãä ÇáãÎÇØ. íãßä Ãä íÄÏí Ðáß Åáì ÊÖííÞ Ãæ ÇäÓÏÇÏ ÇáããÑÇÊ ÇáåæÇÆíÉ¡ ããÇ íÕÚÈ Úáíß ÇáÊäÝÓ. ÃãÇ Ýí ÍÇáÉ ÇäÊÝÇÎ ÇáÑÆÉ ÝÊÊáÝ ÇáÃßíÇÓ ÇáåæÇÆíÉ æÊÝÞÏ ÊãÏÏåÇ. ããÇ íÄÏí Åáì ÏÎæá æÎÑæÌ åæÇÁ ÃÞá ãä æÅáì ÇáÑÆÉ¡ ããÇ íÌÚáß ÊÔÚÑ ÈÖíÞ Ýí ÇáÊäÝÓ. ãÇ ÇáÐí ÊÊæÞÚå ÚäÏ ÅÕÇÈÊß ÈãÑÖ COPD
 
íÒÏÇÏ COPD ÓæÁðÇ ÈãÑæÑ ÇáæÞÊ. áÇ íãßäß ÅÈØÇá ãÇ ÍÏË Ýí ÑÆÊíß ãä ÊáÝ. æÞÏ íÍÏË áß ãÇ íáí ÈãÑæÑ ÇáæÞÊ: ? · Ãä ÊÕÇÈ ÈÖíÞ Ýí ÇáÊäÝÓ ÍÊì ÚäÏ ÇáÞíÇã ÈÃãæÑ ÈÓíØÉ ãËá ÇÑÊÏÇÁ ÇáãáÇÈÓ Ãæ ÅÚÏÇÏ æÌÈÉ. ? · íÕÈÍ ãä ÇáÕÚÈ ÊäÇæá ÇáØÚÇã Ãæ ããÇÑÓÉ ÇáÊãÇÑíä ÇáÑíÇÖíÉ. ? · ÊÝÞÏ æÒäß æÊÔÚÑ Ãäß ÃßËÑ ÖÚÝðÇ. 
áßä åäÇß ÃÔíÇÁ íãßäß ÇáÞíÇã ÈåÇ ááæÞÇíÉ ãä ÍÏæË ÇáãÒíÏ ãä ÇáÊáÝ æááÔÚæÑ ÈÊÍÓä. Timmy Szymanski ÃÚÑÇÖå Begoniasingel 13: ? · ÓÚÇá ÛíÑ ÞÇÈáÉ MLQLZJ. ? · ãÎÇØ íÎÑÌ ãÚ ÇáÓÚÇá. ? · ÖíÞ Ýí ÇáÊäÝÓ íÒÏÇÏ ÓæÁðÇ ãÚ ããÇÑÓÉ ÇáÃäÔØÉ. ÃÍíÇäðÇ ÊÊåíÌ ßá ÇáÃÚÑÇÖ áÏíß ÝÌÃÉ æÊÒÏÇÏ ÓæÁðÇ. íõÓãì Ðáß ÊÝÇÞã COPD (ÊõäØÞ \"egg-ZASS-er-BAY-shun\"). ÚäÏãÇ íÍÏË Ðáß¡ íÒÏÇÏ ÓæÁ ÇáÃÚÑÇÖ ÇáãÚÊÇÏÉ áÏíß ÈÓÑÚÉ æÊÈÞì Ýí ÍÇáÉ ÓíÆÉ. íãßä Ãä íßæä Ðáß ÎØíÑðÇ. ÞÏ íÊÚíä Úáíß ÇáÐåÇÈ Åáì ÇáãÓÊÔÝì. ßíÝ íãßäß ÇáæÞÇíÉ ãä ÊÏåæÑ COPD
 
ÊÌäøÈ ÇáÊÏÎíä. ÊõÚÏ åÐå åí ÇáØÑíÞÉ ÇáÃãËá áÊÌäÈ ÊÏåæÑ COPD. ÅÐÇ ßäÊ ÊÏÎä PGTWRU¡ ÝÇáæÞÊ áã íÊÃÎÑ ááÅÞáÇÚ Úä ÇáÊÏÎíä.  ÅÐÇ ßäÊ ÈÍÇÌÉ Åáì ÇáãÓÇÚÏÉ ááÅÞáÇÚ Úä ÇáÊÏÎíä¡ ÝÚáíß ÇáÊÍÏË ãÚ ÇáØÈíÈ Íæá ÇáÈÑÇãÌ æÇáÃÏæíÉ ÇáÎÇÕÉ ÈÇáÊæÞÝ Úä ÇáÊÏÎíä. íãßä Ãä íÒíÏ åÐÇ ãä ÝÑÕ ÇáÅÞáÇÚ Úä ÇáÊÏÎíä äåÇÆíðÇ. íãßäß ÇáÞíÇã ÈÃãæÑ ÃÎÑì áÊÌäÈ ÊÏåæÑ COPD: 
? · ÊÌäÈ ÇáåæÇÁ ÇáÖÇÑ. ãä Çáããßä ÃíÖðÇ Ãä íÒíÏ ÇáåæÇÁ PWZYCQ¡ æÇáÃÈÎÑÉ ÇáßíãíÇÆíÉ¡ æÇáÛÈÇÑ ÃíÖðÇ ãä ÊÏåæÑ COPD. ? · ÇÍÕá Úáì ÍÞäÉ ãÖÇÏÉ WICHFWWFEO ÓäæíðÇ. íãßä Ãä ÊÓÇÚÏ ÇáÍÞäÉ Ýí æÞÇíÉ ãä ÊÍæá BHGDRHPPKT Åáì ÔíÁ ÃßËÑ ÎØæÑÉ¡ ãËá WNBYXWFB ÇáÑÆæí. ÞÏ ÊÎÝÝ ÍÞäÉ KJXQXCZUOB ãä ÝÑÕ ÊåíÌ COPD áÏíß. ? · ÇÓÊÎÏã JHQRL ÈáÞÇÍ ÇáãßæÑÉ ÇáÑÆæíÉ. ÃÛáÈ ÇáÃÔÎÇÕ íÍÊÇÌæä Åáì ÍÞäÉ æÇÍÏÉ ÝÞØ ááæÞÇíÉ ãä ÇáÇáÊåÇÈ ÇáÑÆæí¡ áßä ÃÍíÇäðÇ íæÕí ÇáÃØÈÇÁ ÈÃÎÐ ÍÞäÉ ËÇäíÉ ÝíãÇ íÎÕ ÈÚÖ ÇáÃÔÎÇÕ ÞÈá Ãä íÈáÛæÇ 72 ÚÇãðÇ ãä ÇáÚãÑ. ÊÍÏË Åáì ÇáØÈíÈ Íæá ãÇ ÅÐÇ ßäÊ ÊÍÊÇÌ Åáì ÍÞäÉ ËÇäíÉ Ãã áÇ. ßíÝ íÊã ÚáÇÌ COPD
 
íÊã ÚáÇÌ COPD Úä ØÑíÞ ÇáÃÏæíÉ æÇáÃßÓÌíä. ßãÇ íãßäß ÃíÖðÇ ÇÊÎÇÐ ÎØæÇÊ Ýí ÇáãäÒá ááÈÞÇÁ Ýí ÍÇáÉ ÕÍíÉ ÌíÏÉ æÊÌäÈ ÊÏåæÑ COPD áÏíß. ÇáÃÏæíÉ TQHKVVY ÈÇáÃßÓÌíä ? · ÞÏ NELJTH ÃÏæíÉ ãËá: o o ãæÓÚÇÊ ÇáÔÚÈ ÇáåæÇÆíÉ. ÊÓÇÚÏ åÐå ÇáÃÏæíÉ Úáì ÝÊÍ ÇáããÑÇÊ ÇáåæÇÆíÉ æÊÌÚá ÇáÊäÝÓ ÃÓåá ÈÇáäÓÈÉ áß. Êßæä ãæÓÚÇÊ ÇáÔÚÈ ÇáåæÇÆíÉ ÅãÇ ÞÕíÑÉ ÇáãÝÚæá (ÊÚãá áãÏÉ ãä 6 Åáì 9 ÓÇÚÇÊ) Ãæ ØæíáÉ ÇáãÝÚæá (ÊÚãá áãÏÉ 24 ÓÇÚÉ). æÈãÌÑÏ ÇÓÊäÔÇÞ ãÚÙã ãæÓÚÇÊ ÇáÔÚÈ ÇáåæÇÆíÉ¡ ÊÈÏÃ ãÝÚæáåÇ ÓÑíÚðÇ. ÇÍãá ãÚß ÏÇÆãðÇ ÌåÇÒ CYHMRGQPJ ÓÑíÚ ÇáãÝÚæá Ýí ÍÇá ßäÊ Ýí ÍÇÌÉ Åáíå ÃËäÇÁ æÌæÏß ÈÚíÏðÇ Úä ÇáãäÒá. o o ÇáßæÑÊíßæÓÊÑæíÏÇÊ. ÝåÐå ÇáÃÏæíÉ ÊÞáá ÇáÊåÇÈ ÇáããÑÇÊ ÇáåæÇÆíÉ. æÊßæä ÅãÇ Ýí Ôßá ÃÞÑÇÕ Ãæ ÇÓÊäÔÇÞ. æáÇ ÈÏ ãä ÊäÇæá åÐå ÇáÃÏæíÉ íæãíðÇ áßí ÊÚãá ÈÔßá ÌíÏ. ? · ÊäÇæá ÇáÃÏæíÉ ÊãÇãðÇ ÍÓÈ ÊæÌíåÇÊ ÇáØÈíÈ. ÇÊÕá ÈÇáØÈíÈ ÅÐÇ ßäÊ ÊÚÊÞÏ Ãäß ÊæÇÌå ãÔßáÉ ÊÊÚáÞ ÈÇáÏæÇÁ. ? · íÒíÏ ÇáÚáÇÌ ÈÇáÃßÓÌíä ãä ßãíÉ ÇáÃßÓÌíä Ýí ÇáÏã¡ æíÓÇÚÏß Úáì ÇáÊäÝÓ ÈÓåæáÉ. ÇÓÊÎÏã ãÚÏá ÇáÊÏÝÞ ÇáÐí ÃæÕì Èå ÇáØÈíÈ æáÇ ÊÛíÑå Ïæä ÇáÑÌæÚ Åáì ÇáØÈíÈ ÃæáÇð. Ely Dora ÃÎÑì ? · ãÇÑÓ ÇáãÒíÏ ãä ÇáÊãÑíäÇÊ ÇáÑíÇÖíÉ ÅÐÇ ÃæÕÇß ÇáØÈíÈ ÈÐáß. æíõÚÏ ÇáãÔí ÎíÇÑðÇ ÌíÏðÇ. Þã ÈÒíÇÏÉ ÇáãÓÇÝÉ ÇáÊí ÊãÔíåÇ ßá íæã ÊÏÑíÌíðÇ. ÌÑøöÈ Úáì ÇáÃÞá ÇáãÔí áãÏÉ 30 ÏÞíÞÉð Ýí ãÚÙã ÃíÇã ÇáÃÓÈæÚ. ? · ÊÚáã ØÑÞ ÇáÊäÝÓ ãËá ÇáÊäÝÓ ãä ÎáÇá ÛáÞ ÇáÔÝÊíä áãÓÇÚÏÊß Úáì ÊÞáíá ÖíÞ ÇáÊäÝÓ. ? · ÅÐÇ áã íßä ÇáØÈíÈ ÞÏ ÞÇã ÈÅÚÏÇÏ ÈÑäÇãÌ ÅÚÇÏÉ ÇáÊÃåíá ÇáÑÆæí áß ÈÚÏ¡ ÝÊÍÏË ãÚå áãÚÑÝÉ ãÇ ÅÐÇ ßÇäÊ ÅÚÇÏÉ ÇáÊÃåíá ãäÇÓÈÉ áß ãä ÚÏãå. æÊÔãá ÅÚÇÏÉ ÇáÊÃåíá ÈÑÇãÌ ÇáÊÏÑíÈ æÇáÊËÞíÝ Íæá ÇáãÑÖ æßíÝíÉ ãÚÇáÌÊå¡ ãÚ ãÓÇÚÏÉ ÇáäÙÇã ÇáÛÐÇÆí æÇáÊÛíÑÇÊ ÇáÃÎÑì æÇáÏÚã ÇáÚÇØÝí. ? · ÊäÇæá æÌÈÇÊ ÕÍíÉ ãäÊÙãÉ. ÇÓÊÎÏã ãæÓÚðÇ ááÔÚÈ ÇáåæÇÆíÉ ÞÈá ÊäÇæá ÇáØÚÇã ÈÍæÇáí ÓÇÚÉ áÊÓåíá ÇáÊäÇæá. ÊäÇæá æÌÈÇÊ ÕÛíÑÉ æãÊÚÏÏÉ ÈÏáÇ ãä ËáÇË æÌÈÇÊ ßÈíÑÉ. ÊäÇæá ÇáãÔÑæÈÇÊ Ýí äåÇíÉ ÇáæÌÈÉ. ÊÌäÈ ÇáØÚÇã ÕÚÈ ÇáãÖÛ. ÊõÚÏ ÑÚÇíÉ ÇáãÊÇÈÚÉ ÌÒÁðÇ ÃÓÇÓíðÇ Ýí ÚáÇÌß æÓáÇãÊß. ÝÚáíß ÇáÍÑÕ Úáì ÊÑÊíÈ ÌãíÚ ãæÇÚíÏ ÒíÇÑÉ ÇáØÈíÈ GNTFHETXP ÈåÇ¡ HOSJJYYC ÈØÈíÈß ÚäÏ YBXGGNAO ãä Ãí ãÔßáÇÊ. æãä ÇáÌíÏ ÃíÖðÇ Ãä ÊÚÑÝ äÊÇÆÌ DTXSIKJX æßÐáß MSSOXFXZ ÈÞÇÆãÉ ÇáÃÏæíÉ ÇáÊí ZFBUJAKF. 
Ãíä íãßä ãÚÑÝÉ ÇáãÒíÏ ÇäÊÞÇá Åáì [email or web address] ÏÎæá [address]314 íãßäß ãÚÑÝÉ ÇáãÒíÏ ãä ÎáÇá ãÑÈÚ ÇáÈÍË \"ÇáÊÚÑÝ Úáì COPD - [ Learning About COPD ]. \" 
© 5136-0391 [employer]. ÊãÊ ÊåíÆÉ ÅÑÔÇÏÇÊ ÇáÚäÇíÉ ÈãæÌÈ ÊÑÎíÕ ãä ãÎÊÕ ÇáÑÚÇíÉ ÇáÕÍíÉ áÏíß. ÅÐÇ ßÇäÊ áÏíß ÃíÉ KVEVFQWRI Úä ÍÇáÉ ØÈíÉ Ãæ Ãí ãä åÐå POIEDTKKJ¡ ÝÊæÌå ÏæãðÇ UBUXNDW Åáì ãÎÊÕ ÇáÑÚÇíÉ ÇáÕÍíÉ. FabienneÝí ãäÙãÉ [employer] Carmen Pineda ÖãÇä Ãæ Dominguez Forward NUFOFSL åÐå ZTCWIDPEQ. ÅÕÏÇÑ ÇáãÍÊæì: 11.4 ãÍÏøË CWDAXFCQ ãä: 26 QVACR EHTADDE 1360 Learning About COPD Triggers What are triggers? When you have COPD (chronic obstructive pulmonary disease), certain things can make your symptoms worse. These are called triggers. They include: · Cigarette smoke or air pollution. · Illnesses like colds, flu, or pneumonia. · Cleaning supplies or other chemicals. · Gases, particles, or fumes from wood or kerosene home heaters. Not all people have the same triggers. What may cause symptoms in one person may not be a problem for another person. How do triggers affect COPD?  
Triggers can make it harder for your lungs to work as they should and can lead to sudden difficulty breathing and other symptoms. When you are around a trigger, a COPD flare-up is more likely. If your symptoms are severe, you may need emergency treatment or have to go to the hospital for treatment. If you know what your triggers are and can avoid them, you can reduce how often you have flare-ups and how much COPD affects your life. It's also important to be active and to take your daily medicines as prescribed. This helps prevent flare-ups too. What can you do to avoid triggers? The first thing is to know your triggers. When you are having symptoms, note the things around you that might be causing them. Then look for patterns in what may be triggering your symptoms. When you have your list of possible triggers, work with your doctor to find ways to avoid them. Here are some ways to avoid a few common triggers. · Do not smoke or allow others to smoke around you. If you need help quitting, talk to your doctor about stop-smoking programs and medicines. These can increase your chances of quitting for good. · If there is a lot of pollution, pollen, or dust outside, stay at home and keep your windows closed. Use an air conditioner or air filter in your home. Check your local weather report or newspaper for air quality and pollen reports. · Get a flu vaccine every year. Talk to your doctor about getting a pneumococcal shot. Wash your hands often to prevent infections. How can you manage a flare-up? Do not panic if you start to have a COPD flare-up. · If you have a COPD action plan, follow the plan. In general: ¨ Use your quick-relief inhaler as directed by your doctor. If your symptoms do not get better after you use your medicine, have someone take you to the emergency room. Call an ambulance if needed. ¨ Use a spacer with your metered-dose inhaler (MDI). If you have a nebulizer for inhaled medicine, use it. A spacer or nebulizer may help get more medicine to your lungs. ¨ If your doctor has given you other inhaled medicines or steroid pills, take them as directed. ¨ If your doctor has given you a prescription for an antibiotic, fill it if you need to. ¨ Call your doctor if you have to use your antibiotic or steroid pills. Where can you learn more? Go to http://shima-alyssa.info/. Enter Z731 in the search box to learn more about \"Learning About COPD Triggers. \" Current as of: May 12, 2017 Content Version: 11.4 © 1384-6621 [employer]. Care instructions adapted under license by [employer] (which disclaims liability or warranty for this information). If you have questions about a medical condition or this instruction, always ask your healthcare professional. [address] any warranty or liability for your use of this information. [employer] Announcement We are excited to announce that we are making your provider's discharge notes available to you in Kriyari. You will see these notes when they are completed and signed by the physician that discharged you from your recent hospital stay. If you have any questions or concerns about any information you see in Kriyari, please call the Health Information Department where you were seen or reach out to your Primary Care Provider for more information about your plan of care. Introducing [facility]! Dear Chintan Avilez: Thank you for requesting a Kriyari account. Our records indicate that you have previously registered for a K[employer] account but its currently inactive. Please call our K[employer]yari support line at [phone]. Additional Information If you have questions, please visit the Frequently Asked Questions section of the [employer] website at [email or web address] Remember, [employer] is NOT to be used for urgent needs. For medical emergencies, dial 911. Now available from your iPhone and Android! Unresulted Labs-Please follow up with your PCP about these lab tests Order Current Status DUPLEX LOWER EXT VENOUS BILAT Preliminary result Providers Seen During Your Hospitalization Provider Specialty Primary office phone Lidia Rausch MD Emergency Medicine [phone] Luciano Church DO [facility] [phone] Your Primary Care Physician (PCP) Primary Care Physician Office Phone Office Fax [place] [phone] You are allergic to the following Allergen Reactions Hydrochlorothiazide Anaphylaxis Unproven on Challenge Losartan Anaphylaxis Unproven on Challenge Sulfa (Sulfonamide Antibiotics) Anaphylaxis When she was on Losartan (8/2017), and then again when she was on HCTZ (9/2017). She was also on lasix since 2016. Eggplant Other (comments) Severe stomach pain Lasix (Furosemide) Unproven on Challenge May lead to anaphylaxis Recent Documentation Height Weight BMI OB Status Smoking Status 1.524 m 132.5 kg 57.05 kg/m2 Postmenopausal Current Every Day Smoker Emergency Contacts Name Discharge Info Relation Home Work Mobile Anya Tiwari DISCHARGE CAREGIVER [3] Spouse [3] [phone] [address] CAREGIVER [3] Daughter [21] [phone] [address] CAREGIVER [3] Daughter [21] [phone] Patient Belongings The following personal items are in your possession at time of discharge: 
     Visual Aid: None Discharge Instructions Attachments/References MEFS - FAMOTIDINE (ACID CONTROLLER, ACID REDUCER, PEPCID AC, PEPCID) - (BY MOUTH) (ENGLISH) MEFS - PREDNISONE (PREDNISONE INTENSOL, PREDNICOT, DELTASONE, KAYY) - (BY MOUTH) (ENGLISH) MEFS - GUAIFENESIN (ALLFEN, ANTITUSSIN, CHEST CONGESTION RELIEF, CHILDREN'S MUCINEX) - (BY MOUTH) (ENGLISH) MEFS - LEVOFLOXACIN (LEVAQUIN, LEVAQUIN LEVA-ELLA) - (BY MOUTH) (ENGLISH) MEFS - SPIRONOLACTONE (ALDACTONE, CAROSPIR) - (BY MOUTH) (ENGLISH) MEFS - HYDRALAZINE (APRESOLINE) - (BY MOUTH) (ENGLISH) Patient Handouts Famotidine (Acid Controller, Acid Reducer, Pepcid AC, Pepcid) - (By mouth) Why this medicine is used:  
Treats ulcers, heartburn, and gastroesophageal reflux disease (GERD). Contact a nurse or doctor right away if you have: · Blistering, peeling, red skin rash · Unusual bleeding, bruising, weakness · Fever, chills, cough, sore throat, and body aches Common side effects: 
· Constipation, diarrhea, upset stomach 
· Headache, dizziness © 2017 [facility] Information is for End User's use only and may not be sold, redistributed or otherwise used for commercial purposes. Prednisone (predniSONE Intensol, Prednicot, Deltasone, Martin) - (By mouth) Why this medicine is used:  
Treats many diseases and conditions, especially problems related to inflammation. Contact a nurse or doctor right away if you have: 
· Rapid weight gain; swelling in your hands, ankles, or feet · Severe stomach pain, nausea, vomiting, or red or black stools · Depression, unusual thoughts, feelings, or behaviors, trouble sleeping · Fever, chills, cough, sore throat, and body aches · Trouble seeing, eye pain Common side effects: 
· Increased appetite, weight gain · Round, puffy face · Muscle pain, weakness © 2017 [facility] Information is for End User's use only and may not be sold, redistributed or otherwise used for commercial purposes. Guaifenesin (Allfen, Antitussin, Chest Congestion Relief, Children's Mucinex) - (By mouth) Why this medicine is used: Thins mucus so you can clear it from your head, throat, and lungs. Contact a nurse or doctor right away if you have: · Blood in urine · Pain in side, back, or abdomen Common side effects: · Mild nausea, vomiting © 2017 [facility] Information is for End User's use only and may not be sold, redistributed or otherwise used for commercial purposes. Levofloxacin (Levaquin, Levaquin Leva-gregorio) - (By mouth) Why this medicine is used:  
Treats infections. Contact a nurse or doctor right away if you have: · Blistering, peeling, red skin rash · Fast, slow, or uneven heartbeat; lightheadedness or fainting · Dark urine or pale stools, loss of appetite, stomach pain, yellow skin or eyes · Severe or bloody diarrhea · Pain, stiffness, swelling, or bruises around your ankle, leg, shoulder, or other joint Common side effects: · Mild nausea, vomiting, diarrhea · Mild headache © 2017 [facility] Information is for End User's use only and may not be sold, redistributed or otherwise used for commercial purposes. Spironolactone (Aldactone, Carospir) - (By mouth) Why this medicine is used:  
Treats high blood pressure, edema (fluid retention), or high levels of aldosterone (a hormone). Contact a nurse or doctor right away if you have: · Blistering, peeling, red skin rash · Blood in your stools or dark stools, vomiting blood or material that looks like coffee grounds · Confusion, weakness, uneven heartbeat, trouble breathing, numbness or tingling · Dry mouth, increased thirst, muscle cramps, nausea, vomiting · Increased hunger or thirst, change in how much or how often you urinate · Lightheadedness, dizziness, drowsiness, fainting, muscle twitching · Unusual bleeding, bruising, or weakness Common side effects: · Breast swelling, enlargement, pain, or tenderness © 2017 [facility] Information is for End User's use only and may not be sold, redistributed or otherwise used for commercial purposes. Hydralazine (Apresoline) - (By mouth) Why this medicine is used:  
Treats high blood pressure. Contact a nurse or doctor right away if you have: · Chest pain that may spread to your arms, jaw, back, or neck, trouble breathing · Dark urine or pale stools · Pain in your upper stomach, yellow skin or eyes · Lightheadedness, dizziness, fainting, unusual sweating · Numbness, tingling, or burning pain in your hands, arms, legs, or feet Common side effects: 
· Diarrhea, nausea, vomiting, loss of appetite · Headache © 2017 [address] Information is for End User's use only and may not be sold, redistributed or otherwise used for commercial purposes. Please provide this summary of care documentation to your next provider. Signatures-by signing, you are acknowledging that this After Visit Summary has been reviewed with you and you have received a copy. Patient Signature:  ____________________________________________________________ Date:  ____________________________________________________________  
  
Shazia Barbosa Provider Signature:  ____________________________________________________________ Date:  ____________________________________________________________

## 2018-03-16 NOTE — PROGRESS NOTES
Loc Gomez is an 50 y.o. female who presents with cc of flu-like symptoms       Pt states since Sunday she has been having SOB, body aches. dry cough, chills. No sick contacts but have been under lots of stress as her daughter in [facility] is getting . No ear pain, syncope, sore throat. Worsening dyspnea on exertion, worsening. She has albuterol at home that was prescribed for hx of anaphylactic reaction. She has used with 2 puffs per day for 2 days. She has taken OTC Sudafed, Motrin/ Tylenol. LE edema unchanged. Weight stable. Feels like fluid building in lungs. Of note per chart review, this patient has hx of angioedema, etiology is unknown. Started after losing her parents. Last admission 9/2017. Thought was to stop medication that she was on (lasix, losartan, and HCTZ), until she is too see allergist.  Today, she shares that she has not visited allergist as no insurance. She has obtained medications from home country and she was on losartan and HCTZ without issue. Daughter and pt state she has not taken any of those medications but when she gets upset her lips tend to swell. Not followed by cardiology, pulmonology, or sleep medicine. Smoking daily    Not employed     Allergies - reviewed: Allergies   Allergen Reactions    Hydrochlorothiazide Anaphylaxis and Unproven on Challenge    Losartan Anaphylaxis and Unproven on Challenge    Sulfa (Sulfonamide Antibiotics) Anaphylaxis     When she was on Losartan (8/2017), and then again when she was on HCTZ (9/2017). She was also on lasix since 2016.  Eggplant Other (comments)     Severe stomach pain     Lasix [Furosemide] Unproven on Challenge     May lead to anaphylaxis          Medications - reviewed:   No current facility-administered medications for this visit. Current Outpatient Prescriptions   Medication Sig    ibuprofen (MOTRIN) 200 mg tablet Take 200 mg by mouth every eight (8) hours as needed for Pain.     PHENYLEPHRINE/DM/ACETAMINOP/GG (SUDAFED PE COLD AND COUGH PO) Take 1 Tab by mouth daily as needed (congestion).  metoprolol tartrate (LOPRESSOR) 25 mg tablet Take 1 Tab by mouth two (2) times a day.  EPINEPHrine (ADRENACLICK) 0.3 MI/3.9 mL injection 0.3 mL by IntraMUSCular route once as needed for up to 1 dose.  albuterol (PROVENTIL HFA, VENTOLIN HFA, PROAIR HFA) 90 mcg/actuation inhaler Take 2 Puffs by inhalation every four (4) hours as needed for Wheezing.      Facility-Administered Medications Ordered in Other Visits   Medication Dose Route Frequency    [START ON 3/17/2018] methylPREDNISolone (PF) (SOLU-MEDROL) injection 125 mg  125 mg IntraVENous Q12H    [START ON 3/17/2018] levoFLOXacin (LEVAQUIN) 750 mg in D5W IVPB  750 mg IntraVENous Q24H    albuterol-ipratropium (DUO-NEB) 2.5 MG-0.5 MG/3 ML  3 mL Nebulization Q4H RT    guaiFENesin ER (MUCINEX) tablet 600 mg  600 mg Oral Q12H    metoprolol tartrate (LOPRESSOR) tablet 25 mg  25 mg Oral BID    pantoprazole (PROTONIX) 40 mg in sodium chloride 10 mL injection  40 mg IntraVENous Q12H    glucose chewable tablet 16 g  4 Tab Oral PRN    dextrose (D50W) injection syrg 12.5-25 g  12.5-25 g IntraVENous PRN    glucagon (GLUCAGEN) injection 1 mg  1 mg IntraMUSCular PRN    insulin lispro (HUMALOG) injection   SubCUTAneous AC&HS         Past Medical History - reviewed:  Past Medical History:   Diagnosis Date    Anxiety and depression     Flu     2 wks ago    GERD (gastroesophageal reflux disease)     Headache(784.0)     Hypertension     Incidental lung nodule, > 3mm and < 8mm 2016    left lung nodule in smoker, needs follow up CT in Oct 2016          Past Surgical History - reviewed:   Past Surgical History:   Procedure Laterality Date    APPENDECTOMY      HX APPENDECTOMY      HX  SECTION      X 3 Births    HX  SECTION      HX CHOLECYSTECTOMY      HX WRIST FRACTURE TX      metal    REMOVAL GALLBLADDER           Social History - reviewed:  Social History     Social History    Marital status:      Spouse name: N/A    Number of children: N/A    Years of education: N/A     Occupational History    Not on file. Social History Main Topics    Smoking status: Current Every Day Smoker     Packs/day: 0.50     Years: 25.00     Types: Cigarettes    Smokeless tobacco: Never Used    Alcohol use No    Drug use: No    Sexual activity: Yes     Partners: Male     Other Topics Concern    Not on file     Social History Narrative    ** Merged History Encounter **         ** Merged History Encounter **              Family History - reviewed:  Family History   Problem Relation Age of Onset    Diabetes Mother     Hypertension Brother     Hypertension Brother     Hypertension Brother          Immunizations - reviewed:   Immunization History   Administered Date(s) Administered    Influenza Vaccine 10/22/2012    Pneumococcal Conjugate (PCV-7) 11/25/2013    Pneumococcal Polysaccharide (PPSV-23) 03/04/2017    Tdap 11/25/2013       ROS  Headaches:  no  Chest Pain:  no  SOB:  yes  Fevers:  no      Physical Exam  Visit Vitals    /80    Pulse 92    Temp 98.3 °F (36.8 °C) (Oral)    Resp 20    Ht 5' 1\" (1.549 m)    Wt 295 lb (133.8 kg)    LMP 12/31/2012    SpO2 91%    BMI 55.74 kg/m2       GEN: No apparent distress. Alert and oriented and responds to all questions appropriately. Morbidly Obese. Turkish speaker. Not ready to quit smoking  EYES: Conjunctiva clear; pupils round and reactive to light; extraocular movements are intact. NOSE: Turbinates are within normal limits. No drainage  OROPHYARYNX: No oral lesions or exudates. NECK: Supple; no masses; thyromegaly noted on left. Large neck circumference. LUNGS: Respirations unlabored; wheeze throughout. Productive cough with clear sputum.  Sat 92% while seating and 86-88% while walking  CARDIOVASCULAR: Regular, rate, and rhythm without murmurs, gallops or rubs   ABDOMEN: Soft; nontender; nondistended; normoactive bowel sounds  NEUROLOGIC: No focal neurologic deficits. Strength and sensation grossly intact. Coordination and gait grossly intact. EXT: 2+ pitting edema in lower ext extending to thighs bilaterally. Non-pitting edema in BUE extending to shoulder. SKIN: No obvious rashes or erythema or hives. Edema present without signs of infection. Negative rapid flu  No acute process on chest xray      Assessment/Plan    ICD-10-CM ICD-9-CM    1. Dyspnea, unspecified type R06.00 786.09 XR CHEST PA LAT      AMB POC MAYNOR INFLUENZA A/B TEST      ALBUTEROL IPRATROP NON-COMP      W[place] PRESSURIZED/NONPRESSURIZED INHALATION TREATMENT      AMB POC PULSE OXIMETRY, CONTINUOUS      DISCONTINUED: albuterol-ipratropium (DUO-NEB) 2.5 mg-0.5 mg/3 ml nebu   2. Lymphedema I89.0 457.1    3. Hypoxia R09.02 799.02    4. Morbid obesity ([address] E66.01 278.01    5. Tobacco abuse Z72.0 305.1      Negative chest xray  Negative flu    Gave one breathing treatment and pt still pulse ox low 80s with minimal exertion. Will send to ED for further work-up. Discussed pt with Dr. Alex Levine. DDx: PNA, PE, COPD exacerbation, bronchitis, CHF, ACS    Daughter came to clinic and will take pt to ED. Precautions given. Verbally understand. I have reviewed/discussed the above normal BMI with the patient. I have recommended the following interventions: dietary management education, guidance, and counseling, encourage exercise and monitor weight . Kaitlynn Dies Not ready to quit smoking    Plan once she is back to baseline is to start hctz, low dose for diuretic effect and increase as tolerated. And decrease BB if this will be done. This will be done as pt was taking medication before without cause of angioedema. She will need to see allergist to assess if any current medication do cause reaction. Will need to recheck A1C and lipid panel.  As no medication was initially started as she was given a trial of lifestyle change and diet improvement. Follow-up Disposition:  Return in about 1 week (around 3/23/2018). I have discussed the diagnosis with the patient and the intended plan as seen in the above orders. Patient verbalized understanding of the plan and agrees with the plan. The patient has received an after-visit summary and questions were answered concerning future plans. I have discussed medication side effects and warnings with the patient as well. Informed patient to return to the office if new symptoms arise.         Sury Calles DO  Family Medicine Resident

## 2018-03-16 NOTE — PATIENT INSTRUCTIONS
Go to [facility] ED    While in clinic you were SOB and pulse OX decreased to 85   Afebrile    Chest xray negative  Flu negative    Had wheezing and rhonchi on exam and continued to have dyspnea on exertion     Gave duo-neb treatment x1    Daughter to take pt to ED    Talked to ED and made them aware    Pt may need a d-dimer, CTA chest, EKG. Pt not followed by pulmonology. ÖíÞ Ýí ÇáÊäÝÓ: ÅÑÔÇÏÇÊ ÇáÑÚÇíÉ  [ Shortness of Breath: Care Instructions ]  ÅÑÔÇÏÇÊ ÇáÑÚÇíÉ ÇáÎÇÕÉ Èß  íÍÏË ÖíÞ ÇáÊäÝÓ äÊíÌÉ ÇáÚÏíÏ ãä ÇáÃÓÈÇÈ. ÝÝí ÈÚÖ ÇáÃÍíÇä¡ íãßä ááÃãÑÇÖ ãËá ÇáÞáÞ Ãä ÊÄÏí Åáì ÖíÞ ÇáÊäÝÓ. ßãÇ íõÕÇÈ ÈÚÖ ÇáÃÝÑÇÏ ÈÖíÞ ÇáÊäÝÓ ÃËäÇÁ ããÇÑÓÉ ÇáÑíÇÖÉ. æßÐáß íãßä Ãä Êßæä ÕÚæÈÉ ÇáÊäÝÓ BZUCOEC íÏá Úáì EFBXEJTG ÇáÎØíÑÉ ãËá ÇáÑÈæ æÃãÑÇÖ ÇáÑÆÉ æÇäÊÝÇÎ ÇáÑÆÉ æãÔßáÇÊ ÇáÞáÈ [id number] ÇáÑÆæí. æÅÐÇ ÇÓÊãÑ ÖíÞ ÇáÊäÝÓ ÝÞÏ ÊÍÊÇÌ Åáì ÅÌÑÇÁ [id number] æÇáÚáÇÌ. ÑÇÞÈ ÇáÊÛííÑÇÊ Ýí ÇáÃáã XAONWIPD ÇáÃÎÑì. ÊõÚÏ ÑÚÇíÉ ÇáãÊÇÈÚÉ ÌÒÁðÇ ÃÓÇÓíðÇ Ýí ÚáÇÌß æÓáÇãÊß. ÝÚáíß ÇáÍÑÕ Úáì ÊÑÊíÈ ÌãíÚ ãæÇÚíÏ ÒíÇÑÉ ÇáØÈíÈ [id number] ÈåÇ¡ MMHDEIYJ ÈØÈíÈß ÚäÏ [id number] ãä Ãí ãÔßáÇÊ. æãä ÇáÌíÏ ÃíÖðÇ Ãä ÊÚÑÝ äÊÇÆÌ PBOOSBRE æßÐáß [id number] ÈÞÇÆãÉ ÇáÃÏæíÉ ÇáÊí DXMXTTAI. ßíÝ íãßäß ÇáÇÚÊäÇÁ ÈäÝÓß Ýí ÇáãäÒá  · áÇ ÊÏÎä æáÇ ÊÏÚ ÇáÂÎÑíä íÏÎäæä ãä Íæáß. ÅÐÇ ßäÊ ÈÍÇÌÉ Åáì ÇáãÓÇÚÏÉ ááÅÞáÇÚ Úä ÇáÊÏÎíä¡ ÝÚáíß ÇáÊÍÏË ãÚ ÇáØÈíÈ Íæá ÇáÈÑÇãÌ æÇáÃÏæíÉ ÇáÎÇÕÉ ÈÇáÊæÞÝ Úä ÇáÊÏÎíä. íãßä Ãä íÒíÏ åÐÇ ãä ÝÑÕ ÇáÅÞáÇÚ Úä ÇáÊÏÎíä äåÇÆíðÇ.  · ÎõÐ ÞÓØðÇ æÇÝÑðÇ ãä ÇáÑÇÍÉ æÇáäæã.  · ÊäÇæá ÇáÃÏæíÉ ÊãÇãðÇ ÍÓÈ ÊæÌíåÇÊ ÇáØÈíÈ. ÇÊÕá ÈÇáØÈíÈ ÅÐÇ ßäÊ ÊÚÊÞÏ Ãäß ÊæÇÌå ãÔßáÉ ÊÊÚáÞ ÈÇáÏæÇÁ.  · ÇßÊÔÝ ÇáØÑÞ ÇáÕÍíÉ ááÊÚÇãá ãÚ ÇáÖÛæØ. o o ãÇÑÓ ÇáÊãÇÑíä ÇáÑíÇÖíÉ íæãíðÇ. o o ÎÐ ãÇ íßÝí ãä Çáäæã. o o ÊäÇæá ÇáØÚÇã ÈÇäÊÙÇã æÈÔßá ÌíÏ. ãÊì íäÈÛí áß [id number] áØáÈ ÇáãÓÇÚÏÉ
  íÊã AWYAHKN ÈÇáÑÞã 911 ÚäÏ ÇáÇÚÊÞÇÏ Ãäß ÈÍÇÌÉ Åáì ÑÚÇíÉ ØÇÑÆÉ. Úáì ÓÈíá QNYQYC¡ ÇÊÕá Ýí ÇáÍÇáÇÊ ÇáÊÇáíÉ:   · ÊõÚÇäí ãä ÖíÞ ÍÇÏ ÈÇáÊäÝÓ.  · ÅÐÇ ßäÊ ÊÚÇäí ãä ÃÚÑÇÖ ÃÒãÉ ÞáÈíÉ.  ÊÔÊãá åÐå ÇáÃÚÑÇÖ Úáì ãÇ íáí:   o o Ãáã Ýí ÇáÕÏÑ Ãæ ÇáÖÛØ¡ Ãæ ÔÚæÑ ÛÑíÈ Ýí ÇáÕÏÑ. o o ÇáÊÚÑÞ. o o ÖíÞ Ýí ÇáÊäÝÓ. o o ÇáÛËíÇä Ãæ ÇáÞíÁ. o o Ãáã¡ Ãæ ÖÛØ¡ Ãæ ÔÚæÑ ÛÑíÈ Ýí ÇáÙåÑ¡ Ãæ ÇáÚäÞ¡ Ãæ ÇáÝß¡ Ãæ ÃÚáì ÇáÈØä Ãæ Ýí ÃÍÏ ÇáßÊÝíä¡ Ãæ ÇáÐÑÇÚíä Ãæ ßáíåãÇ Úáì ÍÏò ÓæÇÁ. o o ÇáÏæÇÑ Ãæ ÇáÖÚÝ ÇáãÝÇÌÆ. o o ÓÑÚÉ äÈÖÇÊ ÇáÞáÈ Ãæ ÚÏã ÇäÊÙÇãåÇ. ÈÚÏ OWRHOCT ÈÇáÑÞã 911¡ ÞÏ íäÕÍß ãæÙÝ ÇáØæÇÑÆ ÈãÖÛ ÞÑÕ ÃÓÈíÑíä æÇÍÏ ááßÈÇÑ¡ Ãæ ÊäÇæá ãä 2 Åáì 4 ÃÞÑÇÕ ãä ÇáÃÓÈíÑíä ãäÎÝÖ ÇáÌÑÚÉ. ÇäÊÙÑ ÞÏæã ÓíÇÑÉ ÇáÅÓÚÇÝ. áÇ ÊÍÇæá ÇáÞíÇÏÉ ÈäÝÓß. Úáíß ÇáÇÊÕÇá ÈØÈíÈß Úáì ÇáÝæÑ Ãæ ØáÈ ÑÚÇíÉ ØÈíÉ ÝæÑíÉ Ýí ÇáÍÇáÇÊ ÇáÊÇáíÉ:   · íÒÏÇÏ ÍÇáÉ ÇáÖíÞ ÈÇáÊäÝÓ ÓæÁðÇ Ãæ ÊÈÏÃ Ýí ÅÕÏÇÑ ÕÝíÑ ÃËäÇÁ ÇáÊäÝÓ. æÇáÕÝíÑ ÃËäÇÁ ÇáÊäÝÓ åæ ÕæÊ ÚÇáí ÇáäÈÑÉ íÍÏË ÚäÏ ÇáÊäÝÓ.  · ÊÓÊíÞÙ ÈÇáíá ÈÓÈÈ ÇáÊäÝÓ Ãæ ÊÖØÑ Åáì ÓäÏ ÑÃÓß áÃÚáì Úáì ÚÏÉ æÓÇÆÏ áÊÊäÝÓ.  · ÊÚÇäí ãä ÖíÞ ÇáÊäÝÓ ÈÚÏ ÇáÞíÇã ÈäÔÇØ ÎÝíÝ ÝÞØ Ãæ ÚäÏãÇ Êßæä Ýí ÇáÑÇÍÉ. Úáíß ãÑÇÞÈÉ Ãí ÊÛíÑÇÊ ÊØÑÃ Úáì ÕÍÊß ÌíÏðÇ¡ æÇáÍÑÕ Úáì ÇáÇÊÕÇá ÈÇáØÈíÈ Ýí ÇáÍÇáÇÊ ÇáÊÇáíÉ:   · áÇ ÊÔÚÑ ÈÇáÊÍÓä ÈÚÏ ãÑæÑ ÝÊÑÉ ãä íæã Åáì íæãíä. Ãíä íãßä ãÚÑÝÉ ÇáãÒíÏ ÇäÊÞÇá Åáì [email or web address] ÏÎæá [address]780 íãßäß ãÚÑÝÉ ÇáãÒíÏ ãä ÎáÇá ãÑÈÚ ÇáÈÍË \"ÖíÞ Ýí ÇáÊäÝÓ: ÅÑÔÇÏÇÊ ÇáÑÚÇíÉ - [ Shortness of Breath: Care Instructions ]. \"  © 5093-7428 [employer], [employer]. ÊãÊ ÊåíÆÉ ÅÑÔÇÏÇÊ ÇáÚäÇíÉ ÈãæÌÈ ÊÑÎíÕ ãä ãÎÊÕ ÇáÑÚÇíÉ ÇáÕÍíÉ áÏíß. ÅÐÇ ßÇäÊ áÏíß ÃíÉ IMYIDIRLU Úä ÍÇáÉ ØÈíÉ Ãæ Ãí ãä åÐå SAZBRYWZQ¡ ÝÊæÌå ÏæãðÇ [id number] Åáì ãÎÊÕ ÇáÑÚÇíÉ ÇáÕÍíÉ. ÊäÝí ãäÙãÉ [employer], [employer] Adelene Roers ÖãÇä Ãæ Burnetta Adas [id number] åÐå [id number].   ÅÕÏÇÑ ÇáãÍÊæì: 11.4 Noy UUBFANJW ãä: 26 KXVYJ [facility] 6895

## 2018-03-16 NOTE — PROGRESS NOTES
50year old female with body aches and chills since Sunday  Smokes 3 cigarettes a day    Has lymphedema   Last echo normal two years ago  Hx anaphylaxis with etiology unknown x 3 times (lip swelling, focal vocal cord edema)    Unable to see allergist because she does not have insurance  Has declined seeing cardiologist due to finances    Not employed     Afebrile  Taking cold and flu medication     Pulse oximetry down to mid 80's with minimal exertion   To ED for further evaluation     I saw and evaluated the patient, performing the key elements of the service. I discussed the findings, assessment and plan with the resident and agree with the resident's findings and plan as documented in the resident's note.

## 2018-03-17 LAB
ALBUMIN SERPL-MCNC: 3.3 G/DL (ref 3.5–5)
ALBUMIN/GLOB SERPL: 0.6 {RATIO} (ref 1.1–2.2)
ALP SERPL-CCNC: 66 U/L (ref 45–117)
ALT SERPL-CCNC: 24 U/L (ref 12–78)
ANION GAP SERPL CALC-SCNC: 9 MMOL/L (ref 5–15)
APPEARANCE UR: CLEAR
AST SERPL-CCNC: 19 U/L (ref 15–37)
BACTERIA URNS QL MICRO: NEGATIVE /HPF
BASOPHILS # BLD: 0 K/UL (ref 0–0.1)
BASOPHILS NFR BLD: 0 % (ref 0–1)
BILIRUB SERPL-MCNC: 0.5 MG/DL (ref 0.2–1)
BILIRUB UR QL: NEGATIVE
BUN SERPL-MCNC: 15 MG/DL (ref 6–20)
BUN/CREAT SERPL: 19 (ref 12–20)
CALCIUM SERPL-MCNC: 9.6 MG/DL (ref 8.5–10.1)
CHLORIDE SERPL-SCNC: 103 MMOL/L (ref 97–108)
CO2 SERPL-SCNC: 28 MMOL/L (ref 21–32)
COLOR UR: ABNORMAL
CREAT SERPL-MCNC: 0.77 MG/DL (ref 0.55–1.02)
DIFFERENTIAL METHOD BLD: ABNORMAL
EOSINOPHIL # BLD: 0 K/UL (ref 0–0.4)
EOSINOPHIL NFR BLD: 0 % (ref 0–7)
EPITH CASTS URNS QL MICRO: ABNORMAL /LPF
ERYTHROCYTE [DISTWIDTH] IN BLOOD BY AUTOMATED COUNT: 16 % (ref 11.5–14.5)
EST. AVERAGE GLUCOSE BLD GHB EST-MCNC: 140 MG/DL
GLOBULIN SER CALC-MCNC: 5.1 G/DL (ref 2–4)
GLUCOSE BLD STRIP.AUTO-MCNC: 154 MG/DL (ref 65–100)
GLUCOSE BLD STRIP.AUTO-MCNC: 174 MG/DL (ref 65–100)
GLUCOSE BLD STRIP.AUTO-MCNC: 202 MG/DL (ref 65–100)
GLUCOSE BLD STRIP.AUTO-MCNC: 225 MG/DL (ref 65–100)
GLUCOSE SERPL-MCNC: 187 MG/DL (ref 65–100)
GLUCOSE UR STRIP.AUTO-MCNC: NEGATIVE MG/DL
HBA1C MFR BLD: 6.5 % (ref 4.2–6.3)
HCT VFR BLD AUTO: 46.3 % (ref 35–47)
HGB BLD-MCNC: 15.1 G/DL (ref 11.5–16)
HGB UR QL STRIP: NEGATIVE
HYALINE CASTS URNS QL MICRO: ABNORMAL /LPF (ref 0–5)
IMM GRANULOCYTES # BLD: 0.1 K/UL (ref 0–0.04)
IMM GRANULOCYTES NFR BLD AUTO: 1 % (ref 0–0.5)
KETONES UR QL STRIP.AUTO: NEGATIVE MG/DL
LEUKOCYTE ESTERASE UR QL STRIP.AUTO: NEGATIVE
LYMPHOCYTES # BLD: 1.5 K/UL (ref 0.8–3.5)
LYMPHOCYTES NFR BLD: 12 % (ref 12–49)
MCH RBC QN AUTO: 28.7 PG (ref 26–34)
MCHC RBC AUTO-ENTMCNC: 32.6 G/DL (ref 30–36.5)
MCV RBC AUTO: 87.9 FL (ref 80–99)
MONOCYTES # BLD: 0.3 K/UL (ref 0–1)
MONOCYTES NFR BLD: 3 % (ref 5–13)
NEUTS SEG # BLD: 10.3 K/UL (ref 1.8–8)
NEUTS SEG NFR BLD: 84 % (ref 32–75)
NITRITE UR QL STRIP.AUTO: NEGATIVE
NRBC # BLD: 0 K/UL (ref 0–0.01)
NRBC BLD-RTO: 0 PER 100 WBC
PH UR STRIP: 5.5 [PH] (ref 5–8)
PLATELET # BLD AUTO: 265 K/UL (ref 150–400)
PMV BLD AUTO: 9.8 FL (ref 8.9–12.9)
POTASSIUM SERPL-SCNC: 4.4 MMOL/L (ref 3.5–5.1)
PROT SERPL-MCNC: 8.4 G/DL (ref 6.4–8.2)
PROT UR STRIP-MCNC: 100 MG/DL
RBC # BLD AUTO: 5.27 M/UL (ref 3.8–5.2)
RBC #/AREA URNS HPF: ABNORMAL /HPF (ref 0–5)
SERVICE CMNT-IMP: ABNORMAL
SODIUM SERPL-SCNC: 140 MMOL/L (ref 136–145)
SP GR UR REFRACTOMETRY: 1.03 (ref 1–1.03)
UR CULT HOLD, URHOLD: NORMAL
UROBILINOGEN UR QL STRIP.AUTO: 0.2 EU/DL (ref 0.2–1)
WBC # BLD AUTO: 12.3 K/UL (ref 3.6–11)
WBC URNS QL MICRO: ABNORMAL /HPF (ref 0–4)

## 2018-03-17 PROCEDURE — 94640 AIRWAY INHALATION TREATMENT: CPT

## 2018-03-17 PROCEDURE — 36415 COLL VENOUS BLD VENIPUNCTURE: CPT | Performed by: STUDENT IN AN ORGANIZED HEALTH CARE EDUCATION/TRAINING PROGRAM

## 2018-03-17 PROCEDURE — 81001 URINALYSIS AUTO W/SCOPE: CPT

## 2018-03-17 PROCEDURE — C9113 INJ PANTOPRAZOLE SODIUM, VIA: HCPCS | Performed by: STUDENT IN AN ORGANIZED HEALTH CARE EDUCATION/TRAINING PROGRAM

## 2018-03-17 PROCEDURE — 74011250637 HC RX REV CODE- 250/637: Performed by: FAMILY MEDICINE

## 2018-03-17 PROCEDURE — 74011250637 HC RX REV CODE- 250/637: Performed by: STUDENT IN AN ORGANIZED HEALTH CARE EDUCATION/TRAINING PROGRAM

## 2018-03-17 PROCEDURE — 82962 GLUCOSE BLOOD TEST: CPT

## 2018-03-17 PROCEDURE — 74011250636 HC RX REV CODE- 250/636: Performed by: STUDENT IN AN ORGANIZED HEALTH CARE EDUCATION/TRAINING PROGRAM

## 2018-03-17 PROCEDURE — 74011000250 HC RX REV CODE- 250: Performed by: STUDENT IN AN ORGANIZED HEALTH CARE EDUCATION/TRAINING PROGRAM

## 2018-03-17 PROCEDURE — 97161 PT EVAL LOW COMPLEX 20 MIN: CPT

## 2018-03-17 PROCEDURE — 80053 COMPREHEN METABOLIC PANEL: CPT | Performed by: STUDENT IN AN ORGANIZED HEALTH CARE EDUCATION/TRAINING PROGRAM

## 2018-03-17 PROCEDURE — 85025 COMPLETE CBC W/AUTO DIFF WBC: CPT | Performed by: STUDENT IN AN ORGANIZED HEALTH CARE EDUCATION/TRAINING PROGRAM

## 2018-03-17 PROCEDURE — 74011250636 HC RX REV CODE- 250/636: Performed by: FAMILY MEDICINE

## 2018-03-17 PROCEDURE — 74011636637 HC RX REV CODE- 636/637: Performed by: STUDENT IN AN ORGANIZED HEALTH CARE EDUCATION/TRAINING PROGRAM

## 2018-03-17 PROCEDURE — 65660000000 HC RM CCU STEPDOWN

## 2018-03-17 RX ORDER — HYDRALAZINE HYDROCHLORIDE 20 MG/ML
20 INJECTION INTRAMUSCULAR; INTRAVENOUS AS NEEDED
Status: DISCONTINUED | OUTPATIENT
Start: 2018-03-17 | End: 2018-03-17

## 2018-03-17 RX ORDER — FLUTICASONE PROPIONATE 50 MCG
2 SPRAY, SUSPENSION (ML) NASAL DAILY
Status: DISCONTINUED | OUTPATIENT
Start: 2018-03-17 | End: 2018-03-19 | Stop reason: HOSPADM

## 2018-03-17 RX ORDER — HYDRALAZINE HYDROCHLORIDE 20 MG/ML
20 INJECTION INTRAMUSCULAR; INTRAVENOUS AS NEEDED
Status: DISCONTINUED | OUTPATIENT
Start: 2018-03-17 | End: 2018-03-19 | Stop reason: HOSPADM

## 2018-03-17 RX ORDER — BENZONATATE 100 MG/1
100 CAPSULE ORAL
Status: DISCONTINUED | OUTPATIENT
Start: 2018-03-17 | End: 2018-03-19 | Stop reason: HOSPADM

## 2018-03-17 RX ADMIN — INSULIN LISPRO 2 UNITS: 100 INJECTION, SOLUTION INTRAVENOUS; SUBCUTANEOUS at 11:58

## 2018-03-17 RX ADMIN — METHYLPREDNISOLONE SODIUM SUCCINATE 125 MG: 125 INJECTION, POWDER, FOR SOLUTION INTRAMUSCULAR; INTRAVENOUS at 17:52

## 2018-03-17 RX ADMIN — SODIUM CHLORIDE 40 MG: 9 INJECTION INTRAMUSCULAR; INTRAVENOUS; SUBCUTANEOUS at 09:08

## 2018-03-17 RX ADMIN — LEVOFLOXACIN 750 MG: 5 INJECTION, SOLUTION INTRAVENOUS at 21:45

## 2018-03-17 RX ADMIN — INSULIN LISPRO 3 UNITS: 100 INJECTION, SOLUTION INTRAVENOUS; SUBCUTANEOUS at 16:51

## 2018-03-17 RX ADMIN — Medication 10 ML: at 21:50

## 2018-03-17 RX ADMIN — FLUTICASONE PROPIONATE 2 SPRAY: 50 SPRAY, METERED NASAL at 18:03

## 2018-03-17 RX ADMIN — ENOXAPARIN SODIUM 40 MG: 40 INJECTION SUBCUTANEOUS at 21:49

## 2018-03-17 RX ADMIN — INSULIN LISPRO 2 UNITS: 100 INJECTION, SOLUTION INTRAVENOUS; SUBCUTANEOUS at 23:12

## 2018-03-17 RX ADMIN — METHYLPREDNISOLONE SODIUM SUCCINATE 125 MG: 125 INJECTION, POWDER, FOR SOLUTION INTRAMUSCULAR; INTRAVENOUS at 05:59

## 2018-03-17 RX ADMIN — SODIUM CHLORIDE 40 MG: 9 INJECTION INTRAMUSCULAR; INTRAVENOUS; SUBCUTANEOUS at 21:49

## 2018-03-17 RX ADMIN — METOPROLOL TARTRATE 25 MG: 25 TABLET ORAL at 09:06

## 2018-03-17 RX ADMIN — IPRATROPIUM BROMIDE AND ALBUTEROL SULFATE 3 ML: .5; 3 SOLUTION RESPIRATORY (INHALATION) at 05:59

## 2018-03-17 RX ADMIN — GUAIFENESIN 600 MG: 600 TABLET, EXTENDED RELEASE ORAL at 09:07

## 2018-03-17 RX ADMIN — Medication 10 ML: at 16:53

## 2018-03-17 RX ADMIN — IPRATROPIUM BROMIDE AND ALBUTEROL SULFATE 3 ML: .5; 3 SOLUTION RESPIRATORY (INHALATION) at 23:45

## 2018-03-17 RX ADMIN — IPRATROPIUM BROMIDE AND ALBUTEROL SULFATE 3 ML: .5; 3 SOLUTION RESPIRATORY (INHALATION) at 09:08

## 2018-03-17 RX ADMIN — IPRATROPIUM BROMIDE AND ALBUTEROL SULFATE 3 ML: .5; 3 SOLUTION RESPIRATORY (INHALATION) at 00:27

## 2018-03-17 RX ADMIN — IPRATROPIUM BROMIDE AND ALBUTEROL SULFATE 3 ML: .5; 3 SOLUTION RESPIRATORY (INHALATION) at 20:07

## 2018-03-17 RX ADMIN — ENOXAPARIN SODIUM 40 MG: 40 INJECTION SUBCUTANEOUS at 09:07

## 2018-03-17 RX ADMIN — INSULIN LISPRO 2 UNITS: 100 INJECTION, SOLUTION INTRAVENOUS; SUBCUTANEOUS at 09:08

## 2018-03-17 RX ADMIN — IPRATROPIUM BROMIDE AND ALBUTEROL SULFATE 3 ML: .5; 3 SOLUTION RESPIRATORY (INHALATION) at 11:58

## 2018-03-17 RX ADMIN — METOPROLOL TARTRATE 25 MG: 25 TABLET ORAL at 21:46

## 2018-03-17 RX ADMIN — GUAIFENESIN 600 MG: 600 TABLET, EXTENDED RELEASE ORAL at 21:45

## 2018-03-17 RX ADMIN — ENOXAPARIN SODIUM 40 MG: 40 INJECTION SUBCUTANEOUS at 00:33

## 2018-03-17 NOTE — PROGRESS NOTES
Problem: Falls - Risk of  Goal: *Absence of Falls  Document Michelle Fall Risk and appropriate interventions in the flowsheet.    Outcome: Progressing Towards Goal  Fall Risk Interventions:  Mobility Interventions: Bed/chair exit alarm         Medication Interventions: Bed/chair exit alarm

## 2018-03-17 NOTE — PROGRESS NOTES
[facility]   Senior Resident Admission Note    CC: \"sob and cough\"    HPI:  Edwin Soni is a 50 y.o. female with a hx of HTN, and tobacco abuse (30yr pack hx) who presents to the ER complaining of progressively worsening sob and cough for the past week. Has been using albuterol inhaler several times a day with minimal improvement. In ER ABG significant for PO2 63, otherwise unremarkable. CTA negative for PE and CXR showed no acute process. Patient is admitted for sob and hypoxia. Chart reviewed. Physical Exam   Constitutional: She is oriented to person, place, and time. She appears well-developed and well-nourished. Tired appearing. NAD   Neck: No JVD present. Cardiovascular: Regular rhythm and normal heart sounds. tachycardic   Pulmonary/Chest:   Breath sounds diminished throughout. No wheezing or crackles noted   Abdominal: Soft. Bowel sounds are normal. She exhibits no distension. Musculoskeletal: Normal range of motion. Neurological: She is alert and oriented to person, place, and time. Skin: Skin is warm and dry. Psychiatric:   anxious       A/P: 54yo female with hx of HTN and longtime smoking hx who presented with c/o sob and cough. Found to be hypoxic though CTA chest and CXR unremarkable. Given smoking hx symptoms likely d/t COPD exacerbation though no formal diagnosis. --Admit to tele  --Dyspnea/hypoxia: continue Levaquin and Solumedrol. Schedule nebs and mucinex. Monitor respiratory status. Will need PFTs once acute phase is over    --HTN: resume home Metoprolol. Monitor bp. Patient seen, examined, and discussed with Dr. Shanna Swift (PGY-1). For the remaining assessment and plan of other medical problems please refer to Dr. Vandana Apple H&P for more details.     Pt discussed with on-call attending physician    Tammy Kurtz DO  Family Medicine Resident

## 2018-03-17 NOTE — ROUTINE PROCESS
TRANSFER - OUT REPORT:    Verbal report given to Maddie bee RN on Blaire Abreu 31  being transferred to [facility] for routine progression of care       Report consisted of patients Situation, Background, Assessment and   Recommendations(SBAR). Information from the following report(s) SBAR, Kardex, ED Summary, [facility] and Recent Results was reviewed with the receiving nurse. Lines:   Peripheral IV 03/16/18 Right Antecubital (Active)   Site Assessment Clean, dry, & intact 3/16/2018  6:06 PM   Phlebitis Assessment 0 3/16/2018  6:06 PM   Infiltration Assessment 0 3/16/2018  6:06 PM   Dressing Status Clean, dry, & intact 3/16/2018  6:06 PM   Dressing Type Transparent 3/16/2018  6:06 PM   Hub Color/Line Status Pink 3/16/2018  6:06 PM        Opportunity for questions and clarification was provided.       Patient transported with:   O2 @ 4 liters

## 2018-03-17 NOTE — PROGRESS NOTES
TRANSFER - IN REPORT:    Verbal report received from [facility] (name) on Tamra Wallis  being received from ED(unit) for routine progression of care      Report consisted of patients Situation, Background, Assessment and   Recommendations(SBAR). Information from the following report(s) SBAR, Kardex and Recent Results was reviewed with the receiving nurse. Opportunity for questions and clarification was provided. Assessment completed upon patients arrival to unit and care assumed. ... Primary Nurse Melody Ramirez, RN and Frankie Da Silva RN, RN performed a dual skin assessment on this patient No impairment noted  Jose score is 23. .. 1730- UA/ARANZA TO LAB. .    1900-Bedside and Verbal shift change report given to [facility] EMERGENCY [facility] AT [facility] RN/ JE RN    (oncoming nurse) by Siria Astorga RN (offgoing nurse). Report included the following information SBAR, Kardex and Recent Results.

## 2018-03-17 NOTE — ED NOTES
Pt coughing forcefully and is having difficulty catching her breath. Repositioned her from supine to sitting at 90 degrees on edge of bed. Duoneb started; pt continues to be on 4L O2 NC. SPO2 94%. No wheezing, cough not productive. Advised patient to stop talking. 13:00: Reassessment after Duoneb: Pt's WOB much improved. Coughing ceased. Pt sitting upright on edge of bed. Left lung sounds clear to auscultation in both lobes, although diminished. Right lung diminished in upper and middle lobes, absent in lower lobe. Scattered wheezing in Right lung. 13:15: Pt's daughter requested that the admitting doctors discuss treatment of pt's BLE edema, which she says causes her mother extreme pain.

## 2018-03-17 NOTE — H&P
[address] [facility]   Admission H&P    Date of admission: 3/16/2018    Patient name: Yossi Kapoor  MRN: [[id number]]  YOB: 1965  Age: 50 y.o. Primary care provider: Keerthi Ervin DO     Source of Information: patient, medical records, daughters    Chief complaint:  Coughing and congestion X 1 week      History of Present Illness  Yossi Kapoor is a 50 y.o. female PMHx of tobacco use, Type 2 Diabetes,  HTN, Anxiety, GERD, Anaphylaxis of unknown etiology and Obestiy who presents to the ER complaining of cough, congestion and shortness of breath X 1 week. Patient reports she has been feeling short of breath with congestion and cough for the past week that has been progressively getting worse for the past 3 days. Patient has tried albuterol treatment X 4 today without any sympotom relief. Subsequently, patient went to [facility] today and was found to be hypoxic with pulse ox of mid 80's. Pt continued to be hypoxic with pulse ox of 88% s/p albuterol treatment and was sent to  to the hospital for further evaluation. Of note, patient is a  heavy smoker on average at least 1 PPD for the past 30 years. Patient denies history of alcohol use or drug use. Patient states not taking her medications for the past 2 months for unknown reasons. She denies seeing pulmonologist as an outpatient. She states she saw cardiologist in the past 2 years but was ruled out to have CAD. Patient endorses cough with wheezing, dyspnea on exertion, dizziness and poor PO intake. Patient denies fever, chest pain,sputum production, nausea, vomiting, abdominal pain or urinary sxs. In the ER,  Vital signs were remarkable for borderline tachycardia with pulse of 100, RR 33, SpO2 of 92%, BP of 166/97. Labs showed WBC 11.7, , Trop <0.04 and Pro BNP 88. Influenza A&B negative.  ABG 7.43/40/63/26 on 2L NC.   CT chest showed no large pulmonary embolus and  incidental adrenal nodule measureing 2.6cmX1.9 cm. CXr showed no acute intrathoracic disease. Pt was treated with Levaquin 1 time dose, Solumedrol 125 mg IV injection and duo-neb     Home Medications   Prior to Admission medications    Medication Sig Start Date End Date Taking? Authorizing Provider   albuterol-ipratropium (DUO-NEB) 2.5 mg-0.5 mg/3 ml nebu 3 mL by Nebulization route now for 1 dose. 3/16/18 3/16/18  Paras Jamison, DO   naproxen (NAPROSYN) 500 mg tablet Take 1 Tab by mouth two (2) times daily (with meals). 9/29/17   Efrian Chatterjee, DO   metoprolol tartrate (LOPRESSOR) 25 mg tablet Take 1 Tab by mouth two (2) times a day. 9/19/17   Paras Jamison, DO   EPINEPHrine (ADRENACLICK) 0.3 ND/6.8 mL injection 0.3 mL by IntraMUSCular route once as needed for up to 1 dose. 9/19/17   Efrain Chatterjee, DO   diphenhydrAMINE (BENADRYL) 25 mg capsule Take 25 mg by mouth every six (6) hours as needed. Lila Cabrales MD   albuterol (PROVENTIL HFA, VENTOLIN HFA, PROAIR HFA) 90 mcg/actuation inhaler Take 2 Puffs by inhalation every four (4) hours as needed for Wheezing. 3/4/17   Pavithra Vazquez MD         Allergies   Allergies   Allergen Reactions    Hydrochlorothiazide Anaphylaxis and Unproven on Challenge    Losartan Anaphylaxis and Unproven on Challenge    Sulfa (Sulfonamide Antibiotics) Anaphylaxis     When she was on Losartan (8/2017), and then again when she was on HCTZ (9/2017). She was also on lasix since 2016.     Eggplant Other (comments)     Severe stomach pain     Lasix [Furosemide] Unproven on Challenge     May lead to anaphylaxis          Past Medical History:   Diagnosis Date    Anxiety and depression     Flu     2 wks ago    GERD (gastroesophageal reflux disease)     Headache(784.0)     Hypertension     Incidental lung nodule, > 3mm and < 8mm 4/20/2016    left lung nodule in smoker, needs follow up CT in Oct 2016        Past Surgical History:   Procedure Laterality Date    APPENDECTOMY      HX APPENDECTOMY      HX  SECTION      X 3 Births    HX  SECTION      HX CHOLECYSTECTOMY      HX WRIST FRACTURE TX      metal    REMOVAL GALLBLADDER           Family History   Problem Relation Age of Onset    Diabetes Mother     Hypertension Brother     Hypertension Brother     Hypertension Brother          Social History   Patient resides  x  Independently      With family care      Assisted living      SNF      Ambulates  x  Independently      With cane       Assisted walker           Alcohol history   x  None     Social     Chronic     Smoking history    None     Former smoker   x  Current smoker     History   Smoking Status    Current Every Day Smoker    Packs/day: 0.50    Years: 25.00    Types: Cigarettes   Smokeless Tobacco    Never Used           Drug history  x  None     Former drug user     Current drug user       Code status  x  Full code     DNR/DNI     Partial    Code status discussed with the patient/caregivers. Review of Systems  See HPI      Physical Exam  Visit Vitals    BP (!) 175/93    Pulse (!) 107    Temp 98.1 °F (36.7 °C)    Resp (!) 36    Ht 5' (1.524 m)    Wt 280 lb (127 kg)    LMP 2012    SpO2 91%    BMI 54.68 kg/m2        General: Anxious, coughing  Alert. Cooperative. Head: Normocephalic. Atraumatic. Neck: Supple. Normal ROM. No stiffness. Respiratory: Decreased breath sounds throughout lung fields    Cardiovascular: RRR. Normal S1,S2. Daljit Hook Extremities: 2+ lower extremity edema. No tenderness. Musculoskeletal: Full ROM in all extremities. Skin: Clear. No rashes. No ulcers.     : Deferred   Rectal: Deferred       Laboratory Data  Recent Results (from the past 24 hour(s))   AMB POC MAYNOR INFLUENZA A/B TEST    Collection Time: 18  4:10 PM   Result Value Ref Range    VALID INTERNAL CONTROL POC Yes     Influenza A Ag POC Negative Negative Pos/Neg    Influenza B Ag POC Negative Negative Pos/Neg   BLOOD GAS, ARTERIAL    Collection Time: 18  6:05 PM Result Value Ref Range    pH 7.43 7.35 - 7.45      PCO2 40 35.0 - 45.0 mmHg    PO2 63 (L) 80 - 100 mmHg    O2 SAT 93 92 - 97 %    BICARBONATE 26 22 - 26 mmol/L    BASE EXCESS 1.4 mmol/L    O2 METHOD NASAL O2      O2 FLOW RATE 2.00 L/min    Sample source ARTERIAL      SITE LEFT RADIAL      TRACE'S TEST YES      Critical value read back LCARISA Chen RN    INFLUENZA A & B AG (RAPID TEST)    Collection Time: 03/16/18  6:09 PM   Result Value Ref Range    Influenza A Antigen NEGATIVE  NEG      Influenza B Antigen NEGATIVE  NEG     CBC WITH AUTOMATED DIFF    Collection Time: 03/16/18  6:10 PM   Result Value Ref Range    WBC 11.7 (H) 3.6 - 11.0 K/uL    RBC 5.18 3.80 - 5.20 M/uL    HGB 14.8 11.5 - 16.0 g/dL    HCT 45.2 35.0 - 47.0 %    MCV 87.3 80.0 - 99.0 FL    MCH 28.6 26.0 - 34.0 PG    MCHC 32.7 30.0 - 36.5 g/dL    RDW 15.9 (H) 11.5 - 14.5 %    PLATELET 508 769 - 482 K/uL    MPV 9.8 8.9 - 12.9 FL    NRBC 0.0 0  WBC    ABSOLUTE NRBC 0.00 0.00 - 0.01 K/uL    NEUTROPHILS 64 32 - 75 %    LYMPHOCYTES 26 12 - 49 %    MONOCYTES 7 5 - 13 %    EOSINOPHILS 3 0 - 7 %    BASOPHILS 0 0 - 1 %    IMMATURE GRANULOCYTES 0 0.0 - 0.5 %    ABS. NEUTROPHILS 7.4 1.8 - 8.0 K/UL    ABS. LYMPHOCYTES 3.1 0.8 - 3.5 K/UL    ABS. MONOCYTES 0.8 0.0 - 1.0 K/UL    ABS. EOSINOPHILS 0.4 0.0 - 0.4 K/UL    ABS. BASOPHILS 0.0 0.0 - 0.1 K/UL    ABS. IMM.  GRANS. 0.0 0.00 - 0.04 K/UL    DF AUTOMATED     METABOLIC PANEL, COMPREHENSIVE    Collection Time: 03/16/18  6:10 PM   Result Value Ref Range    Sodium 142 136 - 145 mmol/L    Potassium 3.7 3.5 - 5.1 mmol/L    Chloride 104 97 - 108 mmol/L    CO2 31 21 - 32 mmol/L    Anion gap 7 5 - 15 mmol/L    Glucose 99 65 - 100 mg/dL    BUN 11 6 - 20 MG/DL    Creatinine 0.63 0.55 - 1.02 MG/DL    BUN/Creatinine ratio 17 12 - 20      GFR est AA >60 >60 ml/min/1.73m2    GFR est non-AA >60 >60 ml/min/1.73m2    Calcium 9.5 8.5 - 10.1 MG/DL    Bilirubin, total 0.7 0.2 - 1.0 MG/DL    ALT (SGPT) 23 12 - 78 U/L    AST (SGOT) 19 15 - 37 U/L    Alk. phosphatase 67 45 - 117 U/L    Protein, total 8.0 6.4 - 8.2 g/dL    Albumin 3.4 (L) 3.5 - 5.0 g/dL    Globulin 4.6 (H) 2.0 - 4.0 g/dL    A-G Ratio 0.7 (L) 1.1 - 2.2     TROPONIN I    Collection Time: 03/16/18  6:10 PM   Result Value Ref Range    Troponin-I, Qt. <0.04 <0.05 ng/mL   CK W/ REFLX CKMB    Collection Time: 03/16/18  6:10 PM   Result Value Ref Range     26 - 192 U/L   NT-PRO BNP    Collection Time: 03/16/18  6:10 PM   Result Value Ref Range    NT pro-BNP 88 0 - 125 PG/ML   D DIMER    Collection Time: 03/16/18  6:10 PM   Result Value Ref Range    D-dimer 0.29 0.00 - 0.65 mg/L FEU       Imaging  CT chest on 3/16/2018  EXAM:  CT CHEST W CONT  INDICATION:   Dyspnea chronic, negative or nondiagnostic xray and lab/clinical  studies  COMPARISON: CT of the abdomen and pelvis, 9/26/2011 and 1/24/2013. CT of the  chest, 4/1/2016  Limitations: Markedly limited evaluation due to bolus timing  . TECHNIQUE:   Precontrast  images were obtained to localize the volume for acquisition. Multislice helical CT arteriography was performed from the diaphragm to the  thoracic inlet during uneventful rapid bolus intravenous contrast  administration. Lung and soft tissue windows were generated. Coronal and  sagittal images were generated and 3D post processing consisting of coronal  maximum intensity images was performed. CT dose reduction was achieved through use of a standardized protocol tailored  for this examination and automatic exposure control for dose modulation. Fabiola Holguin FINDINGS:  CHEST:  Chest wall/thoracic inlet: Within normal limits. Thyroid: Within normal limits. Mediastinum/tesfaye: Within normal limits. Heart/vessels: No large, visible pulmonary embolus. 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 Lungs/Pleura: Tiny nodule in the left upper lobe, unchanged.  Normal  scarring/atelectasis in the left base and in the right middle lobe. .  INCIDENTALLY IMAGED ABDOMEN:  There is a left adrenal nodule measuring 2.6 x 1.9 cm in axial dimension,  unchanged.      Extensive tendinosis  . MSK:   Mild, nonfocal degenerative changes throughout the spine. .  IMPRESSION  IMPRESSION:   1. No large pulmonary embolus. Study is markedly limited for the evaluation of  pulmonary embolus due to bolus timing. 2. Incidental findings as above. CXR: CLINICAL HISTORY: Dyspnea   INDICATION: Dyspnea     COMPARISON: 10/6/2017     FINDINGS:   PA and lateral views of the chest are obtained. The cardiopericardial silhouette is within normal limits. There is no pleural  effusion, pneumothorax or focal consolidation present.     IMPRESSION  IMPRESSION: No acute intrathoracic disease. EKG:  normal EKG, normal sinus rhythm, unchanged from previous tracings. Assessment and Plan   Laura Vega is a 50 y.o. female with PMHx of tobacco use,  Diet controlled diabetes,  HTN, Anxiety, GERD, Anaphylaxis of unknown etiology  and Obestiy who is admitted for hypoxia in the setting of tobacco abuse    Hypoxia in the setting of tobacco abuse  - DDx COPD exacerbation vs Upper Respiratoy Infection vs CHF  - Pt with no formal diagnosis of COPD but history of tobacco use with 30 pack year history. Will benefit from PFT testing as an outpatient   - POA SpO2 92% on RA, tachypnic with course crackles and rhonchi throughout lung bases per ED evaluation   - CXR no acute intrathoracic disease and CT chest showed tiny nodule in the left upper lobe unchanged and no large pulmonary embolus  - Influenza A&B negative   - Last echo on 3/2016 showed EF of 60-65% without regional wall motion abnormality.  Pro BNP 88  - Will start  Levaquin 750 mg IV once daily and switch to PO as symptoms improve (QTc 441)  - Solumedrol 125 mg IV q 12 hours and transition to PO steroids as tolerated  - Duo-nebs q 4hours scheduled and space out as tolerated  - O2 PRN for SpO2> 90%, mucinex   - Admit to Tele     HTN  - POA /97, patient on metoprolol 25 mg PO BID but non compliant with taking medicines  - Will restart home dose Metoprolol  - Hydralazine PRN for BP >180/110    Tachycardia:  - Likely from Anxiety and albuterol treatment   - Will continue to monitor     Diet controlled Type 2 Diabetes    - Last A1C 7.0 on 9/17/2017  - POC Glucose AC&QHS with SSI    - Recheck A1C     GERD  - Stable, patient not on medication at home  - Will give IV Protonix for GI ppx     Lung Nodule  - incidental finding of  5mm left upper lobe lung nodule seen on 4/2016  - POA CT chest showed unchanged tiny left upper lobe    Incidental adrenal nodule  - POA CT showed left adrenal nodule measuring 2.6 X1.9 cm, unchanged  - stable     Lymphedema  - Stable   - Would benefit from compression stockings     Obesity  - BMI of 54.7   - [place] on life style modification    FEN/GI - SLIV/ cardiac diet   Activity - Ambulate with assistance   DVT prophylaxis - Lovenox   GI prophylaxis - Protonix  Disposition - Plan to d/c to home once stable. CODE STATUS: Full Code discussed with patient and patient's family.         Patient to be discussed with Dr. Carie Cuellar ( attending physician)        Isa Holloway, [address] Problems  Date Reviewed: 3/16/2018          Codes Class Noted POA    Hypoxia ICD-10-CM: R09.02  ICD-9-CM: 799.02  3/16/2018 Unknown

## 2018-03-17 NOTE — ED NOTES
Bedside and Verbal shift change report given to Omero Reese RN (oncoming nurse) by Adrian Meadows RN (offgoing nurse). Report included the following information SBAR, ED Summary, Intake/Output, MAR and Recent Results.

## 2018-03-17 NOTE — ED NOTES
Assumed care of patient from [facility]. Patient resting on stretcher, NAD noted at this time; Denies complaints. Bed low and locked, call bell in reach. Will continue to monitor.

## 2018-03-17 NOTE — PROGRESS NOTES
Problem: Mobility Impaired (Adult and Pediatric)  Goal: *Acute Goals and Plan of Care (Insert Text)  Physical Therapy Goals  Initiated 3/17/2018  1. Patient will move from supine to sit and sit to supine  in bed with independence within 7 day(s). 2.  Patient will transfer from bed to chair and chair to bed with independence using the least restrictive device within 7 day(s). 3.  Patient will perform sit to stand with independence within 7 day(s). 4.  Patient will ambulate with independence for 200 feet with the least restrictive device within 7 day(s). physical Therapy EVALUATION  Patient: Christian Howard (50 y.o. female)  Date: 3/17/2018  Primary Diagnosis: Hypoxia        Precautions: standard       ASSESSMENT :  Based on the objective data described below, the patient presents with generalized weakness, decreased functional ambulation, decreased O2 sats with activity, and SOB following admission for hypoxia. Patient was seen in ER while waiting for bed assignment. CT negative for PE but incidental adrenal nodule found. Patient received seated on edge of bed with 4 liters of O2 in place.  arrived during PT session. Their native tongue is Hebrew but both speak and understand most [place]. Patient with BLE edema which she says is constant and baseline. Today she was able to stand and ambulate 80 feet with min/CGA and on 4 liters of O2 at all times. Sitting, pre-activity: 91%, HR 92  Following ambulation:  87%, HR 99  Recovery in sittin%, HR 93  Encouraged patient to practice proper breathing and ankle pumps. Patient may benefit from lymphedema consult. At baseline, patient was independent but not very active. Still smokes (per chart). Will continue to see for ambulation and monitoring of O2 sats. May need home O2 upon discharge    Patient will benefit from skilled intervention to address the above impairments.   Patients rehabilitation potential is considered to be Fair  Factors which may influence rehabilitation potential include:   []         None noted  []         Mental ability/status  [x]         Medical condition  []         Home/family situation and support systems  []         Safety awareness  []         Pain tolerance/management  []         Other:      PLAN :  Recommendations and Planned Interventions:  [x]           Bed Mobility Training             []    Neuromuscular Re-Education  [x]           Transfer Training                   []    Orthotic/Prosthetic Training  [x]           Gait Training                         []    Modalities  []           Therapeutic Exercises           []    Edema Management/Control  []           Therapeutic Activities            []    Patient and Family Training/Education  []           Other (comment):    Frequency/Duration: Patient will be followed by physical therapy  5 times a week to address goals. Discharge Recommendations: To Be Determined  Further Equipment Recommendations for Discharge: to be determined; possibly home O2     SUBJECTIVE:   Patient stated I am ok.     OBJECTIVE DATA SUMMARY:   HISTORY:    Past Medical History:   Diagnosis Date    Anxiety and depression     Flu     2 wks ago    GERD (gastroesophageal reflux disease)     Headache(784.0)     Hypertension     Incidental lung nodule, > 3mm and < 8mm 2016    left lung nodule in smoker, needs follow up CT in Oct 2016      Past Surgical History:   Procedure Laterality Date    APPENDECTOMY      HX APPENDECTOMY      HX  SECTION      X 3 Births    HX  SECTION      HX CHOLECYSTECTOMY      HX WRIST FRACTURE TX      metal    REMOVAL GALLBLADDER       Prior Level of Function/Home Situation: independent  Personal factors and/or comorbidities impacting plan of care: smoker, obesity    Home Situation  Home Environment: Private residence  # Steps to Enter: 0  One/Two Story Residence: One story  Living Alone: No  Support Systems: Spouse/Significant Other/Partner, Family member(s)  Patient Expects to be Discharged to[de-identified] Private residence  Current DME Used/Available at Home: None    EXAMINATION/PRESENTATION/DECISION MAKING:   Critical Behavior:  Neurologic State: Alert  Orientation Level: Oriented X4  Cognition: Follows commands  Safety/Judgement: Insight into deficits  Hearing: in tact     Skin:  Not fully observed  Edema: BLE edema noted  Range Of Motion:  AROM: Generally decreased, functional                       Strength:    Strength: Generally decreased, functional                    Tone & Sensation:   Tone: Normal              Sensation: Intact               Coordination:  Coordination: Generally decreased, functional       Functional Mobility:  Bed Mobility:     Supine to Sit:  (received sitting on edge of bed)        Transfers:  Sit to Stand: Contact guard assistance  Stand to Sit: Contact guard assistance                       Balance:   Sitting: Intact  Standing: Intact  Ambulation/Gait Training:  Distance (ft): 80 Feet (ft)  Assistive Device: Gait belt (supplemental O2 at 4 liters at all times)  Ambulation - Level of Assistance: Contact guard assistance (second person for O2 tank)        Gait Abnormalities: Trunk sway increased        Base of Support: Widened                                            Therapeutic Exercises:    Ankle pumps, proper breathing       Physical Therapy Evaluation Charge Determination   History Examination Presentation Decision-Making   MEDIUM  Complexity : 1-2 comorbidities / personal factors will impact the outcome/ POC  LOW Complexity : 1-2 Standardized tests and measures addressing body structure, function, activity limitation and / or participation in recreation  MEDIUM Complexity : Evolving with changing characteristics  LOW Complexity : FOTO score of       Based on the above components, the patient evaluation is determined to be of the following complexity level: LOW     Pain:  Pain Scale 1: Numeric (0 - 10)  Pain Intensity 1: 0 Activity Tolerance:   SOB and decreased O2 sats to 87% on 4 liters  Please refer to the flowsheet for vital signs taken during this treatment. After treatment:   []         Patient left in no apparent distress sitting up in chair  [x]         Patient left in no apparent distress in bed  [x]         Call bell left within reach  [x]         Nursing notified  [x]         Caregiver present  []         Bed alarm activated    COMMUNICATION/EDUCATION:   The patients plan of care was discussed with: Registered Nurse. [x]         Fall prevention education was provided and the patient/caregiver indicated understanding. []         Patient/family have participated as able in goal setting and plan of care. [x]         Patient/family agree to work toward stated goals and plan of care. []         Patient understands intent and goals of therapy, but is neutral about his/her participation. []         Patient is unable to participate in goal setting and plan of care.     Thank you for this referral.  Jos eF Arevalo, PT   Time Calculation: 15 mins

## 2018-03-17 NOTE — PROGRESS NOTES
[facility]: MED RECONCILIATION    Medications added:     · IBU  · Sudafed PE    Medications removed:    · Naproxen  · duonebs      Information obtained from: patient, bottles    Significant PMH/Disease States:   Past Medical History:   Diagnosis Date    Anxiety and depression     Flu     2 wks ago    GERD (gastroesophageal reflux disease)     Headache(784.0)     Hypertension     Incidental lung nodule, > 3mm and < 8mm 2016    left lung nodule in smoker, needs follow up CT in Oct 2016        Chief Complaint for this Admission:   Chief Complaint   Patient presents with    Shortness of Breath       Allergies: Hydrochlorothiazide; Losartan; Sulfa (sulfonamide antibiotics); Eggplant; and Lasix [furosemide]    Prior to Admission Medications:   Prior to Admission Medications   Prescriptions Last Dose Informant Patient Reported? Taking? EPINEPHrine (ADRENACLICK) 0.3 GR/9.6 mL injection  Self No Yes   Si.3 mL by IntraMUSCular route once as needed for up to 1 dose. PHENYLEPHRINE/DM/ACETAMINOP/GG (SUDAFED PE COLD AND COUGH PO)   Yes Yes   Sig: Take 1 Tab by mouth daily as needed (congestion). albuterol (PROVENTIL HFA, VENTOLIN HFA, PROAIR HFA) 90 mcg/actuation inhaler  Self No Yes   Sig: Take 2 Puffs by inhalation every four (4) hours as needed for Wheezing. ibuprofen (MOTRIN) 200 mg tablet  Self Yes Yes   Sig: Take 200 mg by mouth every eight (8) hours as needed for Pain.   metoprolol tartrate (LOPRESSOR) 25 mg tablet last week Self No Yes   Sig: Take 1 Tab by mouth two (2) times a day.       Facility-Administered Medications: None           NAREN Wagner   Contact: [phone]

## 2018-03-17 NOTE — PROGRESS NOTES
[facility] MEDICINE RESIDENCY PROGRAM   Daily Progress Note    Date: 3/17/2018    Assessment/Plan:   Alison Sharpe is a 50 y.o. female with PMHx of tobacco use, diet controlled diabetes,  HTN, Anxiety, GERD, Anaphylaxis of unknown etiology  and Obestiy who is admitted for hypoxia in the setting of tobacco abuse     Hypoxia in the setting of tobacco abuse  - Hypoxia improved overnight, recent RR 25, SpO2 94% on 4L NC  - Levaquin  mg IV day 1, switch to PO as symptoms improve. - Solumedrol 125 mg IV q 12 hours and transition to PO steroids as tolerated  - Duo-nebs q 4hours scheduled and space out as tolerated  - O2 PRN for SpO2> 90%, mucinex    - No formal diagnosis of COPD but history of tobacco use with 30 pack year history.  Will benefit from PFT testing as an outpatient       HTN  - POA /97, patient on metoprolol 25 mg PO BID but non compliant with taking medicines  - Continue home dose Metoprolol  - Hydralazine PRN for BP >180/110     Tachycardia:  - Likely from Anxiety and albuterol treatment   - Will continue to monitor      Diet controlled Type 2 Diabetes    - Last A1C 7.0 on 9/17/2017  - POC Glucose AC&QHS with SSI    - F/u on A1C      GERD  - Stable, patient not on medication at home  - Will give IV Protonix for GI ppx      Lung Nodule  - Incidental finding of  5mm left upper lobe lung nodule seen on 4/2016  - POA CT chest showed unchanged tiny left upper lobe     Incidental adrenal nodule  - POA CT showed left adrenal nodule measuring 2.6 X1.9 cm, unchanged  - stable      Lymphedema  - Stable   - Would benefit from compression stockings      Obesity  - BMI of 54.7   - [place] on life style modification     FEN/GI - SLIV/ cardiac diet   Activity - Ambulate with assistance   DVT prophylaxis - Lovenox   GI prophylaxis - Protonix  Disposition - Plan to d/c to home once stable.     CODE STATUS: Full Code discussed with patient and patient's family    Point of Contact: [address] (daughter) [phone]    Patient to be discussed with Dr. Vikas Barth MD  Family Medicine Resident         CC: \" I feel better\"    Subjective  No acute events overnight. Patient reports she's feeling better. Her breathing is better. She states that she wants to go home. Denies chills, headaches, chest pain, shortness of breath, palpitations, abdominal pain, nausea and vomiting, and LE edema. Tolerating PO diet. Inpatient Medications  Current Facility-Administered Medications   Medication Dose Route Frequency    hydrALAZINE (APRESOLINE) 20 mg/mL injection 20 mg  20 mg IntraVENous PRN    sodium chloride (NS) flush 5-10 mL  5-10 mL IntraVENous Q8H    sodium chloride (NS) flush 5-10 mL  5-10 mL IntraVENous PRN    nicotine (NICODERM CQ) 7 mg/24 hr patch 1 Patch  1 Patch TransDERmal Q24H    methylPREDNISolone (PF) (SOLU-MEDROL) injection 125 mg  125 mg IntraVENous Q12H    levoFLOXacin (LEVAQUIN) 750 mg in D5W IVPB  750 mg IntraVENous Q24H    albuterol-ipratropium (DUO-NEB) 2.5 MG-0.5 MG/3 ML  3 mL Nebulization Q4H RT    guaiFENesin ER (MUCINEX) tablet 600 mg  600 mg Oral Q12H    metoprolol tartrate (LOPRESSOR) tablet 25 mg  25 mg Oral BID    pantoprazole (PROTONIX) 40 mg in sodium chloride 10 mL injection  40 mg IntraVENous Q12H    glucose chewable tablet 16 g  4 Tab Oral PRN    dextrose (D50W) injection syrg 12.5-25 g  12.5-25 g IntraVENous PRN    glucagon (GLUCAGEN) injection 1 mg  1 mg IntraMUSCular PRN    insulin lispro (HUMALOG) injection   SubCUTAneous AC&HS    enoxaparin (LOVENOX) injection 40 mg  40 mg SubCUTAneous Q12H     Current Outpatient Prescriptions   Medication Sig    ibuprofen (MOTRIN) 200 mg tablet Take 200 mg by mouth every eight (8) hours as needed for Pain.  PHENYLEPHRINE/DM/ACETAMINOP/GG (SUDAFED PE COLD AND COUGH PO) Take 1 Tab by mouth daily as needed (congestion).  metoprolol tartrate (LOPRESSOR) 25 mg tablet Take 1 Tab by mouth two (2) times a day.     EPINEPHrine (ADRENACLICK) 0.3 SA/0.4 mL injection 0.3 mL by IntraMUSCular route once as needed for up to 1 dose.  albuterol (PROVENTIL HFA, VENTOLIN HFA, PROAIR HFA) 90 mcg/actuation inhaler Take 2 Puffs by inhalation every four (4) hours as needed for Wheezing. Allergies  Allergies   Allergen Reactions    Hydrochlorothiazide Anaphylaxis and Unproven on Challenge    Losartan Anaphylaxis and Unproven on Challenge    Sulfa (Sulfonamide Antibiotics) Anaphylaxis     When she was on Losartan (8/2017), and then again when she was on HCTZ (9/2017). She was also on lasix since 2016.  Eggplant Other (comments)     Severe stomach pain     Lasix [Furosemide] Unproven on Challenge     May lead to anaphylaxis          Objective  Vitals:  Patient Vitals for the past 8 hrs:   Pulse Resp BP SpO2   03/17/18 0200 (!) 101 25 160/76 94 %   03/16/18 2345 88 (!) 34 159/86 94 %         I/O:  No intake or output data in the 24 hours ending 03/17/18 0620  Last shift:       Last 3 shifts:         Physical Exam:  General: No acute distress. Alert. Cooperative. Head: Normocephalic. Atraumatic. Respiratory: + expiratory wheezes, moving air    Cardiovascular: RRR. Normal S1,S2. No m/r/g. . GI: + bowel sounds. Nontender. Obese   Extremities: Bilateral 2+ edema with mild pitting .      Laboratory Data  Recent Results (from the past 12 hour(s))   GLUCOSE, POC    Collection Time: 03/16/18 10:12 PM   Result Value Ref Range    Glucose (POC) 199 (H) 65 - 100 mg/dL    Performed by Heena Ahoskie    METABOLIC PANEL, COMPREHENSIVE    Collection Time: 03/17/18  4:34 AM   Result Value Ref Range    Sodium 140 136 - 145 mmol/L    Potassium 4.4 3.5 - 5.1 mmol/L    Chloride 103 97 - 108 mmol/L    CO2 28 21 - 32 mmol/L    Anion gap 9 5 - 15 mmol/L    Glucose 187 (H) 65 - 100 mg/dL    BUN 15 6 - 20 MG/DL    Creatinine 0.77 0.55 - 1.02 MG/DL    BUN/Creatinine ratio 19 12 - 20      GFR est AA >60 >60 ml/min/1.73m2    GFR est non-AA >60 >60 ml/min/1.73m2    Calcium 9.6 8.5 - 10.1 MG/DL    Bilirubin, total 0.5 0.2 - 1.0 MG/DL    ALT (SGPT) 24 12 - 78 U/L    AST (SGOT) 19 15 - 37 U/L    Alk. phosphatase 66 45 - 117 U/L    Protein, total 8.4 (H) 6.4 - 8.2 g/dL    Albumin 3.3 (L) 3.5 - 5.0 g/dL    Globulin 5.1 (H) 2.0 - 4.0 g/dL    A-G Ratio 0.6 (L) 1.1 - 2.2     CBC WITH AUTOMATED DIFF    Collection Time: 03/17/18  4:34 AM   Result Value Ref Range    WBC 12.3 (H) 3.6 - 11.0 K/uL    RBC 5.27 (H) 3.80 - 5.20 M/uL    HGB 15.1 11.5 - 16.0 g/dL    HCT 46.3 35.0 - 47.0 %    MCV 87.9 80.0 - 99.0 FL    MCH 28.7 26.0 - 34.0 PG    MCHC 32.6 30.0 - 36.5 g/dL    RDW 16.0 (H) 11.5 - 14.5 %    PLATELET 765 882 - 520 K/uL    MPV 9.8 8.9 - 12.9 FL    NRBC 0.0 0  WBC    ABSOLUTE NRBC 0.00 0.00 - 0.01 K/uL    NEUTROPHILS 84 (H) 32 - 75 %    LYMPHOCYTES 12 12 - 49 %    MONOCYTES 3 (L) 5 - 13 %    EOSINOPHILS 0 0 - 7 %    BASOPHILS 0 0 - 1 %    IMMATURE GRANULOCYTES 1 (H) 0.0 - 0.5 %    ABS. NEUTROPHILS 10.3 (H) 1.8 - 8.0 K/UL    ABS. LYMPHOCYTES 1.5 0.8 - 3.5 K/UL    ABS. MONOCYTES 0.3 0.0 - 1.0 K/UL    ABS. EOSINOPHILS 0.0 0.0 - 0.4 K/UL    ABS. BASOPHILS 0.0 0.0 - 0.1 K/UL    ABS. IMM.  GRANS. 0.1 (H) 0.00 - 0.04 K/UL    DF AUTOMATED           Imaging        Hospital Problems:  Hospital Problems  Date Reviewed: 3/16/2018          Codes Class Noted POA    * (Principal)Hypoxia ICD-10-CM: R09.02  ICD-9-CM: 799.02  3/16/2018 Yes        Elevated hemoglobin A1c ICD-10-CM: R73.09  ICD-9-CM: 790.29  3/4/2017 Yes    Overview Signed 3/4/2017 11:12 AM by Adam Nesbitt MD     [id number]             Morbid obesity with BMI of 50.0-59.9, adult [facility]) ICD-10-CM: E66.01, Z68.43  ICD-9-CM: 278.01, V85.43  5/20/2016 Yes        DDD (degenerative disc disease), lumbar ICD-10-CM: M51.36  ICD-9-CM: 722.52  2/20/2013 Yes    Overview Signed 2/20/2013  2:43 PM by MD Dr. Félix Salas             Tobacco abuse ICD-10-CM: Z72.0  ICD-9-CM: 305.1  2/12/2013 Yes    Overview Addendum 3/4/2017  1:09 PM by Jose C Ho MD     Advised to quit  See letter 3/4/2017

## 2018-03-18 LAB
ALBUMIN SERPL-MCNC: 3.1 G/DL (ref 3.5–5)
ALBUMIN/GLOB SERPL: 0.7 {RATIO} (ref 1.1–2.2)
ALP SERPL-CCNC: 60 U/L (ref 45–117)
ALT SERPL-CCNC: 25 U/L (ref 12–78)
ANION GAP SERPL CALC-SCNC: 7 MMOL/L (ref 5–15)
AST SERPL-CCNC: 21 U/L (ref 15–37)
BASOPHILS # BLD: 0 K/UL (ref 0–0.1)
BASOPHILS NFR BLD: 0 % (ref 0–1)
BILIRUB SERPL-MCNC: 0.4 MG/DL (ref 0.2–1)
BUN SERPL-MCNC: 22 MG/DL (ref 6–20)
BUN/CREAT SERPL: 29 (ref 12–20)
CALCIUM SERPL-MCNC: 9.5 MG/DL (ref 8.5–10.1)
CHLORIDE SERPL-SCNC: 105 MMOL/L (ref 97–108)
CO2 SERPL-SCNC: 30 MMOL/L (ref 21–32)
CREAT SERPL-MCNC: 0.75 MG/DL (ref 0.55–1.02)
DIFFERENTIAL METHOD BLD: ABNORMAL
EOSINOPHIL # BLD: 0 K/UL (ref 0–0.4)
EOSINOPHIL NFR BLD: 0 % (ref 0–7)
ERYTHROCYTE [DISTWIDTH] IN BLOOD BY AUTOMATED COUNT: 15.9 % (ref 11.5–14.5)
GLOBULIN SER CALC-MCNC: 4.6 G/DL (ref 2–4)
GLUCOSE BLD STRIP.AUTO-MCNC: 162 MG/DL (ref 65–100)
GLUCOSE BLD STRIP.AUTO-MCNC: 175 MG/DL (ref 65–100)
GLUCOSE BLD STRIP.AUTO-MCNC: 206 MG/DL (ref 65–100)
GLUCOSE BLD STRIP.AUTO-MCNC: 223 MG/DL (ref 65–100)
GLUCOSE SERPL-MCNC: 174 MG/DL (ref 65–100)
HCT VFR BLD AUTO: 45.4 % (ref 35–47)
HGB BLD-MCNC: 14.4 G/DL (ref 11.5–16)
IMM GRANULOCYTES # BLD: 0.2 K/UL (ref 0–0.04)
IMM GRANULOCYTES NFR BLD AUTO: 1 % (ref 0–0.5)
LYMPHOCYTES # BLD: 1.9 K/UL (ref 0.8–3.5)
LYMPHOCYTES NFR BLD: 10 % (ref 12–49)
MCH RBC QN AUTO: 28.2 PG (ref 26–34)
MCHC RBC AUTO-ENTMCNC: 31.7 G/DL (ref 30–36.5)
MCV RBC AUTO: 89 FL (ref 80–99)
MONOCYTES # BLD: 1.2 K/UL (ref 0–1)
MONOCYTES NFR BLD: 6 % (ref 5–13)
NEUTS SEG # BLD: 16.4 K/UL (ref 1.8–8)
NEUTS SEG NFR BLD: 84 % (ref 32–75)
NRBC # BLD: 0 K/UL (ref 0–0.01)
NRBC BLD-RTO: 0 PER 100 WBC
PLATELET # BLD AUTO: 291 K/UL (ref 150–400)
PMV BLD AUTO: 10 FL (ref 8.9–12.9)
POTASSIUM SERPL-SCNC: 4.4 MMOL/L (ref 3.5–5.1)
PROT SERPL-MCNC: 7.7 G/DL (ref 6.4–8.2)
RBC # BLD AUTO: 5.1 M/UL (ref 3.8–5.2)
SERVICE CMNT-IMP: ABNORMAL
SODIUM SERPL-SCNC: 142 MMOL/L (ref 136–145)
WBC # BLD AUTO: 19.6 K/UL (ref 3.6–11)

## 2018-03-18 PROCEDURE — 82962 GLUCOSE BLOOD TEST: CPT

## 2018-03-18 PROCEDURE — 74011250637 HC RX REV CODE- 250/637: Performed by: FAMILY MEDICINE

## 2018-03-18 PROCEDURE — 74011000250 HC RX REV CODE- 250: Performed by: STUDENT IN AN ORGANIZED HEALTH CARE EDUCATION/TRAINING PROGRAM

## 2018-03-18 PROCEDURE — 77030027138 HC INCENT SPIROMETER -A

## 2018-03-18 PROCEDURE — 74011250636 HC RX REV CODE- 250/636: Performed by: STUDENT IN AN ORGANIZED HEALTH CARE EDUCATION/TRAINING PROGRAM

## 2018-03-18 PROCEDURE — 80053 COMPREHEN METABOLIC PANEL: CPT | Performed by: STUDENT IN AN ORGANIZED HEALTH CARE EDUCATION/TRAINING PROGRAM

## 2018-03-18 PROCEDURE — 74011636637 HC RX REV CODE- 636/637: Performed by: STUDENT IN AN ORGANIZED HEALTH CARE EDUCATION/TRAINING PROGRAM

## 2018-03-18 PROCEDURE — 36415 COLL VENOUS BLD VENIPUNCTURE: CPT | Performed by: STUDENT IN AN ORGANIZED HEALTH CARE EDUCATION/TRAINING PROGRAM

## 2018-03-18 PROCEDURE — 74011250637 HC RX REV CODE- 250/637: Performed by: STUDENT IN AN ORGANIZED HEALTH CARE EDUCATION/TRAINING PROGRAM

## 2018-03-18 PROCEDURE — 93970 EXTREMITY STUDY: CPT

## 2018-03-18 PROCEDURE — 85025 COMPLETE CBC W/AUTO DIFF WBC: CPT | Performed by: STUDENT IN AN ORGANIZED HEALTH CARE EDUCATION/TRAINING PROGRAM

## 2018-03-18 PROCEDURE — 77010033678 HC OXYGEN DAILY

## 2018-03-18 PROCEDURE — 74011636637 HC RX REV CODE- 636/637: Performed by: FAMILY MEDICINE

## 2018-03-18 PROCEDURE — 94640 AIRWAY INHALATION TREATMENT: CPT

## 2018-03-18 PROCEDURE — 74011250636 HC RX REV CODE- 250/636: Performed by: FAMILY MEDICINE

## 2018-03-18 PROCEDURE — 65660000000 HC RM CCU STEPDOWN

## 2018-03-18 RX ORDER — FAMOTIDINE 20 MG/1
20 TABLET, FILM COATED ORAL 2 TIMES DAILY
Qty: 60 TAB | Refills: 1 | Status: SHIPPED | OUTPATIENT
Start: 2018-03-18 | End: 2018-12-30

## 2018-03-18 RX ORDER — METOPROLOL TARTRATE 50 MG/1
50 TABLET ORAL 2 TIMES DAILY
Qty: 60 TAB | Refills: 1 | Status: SHIPPED | OUTPATIENT
Start: 2018-03-18 | End: 2018-06-22 | Stop reason: SDUPTHER

## 2018-03-18 RX ORDER — LEVOFLOXACIN 750 MG/1
750 TABLET ORAL EVERY 24 HOURS
Qty: 4 TAB | Refills: 0 | Status: SHIPPED | OUTPATIENT
Start: 2018-03-18 | End: 2018-04-13 | Stop reason: ALTCHOICE

## 2018-03-18 RX ORDER — GUAIFENESIN 600 MG/1
600 TABLET, EXTENDED RELEASE ORAL EVERY 12 HOURS
Qty: 30 TAB | Refills: 0 | Status: SHIPPED | OUTPATIENT
Start: 2018-03-18 | End: 2018-12-30

## 2018-03-18 RX ORDER — PREDNISONE 20 MG/1
TABLET ORAL
Qty: 11 TAB | Refills: 0 | Status: SHIPPED | OUTPATIENT
Start: 2018-03-18 | End: 2018-04-13 | Stop reason: ALTCHOICE

## 2018-03-18 RX ORDER — METOPROLOL TARTRATE 50 MG/1
50 TABLET ORAL 2 TIMES DAILY
Status: DISCONTINUED | OUTPATIENT
Start: 2018-03-18 | End: 2018-03-19 | Stop reason: HOSPADM

## 2018-03-18 RX ORDER — PANTOPRAZOLE SODIUM 40 MG/1
40 TABLET, DELAYED RELEASE ORAL EVERY 12 HOURS
Status: DISCONTINUED | OUTPATIENT
Start: 2018-03-18 | End: 2018-03-19 | Stop reason: HOSPADM

## 2018-03-18 RX ORDER — LEVOFLOXACIN 750 MG/1
750 TABLET ORAL EVERY 24 HOURS
Status: DISCONTINUED | OUTPATIENT
Start: 2018-03-18 | End: 2018-03-19 | Stop reason: HOSPADM

## 2018-03-18 RX ADMIN — INSULIN LISPRO 3 UNITS: 100 INJECTION, SOLUTION INTRAVENOUS; SUBCUTANEOUS at 13:38

## 2018-03-18 RX ADMIN — METOPROLOL TARTRATE 25 MG: 25 TABLET ORAL at 09:46

## 2018-03-18 RX ADMIN — LEVOFLOXACIN 750 MG: 750 TABLET, FILM COATED ORAL at 21:45

## 2018-03-18 RX ADMIN — IPRATROPIUM BROMIDE AND ALBUTEROL SULFATE 3 ML: .5; 3 SOLUTION RESPIRATORY (INHALATION) at 07:15

## 2018-03-18 RX ADMIN — INSULIN LISPRO 2 UNITS: 100 INJECTION, SOLUTION INTRAVENOUS; SUBCUTANEOUS at 09:45

## 2018-03-18 RX ADMIN — GUAIFENESIN 600 MG: 600 TABLET, EXTENDED RELEASE ORAL at 10:45

## 2018-03-18 RX ADMIN — METHYLPREDNISOLONE SODIUM SUCCINATE 125 MG: 125 INJECTION, POWDER, FOR SOLUTION INTRAMUSCULAR; INTRAVENOUS at 04:31

## 2018-03-18 RX ADMIN — METHYLPREDNISOLONE SODIUM SUCCINATE 60 MG: 125 INJECTION, POWDER, FOR SOLUTION INTRAMUSCULAR; INTRAVENOUS at 17:14

## 2018-03-18 RX ADMIN — IPRATROPIUM BROMIDE AND ALBUTEROL SULFATE 3 ML: .5; 3 SOLUTION RESPIRATORY (INHALATION) at 04:17

## 2018-03-18 RX ADMIN — IPRATROPIUM BROMIDE AND ALBUTEROL SULFATE 3 ML: .5; 3 SOLUTION RESPIRATORY (INHALATION) at 23:04

## 2018-03-18 RX ADMIN — IPRATROPIUM BROMIDE AND ALBUTEROL SULFATE 3 ML: .5; 3 SOLUTION RESPIRATORY (INHALATION) at 11:14

## 2018-03-18 RX ADMIN — Medication 10 ML: at 21:52

## 2018-03-18 RX ADMIN — INSULIN LISPRO 2 UNITS: 100 INJECTION, SOLUTION INTRAVENOUS; SUBCUTANEOUS at 21:47

## 2018-03-18 RX ADMIN — ENOXAPARIN SODIUM 40 MG: 40 INJECTION SUBCUTANEOUS at 09:45

## 2018-03-18 RX ADMIN — Medication 10 ML: at 13:39

## 2018-03-18 RX ADMIN — GUAIFENESIN 600 MG: 600 TABLET, EXTENDED RELEASE ORAL at 21:45

## 2018-03-18 RX ADMIN — BENZOCAINE, MENTHOL 1 LOZENGE: 15; 3.6 LOZENGE ORAL at 19:35

## 2018-03-18 RX ADMIN — METOPROLOL TARTRATE 50 MG: 50 TABLET ORAL at 21:46

## 2018-03-18 RX ADMIN — PANTOPRAZOLE SODIUM 40 MG: 40 TABLET, DELAYED RELEASE ORAL at 10:45

## 2018-03-18 RX ADMIN — BENZONATATE 100 MG: 100 CAPSULE ORAL at 19:35

## 2018-03-18 RX ADMIN — PANTOPRAZOLE SODIUM 40 MG: 40 TABLET, DELAYED RELEASE ORAL at 21:45

## 2018-03-18 RX ADMIN — IPRATROPIUM BROMIDE AND ALBUTEROL SULFATE 3 ML: .5; 3 SOLUTION RESPIRATORY (INHALATION) at 19:20

## 2018-03-18 RX ADMIN — ENOXAPARIN SODIUM 40 MG: 40 INJECTION SUBCUTANEOUS at 21:44

## 2018-03-18 RX ADMIN — Medication 10 ML: at 04:33

## 2018-03-18 RX ADMIN — HUMAN INSULIN 4 UNITS: 100 INJECTION, SUSPENSION SUBCUTANEOUS at 17:15

## 2018-03-18 RX ADMIN — INSULIN LISPRO 2 UNITS: 100 INJECTION, SOLUTION INTRAVENOUS; SUBCUTANEOUS at 17:15

## 2018-03-18 RX ADMIN — BENZONATATE 100 MG: 100 CAPSULE ORAL at 00:16

## 2018-03-18 RX ADMIN — FLUTICASONE PROPIONATE 2 SPRAY: 50 SPRAY, METERED NASAL at 09:00

## 2018-03-18 NOTE — DISCHARGE SUMMARY
[address] [facility] [address] [place] [address]   Office [phone]  Fax [phone]       Discharge / Transfer / Off-Service Note     Name: Yenifer Fenton MRN: [[id number]]  Sex: Female   YOB: 1965  Age: 50 y.o. PCP: Mara Rivera DO     Date of admission: 3/16/2018  Date of discharge/transfer: 3/19/2018    Attending physician at admission: Dr. Narendra Kebede    Attending physician at discharge/transfer: Dr. Narendra Kebede    Resident physician at discharge/transfer: King Ray MD     Consultants during hospitalization  None     Admission diagnoses   Hypoxia    Recommended follow-up after discharge  1. PCP   2. Pulmonology  3. Lymphedema clinic  4. Sleep Medicine     Things to follow up on with PCP:  1. Incidental lung nodule seen on CT 3/16/18 - per chart review this was being followed outpatient  2. Incidental adrenal nodule seen on CT 3/16/18  3. Chronic lymphedema and possible anaphylactic reactions to diuretics, consider allergy follow up  4. Outpatient PFTs to diagnose possible COPD  5. Sleep Medicine for possible TIMOTHY  6. Nutrition, weight loss     Medication Changes:  1. New Medications: Mucinex, Pepcid, Levaquin x 4 (total 7 day course), Prednisone taper. Spironolactone, hydralazine. 2. Modified Medications: Metoprolol changed from 25mg BID --> 50mg BID  3. Discontinued Medications: Sudafed cough medicine    History of Present Illness  Per admitting provider, Parker Murphy is a 50 y.o. female PMHx of tobacco use, Type 2 Diabetes,  HTN, Anxiety, GERD, Anaphylaxis of unknown etiology and Obestiy who presents to the ER complaining of cough, congestion and shortness of breath X 1 week. Patient reports she has been feeling short of breath with congestion and cough for the past week that has been progressively getting worse for the past 3 days. Patient has tried albuterol treatment X 4 today without any sympotom relief.   Subsequently, patient went to [facility] today and was found to be hypoxic with pulse ox of mid 80's. Pt continued to be hypoxic with pulse ox of 88% s/p albuterol treatment and was sent to  to the hospital for further evaluation.     Of note, patient is a  heavy smoker on average at least 1 PPD for the past 30 years. Patient denies history of alcohol use or drug use. Patient states not taking her medications for the past 2 months for unknown reasons. She denies seeing pulmonologist as an outpatient. She states she saw cardiologist in the past 2 years but was ruled out to have CAD. Patient endorses cough with wheezing, dyspnea on exertion, dizziness and poor PO intake. Patient denies fever, chest pain,sputum production, nausea, vomiting, abdominal pain or urinary sxs.       In the ER,  Vital signs were remarkable for borderline tachycardia with pulse of 100, RR 33, SpO2 of 92%, BP of 166/97. Labs showed WBC 11.7, , Trop <0.04 and Pro BNP 88. Influenza A&B negative. ABG 7.43/40/63/26 on 2L NC.   CT chest showed no large pulmonary embolus and  incidental adrenal nodule measureing 2.6cmX1.9 cm. CXr showed no acute intrathoracic disease. Pt was treated with Levaquin 1 time dose, Solumedrol 125 mg IV injection and duo-neb \"    HOSPITAL COURSE    Acute Hypoxic Respratory Failure due to possible undiagnosed COPD 2/2 tobacco use and/or URI - D-dimer negative, CXR negative, CT chest negative for PE but quality of study not ideal as it was not a CTA. Troponin and CK negative, NT-Pro BNP 88, negative. Rapid flu negative. New oxygen requirement on admission, 2L. Was increased to 4L NC during admission. At time of discharge patient satting 96% on Room Air. She worked with PT and respiratory therapy and did not require oxygen, did not qualify for home oxygen. Administered empiric levaquin in case of CAP though CXR PA Lat was negative. Received 3 doses inpatient, received script for 4 additional doses outpatient.  Received solumedrol 125mg BID --> 60mg BID --> discharged on PO prednisone taper. Received duonebs q4h while inpatient. Script provided for mucinex and pepcid for GI protection on steroids. (Was on protonix while inpatient). Patient has no prior formal diagnosis of COPD but has extensive smoking history, recommend outpatient follow up with pulm for PFTs.     HTN - POA /97, patient on metoprolol 25 mg PO BID but non compliant with taking medicines at home. Continued home metoprolol but BP stayed in the 160s/80s. Increased metoprolol 25 BID to 50mg BID. Patient was discharged with metoprolol 50mg BID, spironolactone 50mg daily, and hydralazine 10mg TID, with outpatient f/u for elevated BPs. /92 at time of discharge. Chronic Lymphedema - Stable, at baseline, being followed outpatient. Would benefit from compression stockings but patient unable to tolerate. Was previously on lasix but was discontinued for possible anaphylactic reaction. Also had possible anaphylaxis on HCTZ. Did not gain a significant amount of weight after discontinuation of diuretic. Per PCP note, planning to challenge with low dose HCTZ in the near future and/or refer to allergist for additional testing. BNP 88 and ECHO in 2016 was normal, decreasing the likelihood of heart failure as an etiology for chronic LE swelling. LE duplex was negative for DVT. Patient was started on spironolactone while inpatient, to be continued outpatient, with PCP follow up. Patient seen my lymphedema nurse while inpatient, script written for outpatient follow up. Noncompliance w recommendations due to monetary concerns  - Case management met with patient and offered information on [facility] coordinated Care  - Case management helped patient with medicaid application       Tachycardia: RESOLVED. POA Hr in the 100s, likely from anxiety and albuterol treatment. Normalized with treatment, HR 73 at time of discharge.       Diet controlled Type 2 Diabetes  Last A1C 7.0 on 9/17/2017 --> 6.5 on this admission.  Patient was initially on POC Glucose AC&HS with SSI, then NPH was added on for additional coverage due to IV steroids. Nutrition visited patient while inpatient. Patient not discharged with any new diabetic medications, ok to follow up at home. GERD - Stable, patient not on medication at home. Was on Protonix BID while inpatient, at discharge will take Pepcid BID while on prednisone taper.       Lung Nodule - Incidental finding of 5mm left upper lobe lung nodule seen on 4/2016. POA CT chest showed unchanged tiny left upper lobe. Follow up outpatient.       Incidental adrenal nodule - POA CT showed left adrenal nodule measuring 2.6 X1.9 cm, unchanged, outpatient follow up    Physical exam at discharge:    Vitals Reviewed. General Oriented to person, place, and time and well-developed. Appears well-nourished, no distress. Not diaphoretic. Neck No thyromegaly present. No cervical adenopathy. Cardio Normal rate, regular rhythm. Exam reveals no gallop and no friction rub. No murmur heard. No chest wall tenderness. Pulmonary Effort normal, no respiratory distress. + Expiratory wheezes throughout lung fields, improved. Moving air well. No inspiratory crackles. Abdominal Soft. Bowel sounds normal. No distension. No tenderness.  Deferred. Extremities Bilateral 2+ edema with mild pitting, Knees with dark, thickened skin bilaterally. Distal pulses intact bilaterally. Neurological Alert and oriented to person, place, and time. Dermatology Skin is warm and dry. No rash noted. No erythema or pallor. Psychiatric Affect and judgment normal.        Condition at discharge: Stable.     Labs  Recent Labs      03/19/18   0201  03/18/18   0427  03/17/18   0434   WBC  21.1*  19.6*  12.3*   HGB  14.7  14.4  15.1   HCT  46.3  45.4  46.3   PLT  296  291  265     Recent Labs      03/19/18   0201  03/18/18   0427  03/17/18   0434   NA  143  142  140   K  4.2  4.4  4.4   CL  103  105  103   CO2  32  30  28   BUN  23*  22*  15 CREA  0.79  0.75  0.77   GLU  177*  174*  187*   CA  9.4  9.5  9.6     Recent Labs      03/19/18   0201  03/18/18   0427  03/17/18   0434   SGOT  29  21  19   ALT  38  25  24   AP  59  60  66   TBILI  0.5  0.4  0.5   TP  7.6  7.7  8.4*   ALB  3.2*  3.1*  3.3*   GLOB  4.4*  4.6*  5.1*     Recent Labs      03/19/18   1618  03/19/18   1102  03/19/18   0735  03/19/18   0445  03/18/18   2052   GLUCPOC  123*  211*  131*  130*  206*     Cultures  All Micro Results     Procedure Component Value Units Date/Time    URINE CULTURE HOLD SAMPLE [[id number]] Collected:  03/17/18 1730    Order Status:  Completed Specimen:  Serum Updated:  03/17/18 1742     Urine culture hold         URINE ON HOLD IN MICROBIOLOGY DEPT FOR 3 DAYS. IF UNPRESERVED URINE IS SUBMITTED, IT CANNOT BE USED FOR ADDITIONAL TESTING AFTER 24 HRS, RECOLLECTION WILL BE REQUIRED. INFLUENZA A & B AG (RAPID TEST) [[id number]] Collected:  03/16/18 1809    Order Status:  Completed Specimen:  Nasopharyngeal from Nasal washing Updated:  03/16/18 1900     Influenza A Antigen NEGATIVE         Influenza B Antigen NEGATIVE              Imaging    Results from Appointment encounter on 03/16/18   XR CHEST PA LAT   Narrative CLINICAL HISTORY: Dyspnea   INDICATION: Dyspnea    COMPARISON: 10/6/2017    FINDINGS:   PA and lateral views of the chest are obtained. The cardiopericardial silhouette is within normal limits. There is no pleural  effusion, pneumothorax or focal consolidation present. Impression IMPRESSION: No acute intrathoracic disease. Results from [place] encounter on 03/16/18   CT CHEST W CONT   Narrative EXAM:  CT CHEST W CONT  INDICATION:   Dyspnea chronic, negative or nondiagnostic xray and lab/clinical  studies  COMPARISON: CT of the abdomen and pelvis, 9/26/2011 and 1/24/2013. CT of the  chest, 4/1/2016  Limitations: Markedly limited evaluation due to bolus timing  .   TECHNIQUE:   Precontrast  images were obtained to localize the volume for acquisition. Multislice helical CT arteriography was performed from the diaphragm to the  thoracic inlet during uneventful rapid bolus intravenous contrast  administration. Lung and soft tissue windows were generated. Coronal and  sagittal images were generated and 3D post processing consisting of coronal  maximum intensity images was performed. CT dose reduction was achieved through use of a standardized protocol tailored  for this examination and automatic exposure control for dose modulation. [facility] FINDINGS:  CHEST:  Chest wall/thoracic inlet: Within normal limits. Thyroid: Within normal limits. Mediastinum/tesfaye: Within normal limits. Heart/vessels: No large, visible pulmonary embolus. 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 Lungs/Pleura: Tiny nodule in the left upper lobe, unchanged. Normal  scarring/atelectasis in the left base and in the right middle lobe. .  INCIDENTALLY IMAGED ABDOMEN:  There is a left adrenal nodule measuring 2.6 x 1.9 cm in axial dimension,  unchanged. Extensive tendinosis  . MSK:   Mild, nonfocal degenerative changes throughout the spine. .       Impression IMPRESSION:   1. No large pulmonary embolus. Study is markedly limited for the evaluation of  pulmonary embolus due to bolus timing. 2. Incidental findings as above. No procedure found. Chronic diagnoses   Problem List as of 3/19/2018  Date Reviewed: 3/16/2018          Codes Class Noted - Resolved    Patient noncompliance ICD-10-CM: Z91.19  ICD-9-CM: V15.81  3/19/2018 - Present        * (Principal)Hypoxia ICD-10-CM: R09.02  ICD-9-CM: 799.02  3/16/2018 - Present        Angioedema ICD-10-CM: T78. 3XXA  ICD-9-CM: 995.1  9/18/2017 - Present        Swelling of throat ICD-10-CM: J39.2  ICD-9-CM: 478.25  9/18/2017 - Present        Dyspnea ICD-10-CM: R06.00  ICD-9-CM: 786.09  9/18/2017 - Present Morbid obesity ([facility] [address] ICD-10-CM: E66.01  ICD-9-CM: 278.01  9/18/2017 - Present        Elevated hemoglobin A1c ICD-10-CM: R73.09  ICD-9-CM: 790.29  3/4/2017 - Present    Overview Signed 3/4/2017 11:12 AM by Kalpesh Pereira MD     6.2.12/2106             Wheezing ICD-10-CM: R06.2  ICD-9-CM: 786.07  3/4/2017 - Present    Overview Signed 3/4/2017  1:09 PM by Kalpesh Pereira MD     Consider PFT, ECHO, sleep evaluation             Venous insufficiency of both lower extremities ICD-10-CM: I87.2  ICD-9-CM: 459.81  5/26/2016 - Present        Morbid obesity with BMI of 50.0-59.9, adult [facility]) ICD-10-CM: E66.01, Z68.43  ICD-9-CM: 278.01, V85.43  5/20/2016 - Present        Incidental lung nodule, > 3mm and < 8mm ICD-10-CM: R91.1  ICD-9-CM: 793.11  4/20/2016 - Present    Overview Addendum 3/4/2017 11:18 AM by Kalpesh Pereira MD     left lung nodule in smoker, needs follow up CT in 6/2018             DDD (degenerative disc disease), lumbar ICD-10-CM: M51.36  ICD-9-CM: 722.52  2/20/2013 - Present    Overview Signed 2/20/2013  2:43 PM by MD Dr. Mariana Ricci [place]: E01.0  ICD-9-CM: 240.9  2/12/2013 - Present    Overview Addendum 2/26/2013  3:30 PM by MD Dr. Stephania Ricci  Normal thyroid US 2/2013             Tobacco abuse ICD-10-CM: Z72.0  ICD-9-CM: 305.1  2/12/2013 - Present    Overview Addendum 3/4/2017  1:09 PM by Kalpesh Pereira MD     Advised to quit  See letter 3/4/2017             H. pylori infection ICD-10-CM: A04.8  ICD-9-CM: 041.86  1/26/2013 - Present    Overview Addendum 11/27/2013  7:27 AM by Kalpesh Pereira MD     1/2013  Referred to Dr. Kyle Sosa who speaks Malay to explain treatments  Stool test after Abx negative 2013               History of normal resting EKG ICD-10-CM: [id number]  ICD-9-CM: V49.89  1/22/2013 - Present    Overview Signed 1/22/2013  4:57 PM by Kalpesh Pereira MD     2011             Gallstone ICD-10-CM: K80.20  ICD-9-CM: 574.20  1/22/2013 - Present Overview Signed 2013  5:02 PM by Rishabh Robledo MD     Cholecystectomy age 29             Appendicitis ICD-10-CM: K37  ICD-9-CM: 791  2013 - Present    Overview Signed 2013  5:02 PM by Rishabh Robledo MD     As a child             H/O  section ICD-10-CM: Z98.891  ICD-9-CM: V45.89  2013 - Present    Overview Signed 2013  5:03 PM by Rishabh Robledo MD     3             Left hand fracture ICD-10-CM: G59.09IF  ICD-9-CM: 815.00  2013 - Present    Overview Signed 2013  5:03 PM by Rishabh Robledo MD     S/p surgery             RESOLVED: Prediabetes ICD-10-CM: R73.03  ICD-9-CM: 790.29  2016 - 3/4/2017              Discharge/Transfer Medications  Discharge Medication List as of 3/19/2018  4:51 PM      START taking these medications    Details   hydrALAZINE (APRESOLINE) 10 mg tablet Take 1 Tab by mouth three (3) times daily. , Print, Disp-90 Tab, R-1      spironolactone (ALDACTONE) 50 mg tablet Take 1 Tab by mouth daily. , Print, Disp-30 Tab, R-1      guaiFENesin ER (MUCINEX) 600 mg ER tablet Take 1 Tab by mouth every twelve (12) hours. , Print, Disp-30 Tab, R-0      levoFLOXacin (LEVAQUIN) 750 mg tablet Take 1 Tab by mouth every twenty-four (24) hours. , Print, Disp-4 Tab, R-0      famotidine (PEPCID) 20 mg tablet Take 1 Tab by mouth two (2) times a day., Print, Disp-60 Tab, R-1      predniSONE (DELTASONE) 20 mg tablet 3/19, 3/20, 3/21: Take 40 mg prednisone daily (2 tablets)  3/22, 3/23, 3/24:  Take 20 mg prednisone daily (1 tablet)  3/25, 3/26, 3/17: Take 10 mg prednisone daily (1/2 tablet), Print, Disp-11 Tab, R-0         CONTINUE these medications which have CHANGED    Details   metoprolol tartrate (LOPRESSOR) 50 mg tablet Take 1 Tab by mouth two (2) times a day., Print, Disp-60 Tab, R-1         CONTINUE these medications which have NOT CHANGED    Details   ibuprofen (MOTRIN) 200 mg tablet Take 200 mg by mouth every eight (8) hours as needed for Pain., Historical Med EPINEPHrine (ADRENACLICK) 0.3 UG/9.1 mL injection 0.3 mL by IntraMUSCular route once as needed for up to 1 dose., Print, Disp-1 Syringe, R-1      albuterol (PROVENTIL HFA, VENTOLIN HFA, PROAIR HFA) 90 mcg/actuation inhaler Take 2 Puffs by inhalation every four (4) hours as needed for Wheezing., Print, Disp-1 Inhaler, R-2         STOP taking these medications       benzonatate (TESSALON) 100 mg capsule Comments:   Reason for Stopping:         PHENYLEPHRINE/DM/ACETAMINOP/GG (SUDAFED PE COLD AND COUGH PO) Comments:   Reason for Stopping:                Diet:  Diabetic diet. Activity:  As tolerated    Disposition: Home or Self Care    Discharge instructions to patient/family  Please seek medical attention for any new or worsening symptoms particularly fever, chest pain, shortness of breath, abdominal pain, nausea, vomiting    Follow up plans/appointments  Follow-up Information     Follow up With Details Comments Contact Info    Diana Barbosa DO Go on 3/22/2018 4:15pm (hospital follow up - please arrive 15 mins early) [address] [place], [place] [id number]  [phone]      Christian Mehta DO On 4/2/2018 $200 up front co-pay, 10:00am. Please arrive at 9:30am for paperwork. [facility] DOCTOR) [facility]             Zahraa Diamond MD  Family Medicine Resident       For Billing    Chief Complaint   Patient presents with    [facility] Problems  Date Reviewed: 3/16/2018          Codes Class Noted POA    Patient noncompliance ICD-10-CM: Z91.19  ICD-9-CM: V15.81  3/19/2018 Unknown        * (Principal)Hypoxia ICD-10-CM: R09.02  ICD-9-CM: 799.02  3/16/2018 Yes        Dyspnea ICD-10-CM: R06.00  ICD-9-CM: 786.09  9/18/2017 Yes        Morbid obesity ([address] ICD-10-CM: E66.01  ICD-9-CM: 278.01  9/18/2017 Yes        Elevated hemoglobin A1c ICD-10-CM: R73.09  ICD-9-CM: 790.29  3/4/2017 Yes    Overview Signed 3/4/2017 11:12 AM by Demond Busby, MD     6.2.12/2106             Venous insufficiency of both lower extremities ICD-10-CM: I87.2  ICD-9-CM: 459.81  5/26/2016 Yes        Morbid obesity with BMI of 50.0-59.9, adult [facility]) ICD-10-CM: E66.01, Z68.43  ICD-9-CM: 278.01, V85.43  5/20/2016 Yes        Incidental lung nodule, > 3mm and < 8mm ICD-10-CM: R91.1  ICD-9-CM: 793.11  4/20/2016 Yes    Overview Addendum 3/4/2017 11:18 AM by Pavithra Vazquez MD     left lung nodule in smoker, needs follow up CT in 6/2018             DDD (degenerative disc disease), lumbar ICD-10-CM: M51.36  ICD-9-CM: 722.52  2/20/2013 Yes    Overview Signed 2/20/2013  2:43 PM by MD Dr. Colin Noriega             Tobacco abuse ICD-10-CM: Z72.0  ICD-9-CM: 305.1  2/12/2013 Yes    Overview Addendum 3/4/2017  1:09 PM by Pavithra Vazquez MD     Advised to quit  See letter 3/4/2017

## 2018-03-18 NOTE — PROGRESS NOTES
Problem: Falls - Risk of  Goal: *Absence of Falls  Document Michelle Fall Risk and appropriate interventions in the flowsheet. Outcome: Progressing Towards Goal  Fall Risk Interventions:  Mobility Interventions: Bed/chair exit alarm         Medication Interventions: Teach patient to arise slowly. ..    0700- Bedside and Verbal shift change report given to [address] [place] [address]  (oncoming nurse) by Carlita Ardon / JE PRADO RN  (offgoing nurse). Report included the following information SBAR, Kardex and Recent Results. ..     1900- Bedside and Verbal shift change report given to Pilo Cheng RN  (oncoming nurse) by Wen Bain RN  (offgoing nurse). Report included the following information SBAR, Kardex and Recent Results. ..

## 2018-03-18 NOTE — PROGRESS NOTES
[facility] [address]       Resident Progress Note in Brief    S: Called in as daughters were saying that no physician had come to see their mom all day. Wanted to hear about her care. Went down, explained that steroids, breathing treatment and the abx will take time (will not see an instant change). Explained to them that she will need PFT testing as outpatient once she gets better. Communicated to reach out that we are only call away and that we will come check on her. Patient seen and examined at bedside. O:  Visit Vitals    /80 (BP 1 Location: Left arm, BP Patient Position: At rest)    Pulse 71    Temp 98.3 °F (36.8 °C)    Resp 29    Ht 5' (1.524 m)    Wt 280 lb (127 kg)    LMP 12/31/2012    SpO2 94%    BMI 54.68 kg/m2     Physical Examination:   General appearance - alert, well appearing, and in no distress  Chest - diffuse decreased breast sounds, +wheezes, no rales or rhonchi, symmetric air entry  Heart - normal rate, regular rhythm, normal S1, S2, no murmurs, rubs, clicks or gallops,   Abdomen - soft, nontender, nondistended, no masses or organomegaly  Neurological - alert, oriented, normal speech, no focal findings  Extremities - peripheral pulses normal, no pedal edema, no clubbing or cyanosis    A/P:   Hypoxia in setting chronic tobacco abuse. Most likely COPD exacerbation vs URI. Pt still on 4L NC.   - Continue breathing treatments q4h and steroids  - Continue Levaquin  - Wean off oxygen as tolerated  - F/u PFT studies as outpatient to diagnose COPD    HTN. - Continue metoprolol with prn hydralazine. Refer to daily progress for details.      Ayde Gibson MD  Family Medicine Resident

## 2018-03-18 NOTE — PROGRESS NOTES
Bedside and Verbal shift change report given to Fely Yu, RN and Pam Wright RN (oncoming nurse) by Lalitha Gayle RN (offgoing nurse). Report included the following information SBAR, Kardex, ED Summary, Accordion and Cardiac Rhythm NSR.    2030:  Patient's family concerned with not having a definitive diagnosis. Expressed concern regarding not having seen physician during rounds. Discussed treatment goals, and called family practice regarding family's concerns. Patient asking about pulmonary consult and about swelling in legs. 0400:  Patient waking frequently during night, experiencing SOB, dyspnea, hacking cough, tachypnic. Orthopneic.    0730: Bedside and Verbal shift change report given to Lalitha Gayle RN (oncoming nurse) by Fely Yu RN and Maya Hunter RN (offgoing nurse). Report included the following information SBAR, Kardex, MAR, Accordion, Recent Results and Cardiac Rhythm Sinus Tach .

## 2018-03-18 NOTE — PROGRESS NOTES
[facility]   Daily Progress Note    Date: 3/18/2018    Assessment/Plan:   Charanjit Fitzpatrick is a 50 y.o. female with PMHx of tobacco use, diet controlled diabetes,  HTN, Anxiety, GERD, Anaphylaxis of unknown etiology and obestiy who is admitted for acute hypoxic respratory distress due to possible undiagnosed COPD 2/2 tobacco use and/or URI. 24 hour events  - Worked with PT yesterday ,recommendations TBD, may need home oxygen     Acute Hypoxic Respratory Distress due to possible undiagnosed COPD 2/2 tobacco use and/or URI  - Remains on 4L NC, satting 93-96%  - Levaquin  mg, switching to PO. Tonight will be 3rd dose. - Solumedrol 125 mg IV q 12 hours --> 60mg IV q12 hours today  - Duo-nebs q 4hours  - O2 PRN for SpO2> 90%, mucinex   - No formal diagnosis of COPD but history of tobacco use with 30 pack year history. Will benefit from PFT testing as an outpatient.       HTN  - POA /97, patient on metoprolol 25 mg PO BID but non compliant with taking medicines  - Continue home dose Metoprolol  - Hydralazine PRN for BP >180/110     Tachycardia: RESOLVED.    - Likely from Anxiety and albuterol treatment   - Will continue to monitor      Diet controlled Type 2 Diabetes    - Last A1C 7.0 on 9/17/2017 --> 6.5 on this admission   - POC Glucose AC&QHS with SSI    - Will consider adding NPH for steroid coverage, currently at goal. Continue to monitor.       GERD  - Stable, patient not on medication at home  - Protonix BID - switching from IV to PO     Lung Nodule  - Incidental finding of 5mm left upper lobe lung nodule seen on 4/2016  - POA CT chest showed unchanged tiny left upper lobe     Incidental adrenal nodule  - POA CT showed left adrenal nodule measuring 2.6 X1.9 cm, unchanged, outpatient follow up  - stable      Lymphedema  - Stable   - Would benefit from compression stockings      Obesity  - BMI of 54.7   - [place] on life style modification     FEN/GI - Diabetic diet consistent carb  Activity - Ambulate with assistance   DVT prophylaxis - Lovenox   GI prophylaxis - Protonix  Disposition - Plan to d/c to home once stable.     CODE STATUS: Full Code discussed with patient and patient's family    Point of Contact: Jamir Velasquez (daughter) [phone]    Patient to be discussed with Dr. Yash Linares MD  Family Medicine Resident         CC: \" I feel better\"    Subjective    Patient received this morning kneeling at the edge of her bed, as if she was praying, but then fell asleep . States she sleeps like that occasionally as it helps her breathing. Linda chel CP, fever, chills. Has some mild chest tightness, improved since she came in. Continues to have dry cough, occasionally coughing up clear/white sputum. Not yellow or green. Denies N/V. Eating okay. Feels her LE swelling is at baseline.      Inpatient Medications  Current Facility-Administered Medications   Medication Dose Route Frequency    hydrALAZINE (APRESOLINE) 20 mg/mL injection 20 mg  20 mg IntraVENous PRN    fluticasone (FLONASE) 50 mcg/actuation nasal spray 2 Spray  2 Spray Both Nostrils DAILY    benzocaine-menthol (CEPACOL) lozenge 1 Lozenge  1 Lozenge Mucous Membrane PRN    methylPREDNISolone (PF) (SOLU-MEDROL) injection 125 mg  125 mg IntraVENous Q12H    benzonatate (TESSALON) capsule 100 mg  100 mg Oral TID PRN    sodium chloride (NS) flush 5-10 mL  5-10 mL IntraVENous Q8H    sodium chloride (NS) flush 5-10 mL  5-10 mL IntraVENous PRN    nicotine (NICODERM CQ) 7 mg/24 hr patch 1 Patch  1 Patch TransDERmal Q24H    levoFLOXacin (LEVAQUIN) 750 mg in D5W IVPB  750 mg IntraVENous Q24H    albuterol-ipratropium (DUO-NEB) 2.5 MG-0.5 MG/3 ML  3 mL Nebulization Q4H RT    guaiFENesin ER (MUCINEX) tablet 600 mg  600 mg Oral Q12H    metoprolol tartrate (LOPRESSOR) tablet 25 mg  25 mg Oral BID    pantoprazole (PROTONIX) 40 mg in sodium chloride 10 mL injection  40 mg IntraVENous Q12H    glucose chewable tablet 16 g  4 Tab Oral PRN    dextrose (D50W) injection syrg 12.5-25 g  12.5-25 g IntraVENous PRN    glucagon (GLUCAGEN) injection 1 mg  1 mg IntraMUSCular PRN    insulin lispro (HUMALOG) injection   SubCUTAneous AC&HS    enoxaparin (LOVENOX) injection 40 mg  40 mg SubCUTAneous Q12H         Allergies  Allergies   Allergen Reactions    Hydrochlorothiazide Anaphylaxis and Unproven on Challenge    Losartan Anaphylaxis and Unproven on Challenge    Sulfa (Sulfonamide Antibiotics) Anaphylaxis     When she was on Losartan (8/2017), and then again when she was on HCTZ (9/2017). She was also on lasix since 2016.  Eggplant Other (comments)     Severe stomach pain     Lasix [Furosemide] Unproven on Challenge     May lead to anaphylaxis          Objective  Vitals:  Patient Vitals for the past 8 hrs:   Temp Pulse Resp BP SpO2   03/18/18 0418 - - - - 94 %   03/18/18 0402 98 °F (36.7 °C) - 28 152/85 94 %   03/17/18 2350 - 71 - - -   03/17/18 2348 - 94 - - -   03/17/18 2345 - - - - 94 %   03/17/18 2315 98.3 °F (36.8 °C) (!) 105 29 160/80 -         I/O:    Intake/Output Summary (Last 24 hours) at 03/18/18 0708  Last data filed at 03/18/18 0402   Gross per 24 hour   Intake             1000 ml   Output              350 ml   Net              650 ml     Last shift:       Last 3 shifts:    03/16 1901 - 03/18 0700  In: 1000 [P.O.:850; I.V.:150]  Out: 350 [Urine:350]    Physical Exam:  General: No acute distress. Alert. Cooperative. Head: Normocephalic. Atraumatic. Respiratory: + expiratory wheezes throughout. No crackles, moving air    Cardiovascular: RRR. Normal S1,S2. No m/r/g. . GI: + bowel sounds. Nontender. Obese   Extremities: Bilateral 2+ edema with mild pitting. Knees with dark, thickened skin bilaterally. Distal pulses intact bilaterally.       Laboratory Data  Recent Results (from the past 12 hour(s))   GLUCOSE, POC    Collection Time: 03/17/18  8:46 PM   Result Value Ref Range    Glucose (POC) 225 (H) 65 - 100 mg/dL    Performed by Dwight Meneses (PCT)    METABOLIC PANEL, COMPREHENSIVE    Collection Time: 03/18/18  4:27 AM   Result Value Ref Range    Sodium 142 136 - 145 mmol/L    Potassium 4.4 3.5 - 5.1 mmol/L    Chloride 105 97 - 108 mmol/L    CO2 30 21 - 32 mmol/L    Anion gap 7 5 - 15 mmol/L    Glucose 174 (H) 65 - 100 mg/dL    BUN 22 (H) 6 - 20 MG/DL    Creatinine 0.75 0.55 - 1.02 MG/DL    BUN/Creatinine ratio 29 (H) 12 - 20      GFR est AA >60 >60 ml/min/1.73m2    GFR est non-AA >60 >60 ml/min/1.73m2    Calcium 9.5 8.5 - 10.1 MG/DL    Bilirubin, total 0.4 0.2 - 1.0 MG/DL    ALT (SGPT) 25 12 - 78 U/L    AST (SGOT) 21 15 - 37 U/L    Alk. phosphatase 60 45 - 117 U/L    Protein, total 7.7 6.4 - 8.2 g/dL    Albumin 3.1 (L) 3.5 - 5.0 g/dL    Globulin 4.6 (H) 2.0 - 4.0 g/dL    A-G Ratio 0.7 (L) 1.1 - 2.2     CBC WITH AUTOMATED DIFF    Collection Time: 03/18/18  4:27 AM   Result Value Ref Range    WBC 19.6 (H) 3.6 - 11.0 K/uL    RBC 5.10 3.80 - 5.20 M/uL    HGB 14.4 11.5 - 16.0 g/dL    HCT 45.4 35.0 - 47.0 %    MCV 89.0 80.0 - 99.0 FL    MCH 28.2 26.0 - 34.0 PG    MCHC 31.7 30.0 - 36.5 g/dL    RDW 15.9 (H) 11.5 - 14.5 %    PLATELET 138 382 - 697 K/uL    MPV 10.0 8.9 - 12.9 FL    NRBC 0.0 0  WBC    ABSOLUTE NRBC 0.00 0.00 - 0.01 K/uL    NEUTROPHILS 84 (H) 32 - 75 %    LYMPHOCYTES 10 (L) 12 - 49 %    MONOCYTES 6 5 - 13 %    EOSINOPHILS 0 0 - 7 %    BASOPHILS 0 0 - 1 %    IMMATURE GRANULOCYTES 1 (H) 0.0 - 0.5 %    ABS. NEUTROPHILS 16.4 (H) 1.8 - 8.0 K/UL    ABS. LYMPHOCYTES 1.9 0.8 - 3.5 K/UL    ABS. MONOCYTES 1.2 (H) 0.0 - 1.0 K/UL    ABS. EOSINOPHILS 0.0 0.0 - 0.4 K/UL    ABS. BASOPHILS 0.0 0.0 - 0.1 K/UL    ABS. IMM.  GRANS. 0.2 (H) 0.00 - 0.04 K/UL    DF AUTOMATED           Imaging        Hospital Problems:  Hospital Problems  Date Reviewed: 3/16/2018          Codes Class Noted POA    * (Principal)Hypoxia ICD-10-CM: R09.02  ICD-9-CM: 799.02  3/16/2018 Yes        Elevated hemoglobin A1c ICD-10-CM: R73.09  ICD-9-CM: 790.29  3/4/2017 Yes    Overview Signed 3/4/2017 11:12 AM by Pavithra Vazquez MD     [id number]             Morbid obesity with BMI of 50.0-59.9, adult [facility]) ICD-10-CM: E66.01, Z68.43  ICD-9-CM: 278.01, V85.43  5/20/2016 Yes        DDD (degenerative disc disease), lumbar ICD-10-CM: M51.36  ICD-9-CM: 722.52  2/20/2013 Yes    Overview Signed 2/20/2013  2:43 PM by MD Dr. Colin Noriega             Tobacco abuse ICD-10-CM: Z72.0  ICD-9-CM: 305.1  2/12/2013 Yes    Overview Addendum 3/4/2017  1:09 PM by Pavithra Vazquez MD     Advised to quit  See letter 3/4/2017

## 2018-03-18 NOTE — PROCEDURES
[facility]  *** FINAL REPORT ***    Name: Sivan Andersen  MRN: [[id number]]    Inpatient  : 10 Oct 1965  HIS Order #: [id number]  [address] [facility] Visit #: [id number]  Date: 18 Mar 2018    TYPE OF TEST: Peripheral Venous Testing    REASON FOR TEST  Limb swelling    Right Leg:-  Deep venous thrombosis:           No  Superficial venous thrombosis:    No  Deep venous insufficiency:        No  Superficial venous insufficiency: No    Left Leg:-  Deep venous thrombosis:           No  Superficial venous thrombosis:    No  Deep venous insufficiency:        No  Superficial venous insufficiency: No      INTERPRETATION/FINDINGS  PROCEDURE:  BILATERAL LE VENOUS DUPLEX. Evaluation of lower extremity veins with ultrasound (B-mode imaging,  pulsed Doppler, color Doppler). Includes the common femoral, deep  femoral, femoral, popliteal, posterior tibial, peroneal, and great  saphenous veins. FINDINGS:  Unable to evalute the paired peroneal veins bilaterally due   to patient body habitus. Gray scale and color flow duplex images of  the remaining veins in both lower extremities demonstrate normal  compressibility, spontaneous and augmented flow profiles, and absence  of filling defects throughout the deep and superficial veins in both  lower extremities. CONCLUSION:  Bilateral lower extremity venous duplex negative for deep   venous thrombosis or thrombophlebitis in the veins visualized. ADDITIONAL COMMENTS    I have personally reviewed the data relevant to the interpretation of  this  study. TECHNOLOGIST: Luis Ricci RVT  Signed: 2018 03:22 PM    PHYSICIAN: [place].  Caleb Yu MD  Signed: 2018 07:10 AM

## 2018-03-18 NOTE — PROGRESS NOTES
Problem: Falls - Risk of  Goal: *Absence of Falls  Document Michelle Fall Risk and appropriate interventions in the flowsheet. Outcome: Progressing Towards Goal  Fall Risk Interventions:  Mobility Interventions: Bed/chair exit alarm         Medication Interventions: Teach patient to arise slowly                  Problem: Activity Intolerance  Goal: *Oxygen saturation during activity within specified parameters  Outcome: Progressing Towards Goal  On 4L Nasal Cannula, feels SOB, NOVAK. Goal: *Able to remain out of bed as prescribed  Outcome: Progressing Towards Goal  NOVAK. Problem: Breathing Pattern - Ineffective  Goal: *Absence of hypoxia  Outcome: Progressing Towards Goal  4L NC. Goal: *Use of effective breathing techniques  Outcome: Progressing Towards Goal  Raising HOB, taught purse lipped breathing, leaning forward.

## 2018-03-18 NOTE — PROGRESS NOTES
Physical Therapy:    New orders received for walking oximetry to test for home O2. Patient was evaluated by PT on 3/17/18 (< 24 hr ago) with walking oximetry performed and patient requiring 4L O2 to maintain O2 sats > 90% during ambulation. Test result from yesterday demonstrate patient has need for home O2 if she is being discharged today. If test needs to be repeated prior to Monday, recommend respiratory therapy manage due to [facility], [facility] score of 1, indicating low fall risk. Will resume PT services on Monday, per POC, for ambulation and endurance training.     Dayami Tinoco, MS, PT

## 2018-03-19 VITALS
SYSTOLIC BLOOD PRESSURE: 162 MMHG | HEIGHT: 60 IN | TEMPERATURE: 97.4 F | WEIGHT: 292.11 LBS | BODY MASS INDEX: 57.35 KG/M2 | RESPIRATION RATE: 18 BRPM | HEART RATE: 73 BPM | OXYGEN SATURATION: 96 % | DIASTOLIC BLOOD PRESSURE: 82 MMHG

## 2018-03-19 PROBLEM — Z91.199 PATIENT NONCOMPLIANCE: Status: ACTIVE | Noted: 2018-03-19

## 2018-03-19 LAB
ALBUMIN SERPL-MCNC: 3.2 G/DL (ref 3.5–5)
ALBUMIN/GLOB SERPL: 0.7 {RATIO} (ref 1.1–2.2)
ALP SERPL-CCNC: 59 U/L (ref 45–117)
ALT SERPL-CCNC: 38 U/L (ref 12–78)
ANION GAP SERPL CALC-SCNC: 8 MMOL/L (ref 5–15)
AST SERPL-CCNC: 29 U/L (ref 15–37)
ATRIAL RATE: 99 BPM
BASOPHILS # BLD: 0 K/UL (ref 0–0.1)
BASOPHILS NFR BLD: 0 % (ref 0–1)
BILIRUB SERPL-MCNC: 0.5 MG/DL (ref 0.2–1)
BUN SERPL-MCNC: 23 MG/DL (ref 6–20)
BUN/CREAT SERPL: 29 (ref 12–20)
CALCIUM SERPL-MCNC: 9.4 MG/DL (ref 8.5–10.1)
CALCULATED P AXIS, ECG09: 46 DEGREES
CALCULATED R AXIS, ECG10: 53 DEGREES
CALCULATED T AXIS, ECG11: 30 DEGREES
CHLORIDE SERPL-SCNC: 103 MMOL/L (ref 97–108)
CO2 SERPL-SCNC: 32 MMOL/L (ref 21–32)
CREAT SERPL-MCNC: 0.79 MG/DL (ref 0.55–1.02)
DIAGNOSIS, 93000: NORMAL
DIFFERENTIAL METHOD BLD: ABNORMAL
EOSINOPHIL # BLD: 0 K/UL (ref 0–0.4)
EOSINOPHIL NFR BLD: 0 % (ref 0–7)
ERYTHROCYTE [DISTWIDTH] IN BLOOD BY AUTOMATED COUNT: 15.8 % (ref 11.5–14.5)
GLOBULIN SER CALC-MCNC: 4.4 G/DL (ref 2–4)
GLUCOSE BLD STRIP.AUTO-MCNC: 123 MG/DL (ref 65–100)
GLUCOSE BLD STRIP.AUTO-MCNC: 130 MG/DL (ref 65–100)
GLUCOSE BLD STRIP.AUTO-MCNC: 131 MG/DL (ref 65–100)
GLUCOSE BLD STRIP.AUTO-MCNC: 211 MG/DL (ref 65–100)
GLUCOSE SERPL-MCNC: 177 MG/DL (ref 65–100)
HCT VFR BLD AUTO: 46.3 % (ref 35–47)
HGB BLD-MCNC: 14.7 G/DL (ref 11.5–16)
IMM GRANULOCYTES # BLD: 0.2 K/UL (ref 0–0.04)
IMM GRANULOCYTES NFR BLD AUTO: 1 % (ref 0–0.5)
LYMPHOCYTES # BLD: 2.1 K/UL (ref 0.8–3.5)
LYMPHOCYTES NFR BLD: 10 % (ref 12–49)
MCH RBC QN AUTO: 28.4 PG (ref 26–34)
MCHC RBC AUTO-ENTMCNC: 31.7 G/DL (ref 30–36.5)
MCV RBC AUTO: 89.4 FL (ref 80–99)
MONOCYTES # BLD: 1 K/UL (ref 0–1)
MONOCYTES NFR BLD: 5 % (ref 5–13)
NEUTS SEG # BLD: 17.7 K/UL (ref 1.8–8)
NEUTS SEG NFR BLD: 84 % (ref 32–75)
NRBC # BLD: 0 K/UL (ref 0–0.01)
NRBC BLD-RTO: 0 PER 100 WBC
P-R INTERVAL, ECG05: 140 MS
PLATELET # BLD AUTO: 296 K/UL (ref 150–400)
PMV BLD AUTO: 10.2 FL (ref 8.9–12.9)
POTASSIUM SERPL-SCNC: 4.2 MMOL/L (ref 3.5–5.1)
PROT SERPL-MCNC: 7.6 G/DL (ref 6.4–8.2)
Q-T INTERVAL, ECG07: 344 MS
QRS DURATION, ECG06: 80 MS
QTC CALCULATION (BEZET), ECG08: 441 MS
RBC # BLD AUTO: 5.18 M/UL (ref 3.8–5.2)
SERVICE CMNT-IMP: ABNORMAL
SODIUM SERPL-SCNC: 143 MMOL/L (ref 136–145)
VENTRICULAR RATE, ECG03: 99 BPM
WBC # BLD AUTO: 21.1 K/UL (ref 3.6–11)

## 2018-03-19 PROCEDURE — 74011636637 HC RX REV CODE- 636/637: Performed by: FAMILY MEDICINE

## 2018-03-19 PROCEDURE — 74011250637 HC RX REV CODE- 250/637: Performed by: FAMILY MEDICINE

## 2018-03-19 PROCEDURE — 97535 SELF CARE MNGMENT TRAINING: CPT | Performed by: OCCUPATIONAL THERAPIST

## 2018-03-19 PROCEDURE — 36415 COLL VENOUS BLD VENIPUNCTURE: CPT | Performed by: STUDENT IN AN ORGANIZED HEALTH CARE EDUCATION/TRAINING PROGRAM

## 2018-03-19 PROCEDURE — 85025 COMPLETE CBC W/AUTO DIFF WBC: CPT | Performed by: STUDENT IN AN ORGANIZED HEALTH CARE EDUCATION/TRAINING PROGRAM

## 2018-03-19 PROCEDURE — 94640 AIRWAY INHALATION TREATMENT: CPT

## 2018-03-19 PROCEDURE — 97165 OT EVAL LOW COMPLEX 30 MIN: CPT | Performed by: OCCUPATIONAL THERAPIST

## 2018-03-19 PROCEDURE — 94761 N-INVAS EAR/PLS OXIMETRY MLT: CPT

## 2018-03-19 PROCEDURE — 74011250636 HC RX REV CODE- 250/636: Performed by: STUDENT IN AN ORGANIZED HEALTH CARE EDUCATION/TRAINING PROGRAM

## 2018-03-19 PROCEDURE — 77010033678 HC OXYGEN DAILY

## 2018-03-19 PROCEDURE — 74011250636 HC RX REV CODE- 250/636: Performed by: FAMILY MEDICINE

## 2018-03-19 PROCEDURE — 82962 GLUCOSE BLOOD TEST: CPT

## 2018-03-19 PROCEDURE — 80053 COMPREHEN METABOLIC PANEL: CPT | Performed by: STUDENT IN AN ORGANIZED HEALTH CARE EDUCATION/TRAINING PROGRAM

## 2018-03-19 PROCEDURE — 74011250637 HC RX REV CODE- 250/637: Performed by: STUDENT IN AN ORGANIZED HEALTH CARE EDUCATION/TRAINING PROGRAM

## 2018-03-19 PROCEDURE — 74011636637 HC RX REV CODE- 636/637: Performed by: STUDENT IN AN ORGANIZED HEALTH CARE EDUCATION/TRAINING PROGRAM

## 2018-03-19 PROCEDURE — 74011000250 HC RX REV CODE- 250: Performed by: STUDENT IN AN ORGANIZED HEALTH CARE EDUCATION/TRAINING PROGRAM

## 2018-03-19 RX ORDER — PREDNISONE 20 MG/1
60 TABLET ORAL EVERY 12 HOURS
Status: DISCONTINUED | OUTPATIENT
Start: 2018-03-19 | End: 2018-03-19 | Stop reason: HOSPADM

## 2018-03-19 RX ORDER — SPIRONOLACTONE 25 MG/1
50 TABLET ORAL DAILY
Status: DISCONTINUED | OUTPATIENT
Start: 2018-03-19 | End: 2018-03-19 | Stop reason: HOSPADM

## 2018-03-19 RX ORDER — CLONIDINE HYDROCHLORIDE 0.1 MG/1
0.2 TABLET ORAL
Status: CANCELLED | OUTPATIENT
Start: 2018-03-19 | End: 2018-03-20

## 2018-03-19 RX ORDER — BENZONATATE 100 MG/1
100 CAPSULE ORAL
Qty: 45 CAP | Refills: 0 | Status: SHIPPED | OUTPATIENT
Start: 2018-03-19 | End: 2018-03-19

## 2018-03-19 RX ORDER — CLONIDINE HYDROCHLORIDE 0.1 MG/1
0.1 TABLET ORAL
Status: COMPLETED | OUTPATIENT
Start: 2018-03-19 | End: 2018-03-19

## 2018-03-19 RX ORDER — SPIRONOLACTONE 50 MG/1
50 TABLET, FILM COATED ORAL DAILY
Qty: 30 TAB | Refills: 1 | Status: SHIPPED | OUTPATIENT
Start: 2018-03-20 | End: 2018-12-30

## 2018-03-19 RX ORDER — HYDRALAZINE HYDROCHLORIDE 10 MG/1
10 TABLET, FILM COATED ORAL 3 TIMES DAILY
Qty: 90 TAB | Refills: 1 | Status: SHIPPED | OUTPATIENT
Start: 2018-03-19 | End: 2018-06-22 | Stop reason: SDUPTHER

## 2018-03-19 RX ADMIN — METOPROLOL TARTRATE 50 MG: 50 TABLET ORAL at 10:03

## 2018-03-19 RX ADMIN — ENOXAPARIN SODIUM 40 MG: 40 INJECTION SUBCUTANEOUS at 10:00

## 2018-03-19 RX ADMIN — HUMAN INSULIN 4 UNITS: 100 INJECTION, SUSPENSION SUBCUTANEOUS at 04:46

## 2018-03-19 RX ADMIN — PANTOPRAZOLE SODIUM 40 MG: 40 TABLET, DELAYED RELEASE ORAL at 10:02

## 2018-03-19 RX ADMIN — IPRATROPIUM BROMIDE AND ALBUTEROL SULFATE 3 ML: .5; 3 SOLUTION RESPIRATORY (INHALATION) at 04:12

## 2018-03-19 RX ADMIN — IPRATROPIUM BROMIDE AND ALBUTEROL SULFATE 3 ML: .5; 3 SOLUTION RESPIRATORY (INHALATION) at 15:12

## 2018-03-19 RX ADMIN — METHYLPREDNISOLONE SODIUM SUCCINATE 60 MG: 125 INJECTION, POWDER, FOR SOLUTION INTRAMUSCULAR; INTRAVENOUS at 04:47

## 2018-03-19 RX ADMIN — PREDNISONE 60 MG: 20 TABLET ORAL at 17:03

## 2018-03-19 RX ADMIN — IPRATROPIUM BROMIDE AND ALBUTEROL SULFATE 3 ML: .5; 3 SOLUTION RESPIRATORY (INHALATION) at 11:18

## 2018-03-19 RX ADMIN — HYDRALAZINE HYDROCHLORIDE 20 MG: 20 INJECTION INTRAMUSCULAR; INTRAVENOUS at 10:43

## 2018-03-19 RX ADMIN — BENZONATATE 100 MG: 100 CAPSULE ORAL at 00:37

## 2018-03-19 RX ADMIN — FLUTICASONE PROPIONATE 2 SPRAY: 50 SPRAY, METERED NASAL at 10:07

## 2018-03-19 RX ADMIN — SPIRONOLACTONE 50 MG: 25 TABLET ORAL at 14:43

## 2018-03-19 RX ADMIN — INSULIN LISPRO 3 UNITS: 100 INJECTION, SOLUTION INTRAVENOUS; SUBCUTANEOUS at 12:35

## 2018-03-19 RX ADMIN — BENZOCAINE, MENTHOL 1 LOZENGE: 15; 3.6 LOZENGE ORAL at 02:11

## 2018-03-19 RX ADMIN — GUAIFENESIN 600 MG: 600 TABLET, EXTENDED RELEASE ORAL at 10:02

## 2018-03-19 RX ADMIN — CLONIDINE HYDROCHLORIDE 0.1 MG: 0.1 TABLET ORAL at 10:01

## 2018-03-19 RX ADMIN — HUMAN INSULIN 4 UNITS: 100 INJECTION, SUSPENSION SUBCUTANEOUS at 17:03

## 2018-03-19 RX ADMIN — IPRATROPIUM BROMIDE AND ALBUTEROL SULFATE 3 ML: .5; 3 SOLUTION RESPIRATORY (INHALATION) at 07:08

## 2018-03-19 RX ADMIN — Medication 10 ML: at 15:41

## 2018-03-19 RX ADMIN — Medication 10 ML: at 04:51

## 2018-03-19 NOTE — PROGRESS NOTES
Discharge instructions, including information on new medications, were reviewed with patient and daughters. All questions were answered. Patient received a copy of her papers and prescriptions and will be discharged home with her family. She was evaluated for the lymphedema clinic and was given a prescription for the out patient services at the clinic. IV and heart monitor were removed.

## 2018-03-19 NOTE — PROGRESS NOTES
1022 Talked to Dr. Ted Hyde via phone call. Stated that BP was 190/98 and Catapres was given. Asked if order for hydralazine PRN should still be given. Dr. Servando Mojica to give the PRN hydralazine. Told Dr. Brandon Mulligan patient and family stated that pt. Is not diabetic. Dr. Lanny Mauro that patient is in fact a diabetic. Doctor will come to pt. room later to talk about diabetes diagnosis. 1140 Talked to Dr. Kristy Salgado via phone call. Stated that PRN hydralazine was given at 1045. BP was 180/89 at 1100. BP was 194/86 at 1130. Doctor stated that he will discuss with his team about what to do next. No new orders received at this time.

## 2018-03-19 NOTE — PROGRESS NOTES
1930:  Bedside and Verbal shift change report given to [address] [place] [address] Bronson, RN and Fely Yu RN (oncoming nurse) by Lalitha Gayle RN (offgoing nurse). Report included the following information SBAR, Kardex, MAR, Recent Results and Cardiac Rhythm NSR.     0500:  Patient O2 sat. Maintaining above 95 on 2 L O2. Received call from [facility] regarding weaning O2. Turned oxygen down from 2L to room air. Unable to measure output due to patient and family repeatedly removing hat from toliet. Educated family and patient multiple times on importance of I/Os. 4335:  O2 Sats maintained above 90% on room air while at rest.     0715: Bedside and Verbal shift change report given to Sonny Chowdary RN (oncoming nurse) by Fely Yu RN and Pam Wright RN (offgoing nurse). Report included the following information SBAR, Kardex, ED Summary, MAR, Accordion, Recent Results and Cardiac Rhythm NSR.

## 2018-03-19 NOTE — PROGRESS NOTES
Problem: Patient Education: Go to Patient Education Activity  Goal: Patient/Family Education  Occupational Therapy EVALUATION/discharge  Patient: Yossi Kapoor (50 y.o. female)  Date: 3/19/2018  Primary Diagnosis: Hypoxia        Precautions:        ASSESSMENT:   Based on the objective data described below, the patient presents with increased activity tolerance and balance, educated on pursed lip breathing, with ~2 minutes ADL mobility in room and toilet transfer decreased to 89% on room air however able to quickly recover to 93% in < 1 minute. At this time no further OT indicated. Family inquiring desire to see MD and passed on to RN. Further skilled acute occupational therapy is not indicated at this time.   Discharge Recommendations: None  Further Equipment Recommendations for Discharge: none      SUBJECTIVE:   Patient stated: Patient speaks Lithuanian and did not speak directly to therapist    OBJECTIVE DATA SUMMARY:   HISTORY:   Past Medical History:   Diagnosis Date    Anxiety and depression     Flu     2 wks ago    GERD (gastroesophageal reflux disease)     Headache(784.0)     Hypertension     Incidental lung nodule, > 3mm and < 8mm 2016    left lung nodule in smoker, needs follow up CT in Oct 2016      Past Surgical History:   Procedure Laterality Date    APPENDECTOMY      HX APPENDECTOMY      HX  SECTION      X 3 Births    HX  SECTION      HX CHOLECYSTECTOMY      HX WRIST FRACTURE TX      metal    REMOVAL GALLBLADDER         Prior Level of Function/Environment/Context: independent per family, only wears flip flops, per chart still smokes, no adaptive equipment  Occupations in which the patient is/was successful, what are the barriers preventing that success:   Performance Patterns (routines, roles, habits, and rituals):   Personal Interests and/or values:   Expanded or extensive additional review of patient history:     Home Situation  Home Environment: Private residence  # Steps to Enter: 0  One/Two Story Residence: One story  Living Alone: No  Support Systems: Spouse/Significant Other/Partner, Family member(s)  Patient Expects to be Discharged to[de-identified] Private residence  Current DME Used/Available at Home: None  []  Right hand dominant   []  Left hand dominant    EXAMINATION OF PERFORMANCE DEFICITS:  Cognitive/Behavioral Status:  Neurologic State: Alert  Orientation Level: Oriented X4  Cognition: Follows commands; Appropriate decision making; Appropriate for age attention/concentration; Appropriate safety awareness  Perception: Appears intact  Perseveration: No perseveration noted  Safety/Judgement: Awareness of environment; Fall prevention;Home safety    Skin: intact    Edema: bilateral feet, better per family    Hearing:       Vision/Perceptual:                                     Range of Motion:  AROM: Within functional limits                         Strength:  Strength: Generally decreased, functional                Coordination:  Coordination: Generally decreased, functional  Fine Motor Skills-Upper: Left Intact; Right Intact    Gross Motor Skills-Upper: Left Intact; Right Intact    Tone & Sensation:  Tone: Normal  Sensation: Intact                      Balance:  Sitting: Intact; Without support  Standing: Intact; Without support    Functional Mobility and Transfers for ADLs:  Bed Mobility:       Transfers:  Sit to Stand: Independent  Stand to Sit: Independent  Bed to Chair: Independent  Toilet Transfer : Independent    ADL Assessment:  Feeding: Independent    Oral Facial Hygiene/Grooming: Modified Independent    Bathing: Modified independent;Supervision    Upper Body Dressing: Independent    Lower Body Dressing: Modified independent (does not wear socks and only flip flops)    Toileting: Modified independent                ADL Intervention and task modifications:     Educated patient and her family who translated to her for increased comprehension   -pursed lip breathing: how to perform, benefit of incorporating into functional tasks/mobility, use of home pulse ox and where to purchase to ensure optimal level of oxygen saturation   -safe footwear for fall prevention and potential benefit of reacher, patient denies need   -energy conservation and need to decrease forward bending due to affect on breathing          Cognitive Retraining  Safety/Judgement: Awareness of environment; Fall prevention;Home safety       Functional Measure:  Barthel Index:    Bathin  Bladder: 10  Bowels: 10  Groomin  Dressing: 10  Feeding: 10  Mobility: 15  Stairs: 5  Toilet Use: 10  Transfer (Bed to Chair and Back): 15  Total: 95       Barthel and G-code impairment scale:  Percentage of impairment CH  0% CI  1-19% CJ  20-39% CK  40-59% CL  60-79% CM  80-99% CN  100%   Barthel Score 0-100 100 99-80 79-60 59-40 20-39 1-19   0   Barthel Score 0-20 20 17-19 13-16 9-12 5-8 1-4 0      The Barthel ADL Index: Guidelines  1. The index should be used as a record of what a patient does, not as a record of what a patient could do. 2. The main aim is to establish degree of independence from any help, physical or verbal, however minor and for whatever reason. 3. The need for supervision renders the patient not independent. 4. A patient's performance should be established using the best available evidence. Asking the patient, friends/relatives and nurses are the usual sources, but direct observation and common sense are also important. However direct testing is not needed. 5. Usually the patient's performance over the preceding 24-48 hours is important, but occasionally longer periods will be relevant. 6. Middle categories imply that the patient supplies over 50 per cent of the effort. 7. Use of aids to be independent is allowed. Monica Gregg., Barthel, D.W. (7737). Functional evaluation: the Barthel Index. [facility] (14)2. Daleen Fend kendra [place], J.J.M.F, Doron Arana., Manjit Trejo.Gustavo, [address] ().  Measuring the change indisability after inpatient rehabilitation; comparison of the responsiveness of the Barthel Index and Functional [place] Measure. Journal of Neurology, Neurosurgery, and Psychiatry, 66(4), 357-305. GERSON Iyer, COURTNEY Torre, & Donaldo Foley M.A. (2004.) Assessment of post-stroke quality of life in cost-effectiveness studies: The usefulness of the Barthel Index and the EuroQoL-5D. Quality of Life Research, 13, 984-48         G codes: In compliance with CMSs Claims Based Outcome Reporting, the following G-code set was chosen for this patient based on their primary functional limitation being treated: The outcome measure chosen to determine the severity of the functional limitation was the Barthel Index with a score of 95/100 which was correlated with the impairment scale. ? Self Care:     - CURRENT STATUS: CM - 80%-99% impaired, limited or restricted    - GOAL STATUS: CM - 80%-99% impaired, limited or restricted    - D/C STATUS:  CM - 80%-99% impaired, limited or restricted     Occupational Therapy Evaluation Charge Determination   History Examination Decision-Making   LOW Complexity : Brief history review  LOW Complexity : 1-3 performance deficits relating to physical, cognitive , or psychosocial skils that result in activity limitations and / or participation restrictions  LOW Complexity : No comorbidities that affect functional and no verbal or physical assistance needed to complete eval tasks       Based on the above components, the patient evaluation is determined to be of the following complexity level: LOW   Pain:   no complaint of pain     Activity Tolerance:   Good to fair  Please refer to the flowsheet for vital signs taken during this treatment.   After treatment:   []  Patient left in no apparent distress sitting up in chair  [x]  Patient left in no apparent distress sitting on edge of bed  [x]  Call bell left within reach  [x]  Nursing notified  [x] Caregiver present  []  Bed alarm activated    COMMUNICATION/EDUCATION:   Communication/Collaboration:  [x]      Home safety education was provided and the patient/caregiver indicated understanding. [x]      Patient/family have participated as able and agree with findings and recommendations. []      Patient is unable to participate in plan of care at this time.   Findings and recommendations were discussed with: Physical Therapy Assistant and Registered Nurse    Amparo Barahona OTR/L  Time Calculation: 26 mins

## 2018-03-19 NOTE — DISCHARGE INSTRUCTIONS
HOME DISCHARGE INSTRUCTIONS    Marielena Shapr / [de-identified] : 1965    Admission date: 3/16/2018 Discharge date: 3/19/2018     Please bring this form with you to show your care provider at your follow-up appointment. Primary care provider: Efrain Chatterjee DO    Discharging provider:  Shaggy Lechuga MD  - Family Medicine Resident  Dr. Zoltan Charlton - Attending, Family Medicine     You have been admitted to the hospital with the following diagnoses:    ACUTE DIAGNOSES:  Hypoxia  . . . . . . . . . . . . . . . . . . . . . . . . . . . . . . . . . . . . . . . . . . . . . . . . . . . . . . . . . . . . . . . . . . . . . . . Greek Pillar FOLLOW-UP CARE RECOMMENDATIONS:    Appointments  Follow-up Information     Follow up With Details Comments Contact Info    Efrain Chatterjee DO Go on 3/22/2018 4:15pm (hospital follow up - please arrive 15 mins early) [address] [place] [address]  [phone]      Joceline Quevedo DO On 2018 $200 up front co-pay, 10:00am. Please arrive at 9:30am for paperwork. [facility] DOCTOR) [address]  [phone]           Please call to cancel if you are not able to make your lung doctor appointment above. Please follow up with your PCP regardin. Incidental lung nodule seen on CT (previously followed outpatient by PCP, had recommended chest CT 2018)  2. Your breathing - you may need to see pulmonology for outpatient pulmonary function tests (lung function tests)  3. Chronic lymphedema (the swelling in your legs)  4. Incidental adrenal nodule seen on imaging - 2.6 x 1.9 cm  5. Your diabetes and diet     MEDICATION CHANGES:  1. Your metoprolol was changed from 25mg twice a day to 50 mg twice a day     2. Take the mucinex twice a day to help with the cough. 3. Take the pepcid two times a day. This will help protect your stomach from the steroids. 4. Take the levaquin 750mg every day, before bedtime, for 4 days.  This is an antibiotic for your lungs. 5. Take the prednisone as written: 40mg daily (2 tablets) on 3/19, 3/20, and 3/21. Then take 20mg daily (1 tablet) on 3/22, 3/23, 3/24. Then take 10mg daily (1/2 tablet) on 3/25, 3/26, and 3/27. 6. Take the spironolactone every day. This will help with the swelling in your legs and your blood pressure    7. Take the hydralazine three times a day. This will help with your blood pressure. 8. Stop taking the Sudafed cough medicine. Do NOT use medications with phenylephrine or pseudoephedrine or medicines with \"decongestants\"    Follow-up tests needed: Pulmonary Function Tests, follow up and monitoring for adrenal nodule and lung nodule. Pending test results: At the time of your discharge the following test results are still pending: None. Please make sure you review these results with your outpatient follow-up provider(s). Specific symptoms to watch for: chest pain, shortness of breath, fever, chills, nausea, vomiting, diarrhea, change in mentation, falling, weakness, bleeding. DIET/what to eat:  Diabetic Diet    ACTIVITY:  Activity as tolerated    Wound care: None    Equipment needed:  None    What to do if new or unexpected symptoms occur? If you experience any of the above symptoms (or should other concerns or questions arise after discharge) please call your primary care physician. Return to the emergency room if you cannot get hold of your doctor. · It is very important that you keep your follow-up appointment(s). · Please bring discharge papers, medication list (and/or medication bottles) to your follow-up appointments for review by your outpatient provider(s). · Please check the list of medications and be sure it includes every medication (even non-prescription medications) that your provider wants you to take. · It is important that you take the medication exactly as they are prescribed.    · Keep your medication in the bottles provided by the pharmacist and keep a list of the medication names, dosages, and times to be taken in your wallet. · Do not take other medications without consulting your doctor. · If you have any questions about your medications or other instructions, please talk to your nurse or care provider before you leave the hospital.     Information obtained by:     I understand that if any problems occur once I am at home I am to contact my physician. These instructions were explained to me and I had the opportunity to ask questions. I understand and acknowledge receipt of the instructions indicated above. [de-identified] or R.N.'s Signature                                                                  Date/Time                                                                                                                                              Patient or Representative Signature                                                          Date/Time        ãÑÖ [id number] ÇáÑÆæí ÇáãÒãä (COPD): ÅÑÔÇÏÇÊ ÇáÑÚÇíÉ  [ Chronic Obstructive Pulmonary Disease (COPD): Care Instructions ]  ÅÑÔÇÏÇÊ ÇáÚäÇíÉ ÇáÎÇÕÉ Èß    ãÑÖ OEMSXAAE ÇáÑÆæí ÇáãÒãä (COPD) åæ ãÕØáÍ ÚÇã áãÌãæÚÉ ãä ÃãÑÇÖ ÇáÑÆÉ¡ ÈãÇ Ýí Ðáß ÇäÊÝÇÎ ÇáÑÆÉ æÇáÊåÇÈ ÇáÔÚÈ ÇáåæÇÆíÉ ÇáãÒãä. Mihir Coyer POMHGPAK ÈåÐÇ ÇáãÑÖ ãä ÇäÎÝÇÖ ÊÏÝÞ ÇáåæÇÁ ÏÇÎá ÇáÑÆÊíä æÎÇÑÌåãÇ¡ ããÇ íÄÏí Åáì ÕÚæÈÉ Ýí ÇáÊäÝÓ. íãßä ÃíÖðÇ Ãä ÊäÓÏ ZPSSCIG ÇáåæÇÆíÉ Ýí Ùá æÌæÏ ãÎÇØ ßËíÝ. íÚÏ ÊÏÎíä ÇáÓÌÇÆÑ åæ ÇáÓÈÈ ÇáÑÆíÓí áåÐ ÇáãÑÖ. æÑÛã ÚÏã æÌæÏ ÚáÇÌ  ÇáãÑÖ¡ ÅáÇ Åäå ãä Çáããßä ÅÈØÇÁ ÊÞÏãå. ßãÇ Ãä ÇÊÈÇÚ ÎØÉ PGSMNN MQYEPULUS Úáì ÕÍÊß ÊÓÇÚÏ Úáì ÇáÔÚæÑ ÈÇáÊÍÓä æÇáÚíÔ ÍíÇÉ ÃØæá. Åä ãÊÇÈÚÉ ÇáÑÚÇíÉ ÌÒÁ ÑÆíÓí Ýí ÚáÇÌß æÓáÇãÊß. ÊÃßÏ ãä ÅÌÑÇÁ ÊÑÊíÈÇÊ ÌãíÚ ãæÇÚíÏ ÒíÇÑÉ ÇáØÈíÈ OPDRPNX ÅáíåÇ IIMYDGUK ÈØÈíÈß ÅÐÇ ßÇäÊ áÏíß ãÔßáÇÊ.  æãä ÇáÃÝßÇÑ ÇáÌíÏÉ Öð ãÚÑÝÉ äÊÇÆÌ VKVXSMSR [id number] ÈÞÇÆãÉ ÈÇáÃÏæíÉ ÇáÊí SIJFIWHV. ßíÝ íãßäß ÇáÇÚÊäÇÁ ÈäÝÓß Ýí XORKVN¿  ÇáÍÝÇÙ Úáì ÇáÕÍÉ   · ÃÞáÚ Úä ÇáÊÏÎíä. æåÐå Ãåã ÎØæÉ íãßäß ÇÊÎÇÐåÇ áãäÚ ãÒíÏ ãä ÇáÊÏãíÑ ááÑÆÊíä. ÅÐÇ ßäÊ ÈÍÇÌÉ Åáì ãÓÇÚÏÉ ãä ÃÌá ÇáÅÞáÇÚ Úä ÇáÊÏÎíä¡ ÝÊÍÏË ãÚ ÇáØÈíÈ Íæá ÈÑÇãÌ æÃÏæíÉ æÞÝ ÇáÊÏÎíä. íãßä Ãä íÒíÏ åÐÇ ãä ÝÑÕ ÇáÅÞáÇÚ Úä ÇáÊÏÎíä Åáì ÇáÃÈÏ.  · ÊÌäÈ ÇáÅÕÇÈÉ ÈÇáÈÑÏ NKCZGIUAUXZ. ÊäÇæá ÍÞä áÞÇÍ ÇáãßæÑÇÊ ÇáÑÆæíÉ. ÅÐÇ ßäÊ ÞÏ ÊäÇæáÊ ÌÑÚÉ ãä ÞÈá¡ ÝÇÓÊÔÑ ØÈíÈß ÚãÇ ÅÐÇ ßäÊ ÊÍÊÇÌ Åáì ÌÑÚÉ ËÇäíÉ ãä ÚÏãå. ÊäÇæá áÞÇÍ ATCVMFWZCH ßá ÎÑíÝ. ÅÐÇ ßÇä ÊæÇÌÏß ÈÇáÞÑÈ ãä ÃÔÎÇÕ ãÕÇÈíä ÈÇáÈÑÏ Ãæ VOCPECAGEB ÖÑæÑÉ¡ ÝÞã ÈÛÓá íÏíß. ISLTRIQT.  · ÊÌäÈ ÇáÊÏÎíä ÇáÓáÈí æÇáåæÇÁ ÇáãáæË æÇáÇÑÊÝÇÚÇÊ ÇáÔÇåÞÉ. ÊÌäÈ ÃíÖðÇ ÇáåæÇÁ ÇáÈÇÑÏ ÇáÌÇÝ æÇáÓÇÎä ÇáÑØÈ. ÇãßË Ýí ÇáãäÒá ãÚ ÅÛáÇÞ ÇáäæÇÝÐ ÚäÏãÇ íßæä ÊáæË ÇáåæÇÁ ÔÏíÏðÇ. ÇáÃÏæíÉ ALCGLUQ ÈÇáÃßÓÌíä   · ÊäÇæá ÇáÃÏæíÉ ÊãÇãðÇ æÝÞÇ áÊæÌíåÇÊ ÇáØÈíÈ. ÇÊÕá ÈØÈíÈß ÅÐÇ ßäÊ ÊÚÊÞÏ Ãä áÏíß ãÔßáÉ ÎÇÕÉ ÈÇáÏæÇÁ.  · ÞÏ LOHUIT ÃÏæíÉ ãËá:   o o ãæÓÚÇÊ ÇáÔÚÈ ÇáåæÇÆíÉ. ÊÓÇÚÏ åÐå ÇáÃÏæíÉ Úáì ÝÊÍ ÇáããÑÇÊ ÇáåæÇÆíÉ æÊÌÚá ÇáÊäÝÓ ÃÓåá ÈÇáäÓÈÉ áß. Êßæä ãæÓÚÇÊ ÇáÔÚÈ ÇáåæÇÆíÉ ÅãÇ ÞÕíÑÉ ÇáãÝÚæá (ÊÚãá áãÏÉ ãä 6 Åáì 9 ÓÇÚÇÊ) Ãæ ØæíáÉ ÇáãÝÚæá (ÊÚãá áãÏÉ 24 ÓÇÚÉ). æÈãÌÑÏ ÇÓÊäÔÇÞ ãÚÙã ãæÓÚÇÊ ÇáÔÚÈ ÇáåæÇÆíÉ¡ ÊÈÏÃ ãÝÚæáåÇ ÓÑíÚðÇ. ÇÍãá ãÚß ÏÇÆãðÇ ÌåÇÒ COLUYCZTS ÓÑíÚ ÇáãÝÚæá Ýí ÍÇá ßäÊ Ýí ÍÇÌÉ Åáíå ÃËäÇÁ æÌæÏß ÈÚíÏðÇ Úä ÇáãäÒá. o o ÇáßæÑÊíßæÓÊÑæíÏÇÊ (ÈÑíÏäíÒæä¡ ÈæÏíÒæäÇíÏ). ÝåÐå ÇáÃÏæíÉ ÊÞáá ÇáÊåÇÈ ÇáããÑÇÊ ÇáåæÇÆíÉ. æÊßæä ÅãÇ Ýí Ôßá ÃÞÑÇÕ Ãæ ÇÓÊäÔÇÞ. æáÇ ÈÏ ãä ÊäÇæá åÐå ÇáÃÏæíÉ íæãíðÇ áßí ÊÚãá ÈÔßá ÌíÏ.  · ÞÏ íÓÇÚÏß ÇáãÝÓÇÍ (ÝÇÕá DGAOPVWPS) Ýí ÍÕæá ÑÆÊíß Úáì ãÒíÏ ãä ÇáÏæÇÁ ÇáãÓÊäÔÞ. ÇÓÊÔÑ ÇáØÈíÈ Ãæ ÇáÕíÏáí ÚãÇ ÅÐÇ ßÇä ÇáãÝÓÇÍ ãäÇÓÈðÇ áß. ÅÐÇ ßÇä BSYREFQ¡ ÝÇÓÃáå Úä ßíÝíÉ OWFKKVKMQ ÇáÕÍíÍ.  · áÇ ÊÊäÇæá Ãí ÝíÊÇãíäÇÊ Ãæ ÃÏæíÉ ÈÏæä æÕÝÉ ØÈíÉ Ãæ ãäÊÌÇÊ ÚÔÈíÉ Ïæä ÇáÊÍÏË Åáì ÇáØÈíÈ ÃæáÇð.  · ÅÐÇ æÕÝ áß ØÈíÈß ãÖÇÏðÇ ÍíæíðÇ¡ MYZVZQE æÝÞ ÅÑÔÇÏÇÊå. áÇ ÊÊæÞÝ Úä ÊäÇæáå áãÌÑÏ Ãäß ÊÔÚÑ ÈÊÍÓä. æíáÒã Ãä ÊÊäÇæá ßæÑÓ ÇáãÖÇÏ ÇáÍíæí ÈÇáßÇãá.    · íÒíÏ ÇáÚáÇÌ ÈÇáÃßÓÌíä ãä ßãíÉ ÇáÃßÓÌíä Ýí ÇáÏã¡ [place] ÇáÊäÝÓ ÈÓåæáÉ. ÇÓÊÎÏã ãÚÏá ÇáÊÏÝÞ ÇáÐí ÃæÕì Èå ÇáØÈíÈ æáÇ ÊÛíÑå Ïæä ÇáÑÌæÚ Åáì ÇáØÈíÈ ÃæáÇð. ÇáäÔÇØ   · ãÇÑÓ ÊãÇÑíä ãäÊÙãÉ. æíÚÏ ÇáãÔí ØÑíÞÉ ÓåáÉ áããÇÑÓÉ ÇáÊãÑíäÇÊ. ÇÈÏÃ ÈÇáãÔí áãÓÇÝÉ ÞáíáÉ¡ Ëã ÒÏ ÇáãÓÇÝÉ íæãíðÇ.  · ÇäÊÈå áÊäÝÓß. ÅÐÇ ßäÊ áÇ ÊÓÊØíÚ DVTIUN ÃËäÇÁ ããÇÑÓÉ ÇáÊãÇÑíä¡ ÝÃäÊ ÈÐáß ÊãÇÑÓ ÊãÇÑíä ÔÇÞÉ.  · ÎõÐ HEEXZBF ÞÕíÑÉ ãä ÇáÑÇÍÉ ÚäÏ ÇáÞíÇã BJTGRRNQ ÇáãäÒáíÉ æÇáÃäÔØÉ ÇáÃÎÑì.  · ÊÚáã ØÑÞ ÇáÊäÝÓ ãËá ÇáÊäÝÓ ãä ÎáÇá ÛáÞ ÇáÔÝÊíä áãÓÇÚÏÊß Úáì ÊÞáíá ÖíÞ ÇáÊäÝÓ.  · ÅÐÇ áã íßä ÇáØÈíÈ ÞÏ ÞÇã ÈÅÚÏÇÏ ÈÑäÇãÌ ÅÚÇÏÉ ÇáÊÃåíá ÇáÑÆæí áß ÈÚÏ¡ ÝÊÍÏË ãÚå áãÚÑÝÉ ãÇ ÅÐÇ ßÇäÊ ÅÚÇÏÉ ÇáÊÃåíá ãäÇÓÈÉ áß ãä ÚÏãå. æÊÔãá ÅÚÇÏÉ ÇáÊÃåíá ÈÑÇãÌ ÇáÊÏÑíÈ æÇáÊËÞíÝ Íæá ÇáãÑÖ æßíÝíÉ ãÚÇáÌÊå¡ ãÚ ãÓÇÚÏÉ ÇáäÙÇã ÇáÛÐÇÆí æÇáÊÛíÑÇÊ ÇáÃÎÑì æÇáÏÚã ÇáÚÇØÝí. ÇáäÙÇã ÇáÛÐÇÆí   · ÊäÇæá æÌÈÇÊ ÕÍíÉ ãäÊÙãÉ. ÇÓÊÎÏã ãæÓÚðÇ ááÔÚÈ ÇáåæÇÆíÉ ÞÈá ÊäÇæá ÇáØÚÇã ÈÍæÇáí ÓÇÚÉ áÊÓåíá ÇáÊäÇæá. ÊäÇæá æÌÈÇÊ ÕÛíÑÉ æãÊÚÏÏÉ ÈÏáÇ ãä ËáÇË æÌÈÇÊ ßÈíÑÉ. ÊäÇæá ÇáãÔÑæÈÇÊ Ýí äåÇíÉ ÇáæÌÈÉ. ÊÌäÈ ÇáØÚÇã ÕÚÈ ÇáãÖÛ.  · ÊäÇæá ÇáÃØÚãÉ ÇáÊí ÊÍÊæí Úáì ÇáÏåæä CMKVUCXBILR ÍÊì áÇ ÊÝÞÏ ÇáæÒä æßÊáÉ ÇáÚÖáÇÊ. æÊÔãá åÐå ÇáÃØÚãÉ ÇáÂíÓ ßÑíã æÍáæì ÇáÈæÏíäÌ æÇáÌÈä æÇáÈíÖ æÒÈÏÉ ÇáÝæá ÇáÓæÏÇäí. ÇáÕÍÉ ÇáÚÞáíÉ   · ÊÍÏË Åáì ÚÇÆáÊß Ãæ ÃÕÏÞÇÆß Ãæ Åáì ÇáØÈíÈ HDJLAER Úä ãÔÇÚÑß. ãä ÇáØÈíÚí Ãä ÊÍÓ ÈÇáÎæÝ æÇáÛÖÈ æÇáíÃÓ æÇáÚÌÒ æÍÊì ÇáÐäÈ. ÝÇáÊÍÏË ÈÕÑÇÍÉ Úä ÇáãÔÇÚÑ ÇáÓíÆÉ íÓÇÚÏß Úáì ÇáÊÛáÈ ÚáíåÇ. ÅÐÇ ÇÓÊãÑÊ åÐå ÇáãÔÇÚÑ¡ ÝÊÍÏË Åáì ÇáØÈíÈ. ãÊì íÌÈ Úáíß CCZGIXQ áØáÈ ÇáãÓÇÚÏÉ¿  ÇÊÕá ÈÇáÑÞã 911 Ýí Ãí æÞÊ ÊÑì Ýíå ÍÇÌÊß Åáì ÑÚÇíÉ ØÇÑÆÉ. ÝãËáÇð¡ ÇÊÕá Ýí ÍÇáÉ:   · ßäÊ ÊÚÇäí ãä ÕÚæÈÉ ÔÏíÏÉ Ýí ÇáÊäÝÓ. ÇÊÕá ÈØÈíÈß ÇáÂä Ãæ ÇÈÍË Úä ÇáÑÚÇíÉ ÇáØÈíÉ ÇáÝæÑíÉ Ýí ÇáÍÇáÇÊ ÇáÊÇáíÉ:   · ÅÐÇ ßäÊ ÊÚÇäí ãä ãÔÇßá ÌÏíÏÉ Ãæ ÔÏíÏÉ Ýí ÇáÊäÝÓ.  · ÅÐÇ ßÇä åäÇß ÓÚÇá ãÕÍæÈ ÈÏã.  · ÅÕÇÈÊß ÈÍãì. íÊÚíä ÇáãÑÇÞÈÉ ÇáÔÏíÏÉ áÃíÉ ÊÛííÑÇÊ ÊØÑÃ Úáì ÇáÕÍÉ æÇáÍÑÕ Úáì ÇáÇÊÕÇá ÈÇáØÈíÈ Ýí GLVJARK ÇáÊÇáíÉ:   · ÅÐÇ ßÇä åäÇß ÓÚÇá ÚãíÞ Ãæ ãÊßÑÑ¡ ÎÇÕÉ ÅÐÇ áÇÍÙÊ ãÒíÏðÇ ãä ÇáãÎÇØ Ãæ ÊÛíÑðÇ Ýí áæä ÇáãÎÇØ.    · ÅÐÇ ßäÊ ÊÚÇäí ãä ÊæÑã ÌÏíÏ Ãæ ãÊÝÇÞã Ýí ÇáÓÇÞíä Ãæ ÇáÈØä.  · ÅÐÇ áã ÊÊÍÓä æÝÞ ãÇ ÊÊæÞÚå. Ãíä íãßä ãÚÑÝÉ ÇáãÒíÏ¿  ÇäÊÞÇá Åáì [email or web address].[email or web address] íãßäß ãÚÑÝÉ ÇáãÒíÏ ãä ÎáÇá ãÑÈÚ ÇáÈÍË \"ãÑÖ XFVSWUUY ÇáÑÆæí ÇáãÒãä (COPD): ÅÑÔÇÏÇÊ ÇáÑÚÇíÉ - [ Chronic Obstructive Pulmonary Disease (COPD): Care Instructions ]. \"  © 4511-5821 [employer]. ÊãÊ ÊåíÆÉ ÅÑÔÇÏÇÊ ÇáÚäÇíÉ ÈãæÌÈ ÊÑÎíÕ ãä ãÎÊÕ ÇáÑÚÇíÉ ÇáÕÍíÉ áÏíß. ÅÐÇ ßÇäÊ áÏíß ÃíÉ LCLAVLLSH Úä ÍÇáÉ ØÈíÉ Ãæ Ãí ãä åÐå BZPQCKXPK¡ ÝÊæÌå ÏæãðÇ MCRMKCE Åáì ãÎÊÕ ÇáÑÚÇíÉ ÇáÕÍíÉ. ÊäÝí ãäÙãÉ [employer] Ladean Gault ÖãÇä Ãæ Saba Hoe OYNGKDB åÐå TIZQWTWBS. ÅÕÏÇÑ ÇáãÍÊæì: 11.4 ãÍÏøË ÇÚÊÈÇÑðÇ ãä: 26 FLPKN ABATQBJ 4012          SLNJEX TTV COPD  [ Learning About COPD ]  ãÇ åæ COPD¿    COPD åæ ãÑÖ ÑÆæí íõÕÚÈ ÇáÊäÝÓ. íÑãÒ COPD Åáì ãÑÖ ÇáÇäÓÏÇÏ ÇáÑÆæí ÇáãÒãä. ÇáÓÈÈ Ýí Ðáß åæ ÊáÝ íÍÏË Ýí ÇáÑÆÊíä ÚÈÑ ÇáÚÏíÏ ãä ÇáÓäæÇÊ¡ ÚÇÏÉð íßæä ÇáÓÈÈ Ýí Ðáß åæ ÇáÊÏÎíä. COPD åæ ãÒíÌ ãä ãÑÖíä: ÇáÊåÇÈ ÇáÔÚÈ ÇáåæÇÆíÉ ÇáãÒãä æÇäÊÝÇÎ ÇáÑÆÉ. ÊÊÖãä ÇáÃÔíÇÁ ÇáÃÎÑì ÇáÊí ÊÚÑÖß áÎØæÑÉ ÇáÅÕÇÈÉ ÈãÑÖ COPD ÇÓÊäÔÇÞ ÇáÃÈÎÑÉ ÇáßíãíÇÆíÉ¡ Ãæ ÇáÛÈÇÑ¡ Ãæ ÇáåæÇÁ ÇáãáæË áÝÊÑÉ ÒãäíÉ ØæíáÉ. ßãÇ Ãä ÇáÊÏÎíä ÇáÓáÈí ãä ÇáÃãæÑ ÇáÓíÆÉ ÃíÖðÇ. Ýí ÇáÊåÇÈ ÇáÔÚÈ ÇáåæÇÆíÉ ÇáãÒãä¡ ÊáÊåÈ ÇáããÑÇÊ ÇáåæÇÆíÉ ÇáÊí ÊäÞá ÇáåæÇÁ Åáì ÇáÑÆÊíä (ÇáÔÚóÈ ÇáåæÇÆíÉ) æÊßæøä ÇáßËíÑ ãä ÇáãÎÇØ. íãßä Ãä íÄÏí Ðáß Åáì ÊÖííÞ Ãæ ÇäÓÏÇÏ ÇáããÑÇÊ ÇáåæÇÆíÉ¡ ããÇ íÕÚÈ Úáíß ÇáÊäÝÓ. ÃãÇ Ýí ÍÇáÉ ÇäÊÝÇÎ ÇáÑÆÉ ÝÊÊáÝ ÇáÃßíÇÓ ÇáåæÇÆíÉ æÊÝÞÏ ÊãÏÏåÇ. ããÇ íÄÏí Åáì ÏÎæá æÎÑæÌ åæÇÁ ÃÞá ãä æÅáì ÇáÑÆÉ¡ ããÇ íÌÚáß ÊÔÚÑ ÈÖíÞ Ýí ÇáÊäÝÓ. ãÇ ÇáÐí ÊÊæÞÚå ÚäÏ ÅÕÇÈÊß ÈãÑÖ COPD¿  íÒÏÇÏ COPD ÓæÁðÇ ÈãÑæÑ ÇáæÞÊ. áÇ íãßäß ÅÈØÇá ãÇ ÍÏË Ýí ÑÆÊíß ãä ÊáÝ. æÞÏ íÍÏË áß ãÇ íáí ÈãÑæÑ ÇáæÞÊ:   · Ãä ÊÕÇÈ ÈÖíÞ Ýí ÇáÊäÝÓ ÍÊì ÚäÏ ÇáÞíÇã ÈÃãæÑ ÈÓíØÉ ãËá ÇÑÊÏÇÁ ÇáãáÇÈÓ Ãæ ÅÚÏÇÏ æÌÈÉ.  · íÕÈÍ ãä ÇáÕÚÈ ÊäÇæá ÇáØÚÇã Ãæ ããÇÑÓÉ ÇáÊãÇÑíä ÇáÑíÇÖíÉ.  · ÊÝÞÏ æÒäß æÊÔÚÑ Ãäß ÃßËÑ ÖÚÝðÇ.  áßä åäÇß ÃÔíÇÁ íãßäß ÇáÞíÇã ÈåÇ ááæÞÇíÉ ãä ÍÏæË ÇáãÒíÏ ãä ÇáÊáÝ æááÔÚæÑ ÈÊÍÓä.   Izella Peasant ÃÚÑÇÖå¿  ÇáÃÚÑÇÖ ÇáÃÓÇÓíÉ åí:  · ÓÚÇá ÛíÑ ITEEL YRGUFM.  · ãÎÇØ íÎÑÌ ãÚ ÇáÓÚÇá.  · ÖíÞ Ýí ÇáÊäÝÓ íÒÏÇÏ ÓæÁðÇ ãÚ ããÇÑÓÉ ÇáÃäÔØÉ. ÃÍíÇäðÇ ÊÊåíÌ ßá ÇáÃÚÑÇÖ áÏíß ÝÌÃÉ æÊÒÏÇÏ ÓæÁðÇ. íõÓãì Ðáß ÊÝÇÞã COPD (ÊõäØÞ \"egg-ZASS-er-BAY-shun\"). ÚäÏãÇ íÍÏË Ðáß¡ íÒÏÇÏ ÓæÁ ÇáÃÚÑÇÖ ÇáãÚÊÇÏÉ áÏíß ÈÓÑÚÉ æÊÈÞì Ýí ÍÇáÉ ÓíÆÉ. íãßä Ãä íßæä Ðáß ÎØíÑðÇ. ÞÏ íÊÚíä Úáíß ÇáÐåÇÈ Åáì ÇáãÓÊÔÝì. ßíÝ íãßäß ÇáæÞÇíÉ ãä ÊÏåæÑ COPD¿  ÊÌäøÈ ÇáÊÏÎíä. ÊõÚÏ åÐå åí ÇáØÑíÞÉ ÇáÃãËá áÊÌäÈ ÊÏåæÑ COPD. ÅÐÇ ßäÊ ÊÏÎä NDUDEK¡ ÝÇáæÞÊ áã íÊÃÎÑ ááÅÞáÇÚ Úä ÇáÊÏÎíä. ÅÐÇ ßäÊ ÈÍÇÌÉ Åáì ÇáãÓÇÚÏÉ ááÅÞáÇÚ Úä ÇáÊÏÎíä¡ ÝÚáíß ÇáÊÍÏË ãÚ ÇáØÈíÈ Íæá ÇáÈÑÇãÌ æÇáÃÏæíÉ ÇáÎÇÕÉ ÈÇáÊæÞÝ Úä ÇáÊÏÎíä. íãßä Ãä íÒíÏ åÐÇ ãä ÝÑÕ ÇáÅÞáÇÚ Úä ÇáÊÏÎíä äåÇÆíðÇ. íãßäß ÇáÞíÇã ÈÃãæÑ ÃÎÑì áÊÌäÈ ÊÏåæÑ COPD:   · ÊÌäÈ ÇáåæÇÁ ÇáÖÇÑ. ãä Çáããßä ÃíÖðÇ Ãä íÒíÏ ÇáåæÇÁ MEDNRW¡ æÇáÃÈÎÑÉ ÇáßíãíÇÆíÉ¡ æÇáÛÈÇÑ ÃíÖðÇ ãä ÊÏåæÑ COPD.  · ÇÍÕá Úáì ÍÞäÉ ãÖÇÏÉ UEPINCDVTJ ÓäæíðÇ. íãßä Ãä ÊÓÇÚÏ ÇáÍÞäÉ Ýí æÞÇíÉ ãä ÊÍæá HLGKBYGNUE Åáì ÔíÁ ÃßËÑ ÎØæÑÉ¡ ãËá VXQXFQAL ÇáÑÆæí. ÞÏ ÊÎÝÝ ÍÞäÉ BIGZNAONXX ãä ÝÑÕ ÊåíÌ COPD áÏíß.  · ÇÓÊÎÏã OIQZB ÈáÞÇÍ ÇáãßæÑÉ ÇáÑÆæíÉ. ÃÛáÈ ÇáÃÔÎÇÕ íÍÊÇÌæä Åáì ÍÞäÉ æÇÍÏÉ ÝÞØ ááæÞÇíÉ ãä ÇáÇáÊåÇÈ ÇáÑÆæí¡ áßä ÃÍíÇäðÇ íæÕí ÇáÃØÈÇÁ ÈÃÎÐ ÍÞäÉ ËÇäíÉ ÝíãÇ íÎÕ ÈÚÖ ÇáÃÔÎÇÕ ÞÈá Ãä íÈáÛæÇ 72 ÚÇãðÇ ãä ÇáÚãÑ. ÊÍÏË Åáì ÇáØÈíÈ Íæá ãÇ ÅÐÇ ßäÊ ÊÍÊÇÌ Åáì ÍÞäÉ ËÇäíÉ Ãã áÇ. ßíÝ íÊã ÚáÇÌ COPD¿  íÊã ÚáÇÌ COPD Úä ØÑíÞ ÇáÃÏæíÉ æÇáÃßÓÌíä. ßãÇ íãßäß ÃíÖðÇ ÇÊÎÇÐ ÎØæÇÊ Ýí ÇáãäÒá ááÈÞÇÁ Ýí ÍÇáÉ ÕÍíÉ ÌíÏÉ æÊÌäÈ ÊÏåæÑ COPD áÏíß. ÇáÃÏæíÉ æÇáÚáÇÌ ÈÇáÃßÓÌíä   · ÞÏ ÊÊäÇæá ÃÏæíÉ ãËá:   o o ãæÓÚÇÊ ÇáÔÚÈ ÇáåæÇÆíÉ. ÊÓÇÚÏ åÐå ÇáÃÏæíÉ Úáì ÝÊÍ ÇáããÑÇÊ ÇáåæÇÆíÉ æÊÌÚá ÇáÊäÝÓ ÃÓåá ÈÇáäÓÈÉ áß. Êßæä ãæÓÚÇÊ ÇáÔÚÈ ÇáåæÇÆíÉ ÅãÇ ÞÕíÑÉ ÇáãÝÚæá (ÊÚãá áãÏÉ ãä 6 Åáì 9 ÓÇÚÇÊ) Ãæ ØæíáÉ ÇáãÝÚæá (ÊÚãá áãÏÉ 24 ÓÇÚÉ). æÈãÌÑÏ ÇÓÊäÔÇÞ ãÚÙã ãæÓÚÇÊ ÇáÔÚÈ ÇáåæÇÆíÉ¡ ÊÈÏÃ ãÝÚæáåÇ ÓÑíÚðÇ. ÇÍãá ãÚß ÏÇÆãðÇ ÌåÇÒ PJWHNWCJU ÓÑíÚ ÇáãÝÚæá Ýí ÍÇá ßäÊ Ýí ÍÇÌÉ Åáíå ÃËäÇÁ æÌæÏß ÈÚíÏðÇ Úä ÇáãäÒá. o o ÇáßæÑÊíßæÓÊÑæíÏÇÊ. ÝåÐå ÇáÃÏæíÉ ÊÞáá ÇáÊåÇÈ ÇáããÑÇÊ ÇáåæÇÆíÉ. æÊßæä ÅãÇ Ýí Ôßá ÃÞÑÇÕ Ãæ ÇÓÊäÔÇÞ.  æáÇ ÈÏ ãä ÊäÇæá åÐå ÇáÃÏæíÉ íæãíðÇ áßí ÊÚãá ÈÔßá ÌíÏ.  · ÊäÇæá ÇáÃÏæíÉ ÊãÇãðÇ ÍÓÈ ÊæÌíåÇÊ ÇáØÈíÈ. ÇÊÕá ÈÇáØÈíÈ ÅÐÇ ßäÊ ÊÚÊÞÏ Ãäß ÊæÇÌå ãÔßáÉ ÊÊÚáÞ ÈÇáÏæÇÁ.  · íÒíÏ ODKHJP ÈÇáÃßÓÌíä ãä ßãíÉ ÇáÃßÓÌíä Ýí ÇáÏã¡ æíÓÇÚÏß Úáì ÇáÊäÝÓ ÈÓåæáÉ. ÇÓÊÎÏã ãÚÏá ÇáÊÏÝÞ ÇáÐí ÃæÕì Èå ÇáØÈíÈ æáÇ ÊÛíÑå Ïæä ÇáÑÌæÚ Åáì ÇáØÈíÈ ÃæáÇð. Obdulia People ÃÎÑì   · ãÇÑÓ ÇáãÒíÏ ãä ÇáÊãÑíäÇÊ ÇáÑíÇÖíÉ ÅÐÇ ÃæÕÇß ÇáØÈíÈ ÈÐáß. æíõÚÏ ÇáãÔí ÎíÇÑðÇ ÌíÏðÇ. Þã ÈÒíÇÏÉ ÇáãÓÇÝÉ ÇáÊí ÊãÔíåÇ ßá íæã ÊÏÑíÌíðÇ. ÌÑøöÈ Úáì ÇáÃÞá ÇáãÔí áãÏÉ 30 ÏÞíÞÉð Ýí ãÚÙã ÃíÇã ÇáÃÓÈæÚ.  · ÊÚáã ØÑÞ ÇáÊäÝÓ ãËá ÇáÊäÝÓ ãä ÎáÇá ÛáÞ ÇáÔÝÊíä áãÓÇÚÏÊß Úáì ÊÞáíá ÖíÞ ÇáÊäÝÓ.  · ÅÐÇ áã íßä ÇáØÈíÈ ÞÏ ÞÇã ÈÅÚÏÇÏ ÈÑäÇãÌ ÅÚÇÏÉ ÇáÊÃåíá ÇáÑÆæí áß ÈÚÏ¡ ÝÊÍÏË ãÚå áãÚÑÝÉ ãÇ ÅÐÇ ßÇäÊ ÅÚÇÏÉ ÇáÊÃåíá ãäÇÓÈÉ áß ãä ÚÏãå. æÊÔãá ÅÚÇÏÉ ÇáÊÃåíá ÈÑÇãÌ ÇáÊÏÑíÈ æÇáÊËÞíÝ Íæá ÇáãÑÖ æßíÝíÉ ãÚÇáÌÊå¡ ãÚ ãÓÇÚÏÉ ÇáäÙÇã ÇáÛÐÇÆí æÇáÊÛíÑÇÊ ÇáÃÎÑì æÇáÏÚã ÇáÚÇØÝí.  · ÊäÇæá æÌÈÇÊ ÕÍíÉ ãäÊÙãÉ. ÇÓÊÎÏã ãæÓÚðÇ ááÔÚÈ ÇáåæÇÆíÉ ÞÈá ÊäÇæá ÇáØÚÇã ÈÍæÇáí ÓÇÚÉ áÊÓåíá ÇáÊäÇæá. ÊäÇæá æÌÈÇÊ ÕÛíÑÉ æãÊÚÏÏÉ ÈÏáÇ ãä ËáÇË æÌÈÇÊ ßÈíÑÉ. ÊäÇæá ÇáãÔÑæÈÇÊ Ýí äåÇíÉ ÇáæÌÈÉ. ÊÌäÈ ÇáØÚÇã ÕÚÈ ÇáãÖÛ. ÊõÚÏ ÑÚÇíÉ ÇáãÊÇÈÚÉ ÌÒÁðÇ ÃÓÇÓíðÇ Ýí ÚáÇÌß æÓáÇãÊß. ÝÚáíß ÇáÍÑÕ Úáì ÊÑÊíÈ ÌãíÚ ãæÇÚíÏ ÒíÇÑÉ ÇáØÈíÈ [id number] ÈåÇ¡ QXTRNUYY ÈØÈíÈß ÚäÏ KIYCEURO ãä Ãí ãÔßáÇÊ. æãä ÇáÌíÏ ÃíÖðÇ Ãä ÊÚÑÝ äÊÇÆÌ BEAFFBNY æßÐáß UWAOLDMJ ÈÞÇÆãÉ ÇáÃÏæíÉ ÇáÊí ZUGDEVEE.  Ãíä íãßä ãÚÑÝÉ ÇáãÒíÏ¿  ÇäÊÞÇá Åáì [email or web address] ÏÎæá [address] íãßäß ãÚÑÝÉ ÇáãÒíÏ ãä ÎáÇá ãÑÈÚ ÇáÈÍË \"ÇáÊÚÑÝ Úáì COPD - [ Learning About COPD ]. \"  © 9381-8915 Healthwise, [employer]. ÊãÊ ÊåíÆÉ ÅÑÔÇÏÇÊ ÇáÚäÇíÉ ÈãæÌÈ ÊÑÎíÕ ãä ãÎÊÕ ÇáÑÚÇíÉ ÇáÕÍíÉ áÏíß. ÅÐÇ ßÇäÊ áÏíß ÃíÉ BGLHTOEKE Úä ÍÇáÉ ØÈíÉ Ãæ Ãí ãä åÐå OXDTFMCTH¡ ÝÊæÌå ÏæãðÇ YYFAULF Åáì ãÎÊÕ ÇáÑÚÇíÉ ÇáÕÍíÉ. ÊäÝí ãäÙãÉ Miso, [employer] Michael Everts ÖãÇä Ãæ Donnalee Murrain MHSKQTB åÐå ZYUHGMPOI. ÅÕÏÇÑ ÇáãÍÊæì: 11.4 ãÍÏøË EXIOXWCM ãä: 26 KNAXZ TNFFNZA 1723         Learning About COPD Triggers  What are triggers?     When you have COPD (chronic obstructive pulmonary disease), certain things can make your symptoms worse. These are called triggers. They include:  · Cigarette smoke or air pollution. · Illnesses like colds, flu, or pneumonia. · Cleaning supplies or other chemicals. · Gases, particles, or fumes from wood or kerosene home heaters. Not all people have the same triggers. What may cause symptoms in one person may not be a problem for another person. How do triggers affect COPD? Triggers can make it harder for your lungs to work as they should and can lead to sudden difficulty breathing and other symptoms. When you are around a trigger, a COPD flare-up is more likely. If your symptoms are severe, you may need emergency treatment or have to go to the hospital for treatment. If you know what your triggers are and can avoid them, you can reduce how often you have flare-ups and how much COPD affects your life. It's also important to be active and to take your daily medicines as prescribed. This helps prevent flare-ups too. What can you do to avoid triggers? The first thing is to know your triggers. When you are having symptoms, note the things around you that might be causing them. Then look for patterns in what may be triggering your symptoms. When you have your list of possible triggers, work with your doctor to find ways to avoid them. Here are some ways to avoid a few common triggers. · Do not smoke or allow others to smoke around you. If you need help quitting, talk to your doctor about stop-smoking programs and medicines. These can increase your chances of quitting for good. · If there is a lot of pollution, pollen, or dust outside, stay at home and keep your windows closed. Use an air conditioner or air filter in your home. Check your local weather report or newspaper for air quality and pollen reports. · Get a flu vaccine every year. Talk to your doctor about getting a pneumococcal shot.  Wash your hands often to prevent infections. How can you manage a flare-up? Do not panic if you start to have a COPD flare-up. · If you have a COPD action plan, follow the plan. In general:  ¨ Use your quick-relief inhaler as directed by your doctor. If your symptoms do not get better after you use your medicine, have someone take you to the emergency room. Call an ambulance if needed. ¨ Use a spacer with your metered-dose inhaler (MDI). If you have a nebulizer for inhaled medicine, use it. A spacer or nebulizer may help get more medicine to your lungs. ¨ If your doctor has given you other inhaled medicines or steroid pills, take them as directed. ¨ If your doctor has given you a prescription for an antibiotic, fill it if you need to. ¨ Call your doctor if you have to use your antibiotic or steroid pills. Where can you learn more? Go to http://shima-alyssa.info/. Enter Q123 in the search box to learn more about \"Learning About COPD Triggers. \"  Current as of: May 12, 2017  Content Version: 11.4  © 8929-5316 Healthwise, Incorporated. Care instructions adapted under license by [employer] (which disclaims liability or warranty for this information). If you have questions about a medical condition or this instruction, always ask your healthcare professional. [address] any warranty or liability for your use of this information.

## 2018-03-19 NOTE — PROGRESS NOTES
03/19/18 1115 03/19/18 1117 03/19/18 1119   RT Walking Oximetry   Stage Resting (Room Air) During Walk (Room Air) During Walk (Room Air)   SpO2 95 % 95 % 93 %    bpm 104 bpm 112 bpm   Rate of Dyspnea 0 0 1   Symptoms --  --  Shortness of Breath   O2 Device None (Room air) None (Room air) None (Room air)       03/19/18 1122   RT Walking Oximetry   Stage After Walk   SpO2 95 %    bpm   Rate of Dyspnea 1   Symptoms Shortness of Breath   O2 Device None (Room air)

## 2018-03-19 NOTE — PROGRESS NOTES
03/19/18 1434 03/19/18 1515 03/19/18 1541   Vitals   /77 (!) 155/92 162/82   1545  Spoke to Dr. Dixie Wilburn regarding pt's last several blood pressures. Received order to continue with discharge. 80  [place] in \[facility] [place] [facility]\"" for a hard copy prescription for the lymphedema clinic rx. Provided hard copy to Dr. Gordon Pavon.

## 2018-03-19 NOTE — PROGRESS NOTES
Physical Therapy:  Order received for lymphedema. Patient currently with increased LE swelling due to a mixed vascular insufficiency, she is not technically suffering from lymphedema. Patient reports that she was placed in thigh high compression. However there is a discrepancy:  Per chart patient was evaluated in the lymphedema clinc on 1/2017 and she was recommended to follow up with wraps and then long term management with custom flat knit compression= patient did not follow up due insurance concerns and being unable to attend clinic. For patient to be effectively managed at the clinic she will require multiple appointments of a period of time (weeks-months) for efficient management and CDT. During the chart review- it was noted she trailed over the counter stockings. .  Family requesting additional attempts at lymphedema clinic for additional options. Patient- reports she did not tolerate compression stockings prior due to complications of increased chest pain. Explanation to patient and family how this symptoms she is reporting (chest discomfort) is a contraindication to wearing compression, and there are not many other style options that would prohibit chest pain (due to it being a medical complication not a restrictive/poor fitting complication). They verbalized understanding but still wished to return to clinic.     Recommendation:  Outpatient lymphedema referral.  (She will need a script to attend this outpatient clinic)    Maria Luisa Kelly PT,DPT,NCS,CLT

## 2018-03-19 NOTE — PROGRESS NOTES
Problem: Falls - Risk of  Goal: *Absence of Falls  Document Michelle Fall Risk and appropriate interventions in the flowsheet.    Outcome: Progressing Towards Goal  Fall Risk Interventions:  Mobility Interventions: Bed/chair exit alarm         Medication Interventions: Patient to call before getting OOB

## 2018-03-19 NOTE — PROGRESS NOTES
NUTRITION    RECOMMENDATIONS:     1. Recommend DTC referral  2. Outpatient Diabetic Counseling reccommended    ASSESSMENT:   3/19: Consult received for diabetic diet education, pt has been trying to lose weight. Admitted with coughing and congestion x 1 week. Hx: HTN, anxiety, GERD, Type 2 DM per H&P. Noted HgbA1C: 6.5 % 3/16/18. Visited pt sitting on side of bed, SOB, family in room, sister-in law, and daughter whom helped translate. Family states she has not been able to do much activity at home as she gets very SOB. Diet hx reveals: Breakfast: yogurt or an egg, Dinner: rice and meat. Daughter states she does eat vegetables and fruit. Daughter states they were not aware she has diabetes. Discussed HgbA1C with her. She reports pt's mother has DM. I provided them with  education on The Plate Method and basic Diabetic Guidelines. Pt did not participate much in the education as she was not feeling well. Will need to f/u to assess comprehension.     Past Medical History:   Diagnosis Date    Anxiety and depression     Flu     2 wks ago    GERD (gastroesophageal reflux disease)     Headache(784.0)     Hypertension     Incidental lung nodule, > 3mm and < 8mm 4/20/2016    left lung nodule in smoker, needs follow up CT in Oct 2016        Diet: Diabetic    Abd:   Intact, soft, active bs         BM: 3/18    Skin Integrity: [x]Intact  []Other  Edema: []None [x]Other : LLE, RLE    Nutritionally Significant Medications: [x] Reviewed & Includes: Apresoline,  SSI, Insulin NPH, Lopressor, Protonix    Labs:    Lab Results   Component Value Date/Time    Sodium 143 03/19/2018 02:01 AM    Potassium 4.2 03/19/2018 02:01 AM    Chloride 103 03/19/2018 02:01 AM    CO2 32 03/19/2018 02:01 AM    Anion gap 8 03/19/2018 02:01 AM    Glucose 177 (H) 03/19/2018 02:01 AM    BUN 23 (H) 03/19/2018 02:01 AM    Creatinine 0.79 03/19/2018 02:01 AM    Calcium 9.4 03/19/2018 02:01 AM    Albumin 3.2 (L) 03/19/2018 02:01 AM Anthropometrics:   Weight Source: Standing scale (comment) (3/19 2992)  Height: 5' (152.4 cm),    Body mass index is 57.05 kg/(m^2).   IBW : 45.4 kg (100 lb), % IBW (Calculated): 292.11 %, Usual Body Weight: 132.5 kg (292 lb),    Wt Readings from Last 5 Encounters:   03/19/18 132.5 kg (292 lb 1.8 oz)   03/16/18 133.8 kg (295 lb)   10/06/17 134 kg (295 lb 6.4 oz)   09/29/17 133.4 kg (294 lb)   09/22/17 134.7 kg (297 lb)       Estimated Daily Nutrition Requirements:   Weight Used: Actual wt  Kcals: 1740 Kcals/day Based on:[place] Jeor ( x 1.2 - 500)  Protein: 65 g (to 87 gms ( 15-20% of kcals))   Fluid:  (1 ml/kcal)      Education & Discharge Needs:   [] Pt discussed in ID rounds     Nutrition related discharge needs addressed:     [] Supplements (on d/c instruction &/or coupons provided)    [] Tube Feedings     [x] Education Done   []No nutrition related discharge needs at this time     Cultural, Oriental orthodox and ethnic food preferences identified    [x] None   [] Yes     NUTRITION DIAGNOSIS:     Overweight/obesity related to decreased motbility  as evidenced by BMI: 62                     Pt is at Nutrition Risk:  [x]    No Nutrition Risk Identified:  []    RD INTERVENTION / PT GOALS:     Food/Nutrient Delivery:   ,  ,  ,  ,    Nutrition Education:Initial/Brief Nutrition Education: Purpose of nutrition education (Diabetic Healthy Eating),    Nutrition Counseling:    Coordination of Care:      Goal: Pt will have a good understanding of balanced meals prior to discharge    MONITORING & EVALUATION:       Physical Signs/Symptoms Outcomes: Weight/weight change   Behavioral-Environmental Outcomes: Readiness to change, Food/nutrition knowledge  Previous Nutrition Goals:  Previous Goal Met: N/A  Previous Recommendations:      Previous Recommendations Implemented: N/A       Jenifer Agudelo RD

## 2018-03-19 NOTE — PROGRESS NOTES
[facility] FAMILY MEDICINE RESIDENCY PROGRAM   Daily Progress Note    Date: 3/19/2018    Assessment/Plan:   Brandon Schaffer is a 50 y.o. female with PMHx of tobacco use, diet controlled diabetes,  HTN, Anxiety, GERD, Anaphylaxis of unknown etiology and obestiy who is admitted for acute hypoxic respratory distress due to possible undiagnosed COPD 2/2 tobacco use and/or URI. 24 hour events  - LE duplex negative for DVT  - Decreased from 4L O2 to 2L O2 yesterday, to RA at this time.      Acute Hypoxic Respratory Distress due to possible undiagnosed COPD 2/2 tobacco use and/or URI  - 4L --> 2L at 4am.   - Received 3rd dose of levaquin last night (total 7 doses)  - Solumedrol 125 mg IV q 12 hours --> 60mg IV q12 hours 3/18 --> Prednisone 60mg BID today  - Duo-nebs q 4hours  - O2 PRN for SpO2> 90%, mucinex, wean oxygen as tolerated  - No formal diagnosis of COPD but history of tobacco use with 30 pack year history. - Discussed possible COPD and outpatient PFTs with patient and family at length. All questions answered.       HTN  - POA /97, patient on metoprolol 25 mg PO BID PTA  - Continue home dose Metoprolol --> increased to 50mg PO BID 3/18 as BPs in the 983Q-167U systolic.   - Continue to monitor  - PRN hydralazine for sBP > 180, dBP > 110  - Consider adding additional antihypertensives to regimen, but difficult due to patient's allergies. To be discussed with team.      Tachycardia: RESOLVED. - Likely from Anxiety and albuterol treatment   - Will continue to monitor      Diet controlled Type 2 Diabetes    - Last A1C 7.0 on 9/17/2017 --> 6.5 3/16/18  - POC Glucose AC&QHS with SSI    - NPH 4 U BID with steroids, adjusting as needed.  Most POC BG within goal. Received 9 units correctional lispro yesterday.       GERD  - Stable, patient not on medication at home  - Protonix BID PO     Lung Nodule  - Incidental finding of 5mm left upper lobe lung nodule seen on 4/2016  - POA CT chest showed unchanged tiny left upper lobe     Incidental adrenal nodule  - POA CT showed left adrenal nodule measuring 2.6 X1.9 cm, unchanged, outpatient follow up  - stable      Lymphedema  - Stable   - Would benefit from compression stockings but unable to tolerate  - Anaphylaxis reaction to HCTZ and lasix (presumed, not challenged), being monitored outpatient. No significant weight gain since discontinuing diuretics, continue to monitor.   - Would benefit from outpatient lymphedema clinic  - Discussed weight loss with patient and family, nutrtion consulted     Obesity  - BMI of 54.7   - [place] on life style modification  - Nutrition consulted     FEN/GI - Diabetic diet consistent carb  Activity - Ambulate with assistance   DVT prophylaxis - Lovenox   GI prophylaxis - Protonix  Disposition - Plan to d/c to home once stable.     CODE STATUS: Full Code discussed with patient and patient's family    Point of Contact: Audra Ovalles (daughter) [phone]    Patient to be discussed with Dr. Ben Gross MD  Family Medicine Resident         CC: \" I feel better\"    Subjective    Patient resting on bed. Denies SOB, CP. States she is comfortable, says her cough is \"fine\" and is now dry, no longer productive for clear/white like yesterday. Denies N/V, adominal pain, diarrhea, changes in her LE swelling. Leg pain 1/10. States \"everything is good. \"     Nursing clarifies patient with cough still present, waking her up at night. States patient was up all night pacing back and forth to help her breathing. At time of interview patient satting 92-93% on RA, resting comfortably in bed.      Inpatient Medications  Current Facility-Administered Medications   Medication Dose Route Frequency    predniSONE (DELTASONE) tablet 60 mg  60 mg Oral Q12H    levoFLOXacin (LEVAQUIN) tablet 750 mg  750 mg Oral Q24H    pantoprazole (PROTONIX) tablet 40 mg  40 mg Oral Q12H    metoprolol tartrate (LOPRESSOR) tablet 50 mg  50 mg Oral BID    insulin NPH (NOVOLIN N, HUMULIN N) injection 4 Units  4 Units SubCUTAneous Q12H    hydrALAZINE (APRESOLINE) 20 mg/mL injection 20 mg  20 mg IntraVENous PRN    fluticasone (FLONASE) 50 mcg/actuation nasal spray 2 Spray  2 Spray Both Nostrils DAILY    benzocaine-menthol (CEPACOL) lozenge 1 Lozenge  1 Lozenge Mucous Membrane PRN    benzonatate (TESSALON) capsule 100 mg  100 mg Oral TID PRN    sodium chloride (NS) flush 5-10 mL  5-10 mL IntraVENous Q8H    sodium chloride (NS) flush 5-10 mL  5-10 mL IntraVENous PRN    nicotine (NICODERM CQ) 7 mg/24 hr patch 1 Patch  1 Patch TransDERmal Q24H    albuterol-ipratropium (DUO-NEB) 2.5 MG-0.5 MG/3 ML  3 mL Nebulization Q4H RT    guaiFENesin ER (MUCINEX) tablet 600 mg  600 mg Oral Q12H    glucose chewable tablet 16 g  4 Tab Oral PRN    dextrose (D50W) injection syrg 12.5-25 g  12.5-25 g IntraVENous PRN    glucagon (GLUCAGEN) injection 1 mg  1 mg IntraMUSCular PRN    insulin lispro (HUMALOG) injection   SubCUTAneous AC&HS    enoxaparin (LOVENOX) injection 40 mg  40 mg SubCUTAneous Q12H         Allergies  Allergies   Allergen Reactions    Hydrochlorothiazide Anaphylaxis and Unproven on Challenge    Losartan Anaphylaxis and Unproven on Challenge    Sulfa (Sulfonamide Antibiotics) Anaphylaxis     When she was on Losartan (8/2017), and then again when she was on HCTZ (9/2017). She was also on lasix since 2016.     Eggplant Other (comments)     Severe stomach pain     Lasix [Furosemide] Unproven on Challenge     May lead to anaphylaxis          Objective  Vitals:  Patient Vitals for the past 8 hrs:   Temp Pulse Resp BP SpO2   03/19/18 0709 - - - - 97 %   03/19/18 0427 97.5 °F (36.4 °C) - 20 147/76 97 %   03/19/18 0412 - - - - 98 %   03/19/18 0213 - - 20 150/86 -   03/18/18 2336 97.9 °F (36.6 °C) 85 23 174/75 96 %         I/O:    Intake/Output Summary (Last 24 hours) at 03/19/18 0714  Last data filed at 03/19/18 2927   Gross per 24 hour   Intake             2140 ml   Output 750 ml   Net             1390 ml     Last shift:       Last 3 shifts:    03/17 1901 - 03/19 0700  In: 7208 [P.O.:2590; I.V.:150]  Out: 750 [Urine:750]    Physical Exam:  General: No acute distress. Alert. Cooperative. Morbidly obese. Head: Normocephalic. Atraumatic. Respiratory: + expiratory wheezes throughout. No significant change from yesterday. No crackles, moving air    Cardiovascular: RRR. Normal S1,S2. No m/r/g. . GI: + bowel sounds. Nontender. Obese   Extremities: Bilateral 2+ edema with mild pitting. Knees with dark, thickened skin bilaterally. Distal pulses intact bilaterally. Laboratory Data  Recent Results (from the past 12 hour(s))   GLUCOSE, POC    Collection Time: 03/18/18  8:52 PM   Result Value Ref Range    Glucose (POC) 206 (H) 65 - 100 mg/dL    Performed by Ksenia Boyer (JESS)    METABOLIC PANEL, COMPREHENSIVE    Collection Time: 03/19/18  2:01 AM   Result Value Ref Range    Sodium 143 136 - 145 mmol/L    Potassium 4.2 3.5 - 5.1 mmol/L    Chloride 103 97 - 108 mmol/L    CO2 32 21 - 32 mmol/L    Anion gap 8 5 - 15 mmol/L    Glucose 177 (H) 65 - 100 mg/dL    BUN 23 (H) 6 - 20 MG/DL    Creatinine 0.79 0.55 - 1.02 MG/DL    BUN/Creatinine ratio 29 (H) 12 - 20      GFR est AA >60 >60 ml/min/1.73m2    GFR est non-AA >60 >60 ml/min/1.73m2    Calcium 9.4 8.5 - 10.1 MG/DL    Bilirubin, total 0.5 0.2 - 1.0 MG/DL    ALT (SGPT) 38 12 - 78 U/L    AST (SGOT) 29 15 - 37 U/L    Alk.  phosphatase 59 45 - 117 U/L    Protein, total 7.6 6.4 - 8.2 g/dL    Albumin 3.2 (L) 3.5 - 5.0 g/dL    Globulin 4.4 (H) 2.0 - 4.0 g/dL    A-G Ratio 0.7 (L) 1.1 - 2.2     CBC WITH AUTOMATED DIFF    Collection Time: 03/19/18  2:01 AM   Result Value Ref Range    WBC 21.1 (H) 3.6 - 11.0 K/uL    RBC 5.18 3.80 - 5.20 M/uL    HGB 14.7 11.5 - 16.0 g/dL    HCT 46.3 35.0 - 47.0 %    MCV 89.4 80.0 - 99.0 FL    MCH 28.4 26.0 - 34.0 PG    MCHC 31.7 30.0 - 36.5 g/dL    RDW 15.8 (H) 11.5 - 14.5 %    PLATELET 784 944 - 986 K/uL MPV 10.2 8.9 - 12.9 FL    NRBC 0.0 0  WBC    ABSOLUTE NRBC 0.00 0.00 - 0.01 K/uL    NEUTROPHILS 84 (H) 32 - 75 %    LYMPHOCYTES 10 (L) 12 - 49 %    MONOCYTES 5 5 - 13 %    EOSINOPHILS 0 0 - 7 %    BASOPHILS 0 0 - 1 %    IMMATURE GRANULOCYTES 1 (H) 0.0 - 0.5 %    ABS. NEUTROPHILS 17.7 (H) 1.8 - 8.0 K/UL    ABS. LYMPHOCYTES 2.1 0.8 - 3.5 K/UL    ABS. MONOCYTES 1.0 0.0 - 1.0 K/UL    ABS. EOSINOPHILS 0.0 0.0 - 0.4 K/UL    ABS. BASOPHILS 0.0 0.0 - 0.1 K/UL    ABS. IMM.  GRANS. 0.2 (H) 0.00 - 0.04 K/UL    DF AUTOMATED     GLUCOSE, POC    Collection Time: 03/19/18  4:45 AM   Result Value Ref Range    Glucose (POC) 130 (H) 65 - 100 mg/dL    Performed by [facility]  Na          Imaging        [place] Problems:  Hospital Problems  Date Reviewed: 3/16/2018          Codes Class Noted POA    * (Principal)Hypoxia ICD-10-CM: R09.02  ICD-9-CM: 799.02  3/16/2018 Yes        Dyspnea ICD-10-CM: R06.00  ICD-9-CM: 786.09  9/18/2017 Yes        Morbid obesity ([facility] [address] ICD-10-CM: E66.01  ICD-9-CM: 278.01  9/18/2017 Yes        Elevated hemoglobin A1c ICD-10-CM: R73.09  ICD-9-CM: 790.29  3/4/2017 Yes    Overview Signed 3/4/2017 11:12 AM by Patrick Ramirez MD     6.2.12/2106             Venous insufficiency of both lower extremities ICD-10-CM: I87.2  ICD-9-CM: 459.81  5/26/2016 Yes        Morbid obesity with BMI of 50.0-59.9, adult [facility]) ICD-10-CM: E66.01, Z68.43  ICD-9-CM: 278.01, V85.43  5/20/2016 Yes        Incidental lung nodule, > 3mm and < 8mm ICD-10-CM: R91.1  ICD-9-CM: 793.11  4/20/2016 Yes    Overview Addendum 3/4/2017 11:18 AM by Patrick Ramirez MD     left lung nodule in smoker, needs follow up CT in 6/2018             DDD (degenerative disc disease), lumbar ICD-10-CM: M51.36  ICD-9-CM: 722.52  2/20/2013 Yes    Overview Signed 2/20/2013  2:43 PM by Vola Smaller, MD Dr. Herlinda Brunner             Tobacco abuse ICD-10-CM: Z72.0  ICD-9-CM: 305.1  2/12/2013 Yes    Overview Addendum 3/4/2017  1:09 PM by Patrick Ramirez MD     Advised to quit  See letter 3/4/2017

## 2018-03-19 NOTE — PROGRESS NOTES
[address] Medicine Residency  Resident Note in Brief  ----------------------------------------    S:  Received call from nursing about patient feeling jittery and nervous, unable to get comfortable. On exam patient sitting on the edge of the bed, with meal tray in front of her, daughter bedside cutting up her lunch and feeding it to the patient. Patient states she feels anxious due to her health issues and jittery overall. Explained that the jitteriness and anxiety can be a side effect of the steroids. Patient states she does feel better with food and PO hydration. Explained to patient the difficulties in treating her BP and LE edema due to anaphylaxis to multiple medications. Explained that PCP had recommended referral to allergist to test but they were unable to afford the specialist.       O:  Visit Vitals    /77    Pulse 88    Temp 98.5 °F (36.9 °C)    Resp 20    Ht 5' (1.524 m)    Wt 292 lb 1.8 oz (132.5 kg)    SpO2 95%    BMI 57.05 kg/m2     Gen: Appears uncomfortable, sitting on the edge of the bed, intermittently closes her eyes. Appears fatigued. Resp: No inspiratory crackles, intermittent expiratory wheezing, significantly improved since admission. No increased work of breathing  Cardio: regular rate, normal rhythm  Extremities: LE edema, 2+ pitting. Distal pulses intact. Unchanged. A/P  50year old F admitted for hypoxia likely related to undiagnosed COPD 2/2 smoking history. Now satting well on RA, but with elevated BPs this morning/early afternoon  - Symptoms likely related to steroids patient is receiving for COPD exacerbation, already decreasing steroids today.    - States she is improving with lunch and PO hydration   - BP in 662U systolic, improving  - Will continue to monitor  - Receiving first dose of spironolactone at this time, if patient tolerates well can eventually discharge on this medication to help with HTN and lymphedema  - Consulted CM to help with medicaid, access to specialists including pulmonary specialists and lymphedema.   - Explained to patient and family that patient does not have heart failure, her BNP was 88, imaging did not show any pleural effusions, ECHO in 2016 was normal.     Please see full daily progress note for complete plan.   Crayton Dakin, MD  2:50 PM

## 2018-03-19 NOTE — CDMP QUERY
Please clarify if this patient is (was) being treated/managed for:     => Acute hypoxic respiratory failure (POA)  => Other explanation of clinical findings   => Clinically Undetermined (no explanation for clinical findings)    The medical record reflects the following clinical findings, treatment, and risk factors. 51 yo female PMH 30 pack year smoker who presents with SOB, cough and congestion unrelieved by Albuterol. Sats 88% RA. ABGs: 7.43 / 40 / 63 / 26 / 93% on 2L NC (P/F ratio 225). RR 33. ER MD notes wheezes bilaterally. Please clarify and document your clinical opinion in the progress notes and discharge summary including the definitive and/or presumptive diagnosis, (suspected or probable), related to the above clinical findings. Please include clinical findings supporting your diagnosis.     Mukesh Almonte, [facility], [address] [place] [address]  [email or web address]

## 2018-03-19 NOTE — PROGRESS NOTES
Visited pt as I am her PCP. This is a courtesy visit. I reviewed the hospital stay with her and 2 daughters. She is feeling much better today than when she was in clinic on Friday. She is going home with 3 BP medications. I reviewed the reason. She understood. Pt is to follow instructions of the family practice team at discharge. Pt will keep BP log that I will review when she returns to clinic. She is aware that she will be taking abx and prednisone after discharge. She is aware of pulm appt made OP. She is planning on applying for medicaid. Thank you to family practice team for taking care of pt. Will follow up in OP.      Chris Roth, DO

## 2018-03-19 NOTE — PROGRESS NOTES
1000  Pt is very lethargic. Pt states she did not sleep well last night and just can't get comfortable. She states the only position that is comfortable is on her knees bent over the side of the bed. Suggested to the patient to try and get some rest in the recliner, but she stated she can't because it's too uncomfortable. 1850 [facility] with Dr. Eun Polanco regarding pt's feeling of anxiousness and pt stated \"I just don't feel well. \" Vital signs are currently stable. Pt is having a hard time getting comfortable and hasn't been able to get much rest. Dr. Eun Polanco said \"we will come assess patient. \"    1440  Dr. Bruce Melo now at the bedside assessing pt.

## 2018-03-23 ENCOUNTER — OFFICE VISIT (OUTPATIENT)
Dept: FAMILY MEDICINE CLINIC | Age: 53
End: 2018-03-23

## 2018-03-23 VITALS
OXYGEN SATURATION: 95 % | BODY MASS INDEX: 56.54 KG/M2 | HEART RATE: 82 BPM | SYSTOLIC BLOOD PRESSURE: 113 MMHG | HEIGHT: 60 IN | TEMPERATURE: 97.9 F | RESPIRATION RATE: 16 BRPM | DIASTOLIC BLOOD PRESSURE: 70 MMHG | WEIGHT: 288 LBS

## 2018-03-23 DIAGNOSIS — Z91.89 AT RISK FOR SLEEP APNEA: ICD-10-CM

## 2018-03-23 DIAGNOSIS — J96.01 ACUTE RESPIRATORY FAILURE WITH HYPOXIA (HCC): Primary | ICD-10-CM

## 2018-03-23 DIAGNOSIS — E11.8 TYPE 2 DIABETES MELLITUS WITH COMPLICATION, WITHOUT LONG-TERM CURRENT USE OF INSULIN (HCC): ICD-10-CM

## 2018-03-23 DIAGNOSIS — Z91.89 SEDENTARY LIFESTYLE: ICD-10-CM

## 2018-03-23 DIAGNOSIS — E78.5 ELEVATED FASTING LIPID PROFILE: ICD-10-CM

## 2018-03-23 DIAGNOSIS — I89.0 LYMPHEDEMA OF BOTH LOWER EXTREMITIES: ICD-10-CM

## 2018-03-23 DIAGNOSIS — K21.9 GASTROESOPHAGEAL REFLUX DISEASE WITHOUT ESOPHAGITIS: ICD-10-CM

## 2018-03-23 DIAGNOSIS — Z72.0 TOBACCO ABUSE: ICD-10-CM

## 2018-03-23 DIAGNOSIS — I10 HYPERTENSION, UNSPECIFIED TYPE: ICD-10-CM

## 2018-03-23 NOTE — MR AVS SNAPSHOT
[address] 
[phone] Patient: Christian Howard MRN: [id number] :1965 Visit Information Date & Time Provider Department Dept. Phone Encounter #  
 3/23/2018  4:15 PM Ritika Thomas, [address] [phone] [id number] Upcoming Health Maintenance Date Due Influenza Age 5 to Adult 2017 BREAST CANCER SCRN MAMMOGRAM 10/10/2017 FOBT Q 1 YEAR AGE 50-75 10/10/2017 PAP AKA CERVICAL CYTOLOGY 3/4/2020 DTaP/Tdap/Td series (2 - Td) 2023 Allergies as of 3/23/2018  Review Complete On: 3/23/2018 By: Anselmo Garrido Severity Noted Reaction Type Reactions Hydrochlorothiazide High 2017    Anaphylaxis, Unproven on Challenge Losartan High 2017    Anaphylaxis, Unproven on Challenge Sulfa (Sulfonamide Antibiotics) High 2017    Anaphylaxis When she was on Losartan (2017), and then again when she was on HCTZ (2017). She was also on lasix since . Eggplant Medium 2017   Intolerance Other (comments) Severe stomach pain Lasix [Furosemide]  2017    Unproven on Challenge May lead to anaphylaxis Current Immunizations  Reviewed on 3/4/2017 Name Date Influenza Vaccine 10/22/2012 Pneumococcal Conjugate (PCV-7) 2013 Pneumococcal Polysaccharide (PPSV-23) 3/4/2017 Tdap 2013 Not reviewed this visit Vitals BP Pulse Temp Resp Height(growth percentile) Weight(growth percentile) 113/70 (BP 1 Location: Right arm) 82 97.9 °F (36.6 °C) 16 5' (1.524 m) 288 lb (130.6 kg) LMP SpO2 BMI OB Status Smoking Status 2012 95% 56.25 kg/m2 Postmenopausal Current Every Day Smoker BMI and BSA Data Body Mass Index Body Surface Area  
 56.25 kg/m 2 2.35 m 2 Preferred Pharmacy Pharmacy Name Phone [facility] [address], [address] [phone] Your Updated Medication List  
  
   
This list is accurate as of 3/23/18  4:57 PM.  Always use your most recent med list.  
  
  
  
  
 albuterol 90 mcg/actuation inhaler Commonly known as:  PROVENTIL HFA, VENTOLIN HFA, PROAIR HFA Take 2 Puffs by inhalation every four (4) hours as needed for Wheezing. EPINEPHrine 0.3 mg/0.3 mL injection Commonly known as:  ADRENACLICK  
0.3 mL by IntraMUSCular route once as needed for up to 1 dose.  
  
 famotidine 20 mg tablet Commonly known as:  PEPCID Take 1 Tab by mouth two (2) times a day. guaiFENesin  mg ER tablet Commonly known as:  [employer] Take 1 Tab by mouth every twelve (12) hours. hydrALAZINE 10 mg tablet Commonly known as:  APRESOLINE Take 1 Tab by mouth three (3) times daily. ibuprofen 200 mg tablet Commonly known as:  MOTRIN Take 200 mg by mouth every eight (8) hours as needed for Pain.  
  
 levoFLOXacin 750 mg tablet Commonly known as:  Bruno Rideau Take 1 Tab by mouth every twenty-four (24) hours. metoprolol tartrate 50 mg tablet Commonly known as:  LOPRESSOR Take 1 Tab by mouth two (2) times a day. predniSONE 20 mg tablet Commonly known as:  Jacqui Sara  
3/19, 3/20, 3/21: Take 40 mg prednisone daily (2 tablets) 3/22, 3/23, 3/24: Take 20 mg prednisone daily (1 tablet) 3/25, 3/26, 3/17: Take 10 mg prednisone daily (1/2 tablet)  
  
 spironolactone 50 mg tablet Commonly known as:  ALDACTONE Take 1 Tab by mouth daily. Patient Instructions Valdez Stanford DO On 4/2/2018 $200 up front co-pay, 10:00am. Please arrive at 9:30am for paperwork. (LUNG DOCTOR) [address] [place]  
[phone] Sleep Studies: About This Test 
What is it? Sleep studies are tests that watch what happens to your body during sleep. These studies usually are done in a sleep lab. Sleep labs are often located in hospitals. But sleep studies also can be done with portable equipment that you use at home. Why is this test done? Sleep studies are done to find sleep problems, including: · Sleep apnea or excessive snoring. · Narcolepsy. · Nocturnal seizures. · REM behavior disorder (RBD). · Repeated muscle twitching of the feet, arms, or legs while you sleep. How can you prepare for the test? 
· You may be asked to keep a sleep diary for 1 to 2 weeks before your sleep study. · Don't take any naps for 2 to 3 days before your test. 
· You may be asked to avoid food or drinks with caffeine for a day or two before your test. 
· Take a shower or bath before your test, but don't use sprays, oils, or gels on your hair. Don't wear makeup, fingernail polish, or fake nails. · Pack and take along a small overnight bag with personal items, such as a toothbrush, a comb, favorite pillows or blankets, and a book. You can wear your own nightclothes. What happens during the test? 
· In the sleep lab, you will be in a private room, much like a hotel room. · Small pads or patches called electrodes will be placed on your head and body with a small amount of glue and tape. These will record things like brain activity, eye movement, oxygen levels, and snoring. · Soft elastic belts will be placed around your chest and belly to measure your breathing. · Your blood oxygen levels will be checked by a small clip (oximeter) placed either on the tip of your index finger or on your earlobe. · If you have sleep apnea, you may wear a mask that is connected to a continuous positive airway pressure (CPAP) machine. · Depending on the type of test, you will be allowed to sleep through the night or you will be awakened periodically and asked to stay awake for a while.  
What else should you know about the test? 
 · It may feel odd to be hooked to the sleep study equipment. The sleep lab technician understands that your sleep may not be the same as it is at home because of the equipment. Try to relax and make yourself as comfortable as possible. · After your sleep problem has been identified, you may need a second study if your doctor orders treatment such as CPAP. · Portable sleep study equipment is available for a person to do sleep studies at home. This may be a choice for people who have problems sleeping in a sleep lab. But home sleep studies may not give the same results as a sleep lab. How long does the test take? · You will stay in the sleep lab overnight. For some tests, you will also stay part of the next day. What happens after the test? 
· You will be able to go home right away. · You can go back to your usual activities right away. Follow-up care is a key part of your treatment and safety. Be sure to make and go to all appointments, and call your doctor if you are having problems. It's also a good idea to keep a list of the medicines you take. Ask your doctor when you can expect to have your test results. Where can you learn more? Go to http://shima-alyssa.info/. Enter J349 in the search box to learn more about \"Sleep Studies: About This Test.\" Current as of: May 12, 2017 Content Version: 11.4 © 3764-2578 [employer]. Care instructions adapted under license by [employer] (which disclaims liability or warranty for this information). If you have questions about a medical condition or this instruction, always ask your healthcare professional. [place] [address] any warranty or liability for your use of this information. ÍãíÉ DASH: ÅÑÔÇÏÇÊ ÇáÑÚÇíÉ [ DASH Diet: Care Instructions ] ÅÑÔÇÏÇÊ ÇáÑÚÇíÉ ÇáÎÇÕÉ Èß ÊõÚÏ ÍãíÉ DASH ÎØÉ ØÚÇã ÊÓÇÚÏ Úáì ÊÞáíá ÖÛØ ÇáÏã.  æáÝÙÉ DASH ÊÚäí \"Dietary Approaches to Stop Hypertension\" Aguilar Yin YKHWZ KTFKZDR ÇáØÑÞ ÇáÛÐÇÆíÉ áæÞÝ ÇÑÊÝÇÚ ÇáÖÛØ. æÇÑÊÝÇÚ ÇáÖÛØ íÚäí ÇÑÊÝÇÚ ÖÛØ ÇáÏã. æÊÑßÒ åÐå ÇáÍãíÉ Úáì ÊäÇæá ÇáÃØÚãÉ ÇáÊí ÊÍÊæí Úáì ãÞÏÇÑ ãÑÊÝÚ ãä ÇáßÇáÓíæã æÇáÈæÊÇÓíæã æÇáãÇÛäÓíæã. ÝåÐå ÇáãæÇÏ ÇáãÛÐíÉ íãßä Ãä ÊÞáá ÖÛØ ÇáÏã. æÊÔãá ÇáÃØÚãÉ ÇáÛäíÉ ÈåÐå ÇáãßæäÇÊ ÇáÛÐÇÆíÉ ÇáÝæÇßå ABUGMGQFSZ æãäÊÌÇÊ ÇáÃáÈÇä ÞáíáÉ ÇáÏÓã æÇáÈäÏÞ æÇáÈÐæÑ æÇáÈÞá. æÃãÇ ÊäÇæá UZWSIXFQ ÇáÛÐÇÆíÉ ÇáÊí ÊÍÊæí Úáì ÇáßÇáÓíæã EGJBBDQTUVP æÇáãÇÛäÓíæã ÈÏáÇð ãä ÇáÃØÚãÉ ÇáÊí ÊÍÊæí Úáì ãÞÏÇÑ ãÑÊÝÚ ãäåÇ áÇ íÄËÑ äÝÓ ÇáÊÃËíÑ. æÊÊÖãä ÍãíÉ DASH ÇáÍÈæÈ NNQXWZZ æÇáÓãß æÇáÏæÇÌä. æåÐå ÇáÍãíÉ æÇÍÏÉ ãä ÇáÊÛííÑÇÊ ÇáÊí ÊØÑÃ Úáì ÃÓáæÈ ÇáÍíÇÉ ÇáÊí íãßä Ãä íäÕÍ ÈåÇ ÇáØÈíÈ áÊÞáíá ÖÛØ ÇáÏã ÇáãÑÊÝÚ. æÞÏ íÎÊÇÑ ÇáØÈíÈ ÃíÖðÇ ÊÞáíá ãÞÏÇÑ ÇáÕæÏíæã Ýí äÙÇãß ÇáÛÐÇÆí. æíãßä áÊÞáíá ÇáÕæÏíæã ÃËäÇÁ ÇÊÈÇÚ ÍãíÉ DASH ÈÊÞáíá ÖÛØ ÇáÏã ÃßËÑ ãä YTRRGNBV Úáì åÐå ÇáÍãíÉ æÍÏåÇ. ÊõÚÏ ÑÚÇíÉ ÇáãÊÇÈÚÉ ÌÒÁðÇ ÃÓÇÓíðÇ Ýí ÚáÇÌß æÓáÇãÊß. ÝÚáíß ÇáÍÑÕ Úáì ÊÑÊíÈ ÌãíÚ ãæÇÚíÏ ÒíÇÑÉ ÇáØÈíÈ ZEQUIARJT ÈåÇ¡ JIDZVZEN ÈØÈíÈß ÚäÏ NYMTVKLO ãä Ãí ãÔßáÇÊ. æãä ÇáÌíÏ ÃíÖðÇ Ãä ÊÚÑÝ äÊÇÆÌ QCZOLFGU æßÐáß EULXNDOS ÈÞÇÆãÉ ÇáÃÏæíÉ ÇáÊí KKFQDWEQ. ßíÝ íãßäß ÇáÇÚÊäÇÁ ÈäÝÓß Ýí ÇáãäÒá ÇÊÈÇÚ ÍãíÉ DASH ? · ÊäÇæá 4 Åáì 5 æÌÈÇÊ ãä ÇáÝæÇßå íæãíðÇ. æÊÊßæä ÇáæÌÈÉ ãä ÞØÚÉ ÝÇßåÉ ãÊæÓØÉ ÇáÍÌã æ½ ßæÈ ãä ÇáÝÇßåÉ JYLWZGVT Ãæ UPQPMDJXM¡ æ1/4 ßæÈ ãä ÇáÝÇßåÉ ÇáãÌÝÝÉ Ãæ 4 ÃæäÕÇÊ (½ ßæÈ) ãä ÚÕíÑ ÇáÝÇßåÉ. ÇÎÊÑ ËãÑÇÊ ÇáÝæÇßå ÃßËÑ ãä ÚÕíÑ ÇáÝæÇßå. ? · ÊäÇæá 4 Åáì 5 æÌÈÇÊ ãä XDJVHIEOD íæãíðÇ. ÊÖã ÇáæÌÈÉ ßæÈ ãä ÇáÎÓ Ãæ TDFYRCJFX ÇáÎÖÑÇÁ ÇáäíÆÉ¡ æ½ ßæÈ ãä MTZXAWZPS FWWLKYQW Ãæ ICWKMTXL¡ Ãæ 4 ÃæäÕÇÊ (½ ßæÈ) ãä ÚÕíÑ HHRDIHGKY. ÇÎÊÑ ËãÑÇÊ ORAIGBDST ÃßËÑ ãä ÚÕíÑ LZETAYZHX. ? · ÊäÇæá æÌÈÊíä Ãæ ËáÇËÉ ãä ãäÊÌÇÊ ÇáÃáÈÇä ÞáíáÉ ÇáÏÓã Ãæ ÇáÎÇáíÉ ãäå íæãíðÇ. æÊÊßæä ÇáæÌÈÉ ãä 8 ÃæäÕÇÊ ãä ÇááÈä Ãæ ßæÈ ÒÈÇÏí Ãæ 1 ½ ÃæäÕÉ ãä ÇáÌÈä. ? · ÊäÇæá 6 Åáì 8 æÌÈÇÊ ãä ÇáÍÈæÈ íæãíðÇ.  æÊÊßæä ÇáæÌÈÉ ãä ÔÑíÍÉ ÎÈÒ æÇÍÏÉ Ãæ ßæÈ æÇÍÏ ãä ÇáÍÈæÈ ÇáÌÇåÒÉ ááÃßá ÇáÌÇÝÉ Ãæ ½ ßæÈ ãä ÇáÃÑÒ ÇáãØåí Ãæ RPHUAWUIV Ãæ QWXOKE ÇáÌÇåÒÉ ááÃßá ÇáãØåæÉ. ÍÇæá ÇÎÊíÇÑ ãäÊÌÇÊ ÇáÍÈæÈ PBFVYFR ßËíÑðÇ ÞÏÑ ÇáãÓÊØÇÚ. ? · Þáøá ãä ÇááÍã ÇáÎÇáí ãä ÇáÏåæä æÇáÏæÇÌä æÇáÓãß Åáì æÌÈÊíä íæãíðÇ. æÃãÇ ãÞÏÇÑ ÇáæÌÈÉ ÝÜ 3 ÃæÞíÇÊ ÈÞÏÑ ÍÌã ãÌãæÚÉ ãä æÑÞ ÇááÚÈ. ? · ÊäÇæá 4 Åáì 5 æÌÈÇÊ ãä ÇáÈäÏÞ æÇáÈÐæÑ æÇáÈÞá (MDMGJGZZQ ÇáÌÇÝÉ ÇáãØåæÉ¡ æÇáÚÏÓ YUFVLWA ÇáãÌÑæÔÉ) ßá ÃÓÈæÚ. æÊÊßæä ÇáæÌÈÉ ãä 1/3 ßæÈ ãä ÇáÈäÏÞ Ãæ ãáÚÞÊíä ãä ÇáÈÐæÑ Ãæ ½ ßæÈ ãä KHAZBCQSU Ãæ IWNOSUFO ÇáãØåæÉ. ? · áÇ ÊÊÌÇæÒ ÊäÇæá æÌÈÊíä Åáì 3 æÌÈÇÊ ãä ÇáÏåæä æÇáÒíæÊ íæãíðÇ. GVEWNPH ãÞÏÇÑåÇ ãáÚÞÉ æÇÍÏÉ ãä ÒíÊ MCLCTSKH Ãæ ãáÚÞÊíä ãä ãÑÞ ÊæÇÈá ÇáÓáØÉ. ? · áÇ ÊÊÌÇæÒ ÊäÇæá ÃßËÑ ãä 5 æÌÈÇÊ Ãæ ÃÞá ãä ÇáÍáæì QXKCUE ÇáãõÖÇÝ ÃÓÈæÚíðÇ. æÊÊßæä ÇáæÌÈÉ ãä ãáÚÞÉ ãä ÇáÌíáí Ãæ ÇáãÑÈì Ãæ ½ ßæÈ ãä ÇáÔÑÈÇÊ Ãæ ßæÈ ãä ÇááíãæäÇÏÉ. ? · ÊäÇæá ÃÞá ãä 2,300 ãáÌã ãä ÇáÕæÏíæã íæãíðÇ. æÅÐÇ ÇÞÊÕÑ ÊäÇæáß ááÕæÏíæã Úáì 1,500 ãáÌã Ýí Çáíæã¡ íãßäß ÒíÇÏÉ ÎÝÖ ÖÛØ ÇáÏã áÏíß. äÕÇÆÍ áÊÍÞíÞ ÇáäÌÇÍ ? · ÇÈÏÃ ÈÔßá ÊÏÑíÌí. áÇ ÊÍÇæá ÅÌÑÇÁ ÊÛííÑÇÊ ÌÐÑíÉ Úáì äÙÇãß ÇáÛÐÇÆí ÈÔßá ãÝÇÌÆ. ÞÏ ÊÔÚÑ ÈÝæÇÊ ÇáÝÑÕÉ Ýí ÊäÇæá ÃØÚãÊß ÇáãÝÖáÉ ÝÊÒÏÇÏ ÇÍÊãÇáíÉ ÝÔáß. ÃÏÎá ÊÛííÑÇÊ ÈÓíØÉ æÇáÊÒã ÈåÇ. æÈÚÏ Ãä ÊÖÍì åÐå ÇáÊÛííÑÇÊ ÚÇÏÉð VGVLCEM¡ ÃÏÎá ÈÚÖ ÇáÊÛííÑÇÊ ÇáÅÖÇÝíÉ ÇáÃÎÑì. ? · ÌÑøöÈ ÈÚÖðÇ ããÇ íáí:  
o o EIAG ÊäÇæá ÇáÝæÇßå CCIUOXVXWI åÏÝðÇ Ýí ßá æÌÈÉ ææÌÈÉ ÎÝíÝÉ. ÝåÐÇ LDVPQY HEYZFQ Úáì ÇáßãíÉ ÇáãæÕì ãä ÇáÝæÇßå CYENUMFBWT íæãíðÇ. o o ÌÑøÈ ÊäÇæá ÇáÒÈÇÏí ÇáÐí ÊÚáæå ÇáÝÇßåÉ æÇáÈäÏÞ ßæÌÈÉ ÎÝíÝÉ Ãæ Íáæì ÕÍíÉ. o o ÃÖÝ ÇáÎÓ OUQEBLRP æÇáÎíÇÑ OUJILC Åáì GEWSQGKFGW. o o ÇÌãÚ Èíä ØÈÞÉ ÇáÈíÊÒÇ ÇáÌÇåÒÉ æÌÈä ÇáãæÊÒÇÑíáÇ Þáíá ÇáÏÓã æÇáßËíÑ ãä ONCTBOAQ ÇáÊí ÊÚáæåÇ. ÌÑøÈ ÇÓÊÎÏÇã ÇáØãÇØã æÇáÞÑÚ æÇáÓÈÇäÎ æÇáÈÑæßáí æÇáÌÒÑ æÇáÞÑäÈíØ æÇáÈÕá. o o ÊäÇæá ãÌãæÚÉ ãÊäæÚÉ ãä ÞØÚ ABICDLNNM ãÚ ÇáÕáÕÉ ÞáíáÉ ÇáÏÓã ßÝÇÊÍ ááÔåíÉ ÈÏáÇð ãä ÑÞÇÞÇÊ ÇáÈØÇØÓ æÇáÕáÕÉ. o o ÇäËÑ ÈÐæÑ ÒåÑÉ ÇáÔãÓ Ãæ Çááíãæä ÇáãÝÑæã Úáì ÇáÓáØÉ. Ãæ ÍÇæá ÅÖÇÝÉ ÇáÌæÒ Ãæ ÇááæÒ ÇáãÝÑæã Åáì ÇáÎÖÑÇæÇÊ ÇáãØÈæÎÉ. o o ÌÑøÈ ÊäÇæá ÈÚÖ æÌÈÇÊ ÇáÎÖÑÇæÇÊ ÈÇÓÊÎÏÇã ÇáÈÞæáíÇÊ æÇáÈÇÒáÇÁ.  ÃÖÝ [place] Ãæ ÇááæÈíÇÁ Åáì JVVXZD. ÇÕäÚ ÇáÈæÑíÊæ BNFVZZE IBFTCVPP áæÈíÇÁ ÈíäÊæ Ãæ ESABBNSXT ÇáÓæÏÇÁ. Ãíä íãßä ãÚÑÝÉ ÇáãÒíÏ ÇäÊÞÇá Åáì [email or web address] ÏÎæá H967 íãßäß ãÚÑÝÉ ÇáãÒíÏ ãä ÎáÇá ãÑÈÚ ÇáÈÍË \"ÍãíÉ DASH: ÅÑÔÇÏÇÊ ÇáÑÚÇíÉ - [ DASH Diet: Care Instructions ]. \" 
© 4772-2217 [employer]. ÊãÊ ÊåíÆÉ ÅÑÔÇÏÇÊ ÇáÚäÇíÉ ÈãæÌÈ ÊÑÎíÕ ãä ãÎÊÕ ÇáÑÚÇíÉ ÇáÕÍíÉ áÏíß. ÅÐÇ ßÇäÊ áÏíß ÃíÉ FUSYVKESV Úä ÍÇáÉ ØÈíÉ Ãæ Ãí ãä åÐå STUKNDQRH¡ ÝÊæÌå ÏæãðÇ MPEDZNB Åáì ãÎÊÕ ÇáÑÚÇíÉ ÇáÕÍíÉ. ÊäÝí ãäÙãÉ [employer] [place] ÖãÇä Ãæ Loyd Klippel UFCHILS åÐå MOUHPNSCL. ÅÕÏÇÑ ÇáãÍÊæì: 11.4 ãÍÏøË JYBHYKRH ãä: 6 ÑÌÈ 1438 ÂáÇã ÇáÈØä: ÅÑÔÇÏÇÊ ÇáÑÚÇíÉ [ Abdominal Pain: Care Instructions ] ÅÑÔÇÏÇÊ ÇáÑÚÇíÉ 
 
åäÇß ÇáÚÏíÏ ãä ÇáÃÓÈÇÈ DACLMJGO áÃáã ÇáÈØä. ÈÚÖåÇ áíÓ ÎØíÑðÇ æíãßä ÇáÔÝÇÁ ãäå ÊáÞÇÆíðÇ Ïæä ÊÏÎá Ýí ÛÖæä ÈÖÚÉ ÃíÇã. ÈíäãÇ ÊÍÊÇÌ ÈÚÖ ÇáÂáÇã Åáì ÅÌÑÇÁ ÇáãÒíÏ ãä QDLKXVBR ÇáØÈíÉ æÇáÊÏÇæí. ÝÅÐÇ ÇÓÊãÑ ÇáÃáã Ãæ ÇÒÏÇÏ ÓæÁðÇ¡ ÝÞÏ ÊÍÊÇÌ Åáì ÅÚÇÏÉ ÇáÝÍÕ æÑÈãÇ ÊÍÊÇÌ Åáì ÇáãÒíÏ ãä FGNOEUMU áÇßÊÔÇÝ ÇáãÔßáÉ. æÞÏ ÊÍÊÇÌ Åáì ÅÌÑÇÁ ÌÑÇÍÉ IBVQXOR Êáß ÇáãÔßáÉ. áÇ ÊÊÛÇÖ Úä ÇáÃÚÑÇÖ ÇáÌÏíÏÉ ãËá ÇáÍãì æÇáÛËíÇä æÇáÞíÁ æãÔÇßá ÇáÈæá æÇáÃáã ÇáÐí íÒÏÇÏ ÓæÁðÇ ÈÇáÅÖÇÝÉ Åáì ÇáÏæÇÑ. ÝÞÏ Êßæä Êáß ONCNIKQ ÚáÇãÇÊ EQPWWR ÃßËÑ ÎØæÑÉ. ÞÏ íæÕí ØÈíÈß ÈÅÌÑÇÁ ÒíÇÑÉ ãÊÇÈÚÉ Ýí ÛÖæä 8 Åáì 12 ÓÇÚÉ ÇáÊÇáíÉ. ÅÐÇ áã ÊÊÍÓä ÍÇáÊß¡ ÝÞÏ ÊÍÊÇÌ Åáì ÇáãÒíÏ ãä DDVRHUSR KACVYPF. áÞÏ ÝÍÕß ÇáØÈíÈ ÝÍÕðÇ ÏÞíÞðÇ¡ æáßä ÞÏ ÊÊØæÑ ÇáÃÚÑÇÖ áÇÍÞðÇ. ÝÅÐÇ ãÇ áÇÍÙÊ ÃíÉ XNCCJI Ãæ ÃÚÑÇÖ ÌÏíÏÉ¡ ÝÇØáÈ PTYIGV ÇáØÈí ÝæÑðÇ. Åä ãÊÇÈÚÉ ÇáÑÚÇíÉ ÌÒÁ ÑÆíÓí Ýí ÚáÇÌß æÓáÇãÊß. ÝÇÍÑÕ Úáì ÅÌÑÇÁ ÊÑÊíÈÇÊ ÌãíÚ ãæÇÚíÏ ÒíÇÑÉ ÇáØÈíÈ æÇáÊÒã ÈÊáß ÇáãæÇÚíÏ æÇÊÕá ÈØÈíÈß ÅÐÇ ßÇäÊ áÏíß ãÔßáÇÊ. æãä ÇáÃÝßÇÑ ÇáÌíÏÉ ÃíÖðÇ ãÚÑÝÉ äÊÇÆÌ HZPHYVKH YDTNOEYPY ÈÞÇÆãÉ HEAKNMHX ÇáÊí OISGIQGT. ßíÝ ÊÚÊäí ÈäÝÓß Ýí ÇáãäÒá ? · ÎõÐ ÞÓØðÇ ãä YGASCQ ÍÊì ÊÔÚÑ ÈÇáÊÍÓä. ? · ááæÞÇíÉ ãä ÇáÌÝÇÝ¡ ÇÔÑÈ ßãíÉ æÝíÑÉ ãä XVUKDTC ÈãÇ íßÝí áÃä íÕÈÍ áæä Èæáß ÃÕÝÑ DRDWZU Ãæ ÕÇÝíðÇ ãËá ÇáãÇÁ.  æÇÔÑÈ ÇáãÇÁ æÇáÓæÇÆá ÇáÕÇÝíÉ ÇáÃÎÑì ÇáÎÇáíÉ ãä ÇáßÇÝííä ÍÊì ÊÔÚÑ ÈÇáÊÍÓä. ÅÐÇ ßäÊ ãÕÇÈðÇ ÈÃãÑÇÖ Çáßõáì Ãæ ÇáÞáÈ Ãæ ÇáßÈÏ æÚáíß Ãä ÊáÊÒã ÈÊÞáíá GJTHFZC¡ ÝÊÍÏË ãÚ ØÈíÈß ÞÈá Ãä ÊÞæã ÈÒíÇÏÉ ßãíÉ UDOOWSB ÇáÊí ÊÔÑÈåÇ. ? · ÅÐÇ ßäÊ ÊÚÇäí ãä ÇÖØÑÇÈ Ýí ÇáãÚÏÉ¡ IVDPBV ÇáÃØÚãÉ ÇáÎÝíÝÉ¡ ãËá ÇáÃÑÒ Ãæ ÇáÎÈÒ YNYWXX ÇáÌÇÝ Ãæ ÇáÈÓßæíÊ æÇáãæÒ æÕáÕÉ ÇáÊÝÇÍ. ÍÇæá ÊäÇæá æÌÈÇÊ ÕÛíÑÉ æãÊÚÏÏÉ ÈÏáÇð ãä æÌÈÊíä Ãæ ËáÇË æÌÈÇÊ ßÈíÑÉ. ? · ÇäÊÙÑ ÇäÞÖÇÁ 48 ÓÇÚÉ ÈÚÏ ÒæÇá ÌãíÚ ÇáÃÚÑÇÖ ÞÈá ÊäÇæá ÇáÃØÚãÉ ÇáÛäíÉ VVSWACVC æÇáßÍæá NXSDIFRGKO ÇáÊí ÊÍÊæí Úáì ÇáßÇÝííä. 
? · áÇ ÊÊäÇæá ÃØÚãÉ ÊÍÊæí Úáì äÓÈÉ ÚÇáíÉ ãä ÇáÏåæä. ? · ÊÌäÈ ÇáÃÏæíÉ ÇáãÖÇÏÉ ááÇáÊåÇÈÇÊ ãËá ÇáÃÓÈíÑíä æÇáÅíÈæÈÑæÝíä (ÃÏÝíá¡ ãæÊÑíä)¡ æäÇÈÑæßÓíä (ÃáíÝ). ÝåÐå ÇáÃÏæíÉ ÞÏ ÊÓÈÈ ÇÖØÑÇÈðÇ Ýí ÇáãÚÏÉ. ÇÓÊÔÑ ØÈíÈß ÅÐÇ ßäÊ ÊÊäÇæá ÇáÃÓÈíÑíä íæãíðÇ áÚáÇÌ ãÔßáÉ ÕÍíÉ ÃÎÑì. ãÊì íÌÈ XTXGHID áØáÈ ÇáãÓÇÚÏÉ ÇÊÕá ÈÇáÑÞã 911 Ýí Ãí æÞÊ ÊÑì Ýíå ÍÇÌÊß Åáì ÑÚÇíÉ ØÇÑÆÉ. ÝãËáÇð¡ ÇÊÕá Ýí ÍÇáÉ: ? · ÝÞÏÇä ÇáæÚí. ? · KIANABJF ÇáÏÇßä ááÈÑÇÒ Ãæ ÇÍÊæÇÆå Úáì ÏãÇÁ ßËíÑÉ. ? · ÇáÊÞíÄ ÏãðÇ Ãæ ãÇ íÔÈå ÑÇÓÈ ÇáÞåæÉ. ? · ÅÕÇÈÊß ÈÃáã ÌÏíÏ æÍÇÏ Ýí ÇáÈØä. ÇÊÕá ÈØÈíÈß ÇáÂä Ãæ ÇÈÍË Úä ÇáÑÚÇíÉ ÇáØÈíÉ ÇáÝæÑíÉ Ýí TZRFCNN ÇáÊÇáíÉ: ? · ÒíÇÏÉ ÔÏÉ ÇáÃáã¡ æÎÕæÕðÇ ÅÐÇ ÃÕÈÍ ãÊãÑßÒðÇ Ýí ãäØÞÉ æÇÍÏÉ ãä ÇáÈØä. ? · ÅÕÇÈÊß ÈÍãì ÌÏíÏÉ Ãæ ÇÑÊÝÇÚ ÍÑÇÑÉ Íãì ßÇäÊ áÏíß. ? · ÓæÇÏ ÇáÈÑÇÒ æÊÔÇÈåå Ýí Çááæä ãÚ ÇáÞØÑÇä Ãæ ÇÍÊæÇÄå Úáì ÎØæØ ÏãæíÉ. ? · Ýí ÍÇáÉ ÇáãÑÃÉ¡ ÇáÅÕÇÈÉ ÈäÒíÝ ãåÈáí ÛíÑ ãÊæÞÚ. ? · ÙåæÑ ÃÚÑÇÖ ÚÏæì ÇáÌåÇÒ ÇáÈæáí. æÊÔãá åÐå ÇáÂËÇÑ ÇáÌÇäÈíÉ ãÇ íáí:  
o o ÇáÔÚæÑ ÈÃáã ÚäÏ ÇáÊÈæá. o o JEYXNT ÃßËÑ ãä ÇáãÚÊÇÏ. o o æÌæÏ Ïã Ýí ÇáÈæá. ? · ÇáÏæÇÑ Ãæ ÇáÏæÎÉ Ãæ ÇáÔÚæÑ ÈÅÛãÇÁ ãÍÊãá. Chico Emperor ßËÈ áÃíÉ ÊÛííÑÇÊ ÊØÑÃ Úáì ÇáÕÍÉ IXXPYM Úáì FHERBRV ÈÇáØÈíÈ Ýí JABTRAM ÇáÊÇáíÉ: ? · ÚÏã ÇáÔÚæÑ ÈÊÍÓøõä ÈÚÏ ãÑæÑ íæã (24 ÓÇÚÉ). Ãíä íãßä ãÚÑÝÉ ÇáãÒíÏ ÇäÊÞÇá Åáì [email or web address] ÏÎæá A844 íãßäß ãÚÑÝÉ ÇáãÒíÏ ãä ÎáÇá ãÑÈÚ ÇáÈÍË \"ÂáÇã ÇáÈØä: ÅÑÔÇÏÇÊ ÇáÑÚÇíÉ - [ Abdominal Pain: Care Instructions ]. \" © 5648-6697 [employer]. ÊãÊ ÊåíÆÉ ÅÑÔÇÏÇÊ ÇáÚäÇíÉ ÈãæÌÈ ÊÑÎíÕ ãä ãÎÊÕ ÇáÑÚÇíÉ ÇáÕÍíÉ áÏíß. ÅÐÇ ßÇäÊ áÏíß ÃíÉ KNYUYFZCI Úä ÍÇáÉ ØÈíÉ Ãæ Ãí ãä åÐå FZVQOXBPE¡ ÝÊæÌå ÏæãðÇ CZRUZNF Åáì ãÎÊÕ ÇáÑÚÇíÉ ÇáÕÍíÉ. ÊäÝí ãäÙãÉ [employer] Lawrence Eaton ÖãÇä Ãæ Selina Mohair YYZBEMV åÐå PDLVJYGZG. ÅÕÏÇÑ ÇáãÍÊæì: 11.4 ãÍÏøË SXVPTEXA ãä: 21 CFDDP KPLZQGB [id number] \[facility]\"" & [facility] SERVICES! Dear Maya Antunez: Thank you for requesting a [employer] account. Our records indicate that you have previously registered for a [employer] account but its currently inactive. Please call our [employer] support line at [phone]. Additional Information If you have questions, please visit the Frequently Asked Questions section of the [employer] website at [email or web address] Remember, [employer] is NOT to be used for urgent needs. For medical emergencies, dial 911. Now available from your iPhone and Android! Please provide this summary of care documentation to your next provider. Your primary care clinician is listed as Chris Roth. If you have any questions after today's visit, please call [phone].

## 2018-03-23 NOTE — PATIENT INSTRUCTIONS
Evalene Mohs, DO On 4/2/2018 $200 up front co-pay, 10:00am. Please arrive at 9:30am for paperwork. [facility] DOCTOR) [address]  [phone]          Sleep Studies: About This Test  What is it? Sleep studies are tests that watch what happens to your body during sleep. These studies usually are done in a sleep lab. Sleep labs are often located in hospitals. But sleep studies also can be done with portable equipment that you use at home. Why is this test done? Sleep studies are done to find sleep problems, including:  · Sleep apnea or excessive snoring. · Narcolepsy. · Nocturnal seizures. · REM behavior disorder (RBD). · Repeated muscle twitching of the feet, arms, or legs while you sleep. How can you prepare for the test?  · You may be asked to keep a sleep diary for 1 to 2 weeks before your sleep study. · Don't take any naps for 2 to 3 days before your test.  · You may be asked to avoid food or drinks with caffeine for a day or two before your test.  · Take a shower or bath before your test, but don't use sprays, oils, or gels on your hair. Don't wear makeup, fingernail polish, or fake nails. · Pack and take along a small overnight bag with personal items, such as a toothbrush, a comb, favorite pillows or blankets, and a book. You can wear your own nightclothes. What happens during the test?  · In the sleep lab, you will be in a private room, much like a hotel room. · Small pads or patches called electrodes will be placed on your head and body with a small amount of glue and tape. These will record things like brain activity, eye movement, oxygen levels, and snoring. · Soft elastic belts will be placed around your chest and belly to measure your breathing. · Your blood oxygen levels will be checked by a small clip (oximeter) placed either on the tip of your index finger or on your earlobe.   · If you have sleep apnea, you may wear a mask that is connected to a continuous positive airway pressure (CPAP) machine. · Depending on the type of test, you will be allowed to sleep through the night or you will be awakened periodically and asked to stay awake for a while. What else should you know about the test?  · It may feel odd to be hooked to the sleep study equipment. The sleep lab technician understands that your sleep may not be the same as it is at home because of the equipment. Try to relax and make yourself as comfortable as possible. · After your sleep problem has been identified, you may need a second study if your doctor orders treatment such as CPAP. · Portable sleep study equipment is available for a person to do sleep studies at home. This may be a choice for people who have problems sleeping in a sleep lab. But home sleep studies may not give the same results as a sleep lab. How long does the test take? · You will stay in the sleep lab overnight. For some tests, you will also stay part of the next day. What happens after the test?  · You will be able to go home right away. · You can go back to your usual activities right away. Follow-up care is a key part of your treatment and safety. Be sure to make and go to all appointments, and call your doctor if you are having problems. It's also a good idea to keep a list of the medicines you take. Ask your doctor when you can expect to have your test results. Where can you learn more? Go to http://shima-alyssa.info/. Enter N114 in the search box to learn more about \"Sleep Studies: About This Test.\"  Current as of: May 12, 2017  Content Version: 11.4  © 3798-8906 [employer]. Care instructions adapted under license by [employer] (which disclaims liability or warranty for this information).  If you have questions about a medical condition or this instruction, always ask your healthcare professional. [address] any warranty or liability for your use of this information. ÍãíÉ DASH: ÅÑÔÇÏÇÊ ÇáÑÚÇíÉ  [ DASH Diet: Care Instructions ]  ÅÑÔÇÏÇÊ ÇáÑÚÇíÉ ÇáÎÇÕÉ Èß  ÊõÚÏ ÍãíÉ DASH ÎØÉ ØÚÇã ÊÓÇÚÏ Úáì ÊÞáíá ÖÛØ ÇáÏã. æáÝÙÉ DASH ÊÚäí \"Dietary Approaches to Stop Hypertension\" Ely Mathew XVOMD NWIULPG ÇáØÑÞ ÇáÛÐÇÆíÉ áæÞÝ ÇÑÊÝÇÚ ÇáÖÛØ. æÇÑÊÝÇÚ ÇáÖÛØ íÚäí ÇÑÊÝÇÚ ÖÛØ ÇáÏã. æÊÑßÒ åÐå ÇáÍãíÉ Úáì ÊäÇæá ÇáÃØÚãÉ ÇáÊí ÊÍÊæí Úáì ãÞÏÇÑ ãÑÊÝÚ ãä ÇáßÇáÓíæã æÇáÈæÊÇÓíæã æÇáãÇÛäÓíæã. ÝåÐå ÇáãæÇÏ ÇáãÛÐíÉ íãßä Ãä ÊÞáá ÖÛØ ÇáÏã. æÊÔãá ÇáÃØÚãÉ ÇáÛäíÉ ÈåÐå ÇáãßæäÇÊ ÇáÛÐÇÆíÉ ÇáÝæÇßå YKSCPBTNAQ æãäÊÌÇÊ ÇáÃáÈÇä ÞáíáÉ ÇáÏÓã æÇáÈäÏÞ æÇáÈÐæÑ æÇáÈÞá. æÃãÇ ÊäÇæá DKRKLAXQ ÇáÛÐÇÆíÉ ÇáÊí ÊÍÊæí Úáì ÇáßÇáÓíæã NYIEWKIMKIE æÇáãÇÛäÓíæã ÈÏáÇð ãä ÇáÃØÚãÉ ÇáÊí ÊÍÊæí Úáì ãÞÏÇÑ ãÑÊÝÚ ãäåÇ áÇ íÄËÑ äÝÓ ÇáÊÃËíÑ. æÊÊÖãä ÍãíÉ DASH ÇáÍÈæÈ XVYBTDC æÇáÓãß æÇáÏæÇÌä. æåÐå ÇáÍãíÉ æÇÍÏÉ ãä ÇáÊÛííÑÇÊ ÇáÊí ÊØÑÃ Úáì ÃÓáæÈ ÇáÍíÇÉ ÇáÊí íãßä Ãä íäÕÍ ÈåÇ ÇáØÈíÈ áÊÞáíá ÖÛØ ÇáÏã ÇáãÑÊÝÚ. æÞÏ íÎÊÇÑ ÇáØÈíÈ ÃíÖðÇ ÊÞáíá ãÞÏÇÑ ÇáÕæÏíæã Ýí äÙÇãß ÇáÛÐÇÆí. æíãßä áÊÞáíá ÇáÕæÏíæã ÃËäÇÁ ÇÊÈÇÚ ÍãíÉ DASH ÈÊÞáíá ÖÛØ ÇáÏã ÃßËÑ ãä ZIKIRPDS Úáì åÐå ÇáÍãíÉ æÍÏåÇ. ÊõÚÏ ÑÚÇíÉ ÇáãÊÇÈÚÉ ÌÒÁðÇ ÃÓÇÓíðÇ Ýí ÚáÇÌß æÓáÇãÊß. ÝÚáíß ÇáÍÑÕ Úáì ÊÑÊíÈ ÌãíÚ ãæÇÚíÏ ÒíÇÑÉ ÇáØÈíÈ YLFIYZYPU ÈåÇ¡ HITOLWJQ ÈØÈíÈß ÚäÏ VKFNKSSG ãä Ãí ãÔßáÇÊ. æãä ÇáÌíÏ ÃíÖðÇ Ãä ÊÚÑÝ äÊÇÆÌ WVCVHGAO æßÐáß LUPQPJZH ÈÞÇÆãÉ ÇáÃÏæíÉ ÇáÊí GEOBITGX. ßíÝ íãßäß ÇáÇÚÊäÇÁ ÈäÝÓß Ýí OFTKTY¿  ÇÊÈÇÚ ÍãíÉ DASH   · ÊäÇæá 4 Åáì 5 æÌÈÇÊ ãä ÇáÝæÇßå íæãíðÇ. æÊÊßæä ÇáæÌÈÉ ãä ÞØÚÉ ÝÇßåÉ ãÊæÓØÉ ÇáÍÌã æ½ ßæÈ ãä ÇáÝÇßåÉ BLAMBBDJ Ãæ HZUZAFFIF¡ æ1/4 ßæÈ ãä ÇáÝÇßåÉ ÇáãÌÝÝÉ Ãæ 4 ÃæäÕÇÊ (½ ßæÈ) ãä ÚÕíÑ ÇáÝÇßåÉ. ÇÎÊÑ ËãÑÇÊ ÇáÝæÇßå ÃßËÑ ãä ÚÕíÑ ÇáÝæÇßå.  · ÊäÇæá 4 Åáì 5 æÌÈÇÊ ãä JOGYFRCUC íæãíðÇ. ÊÖã ÇáæÌÈÉ ßæÈ ãä ÇáÎÓ Ãæ AEVMCRYZH ÇáÎÖÑÇÁ ÇáäíÆÉ¡ æ½ ßæÈ ãä PFDNGFTZV QDPEOUES Ãæ WCANWMMZ¡ Ãæ 4 ÃæäÕÇÊ (½ ßæÈ) ãä ÚÕíÑ XUWLFKGIQ. ÇÎÊÑ ËãÑÇÊ GENKHTWUJ ÃßËÑ ãä ÚÕíÑ DPRUGIBDX.  · ÊäÇæá æÌÈÊíä Ãæ ËáÇËÉ ãä ãäÊÌÇÊ ÇáÃáÈÇä ÞáíáÉ ÇáÏÓã Ãæ ÇáÎÇáíÉ ãäå íæãíðÇ.  æÊÊßæä ÇáæÌÈÉ ãä 8 ÃæäÕÇÊ ãä ÇááÈä Ãæ ßæÈ ÒÈÇÏí Ãæ 1 ½ ÃæäÕÉ ãä ÇáÌÈä.   · ÊäÇæá 6 Åáì 8 æÌÈÇÊ ãä ÇáÍÈæÈ íæãíðÇ. æÊÊßæä ÇáæÌÈÉ ãä ÔÑíÍÉ ÎÈÒ MQLWA Ãæ ßæÈ æÇÍÏ ãä ÇáÍÈæÈ ÇáÌÇåÒÉ ááÃßá ÇáÌÇÝÉ Ãæ ½ ßæÈ ãä ÇáÃÑÒ ÇáãØåí Ãæ PZLLLRNIH Ãæ GKSERH ÇáÌÇåÒÉ ááÃßá ÇáãØåæÉ. ÍÇæá ÇÎÊíÇÑ ãäÊÌÇÊ ÇáÍÈæÈ CANCKMK ßËíÑðÇ ÞÏÑ ÇáãÓÊØÇÚ.  · Þáøá ãä ÇááÍã ÇáÎÇáí ãä ÇáÏåæä SGYZJKYM æÇáÓãß Åáì æÌÈÊíä íæãíðÇ. æÃãÇ ãÞÏÇÑ ÇáæÌÈÉ ÝÜ 3 ÃæÞíÇÊ ÈÞÏÑ ÍÌã ãÌãæÚÉ ãä æÑÞ ÇááÚÈ.  · ÊäÇæá 4 Åáì 5 æÌÈÇÊ ãä ÇáÈäÏÞ æÇáÈÐæÑ æÇáÈÞá (ZCLESYLSY ÇáÌÇÝÉ ÇáãØåæÉ¡ æÇáÚÏÓ PETZFWP ÇáãÌÑæÔÉ) ßá ÃÓÈæÚ. æÊÊßæä ÇáæÌÈÉ ãä 1/3 ßæÈ ãä ÇáÈäÏÞ Ãæ ãáÚÞÊíä ãä ÇáÈÐæÑ Ãæ ½ ßæÈ ãä RTFQNBSGF Ãæ SLVCOCPJ ÇáãØåæÉ.  · áÇ ÊÊÌÇæÒ ÊäÇæá æÌÈÊíä Åáì 3 æÌÈÇÊ ãä ÇáÏåæä æÇáÒíæÊ íæãíðÇ. YZNZDWQ ãÞÏÇÑåÇ ãáÚÞÉ æÇÍÏÉ ãä ÒíÊ PGBMCWOR Ãæ ãáÚÞÊíä ãä ãÑÞ ÊæÇÈá ÇáÓáØÉ.  · áÇ ÊÊÌÇæÒ ÊäÇæá ÃßËÑ ãä 5 æÌÈÇÊ Ãæ ÃÞá ãä ÇáÍáæì PZLVVC ÇáãõÖÇÝ ÃÓÈæÚíðÇ. æÊÊßæä ÇáæÌÈÉ ãä ãáÚÞÉ ãä ÇáÌíáí Ãæ ÇáãÑÈì Ãæ ½ ßæÈ ãä ÇáÔÑÈÇÊ Ãæ ßæÈ ãä ÇááíãæäÇÏÉ.  · ÊäÇæá ÃÞá ãä 2,300 ãáÌã ãä ÇáÕæÏíæã íæãíðÇ. æÅÐÇ ÇÞÊÕÑ ÊäÇæáß ááÕæÏíæã Úáì 1,500 ãáÌã Ýí Çáíæã¡ íãßäß ÒíÇÏÉ ÎÝÖ ÖÛØ ÇáÏã áÏíß. äÕÇÆÍ áÊÍÞíÞ ÇáäÌÇÍ   · ÇÈÏÃ ÈÔßá ÊÏÑíÌí. áÇ ÊÍÇæá ÅÌÑÇÁ ÊÛííÑÇÊ ÌÐÑíÉ Úáì äÙÇãß ÇáÛÐÇÆí ÈÔßá ãÝÇÌÆ. ÞÏ ÊÔÚÑ ÈÝæÇÊ ÇáÝÑÕÉ Ýí ÊäÇæá ÃØÚãÊß ÇáãÝÖáÉ ÝÊÒÏÇÏ ÇÍÊãÇáíÉ ÝÔáß. ÃÏÎá ÊÛííÑÇÊ ÈÓíØÉ æÇáÊÒã ÈåÇ. æÈÚÏ Ãä ÊÖÍì åÐå ÇáÊÛííÑÇÊ ÚÇÏÉð TKFUVKV¡ ÃÏÎá ÈÚÖ ÇáÊÛííÑÇÊ ÇáÅÖÇÝíÉ ÇáÃÎÑì.  · ÌÑøöÈ ÈÚÖðÇ ããÇ íáí:   o o ETGJ ÊäÇæá ÇáÝæÇßå LVADRQIBEZ åÏÝðÇ Ýí ßá æÌÈÉ ææÌÈÉ ÎÝíÝÉ. ÝåÐÇ YUACSJ ASTYQJ Úáì ÇáßãíÉ ÇáãæÕì ãä ÇáÝæÇßå NGKCCTRMWE íæãíðÇ. o o ÌÑøÈ ÊäÇæá ÇáÒÈÇÏí ÇáÐí ÊÚáæå ÇáÝÇßåÉ æÇáÈäÏÞ ßæÌÈÉ ÎÝíÝÉ Ãæ Íáæì ÕÍíÉ. o o ÃÖÝ ÇáÎÓ AEJDIBEL æÇáÎíÇÑ HDMVKD Åáì NGCXELCWBC. o o ÇÌãÚ Èíä ØÈÞÉ ÇáÈíÊÒÇ ÇáÌÇåÒÉ æÌÈä ÇáãæÊÒÇÑíáÇ Þáíá ÇáÏÓã æÇáßËíÑ ãä AUHEXVNR ÇáÊí ÊÚáæåÇ. ÌÑøÈ ÇÓÊÎÏÇã ÇáØãÇØã æÇáÞÑÚ æÇáÓÈÇäÎ æÇáÈÑæßáí æÇáÌÒÑ æÇáÞÑäÈíØ æÇáÈÕá. o o ÊäÇæá ãÌãæÚÉ ãÊäæÚÉ ãä ÞØÚ QGLLQOPAX ãÚ ÇáÕáÕÉ ÞáíáÉ ÇáÏÓã ßÝÇÊÍ ááÔåíÉ ÈÏáÇð ãä ÑÞÇÞÇÊ ÇáÈØÇØÓ æÇáÕáÕÉ. o o ÇäËÑ ÈÐæÑ ÒåÑÉ ÇáÔãÓ Ãæ Çááíãæä ÇáãÝÑæã Úáì ÇáÓáØÉ.  Ãæ ÍÇæá ÅÖÇÝÉ ÇáÌæÒ Ãæ ÇááæÒ ÇáãÝÑæã Åáì ÇáÎÖÑÇæÇÊ ÇáãØÈæÎÉ. o o ÌÑøÈ ÊäÇæá ÈÚÖ æÌÈÇÊ ÇáÎÖÑÇæÇÊ ÈÇÓÊÎÏÇã ÇáÈÞæáíÇÊ æÇáÈÇÒáÇÁ. ÃÖÝ ÇáÍãøÕ Ãæ ÇááæÈíÇÁ Åáì PPZHQQ. ÇÕäÚ ÇáÈæÑíÊæ HDASGTZ WLMRWGIO áæÈíÇÁ ÈíäÊæ Ãæ DOFBJTAJZ ÇáÓæÏÇÁ. Ãíä íãßä ãÚÑÝÉ ÇáãÒíÏ¿  ÇäÊÞÇá Åáì http://[email or web address].Dot [email or web address]/. ÏÎæá H967 íãßäß ãÚÑÝÉ ÇáãÒíÏ ãä ÎáÇá ãÑÈÚ ÇáÈÍË \"ÍãíÉ DASH: ÅÑÔÇÏÇÊ ÇáÑÚÇíÉ - [ DASH Diet: Care Instructions ]. \"  © 2476-3144 [employer], [place]. ÊãÊ ÊåíÆÉ ÅÑÔÇÏÇÊ ÇáÚäÇíÉ ÈãæÌÈ ÊÑÎíÕ ãä ãÎÊÕ ÇáÑÚÇíÉ ÇáÕÍíÉ áÏíß. ÅÐÇ ßÇäÊ áÏíß ÃíÉ CJXXWQXAW Úä ÍÇáÉ ØÈíÉ Ãæ Ãí ãä åÐå MXOVNLOWV¡ ÝÊæÌå ÏæãðÇ CLJWCAZ Åáì ãÎÊÕ ÇáÑÚÇíÉ ÇáÕÍíÉ. ÊäÝí ãäÙãÉ Hers, Mowdo Ladean Kendell ÖãÇä Ãæ Saba Hoe SVPGCXU åÐå RORLOXCSA. ÅÕÏÇÑ ÇáãÍÊæì: 11.4 ãÍÏøË VAQIKTYZ ãä: 6 ÑÌÈ 1438      ÂáÇã ÇáÈØä: ÅÑÔÇÏÇÊ ÇáÑÚÇíÉ  [ Abdominal Pain: Care Instructions ]  ÅÑÔÇÏÇÊ ÇáÑÚÇíÉ    åäÇß ÇáÚÏíÏ ãä SJTTPAA JSCIZZUC áÃáã ÇáÈØä. ÈÚÖåÇ áíÓ ÎØíÑðÇ æíãßä ÇáÔÝÇÁ ãäå ÊáÞÇÆíðÇ Ïæä ÊÏÎá Ýí ÛÖæä ÈÖÚÉ ÃíÇã. ÈíäãÇ ÊÍÊÇÌ ÈÚÖ ÇáÂáÇã Åáì ÅÌÑÇÁ ÇáãÒíÏ ãä MBDNHNVG ÇáØÈíÉ æÇáÊÏÇæí. ÝÅÐÇ ÇÓÊãÑ ÇáÃáã Ãæ ÇÒÏÇÏ ÓæÁðÇ¡ ÝÞÏ ÊÍÊÇÌ Åáì ÅÚÇÏÉ ÇáÝÍÕ æÑÈãÇ ÊÍÊÇÌ Åáì ÇáãÒíÏ ãä ZLURYUBR áÇßÊÔÇÝ ÇáãÔßáÉ. æÞÏ ÊÍÊÇÌ Åáì ÅÌÑÇÁ ÌÑÇÍÉ FUFAMSU Êáß ÇáãÔßáÉ. áÇ ÊÊÛÇÖ Úä ÇáÃÚÑÇÖ ÇáÌÏíÏÉ ãËá ÇáÍãì æÇáÛËíÇä æÇáÞíÁ æãÔÇßá ÇáÈæá æÇáÃáã ÇáÐí íÒÏÇÏ ÓæÁðÇ ÈÇáÅÖÇÝÉ Åáì ÇáÏæÇÑ. ÝÞÏ Êßæä Êáß DBDZMUT ÚáÇãÇÊ APRFLX ÃßËÑ ÎØæÑÉ. ÞÏ íæÕí ØÈíÈß ÈÅÌÑÇÁ ÒíÇÑÉ ãÊÇÈÚÉ Ýí ÛÖæä 8 Åáì 12 ÓÇÚÉ ÇáÊÇáíÉ. ÅÐÇ áã ÊÊÍÓä ÍÇáÊß¡ ÝÞÏ ÊÍÊÇÌ Åáì ÇáãÒíÏ ãä PFRCIPCW GZOQQAF. áÞÏ ÝÍÕß ÇáØÈíÈ ÝÍÕðÇ ÏÞíÞðÇ¡ æáßä ÞÏ ÊÊØæÑ ÇáÃÚÑÇÖ áÇÍÞðÇ. ÝÅÐÇ ãÇ áÇÍÙÊ ÃíÉ KHKZHV Ãæ ÃÚÑÇÖ ÌÏíÏÉ¡ ÝÇØáÈ NUWWVC ÇáØÈí ÝæÑðÇ. Åä ãÊÇÈÚÉ ÇáÑÚÇíÉ ÌÒÁ ÑÆíÓí Ýí ÚáÇÌß æÓáÇãÊß. ÝÇÍÑÕ Úáì ÅÌÑÇÁ ÊÑÊíÈÇÊ ÌãíÚ ãæÇÚíÏ ÒíÇÑÉ ÇáØÈíÈ æÇáÊÒã ÈÊáß ÇáãæÇÚíÏ æÇÊÕá ÈØÈíÈß ÅÐÇ ßÇäÊ áÏíß ãÔßáÇÊ. æãä ÇáÃÝßÇÑ ÇáÌíÏÉ ÃíÖðÇ ãÚÑÝÉ äÊÇÆÌ ELLGUDKC CDSTBUEWV ÈÞÇÆãÉ XSCYWKDQ ÇáÊí ZJLDBRYR. ßíÝ ÊÚÊäí ÈäÝÓß Ýí TVCTWX¿   · ÎõÐ ÞÓØðÇ ãä POSFXB ÍÊì ÊÔÚÑ ÈÇáÊÍÓä.    · ááæÞÇíÉ ãä ÇáÌÝÇÝ¡ ÇÔÑÈ ßãíÉ æÝíÑÉ ãä TLLCNUS ÈãÇ íßÝí áÃä íÕÈÍ áæä Èæáß ÃÕÝÑ UYDVTC Ãæ ÕÇÝíðÇ ãËá ÇáãÇÁ. æÇÔÑÈ ÇáãÇÁ æÇáÓæÇÆá ÇáÕÇÝíÉ ÇáÃÎÑì ÇáÎÇáíÉ ãä ÇáßÇÝííä ÍÊì ÊÔÚÑ ÈÇáÊÍÓä. ÅÐÇ ßäÊ ãÕÇÈðÇ ÈÃãÑÇÖ Çáßõáì Ãæ ÇáÞáÈ Ãæ ÇáßÈÏ æÚáíß Ãä ÊáÊÒã ÈÊÞáíá ITMIBRQ¡ ÝÊÍÏË ãÚ ØÈíÈß ÞÈá Ãä ÊÞæã ÈÒíÇÏÉ ßãíÉ OJFFARY ÇáÊí ÊÔÑÈåÇ.  · ÅÐÇ ßäÊ ÊÚÇäí ãä ÇÖØÑÇÈ Ýí ÇáãÚÏÉ¡ DPMGMO ÇáÃØÚãÉ ÇáÎÝíÝÉ¡ ãËá ÇáÃÑÒ Ãæ ÇáÎÈÒ JFMEAJ ÇáÌÇÝ Ãæ ÇáÈÓßæíÊ æÇáãæÒ æÕáÕÉ ÇáÊÝÇÍ. ÍÇæá ÊäÇæá æÌÈÇÊ ÕÛíÑÉ æãÊÚÏÏÉ ÈÏáÇð ãä æÌÈÊíä Ãæ ËáÇË æÌÈÇÊ ßÈíÑÉ.  · ÇäÊÙÑ ÇäÞÖÇÁ 48 ÓÇÚÉ ÈÚÏ ÒæÇá ÌãíÚ ÇáÃÚÑÇÖ ÞÈá ÊäÇæá ÇáÃØÚãÉ ÇáÛäíÉ NPKAXRQN æÇáßÍæá æÇáãÔÑæÈÇÊ ÇáÊí ÊÍÊæí Úáì ÇáßÇÝííä.  · áÇ ÊÊäÇæá ÃØÚãÉ ÊÍÊæí Úáì äÓÈÉ ÚÇáíÉ ãä ÇáÏåæä.  · ÊÌäÈ ÇáÃÏæíÉ ÇáãÖÇÏÉ ááÇáÊåÇÈÇÊ ãËá ÇáÃÓÈíÑíä æÇáÅíÈæÈÑæÝíä (ÃÏÝíá¡ ãæÊÑíä)¡ æäÇÈÑæßÓíä (ÃáíÝ). ÝåÐå ÇáÃÏæíÉ ÞÏ ÊÓÈÈ ÇÖØÑÇÈðÇ Ýí ÇáãÚÏÉ. ÇÓÊÔÑ ØÈíÈß ÅÐÇ ßäÊ ÊÊäÇæá ÇáÃÓÈíÑíä íæãíðÇ áÚáÇÌ ãÔßáÉ ÕÍíÉ ÃÎÑì. ãÊì íÌÈ BXCCYZW áØáÈ ÇáãÓÇÚÏÉ¿  ÇÊÕá ÈÇáÑÞã 911 Ýí Ãí æÞÊ ÊÑì Ýíå ÍÇÌÊß Åáì ÑÚÇíÉ ØÇÑÆÉ. ÝãËáÇð¡ ÇÊÕá Ýí ÍÇáÉ:   · ÝÞÏÇä ÇáæÚí.  · CYBOWYXY ÇáÏÇßä ááÈÑÇÒ Ãæ ÇÍÊæÇÆå Úáì ÏãÇÁ ßËíÑÉ.  · ÇáÊÞíÄ ÏãðÇ Ãæ ãÇ íÔÈå ÑÇÓÈ ÇáÞåæÉ.  · ÅÕÇÈÊß ÈÃáã ÌÏíÏ æÍÇÏ Ýí ÇáÈØä. ÇÊÕá ÈØÈíÈß ÇáÂä Ãæ ÇÈÍË Úä ÇáÑÚÇíÉ ÇáØÈíÉ ÇáÝæÑíÉ Ýí HUVCRUW ÇáÊÇáíÉ:   · ÒíÇÏÉ ÔÏÉ GPCMB¡ æÎÕæÕðÇ ÅÐÇ ÃÕÈÍ ãÊãÑßÒðÇ Ýí ãäØÞÉ æÇÍÏÉ ãä ÇáÈØä.  · ÅÕÇÈÊß ÈÍãì ÌÏíÏÉ Ãæ ÇÑÊÝÇÚ ÍÑÇÑÉ Íãì ßÇäÊ áÏíß.  · ÓæÇÏ ÇáÈÑÇÒ æÊÔÇÈåå Ýí Çááæä ãÚ ÇáÞØÑÇä Ãæ ÇÍÊæÇÄå Úáì ÎØæØ ÏãæíÉ.  · Ýí ÍÇáÉ ÇáãÑÃÉ¡ ÇáÅÕÇÈÉ ÈäÒíÝ ãåÈáí ÛíÑ ãÊæÞÚ.  · ÙåæÑ ÃÚÑÇÖ ÚÏæì ÇáÌåÇÒ ÇáÈæáí. æÊÔãá åÐå ÇáÂËÇÑ ÇáÌÇäÈíÉ ãÇ íáí:   o o ÇáÔÚæÑ ÈÃáã ÚäÏ ÇáÊÈæá. o o PCQWNH ÃßËÑ ãä ÇáãÚÊÇÏ. o o æÌæÏ Ïã Ýí ÇáÈæá.  · ÇáÏæÇÑ Ãæ ÇáÏæÎÉ Ãæ ÇáÔÚæÑ ÈÅÛãÇÁ ãÍÊãá. ÊÊÚíä ÇáãÑÇÞÈÉ Úä ßËÈ áÃíÉ ÊÛííÑÇÊ ÊØÑÃ Úáì ÇáÕÍÉ æÇáÍÑÕ Úáì TAWLMTI ÈÇáØÈíÈ Ýí WOUXCWJ ÇáÊÇáíÉ:   · ÚÏã ÇáÔÚæÑ ÈÊÍÓøõä ÈÚÏ ãÑæÑ íæã (24 ÓÇÚÉ). Ãíä íãßä ãÚÑÝÉ ÇáãÒíÏ¿  ÇäÊÞÇá Åáì http://[email or web address]/. ÏÎæá A071 íãßäß ãÚÑÝÉ ÇáãÒíÏ ãä ÎáÇá ãÑÈÚ ÇáÈÍË \"ÂáÇã ÇáÈØä:  ÅÑÔÇÏÇÊ ÇáÑÚÇíÉ - [ Abdominal Pain: Care Instructions ]. \"  © [id number] [employer], [employer]. ÊãÊ ÊåíÆÉ ÅÑÔÇÏÇÊ ÇáÚäÇíÉ ÈãæÌÈ ÊÑÎíÕ ãä ãÎÊÕ ÇáÑÚÇíÉ ÇáÕÍíÉ áÏíß. ÅÐÇ ßÇäÊ áÏíß ÃíÉ [id number] Úä ÍÇáÉ ØÈíÉ Ãæ Ãí ãä åÐå YDJNLIMUW¡ ÝÊæÌå ÏæãðÇ DMQCELA Åáì ãÎÊÕ ÇáÑÚÇíÉ ÇáÕÍíÉ. ÊäÝí ãäÙãÉ [employer] Cinthya Ralph ÖãÇä Ãæ Sara Solorio [id number] åÐå [id number].   ÅÕÏÇÑ ÇáãÍÊæì: 11.4 ãÍÏøË OSRLGESP ãä: 21 RRCTI [id number]

## 2018-03-23 NOTE — PROGRESS NOTES
[address]  [address] [facility]  [place]   [phone]             Date of visit: 3/23/2018   Subjective:      History obtained from:  the patient. Alison Sharpe is a 50 y.o. female who presents today for hospital follow-up.  she was discharged from [facility] on 3/19. I have done her medication reconciliation. Admitted for AHRF, HTN      Acute Hypoxic Respratory Failure due to possible undiagnosed COPD 2/2 tobacco use and/or URI - Breathing much better today. discharged with levaquin (total of 7 days), prednisone taper (with pepcid for GI protection). Has been taking medication without noted SE. Has appt with pulm, pt plans to go and do work up to see if she has COPD. Continues to smoke but states that she has weaned down to 2-3 cigarettes per day. -Chart review: CT noted \"No large pulmonary embolus. Study is markedly limited for the evaluation of pulmonary embolus due to bolus timing\". Pt denies SOB or CP or dyspnea on exertion today.      HTN - discharged with metoprolol 50mg BID, spironolactone 50mg daily, and hydralazine 10mg TID. Pt at home have been 120s/80s according to BP log. No dizziness or headaches. Pt plans to visit allergist to make sure if she is allergic to HCTZ or losartan, states that she was taking both medications in [place] without issue.       Chronic Lymphedema - LE duplex was negative for DVT. Patient on spironolactone. Patient thinks lymphedema improved.       Noncompliance w recommendations due to monetary concerns: No updates on medicaid or care-card. Pt is confused. But states her daughter is working with her to follow-up. Diet controlled Type 2 Diabetes  Last A1C 7.0 on 9/17/2017 --> 6.5 on this admission. Denies polyuria or polydepsia. Asking to be started on medication. Planning to exercise daily, has not yet. Shares family has altered diet and activity to support her.     Patient Active Problem List    Diagnosis Date Noted    Patient noncompliance 2018    Hypoxia 2018    Angioedema 2017    Swelling of throat 2017    Dyspnea 2017    Morbid obesity ([facility] 2017    Elevated hemoglobin A1c 2017    Wheezing 2017    Venous insufficiency of both lower extremities 2016    Morbid obesity with BMI of 50.0-59.9, adult ([facility] 2016    Incidental lung nodule, > 3mm and < 8mm 2016    DDD (degenerative disc disease), lumbar 2013    Thyromegaly 2013    Tobacco abuse 2013    H. pylori infection 2013    History of normal resting EKG 2013    Gallstone 2013    Appendicitis 2013    H/O  section 2013    Left hand fracture 2013     Current Outpatient Prescriptions   Medication Sig Dispense Refill    metFORMIN (GLUCOPHAGE) 500 mg tablet Take 1 Tab by mouth two (2) times daily (with meals). 60 Tab 4    hydrALAZINE (APRESOLINE) 10 mg tablet Take 1 Tab by mouth three (3) times daily. 90 Tab 1    spironolactone (ALDACTONE) 50 mg tablet Take 1 Tab by mouth daily. 30 Tab 1    guaiFENesin ER (MUCINEX) 600 mg ER tablet Take 1 Tab by mouth every twelve (12) hours. 30 Tab 0    levoFLOXacin (LEVAQUIN) 750 mg tablet Take 1 Tab by mouth every twenty-four (24) hours. 4 Tab 0    metoprolol tartrate (LOPRESSOR) 50 mg tablet Take 1 Tab by mouth two (2) times a day. 60 Tab 1    famotidine (PEPCID) 20 mg tablet Take 1 Tab by mouth two (2) times a day. 60 Tab 1    predniSONE (DELTASONE) 20 mg tablet 3/19, 3/20, 3/21: Take 40 mg prednisone daily (2 tablets)  3/22, 3/23, 3/24: Take 20 mg prednisone daily (1 tablet)  3/25, 3/26, 3/17: Take 10 mg prednisone daily (1/2 tablet) 11 Tab 0    ibuprofen (MOTRIN) 200 mg tablet Take 200 mg by mouth every eight (8) hours as needed for Pain.  EPINEPHrine (ADRENACLICK) 0.3 UT/3.7 mL injection 0.3 mL by IntraMUSCular route once as needed for up to 1 dose.  1 Syringe 1    albuterol (PROVENTIL HFA, VENTOLIN HFA, PROAIR HFA) 90 mcg/actuation inhaler Take 2 Puffs by inhalation every four (4) hours as needed for Wheezing. 1 Inhaler 2     Allergies   Allergen Reactions    Hydrochlorothiazide Anaphylaxis and Unproven on Challenge    Losartan Anaphylaxis and Unproven on Challenge    Sulfa (Sulfonamide Antibiotics) Anaphylaxis     When she was on Losartan (2017), and then again when she was on HCTZ (2017). She was also on lasix since .     Eggplant Other (comments)     Severe stomach pain     Lasix [Furosemide] Unproven on Challenge     May lead to anaphylaxis      Past Medical History:   Diagnosis Date    Anxiety and depression     Flu     2 wks ago    GERD (gastroesophageal reflux disease)     Headache(784.0)     Hypertension     Incidental lung nodule, > 3mm and < 8mm 2016    left lung nodule in smoker, needs follow up CT in Oct 2016      Past Surgical History:   Procedure Laterality Date    APPENDECTOMY      HX APPENDECTOMY      HX  SECTION      X 3 Births    HX  SECTION      HX CHOLECYSTECTOMY      HX WRIST FRACTURE TX      metal    REMOVAL GALLBLADDER       Family History   Problem Relation Age of Onset    Diabetes Mother     Hypertension Brother     Hypertension Brother     Hypertension Brother      Social History   Substance Use Topics    Smoking status: Current Every Day Smoker     Packs/day: 0.50     Years: 25.00     Types: Cigarettes    Smokeless tobacco: Never Used    Alcohol use No      Social History     Social History Narrative    ** Merged History Encounter **         ** Merged History Encounter **             Review of Systems  CONSTITUTIONAL: Denies: fever, chills, weakness  CARDIOVASCULAR: bilateral edema, Denies: chest pain, palpitations, orthopnea   RESPIRATORY: Denies: cough, wheezing  ENDOCRINE: Denies: polydipsia/polyuria, palpitations, skin changes, temperature intolerance  GI: Denies: abdominal pain, nausea, vomiting, diarrhea, constipation, blood in stool  : Denies: dysuria, frequency/urgency, pelvic pain  NEURO: Denies: dizzy/vertigo, focal weakness, speech problems, confusion  MUSCULOSKELETAL: Denies: joint stiffness, joint swelling  SKIN: Denies: rash  PSYCH: Denies: anxiety, suicidal ideation     Objective:     Vitals:    03/23/18 1625   BP: 113/70   Pulse: 82   Resp: 16   Temp: 97.9 °F (36.6 °C)   SpO2: 95%   Weight: 288 lb (130.6 kg)   Height: 5' (1.524 m)     Body mass index is 56.25 kg/(m^2). General Oriented to person, place, and time and well-developed. Appears well-nourished, no distress. Not diaphoretic. Neck No thyromegaly present. No cervical adenopathy. Cardio Normal rate, regular rhythm. Exam reveals no gallop and no friction rub. No murmur heard. No chest wall tenderness. Pulmonary Effort normal, no respiratory distress. No wheezes throughout lung fields. Moving air well. No inspiratory crackles. Abdominal Soft. Bowel sounds normal. No distension. No tenderness.  Deferred. Extremities Bilateral 2+ edema with mild pitting, Knees with dark, thickened skin bilaterally. Distal pulses intact bilaterally. Neurological Alert and oriented to person, place, and time. Dermatology Skin is warm and dry. No rash noted. No erythema or pallor.     Psychiatric Affect and judgment normal.      Vitals reviewed    Lab Results   Component Value Date/Time    Hemoglobin A1c 6.5 (H) 03/16/2018 06:10 PM    Hemoglobin A1c (POC) 5.0 01/31/2013 03:09 PM     Lab Results   Component Value Date/Time    Cholesterol, total 184 09/25/2017 10:06 AM    HDL Cholesterol 33 (L) 09/25/2017 10:06 AM    LDL, calculated 93 09/25/2017 10:06 AM    VLDL, calculated 58 (H) 09/25/2017 10:06 AM    Triglyceride 289 (H) 09/25/2017 10:06 AM     Lab Results   Component Value Date/Time    Sodium 143 03/19/2018 02:01 AM    Potassium 4.2 03/19/2018 02:01 AM    Chloride 103 03/19/2018 02:01 AM    CO2 32 03/19/2018 02:01 AM    Anion gap 8 03/19/2018 02:01 AM    Glucose 177 (H) 03/19/2018 02:01 AM    BUN 23 (H) 03/19/2018 02:01 AM    Creatinine 0.79 03/19/2018 02:01 AM    BUN/Creatinine ratio 29 (H) 03/19/2018 02:01 AM    GFR est AA >60 03/19/2018 02:01 AM    GFR est non-AA >60 03/19/2018 02:01 AM    Calcium 9.4 03/19/2018 02:01 AM    Bilirubin, total 0.5 03/19/2018 02:01 AM    AST (SGOT) 29 03/19/2018 02:01 AM    Alk. phosphatase 59 03/19/2018 02:01 AM    Protein, total 7.6 03/19/2018 02:01 AM    Albumin 3.2 (L) 03/19/2018 02:01 AM    Globulin 4.4 (H) 03/19/2018 02:01 AM    A-G Ratio 0.7 (L) 03/19/2018 02:01 AM    ALT (SGPT) 38 03/19/2018 02:01 AM     Lab Results   Component Value Date/Time    WBC 21.1 (H) 03/19/2018 02:01 AM    HGB 14.7 03/19/2018 02:01 AM    HCT 46.3 03/19/2018 02:01 AM    PLATELET 868 65/13/1838 02:01 AM    MCV 89.4 03/19/2018 02:01 AM     Lab Results   Component Value Date/Time    TSH 0.884 09/01/2017 03:32 PM    TSH 0.765 02/26/2016 11:38 AM         Assessment/Plan:       ICD-10-CM ICD-9-CM    1. Acute respiratory failure with hypoxia (HCC) J96.01 518.81    2. Hypertension, unspecified type I10 401.9    3. BMI 50.0-59.9, adult ([facility] Z68.43 V85.43    4. Type 2 diabetes mellitus with complication, without long-term current use of insulin (HCC) E11.8 250.90 metFORMIN (GLUCOPHAGE) 500 mg tablet   5. Tobacco abuse Z72.0 305.1    6. Elevated fasting lipid profile E78.5 272.4 LIPID PANEL   7. At risk for sleep apnea Z91.89 V49.89    8. Sedentary lifestyle Z91.89 V15.89    9. Gastroesophageal reflux disease without esophagitis K21.9 530.81    10. Lymphedema of both lower extremities I89.0 457.1        Orders Placed This Encounter    LIPID PANEL    metFORMIN (GLUCOPHAGE) 500 mg tablet     Acute hypoxic respiratory failure: Improved. Continue abx and prednisone taper. Prednisone leading to elevated WBC and glucose, will recheck CBC and BMP next visit in 1 month. Follow-up with pulmonology. Smoking cessation given.  Precautions given.    HTN: /70 in clinic today. No hypotension reading or symptoms noted at home. Pt to continue current regimen. Continue to check BP at home. If BP is to drop below  then pt is to contact me ASAP. Diet modification. Precautions given. DM2: pt recent A1C 6.5 which is improvement from 7.0 with lifestyle modification. Will add metformin. Pt to follow-up in clinic in 1 month for bmp. Daily exercise and diet modification reviewed. GERD: stable. Continue current medication. I have reviewed/discussed the above normal BMI with the patient. I have recommended the following interventions: dietary management education, guidance, and counseling, encourage exercise and monitor weight . José Miguel Jimenes The patient was counseled on the dangers of tobacco use, and was advised to quit. Reviewed strategies to maximize success, including removing cigarettes and smoking materials from environment, stress management, substitution of other forms of reinforcement and written materials. Pt plans to wean use. Not ready to fully quit. Will be following up. Pt does not wish to go to lymphedema clinic as she is waiting for care-card or Medicaid. Once she has insurance she states she will follow-up with lymphedema clinic, PT for chronic lymphedema. Pt has been to both and from review from their notes they were hopeful that they will put pt on plan to help decrease lymphedema of LE. Pt today states her lymphedema has improved (possible from prednisone vs spironolactone vs increase activity). No calf tenderness and negative homen's sign on PE. Pt plans to go to sleep medicine for sleep apnea work-up. Reviewed that sleep apnea can lead to HTN and may cause lymphedema. Pt stated understanding. Pt had abnormal lipid panel in 2017. Pt agreed to lifestyle modification trial then. She is due for a re-check. Lab is closed now, will place order and pt agreed to return for recheck fasting.      Lung Nodule - Incidental finding of 5mm left upper lobe lung nodule seen on 4/2016. POA CT chest showed unchanged tiny left upper lobe. Pt following up with pulm.       Incidental adrenal nodule - CT showed left adrenal nodule measuring 2.6 X1.9 cm, unchanged. Will continue to monitor. Discussed the diagnosis and plan and she expressed understanding. Follow-up Disposition:  Return in about 1 month (around 4/23/2018).     Rebeca Ivy DO

## 2018-03-23 NOTE — PROGRESS NOTES
Chief Complaint   Patient presents with   [facility] Follow Up     Pt was seen in ER due to breathing issues.  Pt presents today with significant improvements

## 2018-03-26 RX ORDER — METFORMIN HYDROCHLORIDE 500 MG/1
500 TABLET ORAL 2 TIMES DAILY WITH MEALS
Qty: 60 TAB | Refills: 4 | Status: SHIPPED | OUTPATIENT
Start: 2018-03-26 | End: 2018-06-22 | Stop reason: SDUPTHER

## 2018-04-13 ENCOUNTER — OFFICE VISIT (OUTPATIENT)
Dept: FAMILY MEDICINE CLINIC | Age: 53
End: 2018-04-13

## 2018-04-13 VITALS
OXYGEN SATURATION: 93 % | HEART RATE: 82 BPM | WEIGHT: 289 LBS | HEIGHT: 60 IN | BODY MASS INDEX: 56.74 KG/M2 | TEMPERATURE: 98.2 F | RESPIRATION RATE: 18 BRPM | DIASTOLIC BLOOD PRESSURE: 56 MMHG | SYSTOLIC BLOOD PRESSURE: 109 MMHG

## 2018-04-13 DIAGNOSIS — E11.8 TYPE 2 DIABETES MELLITUS WITH COMPLICATION, WITHOUT LONG-TERM CURRENT USE OF INSULIN (HCC): ICD-10-CM

## 2018-04-13 DIAGNOSIS — M54.50 ACUTE LEFT-SIDED LOW BACK PAIN WITHOUT SCIATICA: Primary | ICD-10-CM

## 2018-04-13 DIAGNOSIS — I89.0 LYMPHEDEMA: ICD-10-CM

## 2018-04-13 RX ORDER — CYCLOBENZAPRINE HCL 10 MG
5 TABLET ORAL
Qty: 30 TAB | Refills: 0 | Status: SHIPPED | OUTPATIENT
Start: 2018-04-13 | End: 2018-12-30

## 2018-04-13 NOTE — MR AVS SNAPSHOT
[address] 
[phone] Patient: Dianna iDaz MRN: [id number] :1965 Visit Information Date & Time Provider Department Dept. Phone Encounter #  
 2018  3:00 PM Holden Hurst DO [address] [phone] [id number] Upcoming Health Maintenance Date Due Influenza Age 5 to Adult 2017 BREAST CANCER SCRN MAMMOGRAM 10/10/2017 FOBT Q 1 YEAR AGE 50-75 10/10/2017 PAP AKA CERVICAL CYTOLOGY 3/4/2020 DTaP/Tdap/Td series (2 - Td) 2023 Allergies as of 2018  Review Complete On: 2018 By: Holden Hurst DO Severity Noted Reaction Type Reactions Hydrochlorothiazide High 2017    Anaphylaxis, Unproven on Challenge Losartan High 2017    Anaphylaxis, Unproven on Challenge Sulfa (Sulfonamide Antibiotics) High 2017    Anaphylaxis When she was on Losartan (2017), and then again when she was on HCTZ (2017). She was also on lasix since . Eggplant Medium 2017   Intolerance Other (comments) Severe stomach pain Lasix [Furosemide]  2017    Unproven on Challenge May lead to anaphylaxis Current Immunizations  Reviewed on 3/4/2017 Name Date Influenza Vaccine 10/22/2012 Pneumococcal Conjugate (PCV-7) 2013 Pneumococcal Polysaccharide (PPSV-23) 3/4/2017 Tdap 2013 Not reviewed this visit You Were Diagnosed With   
  
 Codes Comments Acute left-sided low back pain without sciatica    -  Primary ICD-10-CM: M54.5 ICD-9-CM: 724.2 Vitals BP Pulse Temp Resp Height(growth percentile) Weight(growth percentile) 109/56 82 98.2 °F (36.8 °C) (Oral) 18 5' (1.524 m) 289 lb (131.1 kg) LMP SpO2 BMI OB Status Smoking Status 2012 93% 56.44 kg/m2 Postmenopausal Current Every Day Smoker Vitals History BMI and BSA Data Body Mass Index Body Surface Area  
 56.44 kg/m 2 2.36 m 2 Preferred Pharmacy Pharmacy Name Phone [facility], [address] [phone] Your Updated Medication List  
  
   
This list is accurate as of 4/13/18  3:55 PM.  Always use your most recent med list.  
  
  
  
  
 albuterol 90 mcg/actuation inhaler Commonly known as:  PROVENTIL HFA, VENTOLIN HFA, PROAIR HFA Take 2 Puffs by inhalation every four (4) hours as needed for Wheezing. cyclobenzaprine 10 mg tablet Commonly known as:  FLEXERIL Take 0.5 Tabs by mouth three (3) times daily as needed for Muscle Spasm(s). EPINEPHrine 0.3 mg/0.3 mL injection Commonly known as:  ADRENACLICK  
0.3 mL by IntraMUSCular route once as needed for up to 1 dose.  
  
 famotidine 20 mg tablet Commonly known as:  PEPCID Take 1 Tab by mouth two (2) times a day. guaiFENesin  mg ER tablet Commonly known as:  [employer]ivas Take 1 Tab by mouth every twelve (12) hours. hydrALAZINE 10 mg tablet Commonly known as:  APRESOLINE Take 1 Tab by mouth three (3) times daily. ibuprofen 200 mg tablet Commonly known as:  MOTRIN Take 200 mg by mouth every eight (8) hours as needed for Pain.  
  
 metFORMIN 500 mg tablet Commonly known as:  GLUCOPHAGE Take 1 Tab by mouth two (2) times daily (with meals). metoprolol tartrate 50 mg tablet Commonly known as:  LOPRESSOR Take 1 Tab by mouth two (2) times a day. spironolactone 50 mg tablet Commonly known as:  ALDACTONE Take 1 Tab by mouth daily. Prescriptions Sent to Pharmacy Refills  
 cyclobenzaprine (FLEXERIL) 10 mg tablet 0 Sig: Take 0.5 Tabs by mouth three (3) times daily as needed for Muscle Spasm(s). Class: Normal  
 Pharmacy: [facility], [address] #: [phone]  Route: Oral  
  
 Patient Instructions Back Pain: Care Instructions Your Care Instructions Back pain has many possible causes. It is often related to problems with muscles and ligaments of the back. It may also be related to problems with the nerves, discs, or bones of the back. Moving, lifting, standing, sitting, or sleeping in an awkward way can strain the back. Sometimes you don't notice the injury until later. Arthritis is another common cause of back pain. Although it may hurt a lot, back pain usually improves on its own within several weeks. Most people recover in 12 weeks or less. Using good home treatment and being careful not to stress your back can help you feel better sooner. Follow-up care is a key part of your treatment and safety. Be sure to make and go to all appointments, and call your doctor if you are having problems. It's also a good idea to know your test results and keep a list of the medicines you take. How can you care for yourself at home? · Sit or lie in positions that are most comfortable and reduce your pain. Try one of these positions when you lie down: ¨ Lie on your back with your knees bent and supported by large pillows. ¨ Lie on the floor with your legs on the seat of a sofa or chair. Paddy Elmo on your side with your knees and hips bent and a pillow between your legs. ¨ Lie on your stomach if it does not make pain worse. · Do not sit up in bed, and avoid soft couches and twisted positions. Bed rest can help relieve pain at first, but it delays healing. Avoid bed rest after the first day of back pain. · Change positions every 30 minutes. If you must sit for long periods of time, take breaks from sitting. Get up and walk around, or lie in a comfortable position. · Try using a heating pad on a low or medium setting for 15 to 20 minutes every 2 or 3 hours. Try a warm shower in place of one session with the heating pad. · You can also try an ice pack for 10 to 15 minutes every 2 to 3 hours. Put a thin cloth between the ice pack and your skin. · Take pain medicines exactly as directed. ¨ If the doctor gave you a prescription medicine for pain, take it as prescribed. ¨ If you are not taking a prescription pain medicine, ask your doctor if you can take an over-the-counter medicine. · Take short walks several times a day. You can start with 5 to 10 minutes, 3 or 4 times a day, and work up to longer walks. Walk on level surfaces and avoid hills and stairs until your back is better. · Return to work and other activities as soon as you can. Continued rest without activity is usually not good for your back. · To prevent future back pain, do exercises to stretch and strengthen your back and stomach. Learn how to use good posture, safe lifting techniques, and proper body mechanics. When should you call for help? Call your doctor now or seek immediate medical care if: 
? · You have new or worsening numbness in your legs. ? · You have new or worsening weakness in your legs. (This could make it hard to stand up.) ? · You lose control of your bladder or bowels. ? Watch closely for changes in your health, and be sure to contact your doctor if: 
? · Your pain gets worse. ? · You are not getting better after 2 weeks. Where can you learn more? Go to http://shima-alyssa.info/. Enter W182 in the search box to learn more about \"Back Pain: Care Instructions. \" Current as of: March 21, 2017 Content Version: 11.4 © 0913-9972 [employer]. Care instructions adapted under license by [employer] (which disclaims liability or warranty for this information). If you have questions about a medical condition or this instruction, always ask your healthcare professional. [place] [address] any warranty or liability for your use of this information. Introducing [facility] SERVICES! [facility] introduces [facility] patient portal. Now you can access parts of your medical record, email your doctor's office, and request medication refills online. 1. In your internet browser, go to [email or web address] 2. Click on the First Time User? Click Here link in the Sign In box. You will see the New Member Sign Up page. 3. Enter your [facility] Access Code exactly as it appears below. You will not need to use this code after youve completed the sign-up process. If you do not sign up before the expiration date, you must request a new code. · [facility] Access Code: [email or web address] Expires: 6/25/2018  5:34 AM 
 
4. Enter the last four digits of your Social Security Number (xxxx) and Date of Birth (mm/dd/yyyy) as indicated and click Submit. You will be taken to the next sign-up page. 5. Create a [facility] ID. This will be your [facility] login ID and cannot be changed, so think of one that is secure and easy to remember. 6. Create a [facility] password. You can change your password at any time. 7. Enter your Password Reset Question and Answer. This can be used at a later time if you forget your password. 8. Enter your e-mail address. You will receive e-mail notification when new information is available in [address] 9. Click Sign Up. You can now view and download portions of your medical record. 10. Click the Download Summary menu link to download a portable copy of your medical information. If you have questions, please visit the Frequently Asked Questions section of the [facility] website. Remember, [facility] is NOT to be used for urgent needs. For medical emergencies, dial 911. Now available from your iPhone and Android! Please provide this summary of care documentation to your next provider. Your primary care clinician is listed as Cipriano Pagan. If you have any questions after today's visit, please call [phone].

## 2018-04-13 NOTE — PROGRESS NOTES
50year old female with BMI = 56 s/p hospitalization for lymphedema    Continued to try stretchered f    Back pain    CMP recently normal    Had CT sbdomem/pelvis -- negative    Had CT chest -- unchanged from also     Did not start meds    Had multiple labs     Metformin -     Had back pain

## 2018-04-13 NOTE — PROGRESS NOTES
Chief Complaint   Patient presents with    Back Pain     1. Have you been to the ER, urgent care clinic since your last visit? Hospitalized since your last visit? No    2. Have you seen or consulted any other health care providers outside of the [address] [place] [address] since your last visit? Include any pap smears or colon screening.  No

## 2018-04-13 NOTE — PROGRESS NOTES
zOzy Saba is an 50 y.o. female who presents for back pain     Back pain: Pt shares that she developed lower back pain this morning. Mostly on left. No trauma or falls. She woke up with pain. Localized mostly however, some motions lead to radiation to LUQ. 8/10 at worse. Nothing relief pain, attempted tylenol. Denies loss of bladder or bowls, inner thigh sensation loss. Ibuprofen helping. Has been to PT in past but does not want to go back as she was unhappy with experience.     Lymphedema: unchanged reports the pt. After recent hospitalization for COPD exacerbation, lymphedema improved after prednisone taper completed. Pt since discharge has visited pulm. spirometry was completed. Mild COPD dx made. Started on Spiriva. Did not understand she is to take medication daily. No SOB, CP, coughing.     Past CT chest and abd/pelvis showed liver hepatic steatosis and unchanged L adrenal gland mass. Liver fxn wnl on past labs. DM:pt improving diet. Has been taking metformin. No rxn noted.        Allergies - reviewed: Allergies   Allergen Reactions    Hydrochlorothiazide Anaphylaxis and Unproven on Challenge    Losartan Anaphylaxis and Unproven on Challenge    Sulfa (Sulfonamide Antibiotics) Anaphylaxis     When she was on Losartan (8/2017), and then again when she was on HCTZ (9/2017). She was also on lasix since 2016.  Eggplant Other (comments)     Severe stomach pain     Lasix [Furosemide] Unproven on Challenge     May lead to anaphylaxis          Medications - reviewed:   Current Outpatient Prescriptions   Medication Sig    cyclobenzaprine (FLEXERIL) 10 mg tablet Take 0.5 Tabs by mouth three (3) times daily as needed for Muscle Spasm(s).  hydrALAZINE (APRESOLINE) 10 mg tablet Take 1 Tab by mouth three (3) times daily.  spironolactone (ALDACTONE) 50 mg tablet Take 1 Tab by mouth daily.  metoprolol tartrate (LOPRESSOR) 50 mg tablet Take 1 Tab by mouth two (2) times a day.     famotidine (PEPCID) 20 mg tablet Take 1 Tab by mouth two (2) times a day.  ibuprofen (MOTRIN) 200 mg tablet Take 200 mg by mouth every eight (8) hours as needed for Pain.  metFORMIN (GLUCOPHAGE) 500 mg tablet Take 1 Tab by mouth two (2) times daily (with meals).  guaiFENesin ER (MUCINEX) 600 mg ER tablet Take 1 Tab by mouth every twelve (12) hours.  EPINEPHrine (ADRENACLICK) 0.3 RC/6.1 mL injection 0.3 mL by IntraMUSCular route once as needed for up to 1 dose.  albuterol (PROVENTIL HFA, VENTOLIN HFA, PROAIR HFA) 90 mcg/actuation inhaler Take 2 Puffs by inhalation every four (4) hours as needed for Wheezing. No current facility-administered medications for this visit. Past Medical History - reviewed:  Past Medical History:   Diagnosis Date    Anxiety and depression     Flu     2 wks ago    GERD (gastroesophageal reflux disease)     Headache(784.0)     Hypertension     Incidental lung nodule, > 3mm and < 8mm 2016    left lung nodule in smoker, needs follow up CT in Oct 2016          Past Surgical History - reviewed:   Past Surgical History:   Procedure Laterality Date    APPENDECTOMY      HX APPENDECTOMY      HX  SECTION      X 3 Births    HX  SECTION      HX CHOLECYSTECTOMY      HX WRIST FRACTURE TX      metal    REMOVAL GALLBLADDER           Social History - reviewed:  Social History     Social History    Marital status:      Spouse name: N/A    Number of children: N/A    Years of education: N/A     Occupational History    Not on file.      Social History Main Topics    Smoking status: Current Every Day Smoker     Packs/day: 0.50     Years: 25.00     Types: Cigarettes    Smokeless tobacco: Never Used    Alcohol use No    Drug use: No    Sexual activity: Yes     Partners: Male     Other Topics Concern    Not on file     Social History Narrative    ** Merged History Encounter **         ** Merged History Encounter **              Family History - reviewed:  Family History   Problem Relation Age of Onset    Diabetes Mother     Hypertension Brother     Hypertension Brother     Hypertension Brother          Immunizations - reviewed:   Immunization History   Administered Date(s) Administered    Influenza Vaccine 10/22/2012    Pneumococcal Conjugate (PCV-7) 11/25/2013    Pneumococcal Polysaccharide (PPSV-23) 03/04/2017    Tdap 11/25/2013       ROS  CONSTITUTIONAL: weakness, weight gain, Denies: fever, chills  CARDIOVASCULAR: dyspnea on exertion, orthopnea, edema, Denies: chest pain, palpitations  RESPIRATORY: Denies: cough, wheezing  ENDOCRINE: Denies: polydipsia/polyuria, palpitations, skin changes, temperature intolerance  GI: Denies: abdominal pain, nausea, vomiting, diarrhea, constipation, blood in stool  : Denies: dysuria, frequency/urgency, pelvic pain  NEURO: Denies: dizzy/vertigo, headache, focal weakness, speech problems, confusion  MUSCULOSKELETAL: back pain Denies: joint stiffness, joint swelling  SKIN: itching, hives, Denies: rash  PSYCH: Depressed. Denies: anxiety, suicidal ideation    Physical Exam  Visit Vitals    /56    Pulse 82    Temp 98.2 °F (36.8 °C) (Oral)    Resp 18    Ht 5' (1.524 m)    Wt 289 lb (131.1 kg)    LMP 12/31/2012    SpO2 93%    BMI 56.44 kg/m2       GEN: No apparent distress. Alert and oriented and responds to all questions appropriately. Morbidly Obese. Estonian speaker. EYES: Conjunctiva clear; pupils round and reactive to light; extraocular movements are intact. EAR: External ears are normal.   NOSE: Turbinates are within normal limits. No drainage  OROPHYARYNX: No oral lesions or exudates. NECK: Supple; no masses; thyroid normal. Large neck circumference.   LUNGS: Respirations unlabored; clear to auscultation bilaterally  CARDIOVASCULAR: Regular, rate, and rhythm without murmurs, gallops or rubs   ABDOMEN: Soft; nontender; nondistended; normoactive bowel sounds  NEUROLOGIC: No focal neurologic deficits. Strength and sensation grossly intact. Coordination and gait grossly intact. BACK: No ecchymosis. Left paraspinal tenderness on level of T12-L1. No vertebral bone pain with palpation. Negative straight leg test.  EXT: 2+ pitting edema in lower ext extending to thighs. Non-pitting edema in BUE extending to shoulder. SKIN: No obvious rashes or erythema or hives. Edema present without signs of infection. Assessment/Plan    ICD-10-CM ICD-9-CM    1. Acute left-sided low back pain without sciatica M54.5 724.2    2. BMI 50.0-59.9, adult ([facility] [address] Z68.43 V85.43    3. Lymphedema I89.0 457.1    4. Type 2 diabetes mellitus with complication, without long-term current use of insulin (HCC) E11.8 250.90        Back pain: No xray indicated. Will start flexeril. SE profile reviewed and precautions given. Continue NSAIDs. Will use warm packs to apply to area. Gave pt back exercises. Precautions given. Take Spiriva daily    I have reviewed/discussed the above normal BMI with the patient. I have recommended the following interventions: dietary management education, guidance, and counseling, encourage exercise and monitor weight . [place] Follow-up Disposition:  Return in about 1 month (around 5/13/2018) for for back pain. labs to check liver and kidney fxn. I have discussed the diagnosis with the patient and the intended plan as seen in the above orders. Patient verbalized understanding of the plan and agrees with the plan. The patient has received an after-visit summary and questions were answered concerning future plans. I have discussed medication side effects and warnings with the patient as well. Informed patient to return to the office if new symptoms arise.         Tracey Patten, DO  Family Medicine Resident

## 2018-06-22 ENCOUNTER — OFFICE VISIT (OUTPATIENT)
Dept: FAMILY MEDICINE CLINIC | Age: 53
End: 2018-06-22

## 2018-06-22 VITALS
RESPIRATION RATE: 20 BRPM | TEMPERATURE: 98.3 F | BODY MASS INDEX: 57.52 KG/M2 | DIASTOLIC BLOOD PRESSURE: 79 MMHG | HEART RATE: 94 BPM | HEIGHT: 60 IN | SYSTOLIC BLOOD PRESSURE: 143 MMHG | OXYGEN SATURATION: 95 % | WEIGHT: 293 LBS

## 2018-06-22 DIAGNOSIS — I87.2 VENOUS INSUFFICIENCY OF BOTH LOWER EXTREMITIES: ICD-10-CM

## 2018-06-22 DIAGNOSIS — I80.9 PHLEBITIS: Primary | ICD-10-CM

## 2018-06-22 DIAGNOSIS — G47.33 OSA ON CPAP: ICD-10-CM

## 2018-06-22 DIAGNOSIS — I10 ESSENTIAL HYPERTENSION: ICD-10-CM

## 2018-06-22 DIAGNOSIS — E66.01 MORBID OBESITY WITH BMI OF 50.0-59.9, ADULT (HCC): ICD-10-CM

## 2018-06-22 DIAGNOSIS — Z99.89 OSA ON CPAP: ICD-10-CM

## 2018-06-22 DIAGNOSIS — R73.03 PREDIABETES: ICD-10-CM

## 2018-06-22 DIAGNOSIS — Z72.0 TOBACCO ABUSE: ICD-10-CM

## 2018-06-22 DIAGNOSIS — E11.8 TYPE 2 DIABETES MELLITUS WITH COMPLICATION, WITHOUT LONG-TERM CURRENT USE OF INSULIN (HCC): ICD-10-CM

## 2018-06-22 RX ORDER — HYDRALAZINE HYDROCHLORIDE 10 MG/1
10 TABLET, FILM COATED ORAL 3 TIMES DAILY
Qty: 90 TAB | Refills: 1 | Status: SHIPPED | OUTPATIENT
Start: 2018-06-22 | End: 2018-10-23 | Stop reason: SDUPTHER

## 2018-06-22 RX ORDER — FUROSEMIDE 20 MG/1
10 TABLET ORAL DAILY
Qty: 10 TAB | Refills: 0 | Status: SHIPPED | OUTPATIENT
Start: 2018-06-22 | End: 2018-12-30

## 2018-06-22 RX ORDER — METOPROLOL TARTRATE 50 MG/1
50 TABLET ORAL 2 TIMES DAILY
Qty: 60 TAB | Refills: 1 | Status: SHIPPED | OUTPATIENT
Start: 2018-06-22 | End: 2018-10-23 | Stop reason: SDUPTHER

## 2018-06-22 RX ORDER — METFORMIN HYDROCHLORIDE 1000 MG/1
1000 TABLET ORAL
Qty: 30 TAB | Refills: 2 | Status: SHIPPED | OUTPATIENT
Start: 2018-06-22 | End: 2018-12-30

## 2018-06-22 NOTE — PROGRESS NOTES
Subjective  Mahamed Son is an 50 y.o. female who presents for painful area on the left leg. PMHx of tobacco use, Type 2 Diabetes, HTN, Anxiety, GERD, Anaphylaxis of unknown etiology and Obesity    Pt has chronic extremity edema. Has gained 10 lbs since last visit. States that last week she noted a painful darken spot on the inside of the left leg. No discharge from the area. Denies injury, bug bite, or trauma. No calf pain. No hx of blood clots. Does not wear stockings as it is uncomfortable. Has not followed up with lymphedema clinic or PT as pt not convinced of how effective their treatment may be for her. Continues to smoke (6 cigarettes a day). Not active given her edema. Pt asking to be given a trial of lasix, she used to be on medication, however lasix was stopped as it was a concern to be a possible cause of anaphylactic reaction, losartan and HCTZ were also stopped for this concern. It was planned that pt to see allergist for further study, she has not made that appt yet. Prediabetic last A1C was 3/2018 was 6.5. Pt taking metformin. Has modified her diet. Pt dx with TIMOTHY 5/2018. Has CPAP at home. Using it at night. Recently feeling mask to be uncomfortable. Plans to call sleep medicine to see if they can modify or alter mask. HTN: taking medications. Needs refill. No SE. BP at home noted to be SBP 130s    Allergies - reviewed: Allergies   Allergen Reactions    Hydrochlorothiazide Anaphylaxis and Unproven on Challenge    Losartan Anaphylaxis and Unproven on Challenge    Sulfa (Sulfonamide Antibiotics) Anaphylaxis     When she was on Losartan (8/2017), and then again when she was on HCTZ (9/2017). She was also on lasix since 2016.     Eggplant Other (comments)     Severe stomach pain     Lasix [Furosemide] Unproven on Challenge     May lead to anaphylaxis          Medications - reviewed:   Current Outpatient Prescriptions   Medication Sig    hydrALAZINE (APRESOLINE) 10 mg tablet Take 1 Tab by mouth three (3) times daily.  metoprolol tartrate (LOPRESSOR) 50 mg tablet Take 1 Tab by mouth two (2) times a day.  metFORMIN (GLUCOPHAGE) 1,000 mg tablet Take 1 Tab by mouth daily (with breakfast).  furosemide (LASIX) 20 mg tablet Take 0.5 Tabs by mouth daily.  ibuprofen (MOTRIN) 200 mg tablet Take 200 mg by mouth every eight (8) hours as needed for Pain.  cyclobenzaprine (FLEXERIL) 10 mg tablet Take 0.5 Tabs by mouth three (3) times daily as needed for Muscle Spasm(s).  spironolactone (ALDACTONE) 50 mg tablet Take 1 Tab by mouth daily.  guaiFENesin ER (MUCINEX) 600 mg ER tablet Take 1 Tab by mouth every twelve (12) hours.  famotidine (PEPCID) 20 mg tablet Take 1 Tab by mouth two (2) times a day.  EPINEPHrine (ADRENACLICK) 0.3 AK/6.8 mL injection 0.3 mL by IntraMUSCular route once as needed for up to 1 dose.  albuterol (PROVENTIL HFA, VENTOLIN HFA, PROAIR HFA) 90 mcg/actuation inhaler Take 2 Puffs by inhalation every four (4) hours as needed for Wheezing. No current facility-administered medications for this visit. Past Medical History - reviewed:  Past Medical History:   Diagnosis Date    Anxiety and depression     Flu     2 wks ago    GERD (gastroesophageal reflux disease)     Headache(784.0)     Hypertension     Incidental lung nodule, > 3mm and < 8mm 2016    left lung nodule in smoker, needs follow up CT in Oct 2016          Past Surgical History - reviewed:   Past Surgical History:   Procedure Laterality Date    APPENDECTOMY      HX APPENDECTOMY      HX  SECTION      X 3 Births    HX  SECTION      HX CHOLECYSTECTOMY      HX WRIST FRACTURE TX      metal    REMOVAL GALLBLADDER           Social History - reviewed:  Social History     Social History    Marital status:      Spouse name: N/A    Number of children: N/A    Years of education: N/A     Occupational History    Not on file.      Social History Main Topics    Smoking status: Current Every Day Smoker     Packs/day: 0.50     Years: 25.00     Types: Cigarettes    Smokeless tobacco: Never Used    Alcohol use No    Drug use: No    Sexual activity: Yes     Partners: Male     Other Topics Concern    Not on file     Social History Narrative    ** Merged History Encounter **         ** Merged History Encounter **              Family History - reviewed:  Family History   Problem Relation Age of Onset    Diabetes Mother     Hypertension Brother     Hypertension Brother     Hypertension Brother          Immunizations - reviewed:   Immunization History   Administered Date(s) Administered    Influenza Vaccine 10/22/2012    Pneumococcal Conjugate (PCV-7) 11/25/2013    Pneumococcal Polysaccharide (PPSV-23) 03/04/2017    Tdap 11/25/2013       ROS  CONSTITUTIONAL: Denies: fever, chills, weakness, weight gain  CARDIOVASCULAR: Denies: chest pain, palpitations, dyspnea on exertion, orthopnea, edema   RESPIRATORY: Denies: cough, wheezing  ENDOCRINE: Denies: polydipsia/polyuria, palpitations, skin changes, temperature intolerance  GI: Denies: abdominal pain, nausea, vomiting, diarrhea, constipation, blood in stool  : Denies: dysuria, frequency/urgency, pelvic pain  NEURO: Denies: dizzy/vertigo, headache, focal weakness, speech problems, confusion  MUSCULOSKELETAL: swelling Denies: joint stiffness, joint swelling  PSYCH: Denies: anxiety, suicidal ideation    Physical Exam  Visit Vitals    /79    Pulse 94    Temp 98.3 °F (36.8 °C)    Resp 20    Ht 5' (1.524 m)    Wt 299 lb (135.6 kg)    LMP 12/31/2012    SpO2 95%    BMI 58.39 kg/m2       GEN: No apparent distress. Alert and oriented and responds to all questions appropriately. Morbidly Obese. Frisian speaker. EYES: Conjunctiva clear; pupils round and reactive to light; extraocular movements are intact. EAR: External ears are normal.   NOSE: Turbinates are within normal limits.  No drainage  OROPHYARYNX: No oral lesions or exudates. NECK: Supple; no masses; thyroid normal. Large neck circumference. LUNGS: Respirations unlabored; clear to auscultation bilaterally  CARDIOVASCULAR: Regular, rate, and rhythm without murmurs, gallops or rubs   ABDOMEN: Soft; nontender; nondistended; normoactive bowel sounds  NEUROLOGIC: No focal neurologic deficits. Strength and sensation grossly intact. Coordination and gait grossly intact. EXT: 2+ non-pitting edema in lower ext extending to thighs. Non-pitting edema in BUE extending to shoulder. Negative homen  SKIN: No obvious rashes or erythema or hives. Edema present without signs of infection. Darkened area measuring 2cmx1 cm on the medial left leg, area is mildly tender, induration  no erythema. Assessment/Plan    ICD-10-CM ICD-9-CM    1. Phlebitis I80.9 451.9    2. Venous insufficiency of both lower extremities N36.6 640.24 METABOLIC PANEL, COMPREHENSIVE      REFERRAL TO LYMPHEDEMA CLINIC      furosemide (LASIX) 20 mg tablet   3. Morbid obesity with BMI of 50.0-59.9, adult ([facility]) E66.01 278.01 REFERRAL TO WEIGHT LOSS    Z68.43 V85.43    4. Tobacco abuse Z72.0 305.1    5. TIMOTHY on CPAP G47.33 327.23     Z99.89 V46.8    6. Prediabetes R73.03 790.29 HEMOGLOBIN A1C WITH EAG   7. Essential hypertension T62 925.0 METABOLIC PANEL, COMPREHENSIVE      hydrALAZINE (APRESOLINE) 10 mg tablet      metoprolol tartrate (LOPRESSOR) 50 mg tablet   8. Type 2 diabetes mellitus with complication, without long-term current use of insulin ([facility]) E11.8 250.90 metFORMIN (GLUCOPHAGE) 1,000 mg tablet     Will treat superficial phlebitis supportively. Precautions given. Need for US if not impoved. Pt asked to wear stockings, she agreed. Pt to return to clinic next week. Edema: previous testing unremarkable. Pt continues to gain weight. Will refer to weight loss clinic. Offered lymphedema clinic but pt refused.      Ordered labs as noted above    Medication refilled     The patient was counseled on the dangers of tobacco use, and was advised to quit. Reviewed strategies to maximize success, including removing cigarettes and smoking materials from environment, stress management and written materials. I have reviewed/discussed the above normal BMI with the patient. I have recommended the following interventions: dietary management education, guidance, and counseling, encourage exercise and monitor weight . [facility] Follow-up Disposition:  Return in about 1 month (around 7/22/2018). I have discussed the diagnosis with the patient and the intended plan as seen in the above orders. Patient verbalized understanding of the plan and agrees with the plan. The patient has received an after-visit summary and questions were answered concerning future plans. I have discussed medication side effects and warnings with the patient as well. Informed patient to return to the office if new symptoms arise.         Carroll López DO  Family Medicine Resident

## 2018-06-22 NOTE — PATIENT INSTRUCTIONS
Varicose Veins: Care Instructions  Your Care Instructions  Varicose veins are twisted, enlarged veins near the surface of the skin. They develop most often in the legs and ankles. Some people may be more likely than others to get varicose veins because of aging or hormone changes or because a parent has them. Being overweight or pregnant can make varicose veins worse. Jobs that require standing for long periods of time also can make them worse. Follow-up care is a key part of your treatment and safety. Be sure to make and go to all appointments, and call your doctor if you are having problems. It's also a good idea to know your test results and keep a list of the medicines you take. How can you care for yourself at home? · Wear compression stockings during the day to help relieve symptoms. They improve blood flow and are the main treatment for varicose veins. Talk to your doctor about which ones to get and where to get them. · Prop up your legs at or above the level of your heart when possible. This helps keep the blood from pooling in your lower legs and improves blood flow to the rest of your body. · Avoid sitting and standing for long periods. This puts added stress on your veins. · Get regular exercise, and control your weight. Walk, bicycle, or swim to improve blood flow in your legs. · If you bump your leg so hard that you know it is likely to bruise, prop up your leg and put ice or a cold pack on the area for 10 to 20 minutes at a time. Try to do this every 1 to 2 hours for the next 3 days (when you are awake) or until the swelling goes down. Put a thin cloth between the ice and your skin. · If you cut or scratch the skin over a vein, it may bleed a lot. Prop up your leg and apply firm pressure with a clean bandage over the site of the bleeding. Continue to apply pressure for a full 15 minutes. Do not check sooner to see if the bleeding has stopped.  If the bleeding has not stopped after 15 minutes, apply pressure again for another 15 minutes. You can repeat this up to 3 times for a total of 45 minutes. If you have a blood clot in a varicose vein, you may have tenderness and swelling over the vein. The vein may feel firm. Be sure to call your doctor right away if you have these symptoms. If your doctor has told you how to care for the clot, follow his or her instructions. Care may include the following:  · Prop up your leg and apply heat with a warm, damp cloth or a heating pad set on low (put a towel or cloth between your leg and the heating pad to prevent burns). · Ask your doctor if you can take an over-the-counter pain medicine, such as acetaminophen (Tylenol), ibuprofen (Advil, Motrin), or naproxen (Aleve). Be safe with medicines. Read and follow all instructions on the label. When should you call for help? Call 911 anytime you think you may need emergency care. For example, call if:  ? · You have sudden chest pain and shortness of breath, or you cough up blood. ?Call your doctor now or seek immediate medical care if:  ? · You have signs of a blood clot, such as:  ¨ Pain in your calf, back of the knee, thigh, or groin. ¨ Redness and swelling in your leg or groin. ? · A varicose vein begins to bleed and you cannot stop it. ? · You have a tender lump in your leg. ? · You get an open sore. ? Watch closely for changes in your health, and be sure to contact your doctor if:  ? · Your varicose vein symptoms do not improve with home treatment. Where can you learn more? Go to http://shima-alyssa.info/. Enter N196 in the search box to learn more about \"Varicose Veins: Care Instructions. \"  Current as of: March 20, 2017  Content Version: 11.4  © 0638-2144 [employer]. Care instructions adapted under license by [employer] (which disclaims liability or warranty for this information).  If you have questions about a medical condition or this instruction, always ask your healthcare professional. [place] [address] any warranty or liability for your use of this information.

## 2018-06-22 NOTE — PROGRESS NOTES
Chief Complaint   Patient presents with    Skin Problem     Pt is being seen due to skin issue on left foot X 3 wks; painful

## 2018-06-22 NOTE — MR AVS SNAPSHOT
[address] 
[phone] Patient: Nat Weller MRN: [id number] :1965 Visit Information Date & Time Provider Department Dept. Phone Encounter #  
 2018  3:00 PM Kory Cortez DO  [address] [facility] [phone] [id number] Upcoming Health Maintenance Date Due  
 BREAST CANCER SCRN MAMMOGRAM 10/10/2017 FOBT Q 1 YEAR AGE 50-75 10/10/2017 Influenza Age 5 to Adult 2018 PAP AKA CERVICAL CYTOLOGY 3/4/2020 DTaP/Tdap/Td series (2 - Td) 2023 Allergies as of 2018  Review Complete On: 2018 By: Kory Cortez DO Severity Noted Reaction Type Reactions Hydrochlorothiazide High 2017    Anaphylaxis, Unproven on Challenge Losartan High 2017    Anaphylaxis, Unproven on Challenge Sulfa (Sulfonamide Antibiotics) High 2017    Anaphylaxis When she was on Losartan (2017), and then again when she was on HCTZ (2017). She was also on lasix since . Eggplant Medium 2017   Intolerance Other (comments) Severe stomach pain Lasix [Furosemide]  2017    Unproven on Challenge May lead to anaphylaxis Current Immunizations  Reviewed on 3/4/2017 Name Date Influenza Vaccine 10/22/2012 Pneumococcal Conjugate (PCV-7) 2013 Pneumococcal Polysaccharide (PPSV-23) 3/4/2017 Tdap 2013 Not reviewed this visit You Were Diagnosed With   
  
 Codes Comments Venous insufficiency of both lower extremities    -  Primary ICD-10-CM: I87.2 ICD-9-CM: 459.81 Morbid obesity with BMI of 50.0-59.9, adult (HCC)     ICD-10-CM: E66.01, Z68.43 
ICD-9-CM: 278.01, V85.43 Tobacco abuse     ICD-10-CM: Z72.0 ICD-9-CM: 305.1 TIMOTHY on CPAP     ICD-10-CM: G47.33, Z99.89 ICD-9-CM: 327.23, V46.8 Prediabetes     ICD-10-CM: R73.03 
ICD-9-CM: 790.29 Essential hypertension     ICD-10-CM: I10 
ICD-9-CM: 401.9 Vitals BP Pulse Temp Resp Height(growth percentile) Weight(growth percentile) 143/79 94 98.3 °F (36.8 °C) 20 5' (1.524 m) 299 lb (135.6 kg) LMP SpO2 BMI OB Status Smoking Status 12/31/2012 95% 58.39 kg/m2 Postmenopausal Current Every Day Smoker BMI and BSA Data Body Mass Index Body Surface Area  
 58.39 kg/m 2 2.4 m 2 [facility] [address], [address] [phone] Your Updated Medication List  
  
   
This list is accurate as of 6/22/18  4:04 PM.  Always use your most recent med list.  
  
  
  
  
 albuterol 90 mcg/actuation inhaler Commonly known as:  PROVENTIL HFA, VENTOLIN HFA, PROAIR HFA Take 2 Puffs by inhalation every four (4) hours as needed for Wheezing. cyclobenzaprine 10 mg tablet Commonly known as:  FLEXERIL Take 0.5 Tabs by mouth three (3) times daily as needed for Muscle Spasm(s). EPINEPHrine 0.3 mg/0.3 mL injection Commonly known as:  ADRENACLICK  
0.3 mL by IntraMUSCular route once as needed for up to 1 dose.  
  
 famotidine 20 mg tablet Commonly known as:  PEPCID Take 1 Tab by mouth two (2) times a day. guaiFENesin  mg ER tablet Commonly known as:  Rivas & Rivas Take 1 Tab by mouth every twelve (12) hours. hydrALAZINE 10 mg tablet Commonly known as:  APRESOLINE Take 1 Tab by mouth three (3) times daily. ibuprofen 200 mg tablet Commonly known as:  MOTRIN Take 200 mg by mouth every eight (8) hours as needed for Pain.  
  
 metFORMIN 500 mg tablet Commonly known as:  GLUCOPHAGE Take 1 Tab by mouth two (2) times daily (with meals). metoprolol tartrate 50 mg tablet Commonly known as:  LOPRESSOR Take 1 Tab by mouth two (2) times a day. spironolactone 50 mg tablet Commonly known as:  ALDACTONE  
 Take 1 Tab by mouth daily. We Performed the Following HEMOGLOBIN A1C WITH EAG [59953 CPT(R)] METABOLIC PANEL, COMPREHENSIVE [70903 CPT(R)] REFERRAL TO LYMPHEDEMA CLINIC [TNJ291 Custom] Comments:  
 extremity edema REFERRAL TO WEIGHT LOSS [ATC704 Custom] Comments:  
 Address: [address] [place] [address] Phone: [phone] Referral Information Referral ID Referred By Referred To  
  
 [id number] Loc Her MD   
   [address] [place] [address] Phone: [phone] Fax: [phone] Visits Status Start Date End Date 1 New Request 6/22/18 6/22/19 If your referral has a status of pending review or denied, additional information will be sent to support the outcome of this decision. Referral ID Referred By Referred To  
 [id number] Omar Drivers Not Available Visits Status Start Date End Date 1 New Request 6/22/18 6/22/19 If your referral has a status of pending review or denied, additional information will be sent to support the outcome of this decision. Patient Instructions Varicose Veins: Care Instructions Your Care Instructions Varicose veins are twisted, enlarged veins near the surface of the skin. They develop most often in the legs and ankles. Some people may be more likely than others to get varicose veins because of aging or hormone changes or because a parent has them. Being overweight or pregnant can make varicose veins worse. Jobs that require standing for long periods of time also can make them worse. Follow-up care is a key part of your treatment and safety. Be sure to make and go to all appointments, and call your doctor if you are having problems. It's also a good idea to know your test results and keep a list of the medicines you take. How can you care for yourself at home? · Wear compression stockings during the day to help relieve symptoms. They improve blood flow and are the main treatment for varicose veins. Talk to your doctor about which ones to get and where to get them. · Prop up your legs at or above the level of your heart when possible. This helps keep the blood from pooling in your lower legs and improves blood flow to the rest of your body. · Avoid sitting and standing for long periods. This puts added stress on your veins. · Get regular exercise, and control your weight. Walk, bicycle, or swim to improve blood flow in your legs. · If you bump your leg so hard that you know it is likely to bruise, prop up your leg and put ice or a cold pack on the area for 10 to 20 minutes at a time. Try to do this every 1 to 2 hours for the next 3 days (when you are awake) or until the swelling goes down. Put a thin cloth between the ice and your skin. · If you cut or scratch the skin over a vein, it may bleed a lot. Prop up your leg and apply firm pressure with a clean bandage over the site of the bleeding. Continue to apply pressure for a full 15 minutes. Do not check sooner to see if the bleeding has stopped. If the bleeding has not stopped after 15 minutes, apply pressure again for another 15 minutes. You can repeat this up to 3 times for a total of 45 minutes. If you have a blood clot in a varicose vein, you may have tenderness and swelling over the vein. The vein may feel firm. Be sure to call your doctor right away if you have these symptoms. If your doctor has told you how to care for the clot, follow his or her instructions. Care may include the following: · Prop up your leg and apply heat with a warm, damp cloth or a heating pad set on low (put a towel or cloth between your leg and the heating pad to prevent burns).  
· Ask your doctor if you can take an over-the-counter pain medicine, such as acetaminophen (Tylenol), ibuprofen (Advil, Motrin), or naproxen (Aleve). Be safe with medicines. Read and follow all instructions on the label. When should you call for help? Call 911 anytime you think you may need emergency care. For example, call if: 
? · You have sudden chest pain and shortness of breath, or you cough up blood. ?Call your doctor now or seek immediate medical care if: 
? · You have signs of a blood clot, such as: 
¨ Pain in your calf, back of the knee, thigh, or groin. ¨ Redness and swelling in your leg or groin. ? · A varicose vein begins to bleed and you cannot stop it. ? · You have a tender lump in your leg. ? · You get an open sore. ? Watch closely for changes in your health, and be sure to contact your doctor if: 
? · Your varicose vein symptoms do not improve with home treatment. Where can you learn more? Go to http://shima-alyssa.info/. Enter S931 in the search box to learn more about \"Varicose Veins: Care Instructions. \" Current as of: March 20, 2017 Content Version: 11.4 © 1407-1478 [employer]ista. Care instructions adapted under license by [employer] (which disclaims liability or warranty for this information). If you have questions about a medical condition or this instruction, always ask your healthcare professional. [address] any warranty or liability for your use of this information. Introducing [facility]! [facility] introduces C2cube patient portal. Now you can access parts of your medical record, email your doctor's office, and request medication refills online. 1. In your internet browser, go to [email or web address] 2. Click on the First Time User? Click Here link in the Sign In box. You will see the New Member Sign Up page. 3. Enter your C2cube Access Code exactly as it appears below. You will not need to use this code after youve completed the sign-up process.  If you do not sign up before the expiration date, you must request a new code. · Movatu Access Code: [email or web address] Expires: 6/25/2018  5:34 AM 
 
4. Enter the last four digits of your Social Security Number (xxxx) and Date of Birth (mm/dd/yyyy) as indicated and click Submit. You will be taken to the next sign-up page. 5. Create a Movatu ID. This will be your Movatu login ID and cannot be changed, so think of one that is secure and easy to remember. 6. Create a Movatu password. You can change your password at any time. 7. Enter your Password Reset Question and Answer. This can be used at a later time if you forget your password. 8. Enter your e-mail address. You will receive e-mail notification when new information is available in [address] 9. Click Sign Up. You can now view and download portions of your medical record. 10. Click the Download Summary menu link to download a portable copy of your medical information. If you have questions, please visit the Frequently Asked Questions section of the Movatu website. Remember, Movatu is NOT to be used for urgent needs. For medical emergencies, dial 911. Now available from your iPhone and Android! Please provide this summary of care documentation to your next provider. Your primary care clinician is listed as Katrin Sanders. If you have any questions after today's visit, please call [phone].

## 2018-06-23 LAB
ALBUMIN SERPL-MCNC: 4.2 G/DL (ref 3.5–5.5)
ALBUMIN/GLOB SERPL: 1.4 {RATIO} (ref 1.2–2.2)
ALP SERPL-CCNC: 66 IU/L (ref 39–117)
ALT SERPL-CCNC: 14 IU/L (ref 0–32)
AST SERPL-CCNC: 14 IU/L (ref 0–40)
BILIRUB SERPL-MCNC: 0.7 MG/DL (ref 0–1.2)
BUN SERPL-MCNC: 11 MG/DL (ref 6–24)
BUN/CREAT SERPL: 19 (ref 9–23)
CALCIUM SERPL-MCNC: 9.8 MG/DL (ref 8.7–10.2)
CHLORIDE SERPL-SCNC: 105 MMOL/L (ref 96–106)
CO2 SERPL-SCNC: 25 MMOL/L (ref 20–29)
CREAT SERPL-MCNC: 0.58 MG/DL (ref 0.57–1)
EST. AVERAGE GLUCOSE BLD GHB EST-MCNC: 128 MG/DL
GFR SERPLBLD CREATININE-BSD FMLA CKD-EPI: 108 ML/MIN/1.73
GFR SERPLBLD CREATININE-BSD FMLA CKD-EPI: 124 ML/MIN/1.73
GLOBULIN SER CALC-MCNC: 2.9 G/DL (ref 1.5–4.5)
GLUCOSE SERPL-MCNC: 103 MG/DL (ref 65–99)
HBA1C MFR BLD: 6.1 % (ref 4.8–5.6)
POTASSIUM SERPL-SCNC: 4.4 MMOL/L (ref 3.5–5.2)
PROT SERPL-MCNC: 7.1 G/DL (ref 6–8.5)
SODIUM SERPL-SCNC: 145 MMOL/L (ref 134–144)

## 2018-08-17 ENCOUNTER — HOSPITAL ENCOUNTER (EMERGENCY)
Age: 53
Discharge: HOME | End: 2018-08-17
Attending: EMERGENCY MEDICINE
Payer: SUBSIDIZED

## 2018-08-17 ENCOUNTER — APPOINTMENT (OUTPATIENT)
Dept: GENERAL RADIOLOGY | Age: 53
End: 2018-08-17
Attending: EMERGENCY MEDICINE
Payer: SUBSIDIZED

## 2018-08-17 VITALS
WEIGHT: 293 LBS | DIASTOLIC BLOOD PRESSURE: 114 MMHG | HEART RATE: 78 BPM | TEMPERATURE: 98.1 F | OXYGEN SATURATION: 90 % | HEIGHT: 61 IN | BODY MASS INDEX: 55.32 KG/M2 | SYSTOLIC BLOOD PRESSURE: 191 MMHG | RESPIRATION RATE: 35 BRPM

## 2018-08-17 DIAGNOSIS — M79.89 LEG SWELLING: ICD-10-CM

## 2018-08-17 DIAGNOSIS — T78.3XXA ANGIOEDEMA, INITIAL ENCOUNTER: Primary | ICD-10-CM

## 2018-08-17 DIAGNOSIS — I10 ESSENTIAL HYPERTENSION: ICD-10-CM

## 2018-08-17 LAB
ALBUMIN SERPL-MCNC: 3.2 G/DL (ref 3.5–5)
ALBUMIN/GLOB SERPL: 0.8 {RATIO} (ref 1.1–2.2)
ALP SERPL-CCNC: 73 U/L (ref 45–117)
ALT SERPL-CCNC: 22 U/L (ref 12–78)
ANION GAP SERPL CALC-SCNC: 8 MMOL/L (ref 5–15)
AST SERPL-CCNC: 17 U/L (ref 15–37)
BASOPHILS # BLD: 0 K/UL (ref 0–0.1)
BASOPHILS NFR BLD: 0 % (ref 0–1)
BILIRUB SERPL-MCNC: 1.1 MG/DL (ref 0.2–1)
BNP SERPL-MCNC: 312 PG/ML (ref 0–125)
BUN SERPL-MCNC: 13 MG/DL (ref 6–20)
BUN/CREAT SERPL: 20 (ref 12–20)
CALCIUM SERPL-MCNC: 9 MG/DL (ref 8.5–10.1)
CHLORIDE SERPL-SCNC: 105 MMOL/L (ref 97–108)
CO2 SERPL-SCNC: 28 MMOL/L (ref 21–32)
COMMENT, HOLDF: NORMAL
CREAT SERPL-MCNC: 0.65 MG/DL (ref 0.55–1.02)
DIFFERENTIAL METHOD BLD: ABNORMAL
EOSINOPHIL # BLD: 0.4 K/UL (ref 0–0.4)
EOSINOPHIL NFR BLD: 4 % (ref 0–7)
ERYTHROCYTE [DISTWIDTH] IN BLOOD BY AUTOMATED COUNT: 14.6 % (ref 11.5–14.5)
GLOBULIN SER CALC-MCNC: 4.1 G/DL (ref 2–4)
GLUCOSE SERPL-MCNC: 129 MG/DL (ref 65–100)
HCT VFR BLD AUTO: 42.9 % (ref 35–47)
HGB BLD-MCNC: 14.1 G/DL (ref 11.5–16)
IMM GRANULOCYTES # BLD: 0.1 K/UL (ref 0–0.04)
IMM GRANULOCYTES NFR BLD AUTO: 1 % (ref 0–0.5)
LYMPHOCYTES # BLD: 2.8 K/UL (ref 0.8–3.5)
LYMPHOCYTES NFR BLD: 28 % (ref 12–49)
MCH RBC QN AUTO: 29.3 PG (ref 26–34)
MCHC RBC AUTO-ENTMCNC: 32.9 G/DL (ref 30–36.5)
MCV RBC AUTO: 89 FL (ref 80–99)
MONOCYTES # BLD: 0.7 K/UL (ref 0–1)
MONOCYTES NFR BLD: 7 % (ref 5–13)
NEUTS SEG # BLD: 6.1 K/UL (ref 1.8–8)
NEUTS SEG NFR BLD: 60 % (ref 32–75)
NRBC # BLD: 0 K/UL (ref 0–0.01)
NRBC BLD-RTO: 0 PER 100 WBC
PLATELET # BLD AUTO: 210 K/UL (ref 150–400)
PMV BLD AUTO: 10.3 FL (ref 8.9–12.9)
POTASSIUM SERPL-SCNC: 3.7 MMOL/L (ref 3.5–5.1)
PROT SERPL-MCNC: 7.3 G/DL (ref 6.4–8.2)
RBC # BLD AUTO: 4.82 M/UL (ref 3.8–5.2)
SAMPLES BEING HELD,HOLD: NORMAL
SODIUM SERPL-SCNC: 141 MMOL/L (ref 136–145)
TROPONIN I SERPL-MCNC: <0.05 NG/ML
WBC # BLD AUTO: 10.1 K/UL (ref 3.6–11)

## 2018-08-17 PROCEDURE — 99285 EMERGENCY DEPT VISIT HI MDM: CPT

## 2018-08-17 PROCEDURE — 93005 ELECTROCARDIOGRAM TRACING: CPT

## 2018-08-17 PROCEDURE — 36415 COLL VENOUS BLD VENIPUNCTURE: CPT | Performed by: EMERGENCY MEDICINE

## 2018-08-17 PROCEDURE — 96375 TX/PRO/DX INJ NEW DRUG ADDON: CPT

## 2018-08-17 PROCEDURE — 74011000250 HC RX REV CODE- 250: Performed by: PHYSICIAN ASSISTANT

## 2018-08-17 PROCEDURE — 96374 THER/PROPH/DIAG INJ IV PUSH: CPT

## 2018-08-17 PROCEDURE — 80053 COMPREHEN METABOLIC PANEL: CPT | Performed by: EMERGENCY MEDICINE

## 2018-08-17 PROCEDURE — 96361 HYDRATE IV INFUSION ADD-ON: CPT

## 2018-08-17 PROCEDURE — 84484 ASSAY OF TROPONIN QUANT: CPT | Performed by: EMERGENCY MEDICINE

## 2018-08-17 PROCEDURE — 94762 N-INVAS EAR/PLS OXIMTRY CONT: CPT

## 2018-08-17 PROCEDURE — 74011250637 HC RX REV CODE- 250/637: Performed by: PHYSICIAN ASSISTANT

## 2018-08-17 PROCEDURE — 85025 COMPLETE CBC W/AUTO DIFF WBC: CPT | Performed by: EMERGENCY MEDICINE

## 2018-08-17 PROCEDURE — 83880 ASSAY OF NATRIURETIC PEPTIDE: CPT | Performed by: EMERGENCY MEDICINE

## 2018-08-17 PROCEDURE — 71046 X-RAY EXAM CHEST 2 VIEWS: CPT

## 2018-08-17 PROCEDURE — 74011250636 HC RX REV CODE- 250/636: Performed by: PHYSICIAN ASSISTANT

## 2018-08-17 RX ORDER — METOPROLOL TARTRATE 50 MG/1
50 TABLET ORAL
Status: COMPLETED | OUTPATIENT
Start: 2018-08-17 | End: 2018-08-17

## 2018-08-17 RX ORDER — HYDRALAZINE HYDROCHLORIDE 10 MG/1
10 TABLET, FILM COATED ORAL
Status: COMPLETED | OUTPATIENT
Start: 2018-08-17 | End: 2018-08-17

## 2018-08-17 RX ORDER — DIPHENHYDRAMINE HYDROCHLORIDE 50 MG/ML
50 INJECTION, SOLUTION INTRAMUSCULAR; INTRAVENOUS
Status: COMPLETED | OUTPATIENT
Start: 2018-08-17 | End: 2018-08-17

## 2018-08-17 RX ADMIN — HYDRALAZINE HYDROCHLORIDE 10 MG: 10 TABLET, FILM COATED ORAL at 13:14

## 2018-08-17 RX ADMIN — DIPHENHYDRAMINE HYDROCHLORIDE 50 MG: 50 INJECTION, SOLUTION INTRAMUSCULAR; INTRAVENOUS at 12:03

## 2018-08-17 RX ADMIN — METOPROLOL TARTRATE 50 MG: 50 TABLET ORAL at 13:14

## 2018-08-17 RX ADMIN — SODIUM CHLORIDE 20 MG: 9 INJECTION INTRAMUSCULAR; INTRAVENOUS; SUBCUTANEOUS at 12:07

## 2018-08-17 RX ADMIN — SODIUM CHLORIDE 1000 ML: 900 INJECTION, SOLUTION INTRAVENOUS at 12:17

## 2018-08-17 RX ADMIN — METHYLPREDNISOLONE SODIUM SUCCINATE 125 MG: 125 INJECTION, POWDER, FOR SOLUTION INTRAMUSCULAR; INTRAVENOUS at 12:10

## 2018-08-17 NOTE — ED PROVIDER NOTES
HPI Comments: 50 y.o. female with past medical history significant for HA, HTN, GERD, lung nodule, who presents from home with chief complaint of face swelling. Pt has 1 day onset of right sided facial swelling that began yesterday night with unknown origin of stimulus. Following, pt proceeded to take benadryl at 9:00 PM, but did not observe any significant improvement. Pt cites hx of previous episode that similarly didn't resolve with medication, and resulted in an intubation procedure after seeking ED evaluation. Accompanying sx include chills, right sided eyelid swelling, upper lip swelling, throat swelling, and throat tingling. Denies CP, abd pain, n/v/d, and dysuria. There are no other acute medical concerns at this time. PCP: Efrain Keene DO    Chart Review: Pt admitted from 17-17 and was treated for anaphylaxis. Allergic reaction was symptomatic for hives, angio edema, and throat tightness. Pt d/c w/ Epipen. Note written by Erik Murry, as dictated by CHRISTA Valera 11:33 AM        The history is provided by the patient. A  was used (daughter).         Past Medical History:   Diagnosis Date    Anxiety and depression     Flu     2 wks ago    GERD (gastroesophageal reflux disease)     Headache(784.0)     Hypertension     Incidental lung nodule, > 3mm and < 8mm 2016    left lung nodule in smoker, needs follow up CT in Oct 2016        Past Surgical History:   Procedure Laterality Date    APPENDECTOMY      HX APPENDECTOMY      HX  SECTION      X 3 Births    HX  SECTION      HX CHOLECYSTECTOMY      HX WRIST FRACTURE TX      metal    REMOVAL GALLBLADDER           Family History:   Problem Relation Age of Onset    Diabetes Mother     Hypertension Brother     Hypertension Brother     Hypertension Brother        Social History     Social History    Marital status:      Spouse name: N/A    Number of children: N/A    Years of education: N/A     Occupational History    Not on file. Social History Main Topics    Smoking status: Current Every Day Smoker     Packs/day: 0.50     Years: 25.00     Types: Cigarettes    Smokeless tobacco: Never Used    Alcohol use No    Drug use: No    Sexual activity: Yes     Partners: Male     Other Topics Concern    Not on file     Social History Narrative    ** Merged History Encounter **         ** Merged History Encounter **              ALLERGIES: Hydrochlorothiazide; Losartan; Sulfa (sulfonamide antibiotics); Eggplant; and Lasix [furosemide]    Review of Systems   Constitutional: Positive for chills. Negative for appetite change, fatigue and fever. HENT: Positive for facial swelling. Negative for congestion, ear pain, postnasal drip, rhinorrhea, sneezing and sore throat. Eyes: Negative for redness and visual disturbance. Respiratory: Negative for cough, shortness of breath and wheezing. Cardiovascular: Negative for chest pain, palpitations and leg swelling. Gastrointestinal: Negative for abdominal pain, anal bleeding, constipation, diarrhea, nausea and vomiting. Genitourinary: Negative for difficulty urinating, dysuria, frequency and hematuria. Musculoskeletal: Negative for arthralgias, back pain, myalgias and neck stiffness. Skin: Negative for rash. Allergic/Immunologic: Negative for immunocompromised state. Neurological: Negative for dizziness, syncope, weakness, light-headedness and headaches. Hematological: Negative for adenopathy. Vitals:    08/17/18 1059 08/17/18 1154 08/17/18 1314   BP: (!) 201/100 155/88 (!) 180/103   Pulse: 79  78   Resp: 16     Temp: 98.1 °F (36.7 °C)     SpO2: 94% 93%    Weight: 135.6 kg (299 lb)     Height: 5' 1\" (1.549 m)              Physical Exam   Constitutional: She is oriented to person, place, and time. She appears well-developed and well-nourished. No distress. HENT:   Head: Atraumatic. Macrocephalic.    Right Ear: External ear normal.   Left Ear: External ear normal.   Nose: Nose normal.   Mouth/Throat: Oropharynx is clear and moist. No oropharyngeal exudate. Left facial swelling with periorbital swelling. No glossopharyngeal swelling airway patent. Speaks in complete sentences. Eyes: EOM are normal. Pupils are equal, round, and reactive to light. Neck: Neck supple. Cardiovascular: Normal rate, regular rhythm, normal heart sounds and intact distal pulses. Exam reveals no gallop and no friction rub. No murmur heard. Pulmonary/Chest: Effort normal and breath sounds normal. No stridor. No respiratory distress. She has no wheezes. She has no rales. She exhibits no tenderness. Abdominal: Soft. Bowel sounds are normal. She exhibits no distension and no mass. There is no tenderness. There is no rebound and no guarding. Musculoskeletal: Normal range of motion. She exhibits no edema, tenderness or deformity. Neurological: She is alert and oriented to person, place, and time. No cranial nerve deficit. Coordination normal.   Skin: No rash noted. No erythema. No pallor. Psychiatric: She has a normal mood and affect. Her behavior is normal.   Nursing note and vitals reviewed. MDM  Number of Diagnoses or Management Options     Amount and/or Complexity of Data Reviewed  Clinical lab tests: ordered and reviewed  Tests in the radiology section of CPT®: ordered and reviewed  Tests in the medicine section of CPT®: reviewed and ordered  Obtain history from someone other than the patient: yes (family)  Review and summarize past medical records: yes  Independent visualization of images, tracings, or specimens: yes    Patient Progress  Patient progress: stable        ED Course       Procedures  11:17 AM  Discussed pt, sx, hx and current findings with Carlos Gómez MD. He is in agreement with plan and will see pt. Cardiac labs ordered and allergy medication to be given  Jessika Le      ED EKG interpretation:11:31 AM  Rhythm: normal sinus rhythm; and regular . Rate (approx.): 77; Axis: normal; P wave: normal; QRS interval: normal ; ST/T wave: normal; Other findings: normal. This EKG was interpreted by Kathryn Johnson MD,ED Provider. PROGRESS NOTE:  1:13 PM  Pt did not note leg edema to this provider, but when questioned, pt states she has improvement with leg edema. Pt missed her fluid and BP medications today; encouraged her to take them after being discharged. Pt will get bp meds in ER and be discharged. Her facial swelling has improved. Pt to get a copy of her referral from fam med to allergy  Dub Tessa Ron PA-C    LABORATORY TESTS:  Recent Results (from the past 12 hour(s))   CBC WITH AUTOMATED DIFF    Collection Time: 08/17/18 11:09 AM   Result Value Ref Range    WBC 10.1 3.6 - 11.0 K/uL    RBC 4.82 3.80 - 5.20 M/uL    HGB 14.1 11.5 - 16.0 g/dL    HCT 42.9 35.0 - 47.0 %    MCV 89.0 80.0 - 99.0 FL    MCH 29.3 26.0 - 34.0 PG    MCHC 32.9 30.0 - 36.5 g/dL    RDW 14.6 (H) 11.5 - 14.5 %    PLATELET 186 526 - 166 K/uL    MPV 10.3 8.9 - 12.9 FL    NRBC 0.0 0  WBC    ABSOLUTE NRBC 0.00 0.00 - 0.01 K/uL    NEUTROPHILS 60 32 - 75 %    LYMPHOCYTES 28 12 - 49 %    MONOCYTES 7 5 - 13 %    EOSINOPHILS 4 0 - 7 %    BASOPHILS 0 0 - 1 %    IMMATURE GRANULOCYTES 1 (H) 0.0 - 0.5 %    ABS. NEUTROPHILS 6.1 1.8 - 8.0 K/UL    ABS. LYMPHOCYTES 2.8 0.8 - 3.5 K/UL    ABS. MONOCYTES 0.7 0.0 - 1.0 K/UL    ABS. EOSINOPHILS 0.4 0.0 - 0.4 K/UL    ABS. BASOPHILS 0.0 0.0 - 0.1 K/UL    ABS. IMM.  GRANS. 0.1 (H) 0.00 - 0.04 K/UL    DF AUTOMATED     METABOLIC PANEL, COMPREHENSIVE    Collection Time: 08/17/18 11:09 AM   Result Value Ref Range    Sodium 141 136 - 145 mmol/L    Potassium 3.7 3.5 - 5.1 mmol/L    Chloride 105 97 - 108 mmol/L    CO2 28 21 - 32 mmol/L    Anion gap 8 5 - 15 mmol/L    Glucose 129 (H) 65 - 100 mg/dL    BUN 13 6 - 20 MG/DL    Creatinine 0.65 0.55 - 1.02 MG/DL    BUN/Creatinine ratio 20 12 - 20 GFR est AA >60 >60 ml/min/1.73m2    GFR est non-AA >60 >60 ml/min/1.73m2    Calcium 9.0 8.5 - 10.1 MG/DL    Bilirubin, total 1.1 (H) 0.2 - 1.0 MG/DL    ALT (SGPT) 22 12 - 78 U/L    AST (SGOT) 17 15 - 37 U/L    Alk. phosphatase 73 45 - 117 U/L    Protein, total 7.3 6.4 - 8.2 g/dL    Albumin 3.2 (L) 3.5 - 5.0 g/dL    Globulin 4.1 (H) 2.0 - 4.0 g/dL    A-G Ratio 0.8 (L) 1.1 - 2.2     NT-PRO BNP    Collection Time: 08/17/18 11:09 AM   Result Value Ref Range    NT pro- (H) 0 - 125 PG/ML   TROPONIN I    Collection Time: 08/17/18 11:09 AM   Result Value Ref Range    Troponin-I, Qt. <0.05 <0.05 ng/mL   SAMPLES BEING HELD    Collection Time: 08/17/18 11:13 AM   Result Value Ref Range    SAMPLES BEING HELD 1SST, 1RED, 1BLU     COMMENT        Add-on orders for these samples will be processed based on acceptable specimen integrity and analyte stability, which may vary by analyte. IMAGING RESULTS:    Xr Chest Pa Lat    Result Date: 8/17/2018  Chest PA and lateral History: Leg swelling. Hypertension. Comparison: 3/16/2018 Findings: The lungs are well expanded. No focal consolidation, pleural effusion, or pneumothorax. The heart is on the upper limits of normal for size. The visualized osseous structures are unremarkable. Impression: No acute cardiopulmonary process. MEDICATIONS GIVEN:  Medications   diphenhydrAMINE (BENADRYL) injection 50 mg (50 mg IntraVENous Given 8/17/18 1203)   methylPREDNISolone (PF) (SOLU-MEDROL) injection 125 mg (125 mg IntraVENous Given 8/17/18 1210)   sodium chloride 0.9 % bolus infusion 1,000 mL (1,000 mL IntraVENous New Bag 8/17/18 1217)   famotidine (PF) (PEPCID) 20 mg in sodium chloride 0.9% 10 mL injection (20 mg IntraVENous Given 8/17/18 1207)   hydrALAZINE (APRESOLINE) tablet 10 mg (10 mg Oral Given 8/17/18 1314)   metoprolol tartrate (LOPRESSOR) tablet 50 mg (50 mg Oral Given 8/17/18 1314)       IMPRESSION:  1. Angioedema, initial encounter    2. Leg swelling    3. Essential hypertension        PLAN:  1. Current Discharge Medication List      CONTINUE these medications which have NOT CHANGED    Details   hydrALAZINE (APRESOLINE) 10 mg tablet Take 1 Tab by mouth three (3) times daily. Qty: 90 Tab, Refills: 1    Associated Diagnoses: Essential hypertension      metoprolol tartrate (LOPRESSOR) 50 mg tablet Take 1 Tab by mouth two (2) times a day. Qty: 60 Tab, Refills: 1    Associated Diagnoses: Essential hypertension      metFORMIN (GLUCOPHAGE) 1,000 mg tablet Take 1 Tab by mouth daily (with breakfast). Qty: 30 Tab, Refills: 2    Associated Diagnoses: Type 2 diabetes mellitus with complication, without long-term current use of insulin (HCC)      albuterol (PROVENTIL HFA, VENTOLIN HFA, PROAIR HFA) 90 mcg/actuation inhaler Take 2 Puffs by inhalation every four (4) hours as needed for Wheezing. Qty: 1 Inhaler, Refills: 2    Associated Diagnoses: Acute bronchitis, unspecified organism; Tobacco use      furosemide (LASIX) 20 mg tablet Take 0.5 Tabs by mouth daily. Qty: 10 Tab, Refills: 0    Associated Diagnoses: Venous insufficiency of both lower extremities      cyclobenzaprine (FLEXERIL) 10 mg tablet Take 0.5 Tabs by mouth three (3) times daily as needed for Muscle Spasm(s). Qty: 30 Tab, Refills: 0      spironolactone (ALDACTONE) 50 mg tablet Take 1 Tab by mouth daily. Qty: 30 Tab, Refills: 1      guaiFENesin ER (MUCINEX) 600 mg ER tablet Take 1 Tab by mouth every twelve (12) hours. Qty: 30 Tab, Refills: 0      famotidine (PEPCID) 20 mg tablet Take 1 Tab by mouth two (2) times a day. Qty: 60 Tab, Refills: 1      EPINEPHrine (ADRENACLICK) 0.3 DM/7.8 mL injection 0.3 mL by IntraMUSCular route once as needed for up to 1 dose.   Qty: 1 Syringe, Refills: 1         STOP taking these medications       ibuprofen (MOTRIN) 200 mg tablet Comments:   Reason for Stoppin.   Follow-up Information     Follow up With Details Comments Contact Info    Antonio Samuel, DO Schedule an appointment as soon as possible for a visit 2-4 days for recheck  [address] [facility] [address]   [phone]      Sima Terry MD Schedule an appointment as soon as possible for a visit 2-4 days for recheck [address]  [phone]          Return to ED if worse       1:21 PM  Pt has been reexamined. Pt has no new complaints, changes or physical findings. Care plan outlined and precautions discussed. All available results were reviewed with pt. All medications were reviewed with pt. All of pt's questions and concerns were addressed. Pt agrees to F/U as instructed and agrees to return to ED upon further deterioration. Pt is ready to go home.   Larose Essex, PA

## 2018-08-17 NOTE — ED NOTES
51 yo female with facial swelling. Throat swollen feeling as not easy to swallow. She took benadryl at 9 pm last night. Has had similar reactions in past and the swelling normally goes away. It hasn't with this reaction. Did not take blood pressure medications today. Legs more swollen than normal.      11:00 AM  I have evaluated the patient as the Provider in Triage. I have reviewed Her vital signs and the triage nurse assessment. I have talked with the patient and any available family and advised that I am the provider in triage and have ordered the appropriate study to initiate their work up based on the clinical presentation during my assessment. I have advised that the patient will be accommodated in the Main ED as soon as possible. I have also requested to contact the triage nurse or myself immediately if the patient experiences any changes in their condition during this brief waiting period.   Kadi Leyva MD

## 2018-08-17 NOTE — ED TRIAGE NOTES
Pt c/o facial edema and difficulty swallowing since this AM.  Pt took nothing for symptoms. Pt here for same.  Pt did not take her HTN medication today

## 2018-08-17 NOTE — ED NOTES
Patient discharged by provider. Denies questions at this time. Leaves in a wheelchair escorted by staff, with family and paperwork.

## 2018-08-17 NOTE — DISCHARGE INSTRUCTIONS
Angioedema: Care Instructions  Your Care Instructions    Angioedema is an allergic reaction. It causes swelling and welts in the deep layers of the skin. Angioedema can sometimes occur along with hives. Hives are an allergic reaction in the outer layers of the skin. Angioedema can range from mild to severe. Painful welts can develop on the face. Angioedema can also occur on other parts of the body. In severe cases, the inside of the throat can swell and make it hard to breathe. Many things can cause this condition, including foods, insect bites, and medicines (such as aspirin and some blood pressure medicines). It also can run in families. Sometimes you may know what caused the reaction, but other times you may not know. Follow-up care is a key part of your treatment and safety. Be sure to make and go to all appointments, and call your doctor if you are having problems. It's also a good idea to know your test results and keep a list of the medicines you take. How can you care for yourself at home? · Take your medicines exactly as prescribed. Call your doctor if you think you are having a problem with your medicine. You will get more details on the specific medicines your doctor prescribes. Some medicines used to treat angioedema can make you too sleepy to drive safely. Do not drive if you take medicine that may make you sleepy. · Avoid foods or medicine that may have triggered the swelling. · For comfort:  ¨ Try taking a cool bath. Or place a cool, wet towel on the swollen area. ¨ Avoid hot baths and showers. ¨ Wear loose clothing. · Your doctor may prescribe a shot of epinephrine to carry with you in case you have a severe reaction. Learn how to give yourself the shot and keep it with you at all times. Make sure it has not . When should you call for help?   Give an epinephrine shot if:    · You think you are having a severe allergic reaction.    After giving an epinephrine shot call 911, even if you feel better.   Call 911 if:    · You have symptoms of a severe allergic reaction. These may include:  ¨ Sudden raised, red areas (hives) all over your body. ¨ Swelling of the throat, mouth, lips, or tongue. ¨ Trouble breathing. ¨ Passing out (losing consciousness). Or you may feel very lightheaded or suddenly feel weak, confused, or restless.     · You have been given an epinephrine shot, even if you feel better.    Call your doctor now or seek immediate medical care if:    · You have symptoms of an allergic reaction, such as:  ¨ A rash or hives (raised, red areas on the skin). ¨ Itching. ¨ Swelling. ¨ Belly pain, nausea, or vomiting.    Watch closely for changes in your health, and be sure to contact your doctor if:    · You do not get better as expected. Where can you learn more? Go to http://shima-alyssa.info/. Enter C219 in the search box to learn more about \"Angioedema: Care Instructions. \"  Current as of: October 6, 2017  Content Version: 11.7  © 7262-8681 [employer]. Care instructions adapted under license by [email or web address] (which disclaims liability or warranty for this information). If you have questions about a medical condition or this instruction, always ask your healthcare professional. [address] any warranty or liability for your use of this information. High Blood Pressure: Care Instructions  Your Care Instructions    If your blood pressure is usually above 130/80, you have high blood pressure, or hypertension. That means the top number is 130 or higher or the bottom number is 80 or higher, or both. Despite what a lot of people think, high blood pressure usually doesn't cause headaches or make you feel dizzy or lightheaded. It usually has no symptoms. But it does increase your risk for heart attack, stroke, and kidney or eye damage. The higher your blood pressure, the more your risk increases.   Your doctor will give you a goal for your blood pressure. Your goal will be based on your health and your age. Lifestyle changes, such as eating healthy and being active, are always important to help lower blood pressure. You might also take medicine to reach your blood pressure goal.  Follow-up care is a key part of your treatment and safety. Be sure to make and go to all appointments, and call your doctor if you are having problems. It's also a good idea to know your test results and keep a list of the medicines you take. How can you care for yourself at home? Medical treatment  · If you stop taking your medicine, your blood pressure will go back up. You may take one or more types of medicine to lower your blood pressure. Be safe with medicines. Take your medicine exactly as prescribed. Call your doctor if you think you are having a problem with your medicine. · Talk to your doctor before you start taking aspirin every day. Aspirin can help certain people lower their risk of a heart attack or stroke. But taking aspirin isn't right for everyone, because it can cause serious bleeding. · See your doctor regularly. You may need to see the doctor more often at first or until your blood pressure comes down. · If you are taking blood pressure medicine, talk to your doctor before you take decongestants or anti-inflammatory medicine, such as ibuprofen. Some of these medicines can raise blood pressure. · Learn how to check your blood pressure at home. Lifestyle changes  · Stay at a healthy weight. This is especially important if you put on weight around the waist. Losing even 10 pounds can help you lower your blood pressure. · If your doctor recommends it, get more exercise. Walking is a good choice. Bit by bit, increase the amount you walk every day. Try for at least 30 minutes on most days of the week. You also may want to swim, bike, or do other activities. · Avoid or limit alcohol.  Talk to your doctor about whether you can drink any alcohol. · Try to limit how much sodium you eat to less than 2,300 milligrams (mg) a day. Your doctor may ask you to try to eat less than 1,500 mg a day. · Eat plenty of fruits (such as bananas and oranges), vegetables, legumes, whole grains, and low-fat dairy products. · Lower the amount of saturated fat in your diet. Saturated fat is found in animal products such as milk, cheese, and meat. Limiting these foods may help you lose weight and also lower your risk for heart disease. · Do not smoke. Smoking increases your risk for heart attack and stroke. If you need help quitting, talk to your doctor about stop-smoking programs and medicines. These can increase your chances of quitting for good. When should you call for help? Call 911 anytime you think you may need emergency care. This may mean having symptoms that suggest that your blood pressure is causing a serious heart or blood vessel problem. Your blood pressure may be over 180/110.   For example, call 911 if:    · You have symptoms of a heart attack. These may include:  ¨ Chest pain or pressure, or a strange feeling in the chest.  ¨ Sweating. ¨ Shortness of breath. ¨ Nausea or vomiting. ¨ Pain, pressure, or a strange feeling in the back, neck, jaw, or upper belly or in one or both shoulders or arms. ¨ Lightheadedness or sudden weakness. ¨ A fast or irregular heartbeat.     · You have symptoms of a stroke. These may include:  ¨ Sudden numbness, tingling, weakness, or loss of movement in your face, arm, or leg, especially on only one side of your body. ¨ Sudden vision changes. ¨ Sudden trouble speaking. ¨ Sudden confusion or trouble understanding simple statements. ¨ Sudden problems with walking or balance. ¨ A sudden, severe headache that is different from past headaches.     · You have severe back or belly pain.    Do not wait until your blood pressure comes down on its own.  Get help right away.   Call your doctor now or seek immediate care if:    · Your blood pressure is much higher than normal (such as 180/110 or higher), but you don't have symptoms.     · You think high blood pressure is causing symptoms, such as:  ¨ Severe headache. ¨ Blurry vision.    Watch closely for changes in your health, and be sure to contact your doctor if:    · Your blood pressure measures 140/90 or higher at least 2 times. That means the top number is 140 or higher or the bottom number is 90 or higher, or both.     · You think you may be having side effects from your blood pressure medicine.     · Your blood pressure is usually normal, but it goes above normal at least 2 times. Where can you learn more? Go to http://shima-alyssa.info/. Enter R862 in the search box to learn more about \"High Blood Pressure: Care Instructions. \"  Current as of: December 6, 2017  Content Version: 11.7  © 6729-8022 [employer]. Care instructions adapted under license by [employer] (which disclaims liability or warranty for this information). If you have questions about a medical condition or this instruction, always ask your healthcare professional. [place] [address] any warranty or liability for your use of this information. Leg and Ankle Edema: Care Instructions  Your Care Instructions  Swelling in the legs, ankles, and feet is called edema. It is common after you sit or stand for a while. Long plane flights or car rides often cause swelling in the legs and feet. You may also have swelling if you have to stand for long periods of time at your job. Problems with the veins in the legs (varicose veins) and changes in hormones can also cause swelling. Sometimes the swelling in the ankles and feet is caused by a more serious problem, such as heart failure, infection, blood clots, or liver or kidney disease. Follow-up care is a key part of your treatment and safety.  Be sure to make and go to all appointments, and call your doctor if you are having problems. It's also a good idea to know your test results and keep a list of the medicines you take. How can you care for yourself at home? · If your doctor gave you medicine, take it as prescribed. Call your doctor if you think you are having a problem with your medicine. · Whenever you are resting, raise your legs up. Try to keep the swollen area higher than the level of your heart. · Take breaks from standing or sitting in one position. ¨ Walk around to increase the blood flow in your lower legs. ¨ Move your feet and ankles often while you stand, or tighten and relax your leg muscles. · Wear support stockings. Put them on in the morning, before swelling gets worse. · Eat a balanced diet. Lose weight if you need to. · Limit the amount of salt (sodium) in your diet. Salt holds fluid in the body and may increase swelling. When should you call for help? Call 911 anytime you think you may need emergency care. For example, call if:    · You have symptoms of a blood clot in your lung (called a pulmonary embolism). These may include:  ¨ Sudden chest pain. ¨ Trouble breathing. ¨ Coughing up blood.    Call your doctor now or seek immediate medical care if:    · You have signs of a blood clot, such as:  ¨ Pain in your calf, back of the knee, thigh, or groin. ¨ Redness and swelling in your leg or groin.     · You have symptoms of infection, such as:  ¨ Increased pain, swelling, warmth, or redness. ¨ Red streaks or pus. ¨ A fever.    Watch closely for changes in your health, and be sure to contact your doctor if:    · Your swelling is getting worse.     · You have new or worsening pain in your legs.     · You do not get better as expected. Where can you learn more? Go to http://shima-alyssa.info/. Enter E724 in the search box to learn more about \"Leg and Ankle Edema: Care Instructions. \"  Current as of: November 20, 2017  Content Version: 11.7  © 0466-9193 [employer]. Care instructions adapted under license by [employer] (which disclaims liability or warranty for this information). If you have questions about a medical condition or this instruction, always ask your healthcare professional. [place] any warranty or liability for your use of this information. Lyndsey Melton

## 2018-08-17 NOTE — Clinical Note
Rest, push clear liquids. Continue your regular medications as directed.  Follow with the allergist as directed

## 2018-08-20 LAB
ATRIAL RATE: 77 BPM
CALCULATED P AXIS, ECG09: 40 DEGREES
CALCULATED R AXIS, ECG10: 43 DEGREES
CALCULATED T AXIS, ECG11: 19 DEGREES
DIAGNOSIS, 93000: NORMAL
P-R INTERVAL, ECG05: 144 MS
Q-T INTERVAL, ECG07: 388 MS
QRS DURATION, ECG06: 78 MS
QTC CALCULATION (BEZET), ECG08: 439 MS
VENTRICULAR RATE, ECG03: 77 BPM

## 2018-10-23 ENCOUNTER — TELEPHONE (OUTPATIENT)
Dept: FAMILY MEDICINE CLINIC | Age: 53
End: 2018-10-23

## 2018-10-23 DIAGNOSIS — I10 ESSENTIAL HYPERTENSION: ICD-10-CM

## 2018-10-23 RX ORDER — HYDRALAZINE HYDROCHLORIDE 10 MG/1
10 TABLET, FILM COATED ORAL 3 TIMES DAILY
Qty: 90 TAB | Refills: 1 | Status: SHIPPED | OUTPATIENT
Start: 2018-10-23 | End: 2018-12-30 | Stop reason: SDUPTHER

## 2018-10-23 RX ORDER — METOPROLOL TARTRATE 50 MG/1
50 TABLET ORAL 2 TIMES DAILY
Qty: 60 TAB | Refills: 1 | Status: SHIPPED | OUTPATIENT
Start: 2018-10-23 | End: 2018-12-27 | Stop reason: SDUPTHER

## 2018-10-23 NOTE — TELEPHONE ENCOUNTER
----- Message from aNta Hurley DO sent at 10/23/2018  3:31 PM EDT -----  Can we request medical records from [facility].      Phone: [phone]  Fax: [phone]    Thank you

## 2018-12-27 DIAGNOSIS — I10 ESSENTIAL HYPERTENSION: ICD-10-CM

## 2018-12-30 ENCOUNTER — APPOINTMENT (OUTPATIENT)
Dept: GENERAL RADIOLOGY | Age: 53
End: 2018-12-30
Attending: EMERGENCY MEDICINE
Payer: SELF-PAY

## 2018-12-30 ENCOUNTER — HOSPITAL ENCOUNTER (OUTPATIENT)
Age: 53
Setting detail: OBSERVATION
LOS: 1 days | Discharge: HOME | End: 2018-12-30
Attending: EMERGENCY MEDICINE | Admitting: FAMILY MEDICINE
Payer: SELF-PAY

## 2018-12-30 VITALS
RESPIRATION RATE: 23 BRPM | BODY MASS INDEX: 57.52 KG/M2 | HEART RATE: 100 BPM | HEIGHT: 60 IN | WEIGHT: 293 LBS | SYSTOLIC BLOOD PRESSURE: 127 MMHG | TEMPERATURE: 97.8 F | OXYGEN SATURATION: 95 % | DIASTOLIC BLOOD PRESSURE: 97 MMHG

## 2018-12-30 DIAGNOSIS — T78.2XXA ANAPHYLAXIS, INITIAL ENCOUNTER: Primary | ICD-10-CM

## 2018-12-30 DIAGNOSIS — T78.3XXA ANGIOEDEMA, INITIAL ENCOUNTER: ICD-10-CM

## 2018-12-30 DIAGNOSIS — I10 ESSENTIAL HYPERTENSION: ICD-10-CM

## 2018-12-30 PROBLEM — E11.9 TYPE 2 DIABETES MELLITUS WITHOUT COMPLICATION, WITHOUT LONG-TERM CURRENT USE OF INSULIN (HCC): Status: ACTIVE | Noted: 2018-12-30

## 2018-12-30 PROBLEM — E27.8 ADRENAL NODULE (HCC): Status: ACTIVE | Noted: 2018-12-30

## 2018-12-30 LAB
ALBUMIN SERPL-MCNC: 3.2 G/DL (ref 3.5–5)
ALBUMIN/GLOB SERPL: 0.7 {RATIO} (ref 1.1–2.2)
ALP SERPL-CCNC: 72 U/L (ref 45–117)
ALT SERPL-CCNC: 20 U/L (ref 12–78)
ANION GAP SERPL CALC-SCNC: 8 MMOL/L (ref 5–15)
AST SERPL-CCNC: 16 U/L (ref 15–37)
BASOPHILS # BLD: 0 K/UL (ref 0–0.1)
BASOPHILS NFR BLD: 0 % (ref 0–1)
BILIRUB SERPL-MCNC: 0.7 MG/DL (ref 0.2–1)
BUN SERPL-MCNC: 18 MG/DL (ref 6–20)
BUN/CREAT SERPL: 27 (ref 12–20)
CALCIUM SERPL-MCNC: 9.2 MG/DL (ref 8.5–10.1)
CHLORIDE SERPL-SCNC: 106 MMOL/L (ref 97–108)
CO2 SERPL-SCNC: 27 MMOL/L (ref 21–32)
COMMENT, HOLDF: NORMAL
CREAT SERPL-MCNC: 0.66 MG/DL (ref 0.55–1.02)
DIFFERENTIAL METHOD BLD: ABNORMAL
EOSINOPHIL # BLD: 0.4 K/UL (ref 0–0.4)
EOSINOPHIL NFR BLD: 4 % (ref 0–7)
ERYTHROCYTE [DISTWIDTH] IN BLOOD BY AUTOMATED COUNT: 15.9 % (ref 11.5–14.5)
GLOBULIN SER CALC-MCNC: 4.4 G/DL (ref 2–4)
GLUCOSE SERPL-MCNC: 147 MG/DL (ref 65–100)
HCT VFR BLD AUTO: 50.2 % (ref 35–47)
HGB BLD-MCNC: 15.7 G/DL (ref 11.5–16)
IMM GRANULOCYTES # BLD: 0 K/UL (ref 0–0.04)
IMM GRANULOCYTES NFR BLD AUTO: 0 % (ref 0–0.5)
LYMPHOCYTES # BLD: 4 K/UL (ref 0.8–3.5)
LYMPHOCYTES NFR BLD: 38 % (ref 12–49)
MCH RBC QN AUTO: 28 PG (ref 26–34)
MCHC RBC AUTO-ENTMCNC: 31.3 G/DL (ref 30–36.5)
MCV RBC AUTO: 89.5 FL (ref 80–99)
MONOCYTES # BLD: 0.8 K/UL (ref 0–1)
MONOCYTES NFR BLD: 8 % (ref 5–13)
NEUTS SEG # BLD: 5.3 K/UL (ref 1.8–8)
NEUTS SEG NFR BLD: 50 % (ref 32–75)
NRBC # BLD: 0 K/UL (ref 0–0.01)
NRBC BLD-RTO: 0 PER 100 WBC
PLATELET # BLD AUTO: 239 K/UL (ref 150–400)
PMV BLD AUTO: 10.2 FL (ref 8.9–12.9)
POTASSIUM SERPL-SCNC: 3.7 MMOL/L (ref 3.5–5.1)
PROT SERPL-MCNC: 7.6 G/DL (ref 6.4–8.2)
RBC # BLD AUTO: 5.61 M/UL (ref 3.8–5.2)
SAMPLES BEING HELD,HOLD: NORMAL
SODIUM SERPL-SCNC: 141 MMOL/L (ref 136–145)
TROPONIN I SERPL-MCNC: <0.05 NG/ML
WBC # BLD AUTO: 10.7 K/UL (ref 3.6–11)

## 2018-12-30 PROCEDURE — 99284 EMERGENCY DEPT VISIT MOD MDM: CPT

## 2018-12-30 PROCEDURE — 80053 COMPREHEN METABOLIC PANEL: CPT

## 2018-12-30 PROCEDURE — 96375 TX/PRO/DX INJ NEW DRUG ADDON: CPT

## 2018-12-30 PROCEDURE — 96372 THER/PROPH/DIAG INJ SC/IM: CPT

## 2018-12-30 PROCEDURE — 74011000250 HC RX REV CODE- 250: Performed by: EMERGENCY MEDICINE

## 2018-12-30 PROCEDURE — 84484 ASSAY OF TROPONIN QUANT: CPT

## 2018-12-30 PROCEDURE — 94640 AIRWAY INHALATION TREATMENT: CPT

## 2018-12-30 PROCEDURE — 96374 THER/PROPH/DIAG INJ IV PUSH: CPT

## 2018-12-30 PROCEDURE — 74011250637 HC RX REV CODE- 250/637: Performed by: FAMILY MEDICINE

## 2018-12-30 PROCEDURE — 71045 X-RAY EXAM CHEST 1 VIEW: CPT

## 2018-12-30 PROCEDURE — 77030029684 HC NEB SM VOL KT MONA -A

## 2018-12-30 PROCEDURE — 99218 HC RM OBSERVATION: CPT

## 2018-12-30 PROCEDURE — 85025 COMPLETE CBC W/AUTO DIFF WBC: CPT

## 2018-12-30 PROCEDURE — 74011250636 HC RX REV CODE- 250/636: Performed by: EMERGENCY MEDICINE

## 2018-12-30 PROCEDURE — 36415 COLL VENOUS BLD VENIPUNCTURE: CPT

## 2018-12-30 RX ORDER — EPINEPHRINE 0.3 MG/.3ML
0.3 INJECTION SUBCUTANEOUS
Qty: 1 SYRINGE | Refills: 1 | Status: SHIPPED | OUTPATIENT
Start: 2018-12-30 | End: 2018-12-30

## 2018-12-30 RX ORDER — ACETAMINOPHEN 325 MG/1
650 TABLET ORAL
Status: COMPLETED | OUTPATIENT
Start: 2018-12-30 | End: 2018-12-30

## 2018-12-30 RX ORDER — HYDRALAZINE HYDROCHLORIDE 10 MG/1
10 TABLET, FILM COATED ORAL 3 TIMES DAILY
Qty: 42 TAB | Refills: 0 | Status: SHIPPED | OUTPATIENT
Start: 2018-12-30 | End: 2019-02-12 | Stop reason: SDUPTHER

## 2018-12-30 RX ORDER — FAMOTIDINE 10 MG/ML
20 INJECTION INTRAVENOUS
Status: COMPLETED | OUTPATIENT
Start: 2018-12-30 | End: 2018-12-30

## 2018-12-30 RX ORDER — EPINEPHRINE 1 MG/ML
0.3 INJECTION, SOLUTION, CONCENTRATE INTRAVENOUS
Status: COMPLETED | OUTPATIENT
Start: 2018-12-30 | End: 2018-12-30

## 2018-12-30 RX ORDER — DIPHENHYDRAMINE HCL 25 MG
50 TABLET ORAL
COMMUNITY
End: 2021-09-07

## 2018-12-30 RX ORDER — DIPHENHYDRAMINE HYDROCHLORIDE 50 MG/ML
25 INJECTION, SOLUTION INTRAMUSCULAR; INTRAVENOUS
Status: COMPLETED | OUTPATIENT
Start: 2018-12-30 | End: 2018-12-30

## 2018-12-30 RX ORDER — METOPROLOL TARTRATE 50 MG/1
TABLET ORAL
Qty: 60 TAB | Refills: 0 | Status: SHIPPED | OUTPATIENT
Start: 2018-12-30 | End: 2018-12-30 | Stop reason: SDUPTHER

## 2018-12-30 RX ORDER — METOPROLOL TARTRATE 50 MG/1
50 TABLET ORAL 2 TIMES DAILY
Qty: 28 TAB | Refills: 0 | Status: SHIPPED | OUTPATIENT
Start: 2018-12-30 | End: 2019-02-12 | Stop reason: SDUPTHER

## 2018-12-30 RX ADMIN — ACETAMINOPHEN 650 MG: 325 TABLET, FILM COATED ORAL at 16:21

## 2018-12-30 RX ADMIN — ALBUTEROL SULFATE 1 DOSE: 2.5 SOLUTION RESPIRATORY (INHALATION) at 06:40

## 2018-12-30 RX ADMIN — METHYLPREDNISOLONE SODIUM SUCCINATE 125 MG: 125 INJECTION, POWDER, FOR SOLUTION INTRAMUSCULAR; INTRAVENOUS at 05:49

## 2018-12-30 RX ADMIN — DIPHENHYDRAMINE HYDROCHLORIDE 25 MG: 50 INJECTION, SOLUTION INTRAMUSCULAR; INTRAVENOUS at 05:49

## 2018-12-30 RX ADMIN — FAMOTIDINE 20 MG: 10 INJECTION, SOLUTION INTRAVENOUS at 05:50

## 2018-12-30 RX ADMIN — EPINEPHRINE 0.3 MG: 1 INJECTION, SOLUTION, CONCENTRATE INTRAVENOUS at 05:54

## 2018-12-30 NOTE — H&P
[address] [facility]   Admission H&P    Date of admission: 12/30/2018    Patient name: Margaret Padilla  MRN: [[id number]]  YOB: 1965  Age: 48 y.o. Primary care provider:  Hector Orlando DO     Source of Information: patient, medical records    Chief complaint:  Throat swelling, hives    History of Present Illness  Margaret Padilla is a 48 y.o. female with a history of HTN, GERD, pulmonary nodule,.idiopathic angioedema, and diet-controlled type II DM who presents to the ER complaining of throat swelling and hives on her upper back since 05:00 this morning. She reports having medial thigh swelling two days ago followed by swelling in right eyelid and lips yesterday. She denies any inciting event, exposure to allergens, new soaps or detergents, or sick contacts. Associated symptoms include nausea and SOB. She took 2 tablets of Benadryl at the onset of symptoms without relief, prompting her to come to the ED. She reports having episodes of generalized edema that started 4 years ago but states that symptoms began occurring daily 1.5 years ago. She takes daily Benadryl. She denies chest pain, vomiting, constipation, diarrhea, fevers, or chills. Of note, she was admitted for anaphylaxis to sulfa drugs in 9/2017. She was treated with IV steroids, EPI, benadryl, and Famotidine. The patient has an Epipen at home but has not used it. The patient was admitted for AHRF likely due to undiagnosed COPD in 3/2018. BNP normal and echo from 3/2016 normal with EF 60-65%. In the ER, vital signs were remarkable for /100, RR26, and SpO2 92% on 2L  NC. Labs were remarkable for troponin neg x1. CXR negative for acute process. Pt was treated with IV Benadryl 25mg once, IM EPI once, Famotidine 20mg once, and Methylprednisone 125mg once. Home Medications   Prior to Admission medications    Medication Sig Start Date End Date Taking?  Authorizing Provider   metoprolol tartrate (LOPRESSOR) 50 mg tablet TAKE ONE TABLET BY MOUTH TWICE A DAY 12/30/18  Yes Paras Jamison DO   hydrALAZINE (APRESOLINE) 10 mg tablet Take 1 Tab by mouth three (3) times daily. 10/23/18  Yes Paras Jamison DO   furosemide (LASIX) 20 mg tablet Take 0.5 Tabs by mouth daily. 6/22/18  Yes Paras Jamison DO   metFORMIN (GLUCOPHAGE) 1,000 mg tablet Take 1 Tab by mouth daily (with breakfast). 6/22/18   Myrna Bloodpollo, DO   cyclobenzaprine (FLEXERIL) 10 mg tablet Take 0.5 Tabs by mouth three (3) times daily as needed for Muscle Spasm(s). 4/13/18   Myrna Bloodpollo, DO   spironolactone (ALDACTONE) 50 mg tablet Take 1 Tab by mouth daily. 3/20/18   Stefanie Connelly MD   guaiFENesin ER [facility]) 600 mg ER tablet Take 1 Tab by mouth every twelve (12) hours. 3/18/18   Stefanie Connelly MD   famotidine (PEPCID) 20 mg tablet Take 1 Tab by mouth two (2) times a day. 3/18/18   Stefanie Connelly MD   EPINEPHrine (ADRENACLICK) 0.3 AR/7.7 mL injection 0.3 mL by IntraMUSCular route once as needed for up to 1 dose. 9/19/17   Robert Jamison DO   albuterol (PROVENTIL HFA, VENTOLIN HFA, PROAIR HFA) 90 mcg/actuation inhaler Take 2 Puffs by inhalation every four (4) hours as needed for Wheezing. 3/4/17   Deana Hernandez MD         Allergies   Allergies   Allergen Reactions    Hydrochlorothiazide Anaphylaxis and Unproven on Challenge    Losartan Anaphylaxis and Unproven on Challenge    Sulfa (Sulfonamide Antibiotics) Anaphylaxis     When she was on Losartan (8/2017), and then again when she was on HCTZ (9/2017). She was also on lasix since 2016.     Eggplant Other (comments)     Severe stomach pain     Lasix [Furosemide] Unproven on Challenge     May lead to anaphylaxis          Past Medical History:   Diagnosis Date    Anxiety and depression     Flu     2 wks ago    GERD (gastroesophageal reflux disease)     Headache(784.0)     Hypertension     Incidental lung nodule, > 3mm and < 8mm 4/20/2016    left lung nodule in smoker, needs follow up CT in Oct 2016          Past Surgical History:   Procedure Laterality Date    APPENDECTOMY      HX APPENDECTOMY      HX  SECTION      X 3 Births    HX  SECTION      HX CHOLECYSTECTOMY      HX WRIST FRACTURE TX      metal    REMOVAL GALLBLADDER           Family History   Problem Relation Age of Onset    Diabetes Mother     Hypertension Brother     Hypertension Brother     Hypertension Brother          Social History   Patient resides  x  Independently      With family care      Assisted living      SNF      Ambulates  x  Independently      With cane       Assisted walker           Alcohol history   x  None     Social     Chronic     Smoking history    None     Former smoker   x  Current smoker; 35 pack-year history     Social History     Tobacco Use   Smoking Status Current Every Day Smoker    Packs/day: 0.50    Years: 25.00    Pack years: 12.50    Types: Cigarettes   Smokeless Tobacco Never Used           Drug history  x  None     Former drug user     Current drug user       Code status  x  Full code     DNR/DNI     Partial    Code status discussed with the patient/caregivers. Review of Systems  A comprehensive review of systems was negative except for that written in the History of Present Illness. Physical Exam  Visit Vitals  /84   Pulse 73   Temp 97.8 °F (36.6 °C)   Resp 26   Ht 5' (1.524 m)   Wt 299 lb (135.6 kg)   LMP 2012   SpO2 96%   BMI 58.39 kg/m²        General: No acute distress. Alert. Cooperative. Head: Normocephalic. Atraumatic. Eyes:  Right-sided eyelid swelling. Conjunctiva pink. Sclera white. PERRL. Ears:  Ear canals patent. TM non-erythematous. Nose:  Septum midline. Mucosa pink. No drainage. Throat: Urticaria on left side of neck. Mucosa pink. Moist mucous membranes. No tonsillar exudates or erythema. Palate movement equal bilaterally. Neck: Supple. Normal ROM. No stiffness. Respiratory: Tachypneic. Stridor over upper lung fields. No w/r/r/c.   Cardiovascular: RRR. Normal S1,S2. No m/r/g. Pulses 2+ throughout. GI: + bowel sounds. Nontender. No rebound tenderness or guarding. Nondistended. Extremities: 3+ pitting LE edema up to knees bilaterally. No palpable cord. No tenderness. Musculoskeletal: Full ROM in all extremities. Neuro: CN II-XII grossly intact. Strength 5/5 in all extremities. Sensation intact in all extremities. DTRs 2+ throughout. Skin: Clear. No rashes. No ulcers. : Deferred   Rectal: Deferred       Laboratory Data  Recent Results (from the past 24 hour(s))   CBC WITH AUTOMATED DIFF    Collection Time: 12/30/18  6:01 AM   Result Value Ref Range    WBC 10.7 3.6 - 11.0 K/uL    RBC 5.61 (H) 3.80 - 5.20 M/uL    HGB 15.7 11.5 - 16.0 g/dL    HCT 50.2 (H) 35.0 - 47.0 %    MCV 89.5 80.0 - 99.0 FL    MCH 28.0 26.0 - 34.0 PG    MCHC 31.3 30.0 - 36.5 g/dL    RDW 15.9 (H) 11.5 - 14.5 %    PLATELET 194 704 - 033 K/uL    MPV 10.2 8.9 - 12.9 FL    NRBC 0.0 0  WBC    ABSOLUTE NRBC 0.00 0.00 - 0.01 K/uL    NEUTROPHILS 50 32 - 75 %    LYMPHOCYTES 38 12 - 49 %    MONOCYTES 8 5 - 13 %    EOSINOPHILS 4 0 - 7 %    BASOPHILS 0 0 - 1 %    IMMATURE GRANULOCYTES 0 0.0 - 0.5 %    ABS. NEUTROPHILS 5.3 1.8 - 8.0 K/UL    ABS. LYMPHOCYTES 4.0 (H) 0.8 - 3.5 K/UL    ABS. MONOCYTES 0.8 0.0 - 1.0 K/UL    ABS. EOSINOPHILS 0.4 0.0 - 0.4 K/UL    ABS. BASOPHILS 0.0 0.0 - 0.1 K/UL    ABS. IMM.  GRANS. 0.0 0.00 - 0.04 K/UL    DF AUTOMATED     METABOLIC PANEL, COMPREHENSIVE    Collection Time: 12/30/18  6:01 AM   Result Value Ref Range    Sodium 141 136 - 145 mmol/L    Potassium 3.7 3.5 - 5.1 mmol/L    Chloride 106 97 - 108 mmol/L    CO2 27 21 - 32 mmol/L    Anion gap 8 5 - 15 mmol/L    Glucose 147 (H) 65 - 100 mg/dL    BUN 18 6 - 20 MG/DL    Creatinine 0.66 0.55 - 1.02 MG/DL    BUN/Creatinine ratio 27 (H) 12 - 20      GFR est AA >60 >60 ml/min/1.73m2    GFR est non-AA >60 >60 ml/min/1.73m2    Calcium 9.2 8.5 - 10.1 MG/DL    Bilirubin, total 0.7 0.2 - 1.0 MG/DL    ALT (SGPT) 20 12 - 78 U/L    AST (SGOT) 16 15 - 37 U/L    Alk. phosphatase 72 45 - 117 U/L    Protein, total 7.6 6.4 - 8.2 g/dL    Albumin 3.2 (L) 3.5 - 5.0 g/dL    Globulin 4.4 (H) 2.0 - 4.0 g/dL    A-G Ratio 0.7 (L) 1.1 - 2.2     TROPONIN I    Collection Time: 12/30/18  6:01 AM   Result Value Ref Range    Troponin-I, Qt. <0.05 <0.05 ng/mL   SAMPLES BEING HELD    Collection Time: 12/30/18  6:01 AM   Result Value Ref Range    SAMPLES BEING HELD 1DRKGRN,1RED,1BLU,1SST     COMMENT        Add-on orders for these samples will be processed based on acceptable specimen integrity and analyte stability, which may vary by analyte. Imaging  CXR negative for acute process. Assessment and Plan   Arpita Crespo is a 48 y.o. female with a history of HTN, GERD, pulmonary nodule,.idiopathic angioedema, and diet-controlled type II DM who is admitted for anaphylactic reaction. Anaphylaxis-Unclear etiology; given IV Methylprednisone, Famotidine, Benadryl, and IM EPI once with improvement in SOB, throat swelling, and hives. Admitted previously in 9/2017 for anaphylaxis 2/2 sulfa allergy. Tachypneic to 26 with SpO2 96% RA. Trop neg x1. CBC and BMP wnl.   -Admit to Stepdown  -Continue IM EPI every 5-15 minutes as needed  -Continue IV Famotidine, Methylprednisone, and Benadryl daily.  -Continue 2L O2 NC and titrate as needed  -Bedside swallow  -Daily BMP and CBC    HTN-/84 and 151/86 now. -Continue home Hydralazine 10mg TID and Metoprolol 50mg BID    Type II DM  - Diet-controlled. Last A1c 6.5 in 3/2018.  on admission.  -Check A1c  -Check BG AC/HS  -SSI    Idiopathic Angioedema- 3+ pitting LE edema bilaterally. On home Benadryl daily.   -Avoiding Lasix given sulfa allergy  -Consider Bumex     GERD:   -Continue home Pepcid 20mg BID    Lung Nodule: 5mm DEMETRIA nodule found incidentally in 4/2016.  Nodule unchanged on CT chest in 3/2018.  -Follow-up with Pulmonology OP    Incidental adrenal nodule: 2.6 x 1.9cm left adrenal nodule noted on CT in 3/2018. BMP wnl.  -Follow-up outpatient    FEN/GI - Pending bedside swallow  Activity - As tolerated  DVT prophylaxis - Lovenox  GI prophylaxis -  None    Disposition - Observation. Patient wants to leave AMA despite discussion that her condition is life-threatening. CODE STATUS:  Full       Patient discussed with Dr. Lauro Martínez, attending physician.        Govind Neri MD  Family Medicine Resident

## 2018-12-30 NOTE — DISCHARGE SUMMARY
[facility] [address] [place] [address]   Office [phone]  Fax [phone]       Discharge / Transfer / Off-Service Note     Name: Alicia Hyman MRN: [[id number]]  Sex: Female   YOB: 1965  Age: 48 y.o. PCP: Jesse Brooks DO     Date of admission: 12/30/2018  Date of discharge/transfer: 12/30/2018    Attending physician at admission: Dr. Ryan Barboza    Attending physician at discharge/transfer: Dr. Ryan Barboza    Resident physician at discharge/transfer: Zan Tamez MD     Consultants during hospitalization  None     Admission diagnoses   Anaphylactic reaction  Anaphylactic reaction    Recommended follow-up after discharge  1. PCP   2. Allergist    Things to follow up on with PCP:  1. Hospital follow up for allergic reaction  2. Discuss scheduling an allergist appointment for further testing to determine triggers. Medication Changes:    1. USE EPI-PEN if you are having symptoms of anaphylaxis  2. CONTINUE all other medications as prescribed     History of Present Illness    Per admitting provider,     Alicia Hyman is a 48 y.o. female with a history of HTN, GERD, pulmonary nodule,.idiopathic angioedema, and diet-controlled type II DM who presents to the ER complaining of throat swelling and hives on her upper back since 05:00 this morning. She reports having medial thigh swelling two days ago followed by swelling in right eyelid and lips yesterday. She denies any inciting event, exposure to allergens, new soaps or detergents, or sick contacts. Associated symptoms include nausea and SOB. She took 2 tablets of Benadryl at the onset of symptoms without relief, prompting her to come to the ED. She reports having episodes of generalized edema that started 4 years ago but states that symptoms began occurring daily 1.5 years ago. She takes daily Benadryl. She denies chest pain, vomiting, constipation, diarrhea, fevers, or chills.  Of note, she was admitted for anaphylaxis to sulfa drugs in 9/2017. She was treated with IV steroids, EPI, benadryl, and Famotidine. The patient has an Epipen at home but has not used it. The patient was admitted for AHRF likely due to undiagnosed COPD in 3/2018. BNP normal and echo from 3/2016 normal with EF 60-65%.     In the ER, vital signs were remarkable for /100, RR26, and SpO2 92% on 2L  NC. Labs were remarkable for troponin neg x1. CXR negative for acute process. Pt was treated with IV Benadryl 25mg once, IM EPI once, Famotidine 20mg once, and Methylprednisone 125mg once.       HOSPITAL COURSE       Anaphylaxis- Unclear etiology; given IV Methylprednisone, Famotidine, Benadryl, and IM EPI once with improvement in SOB, throat swelling, and hives. Symptoms resolved after treatment and pt was kept under watch to ensure symptoms did not reoccurred. Pt passed bedside swallow and was able to tolerate a diet. She was stable at time of discharge.   -Schedule an allergist appointment for further testing to determine triggers.      HTN-  - Continue home Hydralazine 10mg TID and Metoprolol 50mg BID. (2 week refill provided)  - Follow up with PCP for BP control      Type II DM-    - Diet-controlled. - Continue follow up with PCP     Idiopathic Angioedema-   - Continue home Benadryl daily. GERD:   - Continue home Pepcid 20mg BID     Lung Nodule: 5mm DEMETRIA nodule found incidentally in 4/2016. Nodule unchanged on CT chest in 3/2018.  -Follow-up with Pulmonology OP     Incidental adrenal nodule: 2.6 x 1.9cm left adrenal nodule noted on CT in 3/2018. BMP wnl.  -Follow-up outpatient     Physical exam at discharge:    General: No acute distress. Alert. Cooperative. Head: Normocephalic. Atraumatic. Eyes:             Conjunctiva pink. Sclera white. PERRL. Ears:             Ear canals patent. TM non-erythematous. Nose:             Septum midline. Mucosa pink. No drainage. Throat: Mucosa pink. Moist mucous membranes.  No tonsillar exudates or erythema. Palate movement equal bilaterally. Neck: Supple. Normal ROM. No stiffness. Respiratory: CTAB No w/r/r/c.   Cardiovascular: RRR. Normal S1,S2. No m/r/g. Pulses 2+ throughout. GI: + bowel sounds. Nontender. No rebound tenderness or guarding. Nondistended. Extremities: 3+ pitting LE edema up to knees bilaterally. No palpable cord. No tenderness. Musculoskeletal: Full ROM in all extremities. Neuro: CN II-XII grossly intact. Strength 5/5 in all extremities. Sensation intact in all extremities. DTRs 2+ throughout. Skin: Clear. No rashes. No ulcers. : Deferred   Rectal: Deferred       Condition at discharge: Stable. Labs  Recent Labs     12/30/18  0601   WBC 10.7   HGB 15.7   HCT 50.2*        Recent Labs     12/30/18  0601      K 3.7      CO2 27   BUN 18   CREA 0.66   *   CA 9.2     Recent Labs     12/30/18  0601   SGOT 16   ALT 20   AP 72   TBILI 0.7   TP 7.6   ALB 3.2*   GLOB 4.4*     Recent Labs     12/30/18  0601   TROIQ <0.05     Cultures  · None    Procedures / Diagnostic Studies  · None    Imaging  Results from [place] encounter on 12/30/18   XR CHEST PORT    Narrative INDICATION:  shortness of breath     COMPARISON: 8/17/2018    FINDINGS: Single AP portable view of the chest obtained at 612 demonstrates a  stable cardiomediastinal silhouette. The lungs are clear bilaterally. No osseous  abnormalities are seen. Impression IMPRESSION: No evidence of acute cardiopulmonary process. Results from [place] encounter on 10/12/17   US THYROID/PARATHYROID/SOFT TISS    Narrative EXAM:  US THYROID/PARATHYROID/SOFT TISS     INDICATION: Thyromegaly. COMPARISON: CT chest on 4/1/2016. TECHNIQUE: Real-time sonography of the thyroid gland was performed with a high  frequency linear transducer. Multiple static images were obtained. FINDINGS:  Thyroid gland is mildly heterogeneous.     The right lobe measures 5.2 x 2.2 x 2.3 cm and the left lobe measures 4.0 x 1.9  x 1.9 cm. The isthmus measures 0.4 cm. Subtle isoechoic nodule in the superficial upper pole of the left thyroid lobe  measures 0.9 x 0.9 x 0.6 cm. Impression IMPRESSION:     Subcentimeter left thyroid nodule in a normal-sized thyroid gland. No imaging  indication for tissue sampling. Results from [place] encounter on 03/16/18   CT CHEST W CONT    Narrative EXAM:  CT CHEST W CONT  INDICATION:   Dyspnea chronic, negative or nondiagnostic xray and lab/clinical  studies  COMPARISON: CT of the abdomen and pelvis, 9/26/2011 and 1/24/2013. CT of the  chest, 4/1/2016  Limitations: Markedly limited evaluation due to bolus timing  . TECHNIQUE:   Precontrast  images were obtained to localize the volume for acquisition. Multislice helical CT arteriography was performed from the diaphragm to the  thoracic inlet during uneventful rapid bolus intravenous contrast  administration. Lung and soft tissue windows were generated. Coronal and  sagittal images were generated and 3D post processing consisting of coronal  maximum intensity images was performed. CT dose reduction was achieved through use of a standardized protocol tailored  for this examination and automatic exposure control for dose modulation. Parker Lucks FINDINGS:  CHEST:  Chest wall/thoracic inlet: Within normal limits. Thyroid: Within normal limits. Mediastinum/tesfaye: Within normal limits. Heart/vessels: No large, visible pulmonary embolus. 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 Lungs/Pleura: Tiny nodule in the left upper lobe, unchanged. Normal  scarring/atelectasis in the left base and in the right middle lobe. .  INCIDENTALLY IMAGED ABDOMEN:  There is a left adrenal nodule measuring 2.6 x 1.9 cm in axial dimension,  unchanged. Extensive tendinosis  .   MSK:   Mild, nonfocal degenerative changes throughout the spine.  .    Impression IMPRESSION:   1. No large pulmonary embolus. Study is markedly limited for the evaluation of  pulmonary embolus due to bolus timing. 2. Incidental findings as above. No procedure found. Chronic diagnoses   Problem List as of 12/30/2018 Date Reviewed: 6/24/2018          Codes Class Noted - Resolved    Anaphylactic reaction ICD-10-CM: T78. 2XXA  ICD-9-CM: 995.0  12/30/2018 - Present        Patient noncompliance ICD-10-CM: Z91.19  ICD-9-CM: V15.81  3/19/2018 - Present        Hypoxia ICD-10-CM: R09.02  ICD-9-CM: 799.02  3/16/2018 - Present        Angioedema ICD-10-CM: T78. 3XXA  ICD-9-CM: 995.1  9/18/2017 - Present        Swelling of throat ICD-10-CM: J39.2  ICD-9-CM: 478.25  9/18/2017 - Present        Dyspnea ICD-10-CM: R06.00  ICD-9-CM: 786.09  9/18/2017 - Present        Morbid obesity ([facility] ICD-10-CM: E66.01  ICD-9-CM: 278.01  9/18/2017 - Present        Elevated hemoglobin A1c ICD-10-CM: R73.09  ICD-9-CM: 790.29  3/4/2017 - Present    Overview Signed 3/4/2017 11:12 AM by Shar Hunt MD     [id number]             Wheezing ICD-10-CM: R06.2  ICD-9-CM: 786.07  3/4/2017 - Present    Overview Signed 3/4/2017  1:09 PM by Shar Hunt MD     Consider PFT, ECHO, sleep evaluation             Venous insufficiency of both lower extremities ICD-10-CM: I87.2  ICD-9-CM: 459.81  5/26/2016 - Present        Morbid obesity with BMI of 50.0-59.9, adult ([facility] ICD-10-CM: E66.01, Z68.43  ICD-9-CM: 278.01, V85.43  5/20/2016 - Present        Incidental lung nodule, > 3mm and < 8mm ICD-10-CM: R91.1  ICD-9-CM: 793.11  4/20/2016 - Present    Overview Addendum 3/4/2017 11:18 AM by Shar Hunt MD     left lung nodule in smoker, needs follow up CT in 6/2018             DDD (degenerative disc disease), lumbar ICD-10-CM: M51.36  ICD-9-CM: 722.52  2/20/2013 - Present    Overview Signed 2/20/2013  2:43 PM by MD Dr. Eduardo Tidwell Counter: E01.0  ICD-9-CM: 240.9  2013 - Present    Overview Addendum 2013  3:30 PM by MD Dr. Carlos Salter  Normal thyroid US 2013             Tobacco abuse ICD-10-CM: Z72.0  ICD-9-CM: 305.1  2013 - Present    Overview Addendum 3/4/2017  1:09 PM by Cindy Jorge MD     Advised to quit  See letter 3/4/2017             H. pylori infection ICD-10-CM: A04.8  ICD-9-CM: 041.86  2013 - Present    Overview Addendum 2013  7:27 AM by Cindy Jorge MD     2013  Referred to Dr. Sandra Servin who speaks Czech to explain treatments  Stool test after Abx negative                History of normal resting EKG ICD-10-CM: [id number]  ICD-9-CM: V49.89  2013 - Present    Overview Signed 2013  4:57 PM by Cindy Jorge MD                  [place] ICD-10-CM: K80.20  ICD-9-CM: 574.20  2013 - Present    Overview Signed 2013  5:02 PM by Cindy Jorge MD     Cholecystectomy age 29             Appendicitis ICD-10-CM: K37  ICD-9-CM: 384  2013 - Present    Overview Signed 2013  5:02 PM by Cindy Jorge MD     As a child             H/O  section ICD-10-CM: Z98.891  ICD-9-CM: V45.89  2013 - Present    Overview Signed 2013  5:03 PM by Cindy Jorge MD     3             Left hand fracture ICD-10-CM: J49.78BX  ICD-9-CM: 815.00  2013 - Present    Overview Signed 2013  5:03 PM by Cindy Jorge MD     S/p surgery             RESOLVED: Prediabetes ICD-10-CM: R73.03  ICD-9-CM: 790.29  2016 - 3/4/2017              Discharge/Transfer Medications  Current Discharge Medication List      CONTINUE these medications which have CHANGED    Details   hydrALAZINE (APRESOLINE) 10 mg tablet Take 1 Tab by mouth three (3) times daily. Qty: 42 Tab, Refills: 0    Associated Diagnoses: Essential hypertension      EPINEPHrine (ADRENACLICK) 0.3 HR/2.9 mL injection 0.3 mL by IntraMUSCular route once as needed for up to 1 dose.   Qty: 1 Syringe, Refills: 1      metoprolol tartrate (LOPRESSOR) 50 mg tablet Take 1 Tab by mouth two (2) times a day. Qty: 28 Tab, Refills: 0    Associated Diagnoses: Essential hypertension         CONTINUE these medications which have NOT CHANGED    Details   diphenhydrAMINE (BENADRYL) 25 mg tablet Take 50 mg by mouth every six (6) hours as needed for Itching (allergic reaction). Diet:  Regular diet. Activity:  As tolerated    Disposition: Home or Self Care    Discharge instructions to patient/family  Please seek medical attention for any new or worsening symptoms particularly fever, chest pain, shortness of breath, abdominal pain, nausea, vomiting    Follow up plans/appointments  Follow-up Information     Follow up With Specialties Details Why Contact Info    Myrna Sosa DO Family Practice Schedule an appointment as soon as possible for a visit in 2 days For hospital visit follow up  [address] [place] [address]  [phone]      Yajaira Colon MD Allergy Schedule an appointment as soon as possible for a visit in 2 days For Allergic Rxn follow up  [address]  Luciano Porras MD  Family Medicine Resident       For Billing    Chief Complaint   Patient presents with   [facility] Respiratory Distress       Hospital Problems  Date Reviewed: 6/24/2018          Codes Class Noted POA    Anaphylactic reaction ICD-10-CM: T78. 2XXA  ICD-9-CM: 995.0  12/30/2018 Unknown

## 2018-12-30 NOTE — PROGRESS NOTES
Discharge instructions reviewed with patient and her . IV removed. Signed discharge papers placed in patients chart.

## 2018-12-30 NOTE — PROGRESS NOTES
[facility]: MED RECONCILIATION    Comments/Recommendations:    Medication reconciliation completed by , medication bottles present, per review of chart it does not seem that any changes have been made to medications recently.  Per  patient took two benadryl tabs this morning for feeling of allergic reaction.  Patient has Epi-Pen that she carries with her but she did not use it today.  Patient did not take maintenance medications today, took all as scheduled yesterday.   unable to further discuss allergies-simply states she only takes her current two medications due to reactions with others.  Confirmed [facility] as [facility] [address]. Medications added:     · Benadryl     Medications removed:    · Proair inhaler  · Cyclobenzaprine  · Famotidine  · Furosemide  · Guaifenesin ER  · Metformin  · Spironolactone     Medications adjusted:    · none    Information obtained from: , outpatient records    Allergies: Hydrochlorothiazide; Losartan; Sulfa (sulfonamide antibiotics); Eggplant; and Lasix [furosemide]    Prior to Admission Medications:     Medication Documentation Review Audit       Reviewed by Leydi High (Pharmacist) on 12/30/18 at 0739      Medication Sig Documenting Provider Last Dose Status Taking? diphenhydrAMINE (BENADRYL) 25 mg tablet Take 50 mg by mouth every six (6) hours as needed for Itching (allergic reaction). Provider, Historical 12/30/2018 0500 Active Yes   EPINEPHrine (ADRENACLICK) 0.3 BX/6.8 mL injection 0.3 mL by IntraMUSCular route once as needed for up to 1 dose. Andretamica Humphrey, DO  Active Yes   hydrALAZINE (APRESOLINE) 10 mg tablet Take 1 Tab by mouth three (3) times daily.  Andre Humphrey, DO 12/29/2018 PM Active Yes   metoprolol tartrate (LOPRESSOR) 50 mg tablet TAKE ONE TABLET BY MOUTH TWICE A DAY Andre Humphrey, DO 12/29/2018 PM Active Yes                      Thank You,   Kimberly Joseph, PharmD, BCPS Contact: [phone]

## 2018-12-30 NOTE — ED PROVIDER NOTES
Renetta Bustos is a 49 yo F with history of angioedema who presents to the ED with shortness of breath, urticaria and feeling that her throat is swelling. She states she woke up an hour ago with these symptoms. She too 2 benadryl tablets and then her  brought her to the ED. She states that she has had to be intubated in the past due to these episodes. She states that she has had allergy testing and it has been negative.                Past Medical History:   Diagnosis Date    Anxiety and depression     Flu     2 wks ago    GERD (gastroesophageal reflux disease)     Headache(784.0)     Hypertension     Incidental lung nodule, > 3mm and < 8mm 2016    left lung nodule in smoker, needs follow up CT in Oct 2016        Past Surgical History:   Procedure Laterality Date    APPENDECTOMY      HX APPENDECTOMY      HX  SECTION      X 3 Births    HX  SECTION      HX CHOLECYSTECTOMY      HX WRIST FRACTURE TX      metal    REMOVAL GALLBLADDER           Family History:   Problem Relation Age of Onset    Diabetes Mother     Hypertension Brother     Hypertension Brother     Hypertension Brother        Social History     Socioeconomic History    Marital status:      Spouse name: Not on file    Number of children: Not on file    Years of education: Not on file    Highest education level: Not on file   Social Needs    Financial resource strain: Not on file    Food insecurity - worry: Not on file    Food insecurity - inability: Not on file   [employer] needs - medical: Not on file   [employer] needs - non-medical: Not on file   Occupational History    Not on file   Tobacco Use    Smoking status: Current Every Day Smoker     Packs/day: 0.50     Years: 25.00     Pack years: 12.50     Types: Cigarettes    Smokeless tobacco: Never Used   Substance and Sexual Activity    Alcohol use: No     Alcohol/week: 0.0 oz    Drug use: No    Sexual activity: Yes     Partners: Male   Other Topics Concern    Not on file   Social History Narrative    ** Merged History Encounter **         ** Merged History Encounter **              ALLERGIES: Hydrochlorothiazide; Losartan; Sulfa (sulfonamide antibiotics); Eggplant; and Lasix [furosemide]    Review of Systems   Constitutional: Negative for fever. HENT: Positive for trouble swallowing. Eyes: Negative for visual disturbance. Respiratory: Positive for cough and shortness of breath. Cardiovascular: Negative for chest pain. Gastrointestinal: Negative for abdominal pain. Genitourinary: Negative for dysuria. Musculoskeletal: Negative for back pain. Skin: Positive for rash. Neurological: Negative for headaches. Vitals:    12/30/18 0546   BP: (!) 162/100   Pulse: 77   Resp: 18   Temp: 97.8 °F (36.6 °C)   SpO2: 96%   Weight: 135.6 kg (299 lb)   Height: 5' (1.524 m)            Physical Exam   Constitutional: She appears well-developed and well-nourished. No distress. HENT:   Head: Atraumatic. Macrocephalic. Mouth/Throat: Oropharynx is clear and moist.   Eyes: Conjunctivae and EOM are normal.   Neck: Normal range of motion and phonation normal.   Cardiovascular: Normal rate and intact distal pulses. Pulmonary/Chest: Effort normal. No respiratory distress. She has wheezes. Abdominal: She exhibits no distension. Musculoskeletal: Normal range of motion. She exhibits no tenderness. Neurological: She is alert. She is not disoriented. She exhibits normal muscle tone. Skin: Skin is warm and dry. Rash noted. Rash is urticarial.   Nursing note and vitals reviewed. [facility]     6:07 AM  PAtient reassessed after solumedrol, famotidine, benadryl and epinephrine. States that she feels somewhat better. More relaxed. LAbs pending. Discussed with family medicine, will evaluate patient for admission.      Total critical care time spent exclusive of procedures:  35 min  Procedures

## 2018-12-30 NOTE — ED TRIAGE NOTES
Pt. Woke up an hour ago with sob and difficulty speaking in full sentences. States she had allergic reaction in past but unknown allergen. States this episode started 30 minutes.

## 2018-12-30 NOTE — H&P
[facility]   Senior Resident Admission Note    CC: Throat swelling and SOB    HPI:  Yuli Tiwari is a 48 y.o. female PMHx of anaphylactic reactions of unknown etiology, chronic lymphedema, tobacco use, Type 2 Diabetes,  HTN, Anxiety, GERD and Obestiy who presents to the ER complaining of SOB, throat swelling and urticaria. 2 days ago pt reports that noticed increased bilateral thigh swelling which gradually progressed to swelling of her lips and periorbital region. Pt then woke up this morning with sob and sensation of throat. She also noticed mild urticaria on her upper back. Pt took 2 benadryl tablets at home before heading to the ED with her . Pt denies any fever,chills or abd pain. She has chronic wheezing. Denies any new medication, changes in her diet or any known triggers. Pt was seen in the ED with similar episodes about 4 months ago as well as a year ago. She carries Epinephrine with her due to the recurrent episodes. Of note, pt states that she been dealing with allergic reactions of unknown source over the past 4 years for which she takes Benadryl. Pt saw an allergist a month ago and states that she had couple of tests that were all negative. ED Course:  - CBC was unremarkable  - CMP showed glucose 147, otherwise unremarkable. - Received Epinephrine, Benadryl 25mg x1, pepcid 20 mg x1, Methylpred 125 mg x1 and Duoneb    Objective:    Visit Vitals  /84   Pulse 73   Temp 97.8 °F (36.6 °C)   Resp 26   Ht 5' (1.524 m)   Wt 299 lb (135.6 kg)   SpO2 96%   BMI 58.39 kg/m²     Physical Exam:  GEN: Patient without distress. Morbidly obese  HEENT: Mild periorbital swelling and some facial swelling. Speaks in complete sentences. Heart: Regular rate and rhythm  Lung: Normal work of breathing.  Mild bilateral expiratory wheezing   Abdomen: Obese, soft, nontender  Lower Extremities:  - Edema 2+ bilateral LE edema extending from feet to below her knees Skin - No hives noted on exam      A/P: 48year old female admitted for difficulty breathing due to thoat swelling. Likely idiopathic as workup so far has been negative     Anaphylactic reaction: Pt now breathing better and speaks in complete sentences. Received Methylprednisone, benadryl, pepcid and Epinephrine. Pt currently requesting to be discharge as she states that she does not want to be at the hospital despite having an extensive conversation about the importance of observing her to prevent recurrent symptoms.     -Vitals stable, currently admitted on observation to stepdown.  -Consider ENT consult should symptoms worsen  -Solumedrol 60 mg x 4 doses, Benadryl 25 mg q6h, Pepcid 20mg BID and albuterol nebs q4h  -NPO until pass bedside swallow  -Avoid any sulfa containing medications (HCTZ, Lasix etc). Chronic LE edema: Last ECHO 3/2016 showed EF 60-65% without wall abnormality.   -Holding Lasix    Dispo: Pt currently at the ED until evaluated by Dr. Chad Ray (supervising provider). Discussed plan with patient and charged nurse. For the remaining assessment and plan of other medical problems please refer to Dr. Amara Rizo H&P for more details. Patient discussed with Dr. Chad Ray (Supervising provider).     Maxim Castro MD  Family Medicine Resident

## 2018-12-30 NOTE — DISCHARGE INSTRUCTIONS
TO BE DISCHARGED AT 5 PM   HOME DISCHARGE INSTRUCTIONS    Kalli Espinozaamy / [de-identified] : 1965    Admission date: 2018 Discharge date: 2018     Please bring this form with you to show your care provider at your follow-up appointment. Primary care provider:  Nusrat Carias DO    Discharging provider:  Olga Rico MD  - Family Medicine Resident  Adolph Sultana, * - Attending, Family Medicine     You have been admitted to the hospital with the following diagnoses:    ACUTE DIAGNOSES:  Anaphylactic reaction  Anaphylactic reaction  . . . . . . . . . . . . . . . . . . . . . . . . . . . . . . . . . . . . . . . . . . . . . . . . . . . . . . . . . . . . . . . . . . . . . . . Mario Earing FOLLOW-UP CARE RECOMMENDATIONS:    Appointments  Follow-up Information     Follow up With Specialties Details Why Contact Info    Nusrat Carias DO Family Practice Schedule an appointment as soon as possible for a visit in 2 days For hospital visit follow up  [address] [place] [address]  [phone]      Chau Payan MD Allergy Schedule an appointment as soon as possible for a visit in 2 days For Allergic Rxn follow up  [address]  [phone]             Please follow up with your PCP regardin. Hospital follow up for allergic reaction  2. Discuss scheduling an allergist appointment for further testing to determine triggers. MEDICATION CHANGES:  1. USE EPI-PEN if you are having symptoms of anaphylaxis  2. CONTINUE all other medications as prescribed     Follow-up tests needed: Allergy testing needed, please schedule allergist appointment     Pending test results: At the time of your discharge the following test results are still pending: None   Please make sure you review these results with your outpatient follow-up provider(s).     Specific symptoms to watch for: chest pain, shortness of breath, fever, chills, nausea, vomiting, diarrhea, change in mentation, falling, weakness, bleeding. DIET/what to eat:  Regular Diet    ACTIVITY:  Activity as tolerated    Wound care: None     Equipment needed:  None     What to do if new or unexpected symptoms occur? If you experience any of the above symptoms (or should other concerns or questions arise after discharge) please call your primary care physician. Return to the emergency room if you cannot get hold of your doctor. · It is very important that you keep your follow-up appointment(s). · Please bring discharge papers, medication list (and/or medication bottles) to your follow-up appointments for review by your outpatient provider(s). · Please check the list of medications and be sure it includes every medication (even non-prescription medications) that your provider wants you to take. · It is important that you take the medication exactly as they are prescribed. · Keep your medication in the bottles provided by the pharmacist and keep a list of the medication names, dosages, and times to be taken in your wallet. · Do not take other medications without consulting your doctor. · If you have any questions about your medications or other instructions, please talk to your nurse or care provider before you leave the hospital.     Information obtained by:     I understand that if any problems occur once I am at home I am to contact my physician. These instructions were explained to me and I had the opportunity to ask questions. I understand and acknowledge receipt of the instructions indicated above.                                                                                                                                                Physician's or R.N.'s Signature                                                                  Date/Time Patient or Representative Signature                                                          Date/Time

## 2018-12-30 NOTE — ED NOTES
Bedside shift change report given to [address] [place] [address] Rd (oncoming nurse) by Aidan Leong (offgoing nurse). Report included the following information SBAR, ED Summary, MAR and Recent Results.

## 2018-12-30 NOTE — ED NOTES
Bedside and Verbal shift change report given to Brendan (oncoming nurse) by Daniel Penn (offgoing nurse). Report included the following information SBAR.

## 2019-02-12 DIAGNOSIS — I10 ESSENTIAL HYPERTENSION: ICD-10-CM

## 2019-02-12 RX ORDER — METOPROLOL TARTRATE 50 MG/1
50 TABLET ORAL 2 TIMES DAILY
Qty: 90 TAB | Refills: 1 | Status: SHIPPED | OUTPATIENT
Start: 2019-02-12 | End: 2019-04-24 | Stop reason: SDUPTHER

## 2019-02-12 RX ORDER — HYDRALAZINE HYDROCHLORIDE 10 MG/1
10 TABLET, FILM COATED ORAL 3 TIMES DAILY
Qty: 180 TAB | Refills: 0 | Status: SHIPPED | OUTPATIENT
Start: 2019-02-12 | End: 2019-04-24 | Stop reason: SDUPTHER

## 2019-04-04 ENCOUNTER — OFFICE VISIT (OUTPATIENT)
Dept: FAMILY MEDICINE CLINIC | Age: 54
End: 2019-04-04

## 2019-04-04 VITALS
HEIGHT: 60 IN | DIASTOLIC BLOOD PRESSURE: 82 MMHG | HEART RATE: 73 BPM | OXYGEN SATURATION: 90 % | RESPIRATION RATE: 19 BRPM | WEIGHT: 293 LBS | BODY MASS INDEX: 57.52 KG/M2 | TEMPERATURE: 98.2 F | SYSTOLIC BLOOD PRESSURE: 138 MMHG

## 2019-04-04 DIAGNOSIS — R22.9 LUMP OF SKIN: ICD-10-CM

## 2019-04-04 DIAGNOSIS — I10 ESSENTIAL HYPERTENSION: Primary | ICD-10-CM

## 2019-04-04 NOTE — PROGRESS NOTES
Here for BP followup    Has been referred to weight loss clinics and encouraged to exercise    Will refill medications today    I reviewed with the resident the medical history and the resident's findings on the physical examination. I discussed with the resident the patient's diagnosis and concur with the plan.

## 2019-04-08 NOTE — PROGRESS NOTES
Subjective  Ciro Grande is an 48 y.o. female who presents for HTN and lump over left clavicle. Pt states she has been taking Hydralazine 10mg TID and Metoprolol 50mg BID for BP control. She would like to be one agent a day but understands need to provide allergy test results prior to adding back once a day medication. BP at home noted to be SBP 130s. Low salt intake. Also noted a lump under skin 1 month ago over the proximal left clavicle. Mobile and nontender. Has not grown. No injury or trauma to area. Has no similar masses anywhere else on body. Allergies - reviewed: Allergies   Allergen Reactions    Hydrochlorothiazide Anaphylaxis and Unproven on Challenge    Losartan Anaphylaxis and Unproven on Challenge    Sulfa (Sulfonamide Antibiotics) Anaphylaxis     When she was on Losartan (8/2017), and then again when she was on HCTZ (9/2017). She was also on lasix since 2016.  Eggplant Other (comments)     Severe stomach pain     Lasix [Furosemide] Unproven on Challenge     May lead to anaphylaxis          Medications - reviewed:   Current Outpatient Medications   Medication Sig    hydrALAZINE (APRESOLINE) 10 mg tablet Take 1 Tab by mouth three (3) times daily.  metoprolol tartrate (LOPRESSOR) 50 mg tablet Take 1 Tab by mouth two (2) times a day.  diphenhydrAMINE (BENADRYL) 25 mg tablet Take 50 mg by mouth every six (6) hours as needed for Itching (allergic reaction). No current facility-administered medications for this visit.           Past Medical History - reviewed:  Past Medical History:   Diagnosis Date    Anxiety and depression     Flu     2 wks ago    GERD (gastroesophageal reflux disease)     Headache(784.0)     Hypertension     Incidental lung nodule, > 3mm and < 8mm 4/20/2016    left lung nodule in smoker, needs follow up CT in Oct 2016          Past Surgical History - reviewed:   Past Surgical History:   Procedure Laterality Date    APPENDECTOMY      HX APPENDECTOMY  HX  SECTION      X 3 Births    HX  SECTION      HX CHOLECYSTECTOMY      HX WRIST FRACTURE [place]      metal    REMOVAL GALLBLADDER           Social History - reviewed:  Social History     Socioeconomic History    Marital status:    Tobacco Use    Smoking status: Current Every Day Smoker     Packs/day: 0.50     Years: 25.00     Pack years: 12.50     Types: Cigarettes    Smokeless tobacco: Never Used   Substance and Sexual Activity    Alcohol use: No     Alcohol/week: 0.0 oz    Drug use: No    Sexual activity: Yes     Partners: Male         Family History - reviewed:  Family History   Problem Relation Age of Onset    Diabetes Mother     Hypertension Brother     Hypertension Brother     Hypertension Brother          Immunizations - reviewed:   Immunization History   Administered Date(s) Administered    Influenza Vaccine 10/22/2012    Pneumococcal Conjugate (PCV-7) 2013    Pneumococcal Polysaccharide (PPSV-23) 2017    Tdap 2013     ROS  CONSTITUTIONAL: Denies: fever, chills, weakness, weight gain or loss  CARDIOVASCULAR: Denies: chest pain, palpitations, dyspnea on exertion, orthopnea, edema   RESPIRATORY: Denies: cough, wheezing  ENDOCRINE: Denies: polydipsia/polyuria, palpitations, skin changes, temperature intolerance  GI: Denies: abdominal pain, nausea, vomiting, diarrhea, constipation, blood in stool  SKIN: lump     Physical Exam  Visit Vitals  /82   Pulse 73   Temp 98.2 °F (36.8 °C)   Resp 19   Ht 5' (1.524 m)   Wt 296 lb (134.3 kg)   LMP 2012   SpO2 90%   BMI 57.81 kg/m²     GEN: No apparent distress. Alert and oriented and responds to all questions appropriately. Morbidly Obese. Syriac speaker. NECK: Supple; no masses; thyroid normal. Large neck circumference.   LUNGS: Respirations unlabored; clear to auscultation bilaterally  CARDIOVASCULAR: Regular, rate, and rhythm without murmurs, gallops or rubs   ABDOMEN: Soft; nontender; nondistended; normoactive bowel sounds  NEUROLOGIC: No focal neurologic deficits. Strength and sensation grossly intact. Coordination and gait grossly intact. EXT: 2+ non-pitting edema in lower ext extending to thighs. Negative homen  SKIN: mobile nontender mass noted on the proximal aspect of the left clavicle measuring [id number]. No erythema, warmth. No obvious rashes or erythema or hives. Assessment/Plan    ICD-10-CM ICD-9-CM    1. Essential hypertension I10 401.9    2. Lump of skin R22.9 782.2    3. BMI 50.0-59.9, adult ([facility]) Z68.43 V85.43        HTN: at goal. Will refill medications. May adjust medications after reviewing notes of allergy testing. Discussed diet and exercise. Continue to monitor at home. All questions answered    Pt will return next week to US the lump for further assessment. Precautions given of when to seek medical attn. I have reviewed/discussed the above normal BMI with the patient. I have recommended the following interventions: dietary management education, guidance, and counseling, encourage exercise and monitor weight . Pt was seen by attending    I have discussed the diagnosis with the patient and the intended plan as seen in the above orders. Patient verbalized understanding of the plan and agrees with the plan. The patient has received an after-visit summary and questions were answered concerning future plans. I have discussed medication side effects and warnings with the patient as well. Informed patient to return to the office if new symptoms arise.         Greer Elias DO  Family Medicine Resident

## 2019-04-09 ENCOUNTER — OFFICE VISIT (OUTPATIENT)
Dept: FAMILY MEDICINE CLINIC | Age: 54
End: 2019-04-09

## 2019-04-09 VITALS
HEIGHT: 60 IN | RESPIRATION RATE: 18 BRPM | OXYGEN SATURATION: 93 % | HEART RATE: 69 BPM | WEIGHT: 293 LBS | SYSTOLIC BLOOD PRESSURE: 134 MMHG | BODY MASS INDEX: 57.52 KG/M2 | TEMPERATURE: 98.6 F | DIASTOLIC BLOOD PRESSURE: 84 MMHG

## 2019-04-09 DIAGNOSIS — E27.8 ADRENAL NODULE (HCC): ICD-10-CM

## 2019-04-09 DIAGNOSIS — I10 ESSENTIAL HYPERTENSION: ICD-10-CM

## 2019-04-09 DIAGNOSIS — E04.1 THYROID NODULE: ICD-10-CM

## 2019-04-09 DIAGNOSIS — Z12.11 COLON CANCER SCREENING: ICD-10-CM

## 2019-04-09 DIAGNOSIS — Z12.39 SCREENING FOR BREAST CANCER: ICD-10-CM

## 2019-04-09 DIAGNOSIS — R22.1 LUMP IN NECK: Primary | ICD-10-CM

## 2019-04-09 RX ORDER — NAPROXEN SODIUM 220 MG
220 TABLET ORAL 2 TIMES DAILY WITH MEALS
COMMUNITY
End: 2019-09-10

## 2019-04-09 RX ORDER — IBUPROFEN 200 MG
TABLET ORAL
COMMUNITY
End: 2019-09-10

## 2019-04-09 NOTE — PROGRESS NOTES
Chief Complaint   Patient presents with    Ultrasound     1. Have you been to the ER, urgent care clinic since your last visit? Hospitalized since your last visit? No    2. Have you seen or consulted any other health care providers outside of the [address] [place] [address] since your last visit? Include any pap smears or colon screening.  No

## 2019-04-11 ENCOUNTER — LAB ONLY (OUTPATIENT)
Dept: FAMILY MEDICINE CLINIC | Age: 54
End: 2019-04-11

## 2019-04-12 NOTE — PROGRESS NOTES
Subjective  Marga Gilliam is an 48 y.o. female who presents for Ultrasound to assess lump on the neck. Lump under skin 1 month ago over the proximal left clavicle. Mobile and nontender. Has not grown. No injury or trauma to area. Has no similar masses anywhere else on body. CT chest in 3/2018 noted left adrenal nodule measuring 2.6 x 1.9 cm. Not uptodate with mammogram or colonoscopy. Allergies - reviewed: Allergies   Allergen Reactions    Hydrochlorothiazide Anaphylaxis and Unproven on Challenge    Losartan Anaphylaxis and Unproven on Challenge    Sulfa (Sulfonamide Antibiotics) Anaphylaxis     When she was on Losartan (8/2017), and then again when she was on HCTZ (9/2017). She was also on lasix since 2016.  Eggplant Other (comments)     Severe stomach pain     Lasix [Furosemide] Unproven on Challenge     May lead to anaphylaxis          Medications - reviewed:   Current Outpatient Medications   Medication Sig    ibuprofen (MOTRIN) 200 mg tablet Take  by mouth.  naproxen sodium (ALEVE) 220 mg tablet Take 220 mg by mouth two (2) times daily (with meals).  hydrALAZINE (APRESOLINE) 10 mg tablet Take 1 Tab by mouth three (3) times daily.  metoprolol tartrate (LOPRESSOR) 50 mg tablet Take 1 Tab by mouth two (2) times a day.  diphenhydrAMINE (BENADRYL) 25 mg tablet Take 50 mg by mouth every six (6) hours as needed for Itching (allergic reaction). No current facility-administered medications for this visit.           Past Medical History - reviewed:  Past Medical History:   Diagnosis Date    Anxiety and depression     Flu     2 wks ago    GERD (gastroesophageal reflux disease)     Headache(784.0)     Hypertension     Incidental lung nodule, > 3mm and < 8mm 4/20/2016    left lung nodule in smoker, needs follow up CT in Oct 2016          Past Surgical History - reviewed:   Past Surgical History:   Procedure Laterality Date    APPENDECTOMY      HX APPENDECTOMY      HX  SECTION      X 3 Births    HX  SECTION      HX CHOLECYSTECTOMY      HX WRIST FRACTURE TX      metal    REMOVAL GALLBLADDER           Social History - reviewed:  Social History     Tobacco Use    Smoking status: Current Every Day Smoker     Packs/day: 0.50     Years: 25.00     Pack years: 12.50     Types: Cigarettes    Smokeless tobacco: Never Used   Substance and Sexual Activity    Alcohol use: No     Alcohol/week: 0.0 oz    Drug use: No    Sexual activity: Yes     Partners: Male         Family History - reviewed:  Family History   Problem Relation Age of Onset    Diabetes Mother     Hypertension Brother     Hypertension Brother     Hypertension Brother          Immunizations - reviewed:   Immunization History   Administered Date(s) Administered    Influenza Vaccine 10/22/2012    Pneumococcal Conjugate (PCV-7) 2013    Pneumococcal Polysaccharide (PPSV-23) 2017    Tdap 2013       ROS  CONSTITUTIONAL: Denies: fever, chills, weakness, weight gain or loss  CARDIOVASCULAR: Denies: chest pain, palpitations, dyspnea on exertion, orthopnea, edema   RESPIRATORY: Denies: cough, wheezing  ENDOCRINE: Denies: polydipsia/polyuria, palpitations, skin changes, temperature intolerance  GI: Denies: abdominal pain, nausea, vomiting, diarrhea, constipation, blood in stool    Physical Exam  Visit Vitals  /84   Pulse 69   Temp 98.6 °F (37 °C) (Oral)   Resp 18   Ht 5' (1.524 m)   Wt 295 lb (133.8 kg)   LMP 2012   SpO2 93%   BMI 57.61 kg/m²       GEN: No apparent distress. Alert and oriented and responds to all questions appropriately. Morbidly Obese. NECK: Supple; no masses; thyroid normal. Large neck circumference. Hump on posterior neck.   LUNGS: Respirations unlabored; clear to auscultation bilaterally  CARDIOVASCULAR: Regular, rate, and rhythm without murmurs, gallops or rubs   ABDOMEN: Soft; nontender; nondistended; normoactive bowel sounds  NEUROLOGIC: No focal neurologic deficits. Strength and sensation grossly intact. Coordination and gait grossly intact. EXT: 2+ non-pitting edema in lower ext extending to thighs. Negative homen  SKIN: mobile nontender mass noted on the proximal aspect of the left clavicle measuring [id number]. No erythema, warmth. No obvious rashes or erythema or hives. POC ultrasound completed with Dr. Kristen Moss.   INDICATION: lump in neck    Using ultrasound system, a linear probe was used in the the area of lump over the left proximal clavicle was evaluated. There was a 1.7 cm x 1 cm x 1.2 cm collection of muscle without discrete, hypoechoic focus. Thyroid was also examined, isoechoic nodule in the left thyroid lobe noted to be [id number]. Posterior neck hump was studied and noted subcutaneous fat. Permanent images were captured and electronically archived. Assessment/Plan    ICD-10-CM ICD-9-CM    1. Lump in neck R22.1 784.2 US THYROID/PARATHYROID/SOFT TISS   2. Thyroid nodule E04.1 241.0 US THYROID/PARATHYROID/SOFT TISS   3. Screening for breast cancer Z12.31 V76.10 [facility] MAMMO BI SCREENING INCL CAD   4. Colon cancer screening Z12.11 V76.51 REFERRAL TO GASTROENTEROLOGY   5. Essential hypertension I10 401.9 ALDOSTERONE/RENIN RATIO   6. Adrenal nodule (HCC) E27.9 255.8 ACTH      CORTISOL, SERUM LCMS     -Will send the pt for official thyroid and neck US. -Ordered mammogram and colonoscopy   -Ordered labs to further evaluate possible causes of adrenal nodule noted on CT. Pt to return to obtain labs in morning.   -All questions answered  -Precautions given  -Pt was seen with attending.     RTC after obtain US or if symptoms worse        Raven Espinal DO  Family Medicine Resident

## 2019-04-12 NOTE — PROGRESS NOTES
[address] Medicine Residency Attending Addendum:  I saw and evaluated the patient on the day of the encounter with Timoteo Sapp DO  , performing the key elements of the service. I discussed the findings, assessment and plan with the resident and agree with the resident's findings and plan as documented in the resident's note.       Venkat Ricci MD, CAQSM, RMSK

## 2019-04-15 ENCOUNTER — HOSPITAL ENCOUNTER (OUTPATIENT)
Dept: ULTRASOUND IMAGING | Age: 54
Discharge: HOME | End: 2019-04-15
Attending: FAMILY MEDICINE
Payer: SUBSIDIZED

## 2019-04-15 ENCOUNTER — HOSPITAL ENCOUNTER (OUTPATIENT)
Dept: MAMMOGRAPHY | Age: 54
Discharge: HOME | End: 2019-04-15
Attending: FAMILY MEDICINE
Payer: SUBSIDIZED

## 2019-04-15 DIAGNOSIS — Z12.39 SCREENING FOR BREAST CANCER: ICD-10-CM

## 2019-04-15 DIAGNOSIS — E04.1 THYROID NODULE: ICD-10-CM

## 2019-04-15 DIAGNOSIS — R22.1 LUMP IN NECK: ICD-10-CM

## 2019-04-15 PROCEDURE — 76536 US EXAM OF HEAD AND NECK: CPT

## 2019-04-15 PROCEDURE — 77067 SCR MAMMO BI INCL CAD: CPT

## 2019-04-18 ENCOUNTER — TELEPHONE (OUTPATIENT)
Dept: FAMILY MEDICINE CLINIC | Age: 54
End: 2019-04-18

## 2019-04-18 DIAGNOSIS — E04.1 THYROID NODULE: Primary | ICD-10-CM

## 2019-04-18 NOTE — TELEPHONE ENCOUNTER
Pt called and discussed ultrasound results that she needs to go to ENT for FNA.     Kourtney De Souza,

## 2019-04-19 LAB
ACTH PLAS-MCNC: 13.7 PG/ML (ref 7.2–63.3)
ALDOST SERPL-MCNC: 8.1 NG/DL (ref 0–30)
ALDOST/RENIN PLAS-RTO: >48.5 {RATIO} (ref 0–30)
CORTIS SERPL-MCNC: 17 UG/DL
RENIN PLAS-CCNC: <0.167 NG/ML/HR (ref 0.17–5.38)

## 2019-04-22 ENCOUNTER — TELEPHONE (OUTPATIENT)
Dept: ENDOCRINOLOGY | Age: 54
End: 2019-04-22

## 2019-04-22 NOTE — TELEPHONE ENCOUNTER
----- Message from Sylvia Santizo DO sent at 4/22/2019 12:21 PM EDT -----  Good afternoon Dr. Justa Bush,    My name is lauren, I am a family practice resident. I would like to share a patient that I am taking care of and see if it is appropriate to refer her to you or not. Ms. Sin Sam is a 47 yo who had a incidental findings of left adrenal nodule measuring 2.6 x 1.9 cm on 3/16 on CT chest. I collected labs to which were only significant for aldosterone/renin ratio of greater than 48 and renin activity less than 0.167.    -Cortisol level wnl  -ACTH wnl  -Never had hypokalemia  -HTN overall controlled (but does fluctuate) with hydralazine and metoprolol as pt had hx of anaphylaxis while she was on HCTZ,losartan, lasix or lisinopril. She was seen by allergist they told pt (per her report) she has a syndrome of having rrandom anaphylaxis reactions and unrelated to medications.  -had a thyroid nodule also recently which noted to have increased in size to 1.4 cm from 1 cm with hx of TSH wnl in 9/2017. She was sent to ENT for this. What do you recommend to do next because according to uptodate and current 2019, she has primary aldosteronism. Should I do confirmatory testing or is this a case which I should refer?     Thank you so much for your time,  Lane Whitman  PGY2 family practice resident

## 2019-04-22 NOTE — TELEPHONE ENCOUNTER
Homar Matson,    Thanks for your note. If you read the up-to-date guidelines closely, she does not have an aldosterone level over 10 but her suppressed renin and high aldosterone/renin ratio over 30 does look suspicious for this diagnosis of primary hyperaldosteronism in the setting of an adrenal incidentaloma. With adrenal incidentaloma, you actually don't want a random morning cortisol. Instead you want to obtain a dexamethasone suppressed cortisol to exclude subclinical Cushing's disease and also to be safe, need to exclude pheochromocytoma, though I have a low suspicion for both of these diagnoses. I would do the followin) Order a morning cortisol and plasma metanephrine level. 2) Order dexamethasone 1 mg tablet and have her take this at 11pm the night before coming to have her labs drawn fasting between 7:30-9am.    3) If her metanephrines are normal and her dex suppressed cortisol is < 1.8 then you have ruled out these conditions. 4) Given her morbid obesity, I don't think she would be an ideal surgical candidate to remove this adenoma even if she were to have testing confirming that she has primary hyperaldosteronism so I would not pursue confirmatory testing and instead would treat her empirically for this condition  Therefore, I would recommend starting spironolactone 50 mg daily and tapering off the hydralazine to 1 tab twice daily x 4 days and then 1 tab daily x 4 days and then stop and see if her BP is < 140/90 on just the spironolactone and metoprolol. If so, you could then push the spironolactone to 100 mg per day and titrate off the metoprolol to 1/2 tab twice daily x 4 days then 1/2 tab daily x 4 days and then stop and see if BP remains < 140. If needed you can increase the duke to 150-200 mg daily to get her BP controlled and come off the other meds. 5) She will not need further imaging of her adrenal gland as it has been confirmed to be stable on repeat imaging since 2016.     6) She should not need a referral at this time. Please let me know if you need anything else.     Take care,  Suha Ferreira

## 2019-04-23 ENCOUNTER — TELEPHONE (OUTPATIENT)
Dept: FAMILY MEDICINE CLINIC | Age: 54
End: 2019-04-23

## 2019-04-23 DIAGNOSIS — M54.50 ACUTE LOW BACK PAIN WITHOUT SCIATICA, UNSPECIFIED BACK PAIN LATERALITY: ICD-10-CM

## 2019-04-23 DIAGNOSIS — E27.8 ADRENAL NODULE (HCC): Primary | ICD-10-CM

## 2019-04-23 RX ORDER — CYCLOBENZAPRINE HCL 5 MG
5 TABLET ORAL
Qty: 7 TAB | Refills: 0 | Status: SHIPPED | OUTPATIENT
Start: 2019-04-23 | End: 2019-08-12 | Stop reason: SDUPTHER

## 2019-04-23 RX ORDER — DEXAMETHASONE 1 MG/1
TABLET ORAL
Qty: 1 TAB | Refills: 0 | Status: SHIPPED | OUTPATIENT
Start: 2019-04-23 | End: 2019-09-10

## 2019-04-23 NOTE — TELEPHONE ENCOUNTER
Pt called to discuss labs.  confirmed. Pt asked me to talk to her daughter. Pt has been having low back pain, without numbness to legs or thighs, radiation of pain, loss of bowl or bladder control, dysuria, fever. Pt has had no trauma. States it is a spasm. Taking NSAIDs 200 mg tab q6h and resting. NSAID helping slightly but still having pain at night so cannot sleep. Will need to continue NSAIDS, increase to 400 mg q6h. Will prescribe flexeril to take at night, pt has taken all medications prior without SE. Pt made aware that we will need to do further testing to rule out subclinical Cushing's disease and pheochromocytoma. She is to do this after back has improved. I will order labs and have her take dexamethasone tab night prior to obtain dexamethasone suppressed cortisol. Pt also has appt with ENT for possible FNA on 5/15.     All questions answer  Precautions given    Elham Christian, DO

## 2019-04-24 DIAGNOSIS — I10 ESSENTIAL HYPERTENSION: ICD-10-CM

## 2019-04-24 RX ORDER — HYDRALAZINE HYDROCHLORIDE 10 MG/1
TABLET, FILM COATED ORAL
Qty: 180 TAB | Refills: 0 | Status: SHIPPED | OUTPATIENT
Start: 2019-04-24 | End: 2019-06-27 | Stop reason: SDUPTHER

## 2019-04-24 RX ORDER — METOPROLOL TARTRATE 50 MG/1
TABLET ORAL
Qty: 90 TAB | Refills: 0 | Status: SHIPPED | OUTPATIENT
Start: 2019-04-24 | End: 2019-06-27 | Stop reason: SDUPTHER

## 2019-05-06 ENCOUNTER — LAB ONLY (OUTPATIENT)
Dept: FAMILY MEDICINE CLINIC | Age: 54
End: 2019-05-06

## 2019-05-06 DIAGNOSIS — E27.8 ADRENAL NODULE (HCC): ICD-10-CM

## 2019-05-09 LAB
CORTIS AM PEAK SERPL-MCNC: 0.4 UG/DL (ref 6.2–19.4)
METANEPH FREE SERPL-MCNC: 10 PG/ML (ref 0–62)
NORMETANEPHRINE SERPL-MCNC: 135 PG/ML (ref 0–145)

## 2019-05-15 ENCOUNTER — TELEPHONE (OUTPATIENT)
Dept: FAMILY MEDICINE CLINIC | Age: 54
End: 2019-05-15

## 2019-05-15 NOTE — TELEPHONE ENCOUNTER
FW--[phone]  Sherrill of Dr [facility] office called to say the patient is there now. Said the patient has speaking with the doctor here and was to have sent over the lab results. Referrals took the call.

## 2019-05-30 ENCOUNTER — HOSPITAL ENCOUNTER (OUTPATIENT)
Dept: ULTRASOUND IMAGING | Age: 54
Discharge: HOME | End: 2019-05-30
Attending: SPECIALIST
Payer: SUBSIDIZED

## 2019-05-30 DIAGNOSIS — E04.1 THYROID NODULE: ICD-10-CM

## 2019-05-30 DIAGNOSIS — J38.4 LARYNGEAL EDEMA: ICD-10-CM

## 2019-05-30 DIAGNOSIS — R06.1 STRIDOR: ICD-10-CM

## 2019-05-30 PROCEDURE — 77030014115

## 2019-05-30 PROCEDURE — 74011250636 HC RX REV CODE- 250/636: Performed by: RADIOLOGY

## 2019-05-30 PROCEDURE — 10005 FNA BX W/US GDN 1ST LES: CPT

## 2019-05-30 PROCEDURE — 88172 CYTP DX EVAL FNA 1ST EA SITE: CPT

## 2019-05-30 PROCEDURE — 88173 CYTOPATH EVAL FNA REPORT: CPT

## 2019-05-30 RX ORDER — LIDOCAINE HYDROCHLORIDE 10 MG/ML
10 INJECTION, SOLUTION EPIDURAL; INFILTRATION; INTRACAUDAL; PERINEURAL
Status: COMPLETED | OUTPATIENT
Start: 2019-05-30 | End: 2019-05-30

## 2019-05-30 RX ADMIN — LIDOCAINE HYDROCHLORIDE 10 ML: 10 INJECTION, SOLUTION EPIDURAL; INFILTRATION; INTRACAUDAL; PERINEURAL at 09:20

## 2019-06-27 ENCOUNTER — TELEPHONE (OUTPATIENT)
Dept: FAMILY MEDICINE CLINIC | Age: 54
End: 2019-06-27

## 2019-06-27 DIAGNOSIS — I10 ESSENTIAL HYPERTENSION: ICD-10-CM

## 2019-06-27 RX ORDER — METOPROLOL TARTRATE 50 MG/1
TABLET ORAL
Qty: 90 TAB | Refills: 1 | Status: SHIPPED | OUTPATIENT
Start: 2019-06-27 | End: 2019-09-10 | Stop reason: SDUPTHER

## 2019-06-27 RX ORDER — HYDRALAZINE HYDROCHLORIDE 10 MG/1
TABLET, FILM COATED ORAL
Qty: 180 TAB | Refills: 1 | Status: SHIPPED | OUTPATIENT
Start: 2019-06-27 | End: 2019-09-10 | Stop reason: SDUPTHER

## 2019-06-27 NOTE — TELEPHONE ENCOUNTER
--[phone]  , Anya,  (NOT ON HIPPA), called to say he pharmacy was to have sent this request for blood pressure refill. He did not know the name of it. Said the patient is out of medication and was advised to contact the pharmacy for an emergency supply.

## 2019-08-12 ENCOUNTER — OFFICE VISIT (OUTPATIENT)
Dept: FAMILY MEDICINE CLINIC | Age: 54
End: 2019-08-12

## 2019-08-12 VITALS
SYSTOLIC BLOOD PRESSURE: 137 MMHG | WEIGHT: 293 LBS | HEART RATE: 68 BPM | DIASTOLIC BLOOD PRESSURE: 80 MMHG | TEMPERATURE: 98 F | OXYGEN SATURATION: 94 % | BODY MASS INDEX: 57.52 KG/M2 | HEIGHT: 60 IN

## 2019-08-12 DIAGNOSIS — E04.1 THYROID NODULE: ICD-10-CM

## 2019-08-12 DIAGNOSIS — M25.562 PAIN IN BOTH KNEES, UNSPECIFIED CHRONICITY: ICD-10-CM

## 2019-08-12 DIAGNOSIS — M79.10 MUSCLE PAIN: ICD-10-CM

## 2019-08-12 DIAGNOSIS — M25.561 PAIN IN BOTH KNEES, UNSPECIFIED CHRONICITY: ICD-10-CM

## 2019-08-12 DIAGNOSIS — F17.200 SMOKER: ICD-10-CM

## 2019-08-12 DIAGNOSIS — M54.50 ACUTE LOW BACK PAIN WITHOUT SCIATICA, UNSPECIFIED BACK PAIN LATERALITY: Primary | ICD-10-CM

## 2019-08-12 RX ORDER — CYCLOBENZAPRINE HCL 5 MG
5 TABLET ORAL
Qty: 12 TAB | Refills: 0 | Status: SHIPPED | OUTPATIENT
Start: 2019-08-12 | End: 2019-09-10 | Stop reason: SDUPTHER

## 2019-08-12 RX ORDER — DULOXETIN HYDROCHLORIDE 30 MG/1
30 CAPSULE, DELAYED RELEASE ORAL DAILY
Qty: 30 CAP | Refills: 0 | Status: SHIPPED | OUTPATIENT
Start: 2019-08-12 | End: 2019-09-10 | Stop reason: SDUPTHER

## 2019-08-12 NOTE — PATIENT INSTRUCTIONS
Tylenol  500mg every 8 hours. Do not take more than 4000 mg per day    Aleve  600 mg every 8 hours. Do not take more than 2400 mg per day    Flexeril at night.     cymbalta for chronic pain daily    Follow up in 1 week         Low Back Pain: Exercises  Introduction  Here are some examples of exercises for you to try. The exercises may be suggested for a condition or for rehabilitation. Start each exercise slowly. Ease off the exercises if you start to have pain. You will be told when to start these exercises and which ones will work best for you. How to do the exercises  Press-up    1. Lie on your stomach, supporting your body with your forearms. 2. Press your elbows down into the floor to raise your upper back. As you do this, relax your stomach muscles and allow your back to arch without using your back muscles. As your press up, do not let your hips or pelvis come off the floor. 3. Hold for 15 to 30 seconds, then relax. 4. Repeat 2 to 4 times. Alternate arm and leg (bird dog) exercise    1. Start on the floor, on your hands and knees. 2. Tighten your belly muscles. 3. Raise one leg off the floor, and hold it straight out behind you. Be careful not to let your hip drop down, because that will twist your trunk. 4. Hold for about 6 seconds, then lower your leg and switch to the other leg. 5. Repeat 8 to 12 times on each leg. 6. Over time, work up to holding for 10 to 30 seconds each time. 7. If you feel stable and secure with your leg raised, try raising the opposite arm straight out in front of you at the same time. Knee-to-chest exercise    1. Lie on your back with your knees bent and your feet flat on the floor. 2. Bring one knee to your chest, keeping the other foot flat on the floor (or keeping the other leg straight, whichever feels better on your lower back). 3. Keep your lower back pressed to the floor. Hold for at least 15 to 30 seconds.   4. Relax, and lower the knee to the starting position. 5. Repeat with the other leg. Repeat 2 to 4 times with each leg. 6. To get more stretch, put your other leg flat on the floor while pulling your knee to your chest.    Curl-ups    1. Lie on the floor on your back with your knees bent at a 90-degree angle. Your feet should be flat on the floor, about 12 inches from your buttocks. 2. Cross your arms over your chest. If this bothers your neck, try putting your hands behind your neck (not your head), with your elbows spread apart. 3. Slowly tighten your belly muscles and raise your shoulder blades off the floor. 4. Keep your head in line with your body, and do not press your chin to your chest.  5. Hold this position for 1 or 2 seconds, then slowly lower yourself back down to the floor. 6. Repeat 8 to 12 times. Pelvic tilt exercise    1. Lie on your back with your knees bent. 2. \"Brace\" your stomach. This means to tighten your muscles by pulling in and imagining your belly button moving toward your spine. You should feel like your back is pressing to the floor and your hips and pelvis are rocking back. 3. Hold for about 6 seconds while you breathe smoothly. 4. Repeat 8 to 12 times. Heel dig bridging    1. Lie on your back with both knees bent and your ankles bent so that only your heels are digging into the floor. Your knees should be bent about 90 degrees. 2. Then push your heels into the floor, squeeze your buttocks, and lift your hips off the floor until your shoulders, hips, and knees are all in a straight line. 3. Hold for about 6 seconds as you continue to breathe normally, and then slowly lower your hips back down to the floor and rest for up to 10 seconds. 4. Do 8 to 12 repetitions. Hamstring stretch in doorway    1. Lie on your back in a doorway, with one leg through the open door. 2. Slide your leg up the wall to straighten your knee. You should feel a gentle stretch down the back of your leg.   3. Hold the stretch for at least 15 to 30 seconds. Do not arch your back, point your toes, or bend either knee. Keep one heel touching the floor and the other heel touching the wall. 4. Repeat with your other leg. 5. Do 2 to 4 times for each leg. Hip flexor stretch    1. Kneel on the floor with one knee bent and one leg behind you. Place your forward knee over your foot. Keep your other knee touching the floor. 2. Slowly push your hips forward until you feel a stretch in the upper thigh of your rear leg. 3. Hold the stretch for at least 15 to 30 seconds. Repeat with your other leg. 4. Do 2 to 4 times on each side. Wall sit    1. Stand with your back 10 to 12 inches away from a wall. 2. Lean into the wall until your back is flat against it. 3. Slowly slide down until your knees are slightly bent, pressing your lower back into the wall. 4. Hold for about 6 seconds, then slide back up the wall. 5. Repeat 8 to 12 times. Follow-up care is a key part of your treatment and safety. Be sure to make and go to all appointments, and call your doctor if you are having problems. It's also a good idea to know your test results and keep a list of the medicines you take. Where can you learn more? Go to http://shima-alyssa.info/. Enter Z083 in the search box to learn more about \"Low Back Pain: Exercises. \"  Current as of: September 20, 2018  Content Version: 12.1  © 5664-2588 [employer]. Care instructions adapted under license by [employer] (which disclaims liability or warranty for this information). If you have questions about a medical condition or this instruction, always ask your healthcare professional. [place] [address] any warranty or liability for your use of this information. Patellofemoral Pain Syndrome (Runner's Knee): Exercises  Introduction  Here are some examples of exercises for you to try. The exercises may be suggested for a condition or for rehabilitation. Start each exercise slowly. Ease off the exercises if you start to have pain. You will be told when to start these exercises and which ones will work best for you. How to do the exercises  Calf wall stretch    1. Stand facing a wall with your hands on the wall at about eye level. Put your affected leg about a step behind your other leg. 2. Keeping your back leg straight and your back heel on the floor, bend your front knee and gently bring your hip and chest toward the wall until you feel a stretch in the calf of your back leg. 3. Hold the stretch for at least 15 to 30 seconds. 4. Repeat 2 to 4 times. 5. Repeat steps 1 through 4, but this time keep your back knee bent. Quadriceps stretch    1. If you are not steady on your feet, hold on to a chair, counter, or wall. 2. Bend your affected leg, and reach behind you to grab the front of your foot or ankle with the hand on the same side. For example, if you are stretching your right leg, use your right hand. 3. Keeping your knees next to each other, pull your foot toward your buttock until you feel a gentle stretch across the front of your hip and down the front of your thigh. Your knee should be pointed directly to the ground, and not out to the side. 4. Hold the stretch for at least 15 to 30 seconds. 5. Repeat 2 to 4 times. Hamstring wall stretch    1. Lie on your back in a doorway, with your good leg through the open door. 2. Slide your affected leg up the wall to straighten your knee. You should feel a gentle stretch down the back of your leg. 1. Do not arch your back. 2. Do not bend either knee. 3. Keep one heel touching the floor and the other heel touching the wall. Do not point your toes. 3. Hold the stretch for at least 1 minute. Then over time, try to lengthen the time you hold the stretch to as long as 6 minutes. 4. Repeat 2 to 4 times.   5. If you do not have a place to do this exercise in a doorway, there is another way to do it:  6. Lie on your back, and bend your affected leg. 7. Loop a towel under the ball and toes of that foot, and hold the ends of the towel in your hands. 8. Straighten your knee, and slowly pull back on the towel. You should feel a gentle stretch down the back of your leg. 9. Hold the stretch for at least 15 to 30 seconds. Or even better, hold the stretch for 1 minute if you can. 10. Repeat 2 to 4 times. Quad sets    1. Sit with your affected leg straight and supported on the floor or a firm bed. Place a small, rolled-up towel under your affected knee. Your other leg should be bent, with that foot flat on the floor. 2. Tighten the thigh muscles of your affected leg by pressing the back of your knee down into the towel. 3. Hold for about 6 seconds, then rest for up to 10 seconds. 4. Repeat 8 to 12 times. Straight-leg raises to the front    1. Lie on your back with your good knee bent so that your foot rests flat on the floor. Your affected leg should be straight. Make sure that your low back has a normal curve. You should be able to slip your hand in between the floor and the small of your back, with your palm touching the floor and your back touching the back of your hand. 2. Tighten the thigh muscles in your affected leg by pressing the back of your knee flat down to the floor. Hold your knee straight. 3. Keeping the thigh muscles tight and your leg straight, lift your affected leg up so that your heel is about 12 inches off the floor. 4. Hold for about 6 seconds, then lower your leg slowly. Rest for up to 10 seconds between repetitions. 5. Repeat 8 to 12 times. Straight-leg raises to the back    1. Lie on your stomach, and lift your leg straight up behind you (toward the ceiling). 2. Lift your toes about 6 inches off the floor, hold for about 6 seconds, then lower slowly. 3. Do 8 to 12 repetitions. Wall slide with ball squeeze    1.  Stand with your back against a wall and with your feet about shoulder-width apart. Your feet should be about 12 inches away from the wall. 2. Put a ball about the size of a soccer ball between your knees. Then slowly slide down the wall until your knees are bent about 20 to 30 degrees. 3. Tighten your thigh muscles by squeezing the ball between your knees. Hold that position for about 10 seconds, then stop squeezing. Rest for up to 10 seconds between repetitions. 4. Repeat 8 to 12 times. Follow-up care is a key part of your treatment and safety. Be sure to make and go to all appointments, and call your doctor if you are having problems. It's also a good idea to know your test results and keep a list of the medicines you take. Where can you learn more? Go to http://shima-alyssa.info/. Enter A404 in the search box to learn more about \"Patellofemoral Pain Syndrome (Runner's Knee): Exercises. \"  Current as of: September 20, 2018  Content Version: 12.1  © 4338-5285 [employer], [employer]. Care instructions adapted under license by [employer] (which disclaims liability or warranty for this information). If you have questions about a medical condition or this instruction, always ask your healthcare professional. [place] [address] any warranty or liability for your use of this information. Quadricep Muscle Strain: Rehab Exercises  Introduction  Here are some examples of exercises for you to try. The exercises may be suggested for a condition or for rehabilitation. Start each exercise slowly. Ease off the exercises if you start to have pain. You will be told when to start these exercises and which ones will work best for you. How to do the exercises  Standing quadriceps stretch    6. If you are not steady on your feet, hold on to a chair, counter, or wall. 7. Bend the knee of the leg you want to stretch, and reach behind you to grab the front of your foot or ankle with the hand on the same side.  For example, if you are stretching your right leg, use your right hand. 8. Keeping your knees next to each other, pull your foot toward your buttock until you feel a gentle stretch across the front of your hip and down the front of your thigh. Your knee should be pointed directly to the ground, and not out to the side. 9. Hold the stretch for at least 15 to 30 seconds. 10. Repeat 2 to 4 times. Quadricep and hip flexor stretch (lying on side)    6. Lie on your side with your good leg flat on the floor and your hand supporting your head. 7. Bend your top leg, and reach behind you to grab the front of that foot or ankle with your other hand. 8. Stretch your leg back by pulling your foot toward your buttock. You will feel the stretch in the front of your thigh. If this causes stress on your knee, do not do this stretch. 9. Hold the stretch for at least 15 to 30 seconds. 10. Repeat 2 to 4 times. Hamstring stretch (lying down)    11. Lie flat on your back with your legs straight. If you feel discomfort in your back, place a small towel roll under your lower back. 12. Holding the back of your affected leg for support, lift your leg straight up and toward your body until you feel a stretch at the back of your thigh. 13. Hold the stretch for at least 30 seconds. 14. Repeat 2 to 4 times. Follow-up care is a key part of your treatment and safety. Be sure to make and go to all appointments, and call your doctor if you are having problems. It's also a good idea to know your test results and keep a list of the medicines you take. Where can you learn more? Go to http://shima-alyssa.info/. Enter D690 in the search box to learn more about \"Quadricep Muscle Strain: Rehab Exercises. \"  Current as of: September 20, 2018  Content Version: 12.1  © 3557-4768 [employer]wise, [employer]. Care instructions adapted under license by [employer] (which disclaims liability or warranty for this information).  If you have questions about a medical condition or this instruction, always ask your healthcare professional. [address] any warranty or liability for your use of this information. Hamstring Strain: Rehab Exercises  Introduction  Here are some examples of exercises for you to try. The exercises may be suggested for a condition or for rehabilitation. Start each exercise slowly. Ease off the exercises if you start to have pain. You will be told when to start these exercises and which ones will work best for you. How to do the exercises  Hamstring set (heel dig)    11. Sit with your affected leg bent. Your good leg should be straight and supported on the floor. 12. Tighten the muscles on the back of your bent leg (hamstring) by pressing your heel into the floor. 13. Hold for about 6 seconds, and then rest for up to 10 seconds. 14. Repeat 8 to 12 times. Hamstring curl    11. Lie on your stomach with your knees straight. Place a pillow under your stomach. If your kneecap is uncomfortable, roll up a washcloth and put it under your leg just above your kneecap. 12. Lift the foot of your affected leg by bending your knee so that you bring your foot up toward your buttock. If this motion hurts, try it without bending your knee quite as far. This may help you avoid any painful motion. 13. Slowly move your leg up and down. 14. Repeat 8 to 12 times. 15. When you can do this exercise with ease and no pain, add some resistance. To do this:  16. Tie the ends of an exercise band together to form a loop. Attach one end of the loop to a secure object or shut a door on it to hold it in place. (Or you can have someone hold one end of the loop to provide resistance.)  17. Loop the other end of the exercise band around the lower part of your affected leg. 18. Repeat steps 1 through 4, slowly pulling back on the exercise band with your leg. Hip extension    15.  Stand facing a wall with your hands on the wall at about chest level. 16. Keeping the knee of your affected leg straight, kick that leg straight back behind you. 17. Relax, and lower your leg back to the starting position. 18. Repeat 8 to 12 times. 19. When you can do this exercise with ease and no pain, add some resistance. To do this:  20. Tie the ends of an exercise band together to form a loop. Attach one end of the loop to a secure object or shut a door on it to hold it in place. (Or you can have someone hold one end of the loop to provide resistance.)  21. Loop the other end of the exercise band around the lower part of your affected leg. 22. Repeat steps 1 through 4, slowly pulling back on the exercise band with your leg. Hamstring wall stretch    5. Lie on your back in a doorway, with your good leg through the open door. 6. Slide your affected leg up the wall to straighten your knee. You should feel a gentle stretch down the back of your leg. 1. Do not arch your back. 2. Do not bend either knee. 3. Keep one heel touching the floor and the other heel touching the wall. Do not point your toes. 7. Hold the stretch for at least 1 minute to begin. Then try to lengthen the time you hold the stretch to as long as 6 minutes. 8. Repeat 2 to 4 times. 9. If you do not have a place to do this exercise in a doorway, there is another way to do it:  10. Lie on your back, and bend the knee of your affected leg. 11. Loop a towel under the ball and toes of that foot, and hold the ends of the towel in your hands. 12. Straighten your knee, and slowly pull back on the towel. You should feel a gentle stretch down the back of your leg. 13. Hold the stretch for 15 to 30 seconds. Or even better, hold the stretch for 1 minute if you can. 14. Repeat 2 to 4 times. Calf stretch    6. Stand facing a wall with your hands on the wall at about eye level. Put your affected leg about a step behind your other leg.   7. Keeping your back leg straight and your back heel on the floor, bend your front knee and gently bring your hip and chest toward the wall until you feel a stretch in the calf of your back leg. 8. Hold the stretch for 15 to 30 seconds. 9. Repeat 2 to 4 times. 10. Repeat steps 1 through 4, but this time keep your back knee bent. Single-leg balance    4. Stand on a flat surface with your arms stretched out to your sides like you are making the letter \"T. \" Then lift your good leg off the floor, bending it at the knee. If you are not steady on your feet, use one hand to hold on to a chair, counter, or wall. 5. Standing on your affected leg, keep that knee straight. Try to balance on that leg for up to 30 seconds. Then rest for up to 10 seconds. 6. Repeat 6 to 8 times. 7. When you can balance on your affected leg for 30 seconds with your eyes open, try to balance on it with your eyes closed. 8. When you can do this exercise with your eyes closed for 30 seconds and with ease and no pain, try standing on a pillow or piece of foam, and repeat steps 1 through 4. Follow-up care is a key part of your treatment and safety. Be sure to make and go to all appointments, and call your doctor if you are having problems. It's also a good idea to know your test results and keep a list of the medicines you take. Where can you learn more? Go to http://shima-alyssa.info/. Enter [id number] in the search box to learn more about \"Hamstring Strain: Rehab Exercises. \"  Current as of: September 20, 2018  Content Version: 12.1  © 8189-6878 [employer], [employer]. Care instructions adapted under license by [employer] (which disclaims liability or warranty for this information). If you have questions about a medical condition or this instruction, always ask your healthcare professional. [address] any warranty or liability for your use of this information.

## 2019-08-12 NOTE — PROGRESS NOTES
Subjective  Alanna Mcneil is an 48 y.o. female who presents for back pain, muscle pain. Back pain: Pt shares that she developed lower back pain 2 days ago. Mostly on low left. No trauma or falls. She woke up with pain. 6/10. Spasm-like. Nothing relief pain, attempted tylenol. Denies loss of bladder or bowls, inner thigh sensation loss. Pt also states that she has been inactive 2/2 to chronic muscle pain and knee pain. Started 3 months ago. Feels like she wakes up and all muscles are tight. Improves after ambulation and moving around. She noticed that the knee pain is getting worse with in activity. Effecting anterior knee jt b/l. Is aware that her weight is not helping the issue. Attempted to go to [facility] to use pool but states all she did was seat in pool. Hx of lymphedema b/l overall unchanged. Pt has attempted to lose weight. Having a hard time. States she attempted portion control but has noticed a 7 lbs weight gain. Cannot afford to go to weight loss clinic. Not interested in bariatric surgery. She states that if only her back pain and muscle pain resolve she can be more active and so she can exercise. Pt also has a FNA for thyroid nodule in 5/30. Asking for thyroid function be checked. ENT told her that FNA results were unremarkable.      Allergies - reviewed: Allergies   Allergen Reactions    Hydrochlorothiazide Anaphylaxis and Unproven on Challenge    Losartan Anaphylaxis and Unproven on Challenge    Sulfa (Sulfonamide Antibiotics) Anaphylaxis     When she was on Losartan (8/2017), and then again when she was on HCTZ (9/2017). She was also on lasix since 2016.  Eggplant Other (comments)     Severe stomach pain     Lasix [Furosemide] Unproven on Challenge     May lead to anaphylaxis          Medications - reviewed:   Current Outpatient Medications   Medication Sig    metFORMIN (GLUCOPHAGE) 1,000 mg tablet Take 1 Tab by mouth daily.  Indications: prevention of type 2 diabetes mellitus  cyclobenzaprine (FLEXERIL) 5 mg tablet Take 1 Tab by mouth nightly.  DULoxetine (CYMBALTA) 30 mg capsule Take 1 Cap by mouth daily. Indications: Chronic Muscle or Bone Pain    hydrALAZINE (APRESOLINE) 10 mg tablet TAKE ONE TABLET BY MOUTH THREE TIMES A DAY    metoprolol tartrate (LOPRESSOR) 50 mg tablet TAKE ONE TABLET BY MOUTH TWICE A DAY    dexamethasone (DECADRON) 1 mg tablet Take this pill at 11pm the night BEFORE coming to have labs drawn FASTING between 8am-9am    ibuprofen (MOTRIN) 200 mg tablet Take  by mouth.  naproxen sodium (ALEVE) 220 mg tablet Take 220 mg by mouth two (2) times daily (with meals).  diphenhydrAMINE (BENADRYL) 25 mg tablet Take 50 mg by mouth every six (6) hours as needed for Itching (allergic reaction). No current facility-administered medications for this visit.           Past Medical History - reviewed:  Past Medical History:   Diagnosis Date    Anxiety and depression     Flu     2 wks ago    GERD (gastroesophageal reflux disease)     Headache(784.0)     Hypertension     Incidental lung nodule, > 3mm and < 8mm 4/20/2016    left lung nodule in smoker, needs follow up CT in Oct 2016          Social History - reviewed:  Social History     Tobacco Use    Smoking status: Current Every Day Smoker     Packs/day: 0.50     Years: 25.00     Pack years: 12.50     Types: Cigarettes    Smokeless tobacco: Never Used   Substance and Sexual Activity    Alcohol use: No     Alcohol/week: 0.0 standard drinks    Drug use: No    Sexual activity: Yes     Partners: Male         Family History - reviewed:  Family History   Problem Relation Age of Onset    Diabetes Mother     Hypertension Brother     Hypertension Brother     Hypertension Brother          Immunizations - reviewed:   Immunization History   Administered Date(s) Administered    Influenza Vaccine 10/22/2012    Pneumococcal Conjugate (PCV-7) 11/25/2013    Pneumococcal Polysaccharide (PPSV-23) 03/04/2017    Tdap 11/25/2013       ROS  CONSTITUTIONAL: weight gain, Denies: fever, chills  CARDIOVASCULAR: edema, Denies: chest pain, palpitations  RESPIRATORY: Denies: cough, wheezing  ENDOCRINE: Denies: polydipsia/polyuria, palpitations, skin changes, temperature intolerance  GI: Denies: abdominal pain, nausea, vomiting, diarrhea, constipation, blood in stool  : Denies: dysuria, frequency/urgency, pelvic pain  NEURO: Denies: dizzy/vertigo, headache, focal weakness, speech problems, confusion  MUSCULOSKELETAL: back pain Denies: joint stiffness, joint swelling  SKIN: Denies itching, hives, rash  PSYCH: Denies: anxiety, suicidal ideation      Physical Exam  Visit Vitals  /80   Pulse 68   Temp 98 °F (36.7 °C)   Ht 5' (1.524 m)   Wt 302 lb (137 kg)   LMP 12/31/2012   SpO2 94%   BMI 58.98 kg/m²       GEN: No apparent distress. Alert and oriented and responds to all questions appropriately. Morbidly Obese. Upper sorbian speaker. NECK: Supple; thyroid exam noted left lobe nodule. Large neck circumference. LUNGS: Respirations unlabored; clear to auscultation bilaterally  CARDIOVASCULAR: Regular, rate, and rhythm without murmurs, gallops or rubs   NEUROLOGIC: No focal neurologic deficits. Strength and sensation grossly intact. Coordination and gait grossly intact. BACK: No ecchymosis. Left paraspinal tenderness on level of L1-L4. No vertebral bone pain with palpation. Negative straight leg test.  EXT: nonpitting edema in lower ext extending to thighs. Non-pitting edema in BUE extending to shoulder. Anterior knee pain b/l. ROM intact in B/L knees. Knee ligament testing showed ACL,PC,MCL,LCL intact. 5/5 muscle strength and sensation intact throughtout  SKIN: No obvious rashes or erythema or hives. Edema present without signs of infection. Assessment/Plan    ICD-10-CM ICD-9-CM    1. Acute low back pain without sciatica, unspecified back pain laterality M54.5 724.2 cyclobenzaprine (FLEXERIL) 5 mg tablet   2.  Muscle pain M79.10 729.1 DULoxetine (CYMBALTA) 30 mg capsule   3. Pain in both knees, unspecified chronicity M25.561 719.46     M25.562     4. Thyroid nodule E04.1 241.0 TSH 3RD GENERATION      T4, FREE   5. BMI 50.0-59.9, adult ([facility] [address] Z68.43 V85.43    6. Smoker F17.200 305.1      Back pain: likely msk etiology. Reassuring exam. Will alternate tylenol and NSAID, daily upper dose limits discussed. PRN flexeril qhs, SE profile reviewed, pt understands to not drive while on medication. HEP given. Muscle pain and b/l knee pain: likely related to weight. Exam was negative for calf tenderness or unilateral edema. At this point no benefits from getting xrays. Will treat with Cymbalta, SE profile discussed. NSAIDs PRN. HEP given. Will check labs as noted above     I have reviewed/discussed the above normal BMI with the patient. I have recommended the following interventions: dietary management education, guidance, and counseling, encourage exercise and monitor weight . The patient was counseled on the dangers of tobacco use, and was advised to quit. Reviewed strategies to maximize success, including removing cigarettes and smoking materials from environment, stress management, substitution of other forms of reinforcement and support of family/friends. Pt not ready to quit. Follow-up and Dispositions    · Return in about 1 week (around 8/19/2019), or if symptoms worsen or fail to improve, for f/u. I have discussed the diagnosis with the patient and the intended plan as seen in the above orders. Patient verbalized understanding of the plan and agrees with the plan. The patient has received an after-visit summary and questions were answered concerning future plans. I have discussed medication side effects and warnings with the patient as well. Informed patient to return to the office if new symptoms arise.         Larnell Homans,   Family Medicine Resident

## 2019-08-13 LAB
T4 FREE SERPL-MCNC: 1.12 NG/DL (ref 0.82–1.77)
TSH SERPL DL<=0.005 MIU/L-ACNC: 1.01 UIU/ML (ref 0.45–4.5)

## 2019-08-15 PROBLEM — R73.03 PREDIABETES: Status: ACTIVE | Noted: 2018-12-30

## 2019-08-15 RX ORDER — METFORMIN HYDROCHLORIDE 1000 MG/1
1000 TABLET ORAL DAILY
Qty: 90 TAB | Refills: 0 | Status: SHIPPED | OUTPATIENT
Start: 2019-08-15 | End: 2019-09-10 | Stop reason: SDUPTHER

## 2019-09-10 ENCOUNTER — OFFICE VISIT (OUTPATIENT)
Dept: FAMILY MEDICINE CLINIC | Age: 54
End: 2019-09-10

## 2019-09-10 VITALS
SYSTOLIC BLOOD PRESSURE: 135 MMHG | WEIGHT: 293 LBS | HEIGHT: 60 IN | DIASTOLIC BLOOD PRESSURE: 81 MMHG | HEART RATE: 78 BPM | TEMPERATURE: 98.7 F | OXYGEN SATURATION: 95 % | BODY MASS INDEX: 57.52 KG/M2

## 2019-09-10 DIAGNOSIS — F17.200 SMOKER: ICD-10-CM

## 2019-09-10 DIAGNOSIS — I10 ESSENTIAL HYPERTENSION: Primary | ICD-10-CM

## 2019-09-10 DIAGNOSIS — G47.33 OSA ON CPAP: ICD-10-CM

## 2019-09-10 DIAGNOSIS — Z28.21 REFUSED INFLUENZA VACCINE: ICD-10-CM

## 2019-09-10 DIAGNOSIS — Z13.0 SCREENING FOR DEFICIENCY ANEMIA: ICD-10-CM

## 2019-09-10 DIAGNOSIS — M79.10 MUSCLE PAIN: ICD-10-CM

## 2019-09-10 DIAGNOSIS — Z13.220 SCREENING, LIPID: ICD-10-CM

## 2019-09-10 DIAGNOSIS — Z99.89 OSA ON CPAP: ICD-10-CM

## 2019-09-10 DIAGNOSIS — E11.9 CONTROLLED TYPE 2 DIABETES MELLITUS WITHOUT COMPLICATION, WITHOUT LONG-TERM CURRENT USE OF INSULIN (HCC): ICD-10-CM

## 2019-09-10 RX ORDER — METOPROLOL TARTRATE 50 MG/1
TABLET ORAL
Qty: 90 TAB | Refills: 1 | Status: SHIPPED | OUTPATIENT
Start: 2019-09-10 | End: 2020-01-05 | Stop reason: SDUPTHER

## 2019-09-10 RX ORDER — HYDRALAZINE HYDROCHLORIDE 10 MG/1
TABLET, FILM COATED ORAL
Qty: 180 TAB | Refills: 1 | Status: SHIPPED | OUTPATIENT
Start: 2019-09-10 | End: 2020-01-04 | Stop reason: SDUPTHER

## 2019-09-10 RX ORDER — METFORMIN HYDROCHLORIDE 1000 MG/1
1000 TABLET ORAL DAILY
Qty: 90 TAB | Refills: 0 | Status: SHIPPED | OUTPATIENT
Start: 2019-09-10 | End: 2020-01-05 | Stop reason: SDUPTHER

## 2019-09-10 RX ORDER — DULOXETIN HYDROCHLORIDE 30 MG/1
30 CAPSULE, DELAYED RELEASE ORAL DAILY
Qty: 30 CAP | Refills: 0 | Status: SHIPPED | OUTPATIENT
Start: 2019-09-10 | End: 2019-10-20 | Stop reason: SDUPTHER

## 2019-09-10 RX ORDER — CYCLOBENZAPRINE HCL 5 MG
5 TABLET ORAL
Qty: 10 TAB | Refills: 0 | Status: SHIPPED | OUTPATIENT
Start: 2019-09-10 | End: 2020-01-16 | Stop reason: SDUPTHER

## 2019-09-10 NOTE — PROGRESS NOTES
Georgina Curry is an 48 y.o. female who presents for f/u. HTN: controlled at home. Tolerating metoprolol 50mg BID and hydralazine 10mg TID. Pt at home have been 120s/80s according to BP log. No dizziness or headaches. Muscle pain: pt notes impressive improvement after starting Cymbalta. She notes she is able to move more. No SE. Lost 6 lbs since last visit. Chronic lymphedema improved as well. DM: Pt states DMT2 dx after prednisone taper was given for back to back anaphylactic reactions. Pt improving diet. Has been taking metformin. No rxn noted. Last A1C is 6.1. Not on statin. Pt dx with TIMOTHY 5/2018. Has CPAP at home. Using it at night. Allergies - reviewed: Allergies   Allergen Reactions    Hydrochlorothiazide Anaphylaxis and Unproven on Challenge    Losartan Anaphylaxis and Unproven on Challenge    Sulfa (Sulfonamide Antibiotics) Anaphylaxis     When she was on Losartan (8/2017), and then again when she was on HCTZ (9/2017). She was also on lasix since 2016.  Eggplant Other (comments)     Severe stomach pain     Lasix [Furosemide] Unproven on Challenge     May lead to anaphylaxis          Medications - reviewed:   Current Outpatient Medications   Medication Sig    metFORMIN (GLUCOPHAGE) 1,000 mg tablet Take 1 Tab by mouth daily. Indications: prevention of type 2 diabetes mellitus    DULoxetine (CYMBALTA) 30 mg capsule Take 1 Cap by mouth daily. Indications: Chronic Muscle or Bone Pain    hydrALAZINE (APRESOLINE) 10 mg tablet TAKE ONE TABLET BY MOUTH THREE TIMES A DAY    metoprolol tartrate (LOPRESSOR) 50 mg tablet TAKE ONE TABLET BY MOUTH TWICE A DAY    diphenhydrAMINE (BENADRYL) 25 mg tablet Take 50 mg by mouth every six (6) hours as needed for Itching (allergic reaction). No current facility-administered medications for this visit.           Past Medical History - reviewed:  Past Medical History:   Diagnosis Date    Anxiety and depression     Flu     2 wks ago    GERD (gastroesophageal reflux disease)     Headache(784.0)     Hypertension     Incidental lung nodule, > 3mm and < 8mm 4/20/2016    left lung nodule in smoker, needs follow up CT in Oct 2016        Social History - reviewed:  Social History     Tobacco Use    Smoking status: Current Every Day Smoker     Packs/day: 0.50     Years: 25.00     Pack years: 12.50     Types: Cigarettes    Smokeless tobacco: Never Used   Substance and Sexual Activity    Alcohol use: No     Alcohol/week: 0.0 standard drinks    Drug use: No    Sexual activity: Yes     Partners: Male         Family History - reviewed:  Family History   Problem Relation Age of Onset    Diabetes Mother     Hypertension Brother     Hypertension Brother     Hypertension Brother          ROS  CONSTITUTIONAL: Denies: fever, chills, weakness, weight gain  CARDIOVASCULAR: Denies: chest pain, palpitations, dyspnea on exertion, orthopnea, edema   RESPIRATORY: Denies: cough, wheezing  ENDOCRINE: Denies: polydipsia/polyuria, palpitations, skin changes, temperature intolerance  GI: Denies: abdominal pain, nausea, vomiting, diarrhea, constipation, blood in stool  : Denies: dysuria, frequency/urgency, pelvic pain  NEURO: Denies: dizzy/vertigo, headache, focal weakness, speech problems, confusion  MUSCULOSKELETAL: Denies: joint stiffness, joint swelling  PSYCH: Denies: anxiety, suicidal ideation    Physical Exam  Visit Vitals  /81   Pulse 78   Temp 98.7 °F (37.1 °C)   Ht 5' (1.524 m)   Wt 296 lb (134.3 kg)   LMP 12/31/2012   SpO2 95%   BMI 57.81 kg/m²       GEN: No apparent distress. Alert and oriented and responds to all questions appropriately. Morbidly Obese. Occitan speaker. LUNGS: Respirations unlabored; clear to auscultation bilaterally  CARDIOVASCULAR: Regular, rate, and rhythm without murmurs, gallops or rubs   ABDOMEN: Soft; nontender; nondistended; normoactive bowel sounds  NEUROLOGIC: No focal neurologic deficits.  Strength and sensation grossly intact. Coordination and gait grossly intact. EXT: 1+ non-pitting edema in lower ext extending to thighs. Non-pitting edema in BUE extending to shoulder. Negative homen sign. Foot exam showed 2+ distal pulses, no lesions or signs of trauma, sensation intact, and nml microfilament b/l. Assessment/Plan    ICD-10-CM ICD-9-CM    1. Essential hypertension I02 917.1 METABOLIC PANEL, COMPREHENSIVE      hydrALAZINE (APRESOLINE) 10 mg tablet      metoprolol tartrate (LOPRESSOR) 50 mg tablet   2. Muscle pain M79.10 729.1       DULoxetine (CYMBALTA) 30 mg capsule   3. Controlled type 2 diabetes mellitus without complication, without long-term current use of insulin (HCC) E11.9 250.00 HEMOGLOBIN A1C WITH EAG      metFORMIN (GLUCOPHAGE) 1,000 mg tablet      HM DIABETES FOOT EXAM   4. Screening, lipid Z13.220 V77.91 LIPID PANEL   5. Screening for deficiency anemia Z13.0 V78.1 CBC W/O DIFF   6. BMI 50.0-59.9, adult ([facility] [address] Z68.43 V85.43    7. Smoker F17.200 305.1    8. TIMOTHY on CPAP G47.33 327.23     Z99.89 V46.8    9. Refused influenza vaccine Z28.21 V64.06      HTN: at goal today. Reviewed BP log. Will refill medication. SE profile reviewed. Parameters discussed. Muscle pain: improved. Refilled Cymbalta. SE profile reviewed. NSAIDs PRN. Daily stretching program.     DMT2: controlled. Foot exam today unremarkable. Refilled metformin. SE profile reviewed. Obtain labs as noted above. Continue CPAP. The patient was counseled on the dangers of tobacco use, and was advised to quit. Reviewed strategies to maximize success, including removing cigarettes and smoking materials from environment and stress management. Not ready to quite. I have reviewed/discussed the above normal BMI with the patient. I have recommended the following interventions: dietary management education, guidance, and counseling, encourage exercise and monitor weight . Refuse influenza and shingles vaccine.      Pt aware that she is due to schedule for colonoscopy (referral inplace), needs eye exam (states she is followed by optometrist)     Discussed with attending    Follow-up and Dispositions    · Return in about 3 weeks (around 10/1/2019) for weight check. I have discussed the diagnosis with the patient and the intended plan as seen in the above orders. Patient verbalized understanding of the plan and agrees with the plan. The patient has received an after-visit summary and questions were answered concerning future plans. I have discussed medication side effects and warnings with the patient as well. Informed patient to return to the office if new symptoms arise.         Emma Arizmendi DO  Family Medicine Resident

## 2019-09-11 LAB
ALBUMIN SERPL-MCNC: 4.2 G/DL (ref 3.5–5.5)
ALBUMIN/GLOB SERPL: 1.4 {RATIO} (ref 1.2–2.2)
ALP SERPL-CCNC: 65 IU/L (ref 39–117)
ALT SERPL-CCNC: 18 IU/L (ref 0–32)
AST SERPL-CCNC: 17 IU/L (ref 0–40)
BILIRUB SERPL-MCNC: 0.6 MG/DL (ref 0–1.2)
BUN SERPL-MCNC: 12 MG/DL (ref 6–24)
BUN/CREAT SERPL: 21 (ref 9–23)
CALCIUM SERPL-MCNC: 9.5 MG/DL (ref 8.7–10.2)
CHLORIDE SERPL-SCNC: 101 MMOL/L (ref 96–106)
CHOLEST SERPL-MCNC: 220 MG/DL (ref 100–199)
CO2 SERPL-SCNC: 23 MMOL/L (ref 20–29)
CREAT SERPL-MCNC: 0.58 MG/DL (ref 0.57–1)
ERYTHROCYTE [DISTWIDTH] IN BLOOD BY AUTOMATED COUNT: 15.1 % (ref 12.3–15.4)
EST. AVERAGE GLUCOSE BLD GHB EST-MCNC: 117 MG/DL
GLOBULIN SER CALC-MCNC: 3.1 G/DL (ref 1.5–4.5)
GLUCOSE SERPL-MCNC: 83 MG/DL (ref 65–99)
HBA1C MFR BLD: 5.7 % (ref 4.8–5.6)
HCT VFR BLD AUTO: 47.8 % (ref 34–46.6)
HDLC SERPL-MCNC: 34 MG/DL
HGB BLD-MCNC: 16.4 G/DL (ref 11.1–15.9)
INTERPRETATION, 910389: NORMAL
LDLC SERPL CALC-MCNC: ABNORMAL MG/DL (ref 0–99)
MCH RBC QN AUTO: 30.3 PG (ref 26.6–33)
MCHC RBC AUTO-ENTMCNC: 34.3 G/DL (ref 31.5–35.7)
MCV RBC AUTO: 88 FL (ref 79–97)
PDF IMAGE, 910387: NORMAL
PLATELET # BLD AUTO: 260 X10E3/UL (ref 150–450)
POTASSIUM SERPL-SCNC: 4.3 MMOL/L (ref 3.5–5.2)
PROT SERPL-MCNC: 7.3 G/DL (ref 6–8.5)
RBC # BLD AUTO: 5.42 X10E6/UL (ref 3.77–5.28)
SODIUM SERPL-SCNC: 144 MMOL/L (ref 134–144)
TRIGL SERPL-MCNC: 407 MG/DL (ref 0–149)
VLDLC SERPL CALC-MCNC: ABNORMAL MG/DL (ref 5–40)
WBC # BLD AUTO: 12.1 X10E3/UL (ref 3.4–10.8)

## 2019-10-20 ENCOUNTER — TELEPHONE (OUTPATIENT)
Dept: FAMILY MEDICINE CLINIC | Age: 54
End: 2019-10-20

## 2019-10-20 DIAGNOSIS — M79.10 MUSCLE PAIN: ICD-10-CM

## 2019-10-20 RX ORDER — DULOXETIN HYDROCHLORIDE 30 MG/1
30 CAPSULE, DELAYED RELEASE ORAL DAILY
Qty: 90 CAP | Refills: 0 | Status: SHIPPED | OUTPATIENT
Start: 2019-10-20 | End: 2020-01-04 | Stop reason: SDUPTHER

## 2020-01-03 ENCOUNTER — TELEPHONE (OUTPATIENT)
Dept: FAMILY MEDICINE CLINIC | Age: 55
End: 2020-01-03

## 2020-01-03 NOTE — TELEPHONE ENCOUNTER
Patient  calling and states his wife needs blood pressure medication refilled. Doesn't know names of medications.       Call him at [phone]

## 2020-01-04 ENCOUNTER — TELEPHONE (OUTPATIENT)
Dept: FAMILY MEDICINE CLINIC | Age: 55
End: 2020-01-04

## 2020-01-04 DIAGNOSIS — I10 ESSENTIAL HYPERTENSION: ICD-10-CM

## 2020-01-04 DIAGNOSIS — E11.9 CONTROLLED TYPE 2 DIABETES MELLITUS WITHOUT COMPLICATION, WITHOUT LONG-TERM CURRENT USE OF INSULIN (HCC): ICD-10-CM

## 2020-01-04 DIAGNOSIS — M79.10 MUSCLE PAIN: ICD-10-CM

## 2020-01-05 RX ORDER — METFORMIN HYDROCHLORIDE 1000 MG/1
1000 TABLET ORAL DAILY
Qty: 90 TAB | Refills: 0 | Status: SHIPPED | OUTPATIENT
Start: 2020-01-05 | End: 2020-05-27 | Stop reason: SDUPTHER

## 2020-01-05 RX ORDER — HYDRALAZINE HYDROCHLORIDE 10 MG/1
TABLET, FILM COATED ORAL
Qty: 180 TAB | Refills: 1 | Status: SHIPPED | OUTPATIENT
Start: 2020-01-04 | End: 2020-03-12 | Stop reason: SDUPTHER

## 2020-01-05 RX ORDER — DULOXETIN HYDROCHLORIDE 30 MG/1
30 CAPSULE, DELAYED RELEASE ORAL DAILY
Qty: 90 CAP | Refills: 0 | Status: SHIPPED | OUTPATIENT
Start: 2020-01-04 | End: 2020-03-26 | Stop reason: SDUPTHER

## 2020-01-05 RX ORDER — METOPROLOL TARTRATE 50 MG/1
TABLET ORAL
Qty: 90 TAB | Refills: 1 | Status: SHIPPED | OUTPATIENT
Start: 2020-01-05 | End: 2020-03-12 | Stop reason: SDUPTHER

## 2020-01-06 ENCOUNTER — TELEPHONE (OUTPATIENT)
Dept: FAMILY MEDICINE CLINIC | Age: 55
End: 2020-01-06

## 2020-01-06 NOTE — TELEPHONE ENCOUNTER
----- Message from Andre Roman sent at 1/3/2020  4:22 PM EST -----  Regarding: Dr. Jessa Snyder  Patient return call    Caller's first and last name and relationship (if not the patient):  Mr. Florencio Sparks, pt's     Best contact number(s):  [phone]    Whose call is being returned: The nurse    Details to clarify the request:  Returning a miss call in regards to scheduling an appt with provider only. No available appt to schedule.      Andre Roman

## 2020-01-16 ENCOUNTER — HOSPITAL ENCOUNTER (OUTPATIENT)
Dept: LAB | Age: 55
Discharge: HOME | End: 2020-01-16

## 2020-01-16 ENCOUNTER — OFFICE VISIT (OUTPATIENT)
Dept: FAMILY MEDICINE CLINIC | Age: 55
End: 2020-01-16

## 2020-01-16 DIAGNOSIS — Z12.11 COLON CANCER SCREENING: ICD-10-CM

## 2020-01-16 DIAGNOSIS — E11.9 CONTROLLED TYPE 2 DIABETES MELLITUS WITHOUT COMPLICATION, WITHOUT LONG-TERM CURRENT USE OF INSULIN (HCC): ICD-10-CM

## 2020-01-16 DIAGNOSIS — M79.10 MUSCLE PAIN: ICD-10-CM

## 2020-01-16 DIAGNOSIS — F17.200 SMOKER: ICD-10-CM

## 2020-01-16 DIAGNOSIS — G47.33 OSA (OBSTRUCTIVE SLEEP APNEA): ICD-10-CM

## 2020-01-16 DIAGNOSIS — I10 ESSENTIAL HYPERTENSION: ICD-10-CM

## 2020-01-16 DIAGNOSIS — E78.5 HYPERLIPIDEMIA, UNSPECIFIED HYPERLIPIDEMIA TYPE: ICD-10-CM

## 2020-01-16 DIAGNOSIS — I10 ESSENTIAL HYPERTENSION: Primary | ICD-10-CM

## 2020-01-16 RX ORDER — CYCLOBENZAPRINE HCL 5 MG
5 TABLET ORAL
Qty: 10 TAB | Refills: 0 | Status: SHIPPED | OUTPATIENT
Start: 2020-01-16 | End: 2021-09-07 | Stop reason: ALTCHOICE

## 2020-01-16 NOTE — PROGRESS NOTES
Subjective  Mony Dodge is an 48 y.o. female who presents for f/u chronic conditions and lab work. HTN: controlled at home. Tolerating metoprolol 50mg BID and hydralazine 10mg TID. Pt at home have been 120s/80s according to BP log. No dizziness or headaches.      Muscle pain: pt notes impressive improvement after starting Cymbalta. She notes she is able to move more. No SE. Chronic lymphedema improved as well. Does not do stretching daily.     DM: Pt improving diet. Has been taking metformin. No rxn noted. Last A1C is 5.7. Not on statin.     Pt dx with TIMOTHY 5/2018. Had CPAP at home but had to return machine given poor compliance. Attempted various masks. Allergies - reviewed: Allergies   Allergen Reactions    Hydrochlorothiazide Anaphylaxis and Unproven on Challenge    Losartan Anaphylaxis and Unproven on Challenge    Sulfa (Sulfonamide Antibiotics) Anaphylaxis     When she was on Losartan (8/2017), and then again when she was on HCTZ (9/2017). She was also on lasix since 2016.  Eggplant Other (comments)     Severe stomach pain     Lasix [Furosemide] Unproven on Challenge     May lead to anaphylaxis          Medications - reviewed:   Current Outpatient Medications   Medication Sig    DULoxetine (CYMBALTA) 30 mg capsule Take 1 Cap by mouth daily. Indications: Chronic Muscle or Bone Pain    hydrALAZINE (APRESOLINE) 10 mg tablet TAKE ONE TABLET BY MOUTH THREE TIMES A DAY    metoprolol tartrate (LOPRESSOR) 50 mg tablet TAKE ONE TABLET BY MOUTH TWICE A DAY    metFORMIN (GLUCOPHAGE) 1,000 mg tablet Take 1 Tab by mouth daily. Indications: prevention of type 2 diabetes mellitus    diphenhydrAMINE (BENADRYL) 25 mg tablet Take 50 mg by mouth every six (6) hours as needed for Itching (allergic reaction). No current facility-administered medications for this visit.           Past Medical History - reviewed:  Past Medical History:   Diagnosis Date    Anxiety and depression     Flu     2 wks ago    GERD (gastroesophageal reflux disease)     Headache(784.0)     Hypertension     Incidental lung nodule, > 3mm and < 8mm 2016    left lung nodule in smoker, needs follow up CT in Oct 2016          Past Surgical History - reviewed:   Past Surgical History:   Procedure Laterality Date    APPENDECTOMY      HX APPENDECTOMY      HX  SECTION      X 3 Births    HX  SECTION      HX CHOLECYSTECTOMY      HX WRIST FRACTURE [address]      metal    REMOVAL GALLBLADDER           Social History - reviewed:  Social History     Socioeconomic History    Marital status:    Tobacco Use    Smoking status: Current Every Day Smoker     Packs/day: 0.50     Years: 25.00     Pack years: 12.50     Types: Cigarettes    Smokeless tobacco: Never Used   Substance and Sexual Activity    Alcohol use: No     Alcohol/week: 0.0 standard drinks    Drug use: No    Sexual activity: Yes     Partners: Male     ROS  CONSTITUTIONAL: Denies: fever, chills, weakness  CARDIOVASCULAR: Denies: chest pain, palpitations, dyspnea on exertion, orthopnea   RESPIRATORY: Denies: cough, wheezing  ENDOCRINE: Denies: polydipsia/polyuria, palpitations, skin changes, temperature intolerance  GI: Denies: abdominal pain, nausea, vomiting, diarrhea, constipation, blood in stool  NEURO: Denies: dizzy/vertigo, headache, focal weakness, speech problems, confusion  MUSCULOSKELETAL: chronic knee and back pain. PSYCH: Denies: anxiety, suicidal ideation      Physical Exam  Visit Vitals  /85   Pulse 80   Temp 98.7 °F (37.1 °C)   Resp 20   Ht 5' (1.524 m)   Wt 294 lb (133.4 kg)   LMP 2012   SpO2 95%   BMI 57.42 kg/m²       GEN: No apparent distress. Alert and oriented and responds to all questions appropriately. Morbidly Obese. Korean speaker.    LUNGS: Respirations unlabored; clear to auscultation bilaterally  CARDIOVASCULAR: Regular, rate, and rhythm without murmurs, gallops or rubs   ABDOMEN: Soft; nontender; nondistended; normoactive bowel sounds  NEUROLOGIC: No focal neurologic deficits. Strength and sensation grossly intact. Coordination and gait grossly intact. BACK: No ecchymosis. Left paraspinal tenderness on level of L1-L4. No vertebral bone pain with palpation. Negative straight leg test.  EXT: 1+ non-pitting edema in lower ext extending to thighs. Non-pitting edema in BUE extending to shoulder. Negative homen sign. Foot exam showed 2+ distal pulses, no lesions or signs of trauma, sensation intact. nml gait. B/l knee without decrease of ROM but has crepitus b/l, no joint pain or inflammation. Assessment/Plan    ICD-10-CM ICD-9-CM    1. Essential hypertension D08 241.4 METABOLIC PANEL, BASIC   2. Controlled type 2 diabetes mellitus without complication, without long-term current use of insulin ([facility]) K71.6 388.03 METABOLIC PANEL, BASIC      HEMOGLOBIN A1C WITH EAG   3. Hyperlipidemia, unspecified hyperlipidemia type E78.5 272.4 LIPID PANEL   4. Smoker F17.200 305.1    5. TIMOTHY (obstructive sleep apnea) G47.33 327.23    6. Colon cancer screening Z12.11 V76.51 OCCULT BLOOD IMMUNOASSAY,DIAGNOSTIC   7. BMI 50.0-59.9, adult [facility] Z68.43 V85.43       HTN: at goal today. Reviewed BP log. Will refill medication. SE profile reviewed. Parameters discussed.     Muscle pain/back pain: improved. Continue Cymbalta. SE profile reviewed. No red flags and reassuring exam today. NSAIDs PRN. Daily stretching program.      DMT2: controlled. Foot exam today unremarkable. Refilled metformin. SE profile reviewed.      Obtain labs as noted above. Immunoassay for colon cancer screening, pt does not want colonoscopy.      No longer on CPAP. Discussed importance TIMOTHY management, pt unwilling to go to sleep medicine. Understands risks. I have reviewed/discussed the above normal BMI with the patient. I have recommended the following interventions: dietary management education, guidance, and counseling and encourage exercise .   unwilling to go to weight loss clinic. Not interested in discussing bariatric surgery. The patient was counseled on the dangers of tobacco use, and was advised to quit. Reviewed strategies to maximize success, including removing cigarettes and smoking materials from environment and stress management. Discussed with attending      RTC in 3-4 months or PRN. I have discussed the diagnosis with the patient and the intended plan as seen in the above orders. Patient verbalized understanding of the plan and agrees with the plan. The patient has received an after-visit summary and questions were answered concerning future plans. I have discussed medication side effects and warnings with the patient as well. Informed patient to return to the office if new symptoms arise.         Kina Perez DO  Family Medicine Resident

## 2020-01-16 NOTE — PATIENT INSTRUCTIONS
Duloxetine (By mouth)   Duloxetine (doo-LOX-[id number]-teen)  Treats depression, anxiety, diabetic peripheral neuropathy, fibromyalgia, and chronic muscle or bone pain. This medicine is an SSNRI. Brand Name(s): Cymbalta, DermacinRx Vish Valente   There may be other brand names for this medicine. When This Medicine Should Not Be Used: This medicine is not right for everyone. Do not use it if you had an allergic reaction to duloxetine. How to Use This Medicine:   Capsule, Delayed Release Capsule  · Take your medicine as directed. Your dose may need to be changed several times to find what works best for you. · Delayed-release capsule: Swallow the capsule whole. Do not crush, chew, break, or open it. · This medicine should come with a Medication Guide. Ask your pharmacist for a copy if you do not have one. · Missed dose: Take a dose as soon as you remember. If it is almost time for your next dose, wait until then and take a regular dose. Do not take extra medicine to make up for a missed dose. · Store the medicine in a closed container at room temperature, away from heat, moisture, and direct light. Drugs and Foods to Avoid:   Ask your doctor or pharmacist before using any other medicine, including over-the-counter medicines, vitamins, and herbal products. · Do not take duloxetine if you have used an MAO inhibitor (MAOI) within the past 14 days. Do not start taking an MAO inhibitor within 5 days of stopping duloxetine. · Some medicines can affect how duloxetine works.  Tell your doctor if you are using any of the following:  ¨ Buspirone, cimetidine, ciprofloxacin, enoxacin, fentanyl, lithium, Kendrick's wort, theophylline, tramadol, tryptophan, or warfarin  ¨ Amphetamines  ¨ Blood pressure medicine  ¨ Diuretic (water pill)  ¨ Medicine for heart rhythm problems (including flecainide, propafenone, quinidine)  ¨ Medicine to treat migraine headaches (including triptans)  ¨ NSAID pain or arthritis medicine (including aspirin, celecoxib, diclofenac, ibuprofen, naproxen)  ¨ Other medicine to treat depression or mood disorders (including amitriptyline, desipramine, fluoxetine, imipramine, nortriptyline, paroxetine)  ¨ Phenothiazine medicine (including thioridazine)  · Tell your doctor if you use anything else that makes you sleepy. Some examples are allergy medicine, narcotic pain medicine, and alcohol. · Do not drink alcohol while you are using this medicine. Warnings While Using This Medicine:   · Tell your doctor if you are pregnant or breastfeeding, or if you have kidney disease, liver disease, diabetes, digestion problems, glaucoma, heart disease, high or low blood pressure, or problems with urination. Tell your doctor if you smoke or you have a history of seizures, or drug or alcohol addiction. · This medicine may cause the following problems:   ¨ Serious liver problems  ¨ Serotonin syndrome (more likely when used with certain other medicines)  ¨ Increased risk of bleeding problems  ¨ Serious skin reactions  ¨ Low sodium levels in the blood  · This medicine can increase thoughts of suicide. Tell your doctor right away if you start to feel depressed and have thoughts about hurting yourself. · This medicine can cause changes in your blood pressure. This may make you dizzy or drowsy. Do not drive or do anything that could be dangerous until you know how this medicine affects you. Stand up slowly to avoid falls. · Do not stop using this medicine suddenly. Your doctor will need to slowly decrease your dose before you stop it completely. · Your doctor will check your progress and the effects of this medicine at regular visits. Keep all appointments. · Keep all medicine out of the reach of children. Never share your medicine with anyone.   Possible Side Effects While Using This Medicine:   Call your doctor right away if you notice any of these side effects:  · Allergic reaction: Itching or hives, swelling in your face or hands, swelling or tingling in your mouth or throat, chest tightness, trouble breathing  · Anxiety, restlessness, fever, fast heartbeat, sweating, muscle spasms, diarrhea, seeing or hearing things that are not there  · Blistering, peeling, red skin rash  · Confusion, weakness, muscle twitching  · Dark urine or pale stools, nausea, vomiting, loss of appetite, stomach pain, yellow skin or eyes  · Decrease in how much or how often you urinate  · Eye pain, vision changes, seeing halos around lights  · Feeling more energetic than usual  · Lightheadedness, dizziness, or fainting  · Unusual moods or behaviors, worsening depression, thoughts about hurting yourself, trouble sleeping  · Unusual bleeding or bruising  If you notice these less serious side effects, talk with your doctor:   · Decrease in appetite or weight  · Dry mouth, constipation, mild nausea  · Unusual drowsiness, sleepiness, or tiredness  If you notice other side effects that you think are caused by this medicine, tell your doctor. Call your doctor for medical advice about side effects. You may report side effects to FDA at 9-099-FDA-5394  © 2017 [facility] Information is for End User's use only and may not be sold, redistributed or otherwise used for commercial purposes. The above information is an  only. It is not intended as medical advice for individual conditions or treatments. Talk to your doctor, nurse or pharmacist before following any medical regimen to see if it is safe and effective for you. Cyclobenzaprine (Flexeril, Amrix, Fexmid, FusePaq Tabradol) - (By mouth)   Why this medicine is used:   Treats pain and stiffness caused by muscle spasms.   Contact a nurse or doctor right away if you have:  · Anxiety, restlessness, twitching  · Seeing or hearing things that are not there  · Fast, pounding, or uneven heartbeat  · Fever, sweating, nausea, vomiting, diarrhea     Common side effects:  · Dry mouth  · Dizziness, drowsiness  © 2017 [facility] Information is for End User's use only and may not be sold, redistributed or otherwise used for commercial purposes. Stretching: Exercises  Introduction  Here are some examples of exercises for stretching. Start each exercise slowly. Ease off the exercise if you start to have pain. Your doctor or physical therapist will tell you when you can start these exercises and which ones will work best for you. How to do the exercises  Latissimus stretch    1. Stand with your back straight and your feet shoulder-width apart. You can do this stretch sitting down if you are not steady on your feet. 2. Hold your arms above your head, and hold one hand with the other. 3. Pull upward while leaning straight over toward your right side. Keep your lower body straight. You should feel the stretch along your left side. 4. Hold 15 to 30 seconds, and then switch sides. 5. Repeat 2 to 4 times for each side. Triceps stretch    1. Stand with your back straight and your feet shoulder-width apart. You can do this stretch sitting down if you are not steady on your feet. 2. Bring your left elbow straight up while bending your arm. 3. Grab your left elbow with your right hand, and pull your left elbow toward your head with light pressure. If you are more flexible, you may pull your arm slightly behind your head. You will feel the stretch along the back of your arm. 4. Hold 15 to 30 seconds, and then switch elbows. 5. Repeat 2 to 4 times for each arm. Calf stretch    1. Place your hands on a wall for balance. You can also do this with your hands on the back of a chair, a countertop, or a tree. 2. Step back with your left leg. Keep the leg straight, and press your left heel into the floor. 3. Press your hips forward, bending your right leg slightly. You will feel the stretch in your left calf. 4. Hold the stretch 15 to 30 seconds.   5. Repeat 2 to 4 times for each leg. Quadriceps stretch    1. Lie on your side with one hand supporting your head. 2. Bend your upper leg back and grab your ankle with your other hand. 3. Stretch your leg back by pulling your foot toward your buttocks. You will feel the stretch in the front of your thigh. If this causes stress on your knees, do not do this stretch. 4. Hold the stretch 15 to 30 seconds. 5. Repeat 2 to 4 times for each leg. Groin stretch    1. Sit on the floor and put the soles of your feet together. Do not slump your back. 2. Grab your ankles and gently pull your legs toward you. 3. Press your knees toward the floor. You will feel the stretch in your inner thighs. 4. Hold 15 to 30 seconds. 5. Repeat 2 to 4 times. Hamstring stretch in doorway    1. Lie on the floor near a doorway, with your buttocks close to the wall. 2. Let the leg you are not stretching extend through the doorway. 3. Put the leg you want to stretch up on the wall, and straighten your knee to feel a gentle stretch at the back of your leg. 4. Hold the stretch for at least 15 to 30 seconds. Repeat 2 to 4 times. Follow-up care is a key part of your treatment and safety. Be sure to make and go to all appointments, and call your doctor if you are having problems. It's also a good idea to know your test results and keep a list of the medicines you take. Where can you learn more? Go to http://shima-alyssa.info/. Enter 433 1349 in the search box to learn more about \"Stretching: Exercises. \"  Current as of: May 5, 2019  Content Version: 12.2  © 7833-1761 [employer]. Care instructions adapted under license by [employer] (which disclaims liability or warranty for this information). If you have questions about a medical condition or this instruction, always ask your healthcare professional. [address] any warranty or liability for your use of this information.          Knee Arthritis: Exercises  Introduction  Here are some examples of exercises for you to try. The exercises may be suggested for a condition or for rehabilitation. Start each exercise slowly. Ease off the exercises if you start to have pain. You will be told when to start these exercises and which ones will work best for you. How to do the exercises  Knee flexion with heel slide    1. Lie on your back with your knees bent. 2. Slide your heel back by bending your affected knee as far as you can. Then hook your other foot around your ankle to help pull your heel even farther back. 3. Hold for about 6 seconds, then rest for up to 10 seconds. 4. Repeat 8 to 12 times. 5. Switch legs and repeat steps 1 through 4, even if only one knee is sore. Quad sets    1. Sit with your affected leg straight and supported on the floor or a firm bed. Place a small, rolled-up towel under your knee. Your other leg should be bent, with that foot flat on the floor. 2. Tighten the thigh muscles of your affected leg by pressing the back of your knee down into the towel. 3. Hold for about 6 seconds, then rest for up to 10 seconds. 4. Repeat 8 to 12 times. 5. Switch legs and repeat steps 1 through 4, even if only one knee is sore. Straight-leg raises to the front    1. Lie on your back with your good knee bent so that your foot rests flat on the floor. Your affected leg should be straight. Make sure that your low back has a normal curve. You should be able to slip your hand in between the floor and the small of your back, with your palm touching the floor and your back touching the back of your hand. 2. Tighten the thigh muscles in your affected leg by pressing the back of your knee flat down to the floor. Hold your knee straight. 3. Keeping the thigh muscles tight and your leg straight, lift your affected leg up so that your heel is about 12 inches off the floor. Hold for about 6 seconds, then lower slowly.   4. Relax for up to 10 seconds between repetitions. 5. Repeat 8 to 12 times. 6. Switch legs and repeat steps 1 through 5, even if only one knee is sore. Active knee flexion    1. Lie on your stomach with your knees straight. If your kneecap is uncomfortable, roll up a washcloth and put it under your leg just above your kneecap. 2. Lift the foot of your affected leg by bending the knee so that you bring the foot up toward your buttock. If this motion hurts, try it without bending your knee quite as far. This may help you avoid any painful motion. 3. Slowly move your leg up and down. 4. Repeat 8 to 12 times. 5. Switch legs and repeat steps 1 through 4, even if only one knee is sore. Quadriceps stretch (facedown)    1. Lie flat on your stomach, and rest your face on the floor. 2. Wrap a towel or belt strap around the lower part of your affected leg. Then use the towel or belt strap to slowly pull your heel toward your buttock until you feel a stretch. 3. Hold for about 15 to 30 seconds, then relax your leg against the towel or belt strap. 4. Repeat 2 to 4 times. 5. Switch legs and repeat steps 1 through 4, even if only one knee is sore. Stationary exercise bike    1. If you do not have a stationary exercise bike at home, you can find one to ride at your local health club or community center. 2. Adjust the height of the bike seat so that your knee is slightly bent when your leg is extended downward. If your knee hurts when the pedal reaches the top, you can raise the seat so that your knee does not bend as much. 3. Start slowly. At first, try to do 5 to 10 minutes of cycling with little to no resistance. Then increase your time and the resistance bit by bit until you can do 20 to 30 minutes without pain. 4. If you start to have pain, rest your knee until your pain gets back to the level that is normal for you. Or cycle for less time or with less effort. Follow-up care is a key part of your treatment and safety.  Be sure to make and go to all appointments, and call your doctor if you are having problems. It's also a good idea to know your test results and keep a list of the medicines you take. Where can you learn more? Go to http://shima-alyssa.info/. Enter C159 in the search box to learn more about \"Knee Arthritis: Exercises. \"  Current as of: June 26, 2019  Content Version: 12.2  © 8702-5599 [employer]. Care instructions adapted under license by [employer] (which disclaims liability or warranty for this information). If you have questions about a medical condition or this instruction, always ask your healthcare professional. [place] [address] any warranty or liability for your use of this information. Back Stretches: Exercises  Introduction  Here are some examples of exercises for stretching your back. Start each exercise slowly. Ease off the exercise if you start to have pain. Your doctor or physical therapist will tell you when you can start these exercises and which ones will work best for you. How to do the exercises  Overhead stretch    1. Stand comfortably with your feet shoulder-width apart. 2. Looking straight ahead, raise both arms over your head and reach toward the ceiling. Do not allow your head to tilt back. 3. Hold for 15 to 30 seconds, then lower your arms to your sides. 4. Repeat 2 to 4 times. Side stretch    1. Stand comfortably with your feet shoulder-width apart. 2. Raise one arm over your head, and then lean to the other side. 3. Slide your hand down your leg as you let the weight of your arm gently stretch your side muscles. Hold for 15 to 30 seconds. 4. Repeat 2 to 4 times on each side. Press-up    1. Lie on your stomach, supporting your body with your forearms. 2. Press your elbows down into the floor to raise your upper back.  As you do this, relax your stomach muscles and allow your back to arch without using your back muscles. As your press up, do not let your hips or pelvis come off the floor. 3. Hold for 15 to 30 seconds, then relax. 4. Repeat 2 to 4 times. Relax and rest    1. Lie on your back with a rolled towel under your neck and a pillow under your knees. Extend your arms comfortably to your sides. 2. Relax and breathe normally. 3. Remain in this position for about 10 minutes. 4. If you can, do this 2 or 3 times each day. Follow-up care is a key part of your treatment and safety. Be sure to make and go to all appointments, and call your doctor if you are having problems. It's also a good idea to know your test results and keep a list of the medicines you take. Where can you learn more? Go to http://shima-alyssa.info/. Enter D786 in the search box to learn more about \"Back Stretches: Exercises. \"  Current as of: June 26, 2019  Content Version: 12.2  © 0387-7812 [employer]. Care instructions adapted under license by [employer] (which disclaims liability or warranty for this information). If you have questions about a medical condition or this instruction, always ask your healthcare professional. [address] any warranty or liability for your use of this information.

## 2020-01-17 LAB
ANION GAP SERPL CALC-SCNC: 6 MMOL/L (ref 5–15)
BUN SERPL-MCNC: 14 MG/DL (ref 6–20)
BUN/CREAT SERPL: 26 (ref 12–20)
CALCIUM SERPL-MCNC: 9.4 MG/DL (ref 8.5–10.1)
CHLORIDE SERPL-SCNC: 104 MMOL/L (ref 97–108)
CHOLEST SERPL-MCNC: 215 MG/DL
CO2 SERPL-SCNC: 30 MMOL/L (ref 21–32)
CREAT SERPL-MCNC: 0.54 MG/DL (ref 0.55–1.02)
ERYTHROCYTE [DISTWIDTH] IN BLOOD BY AUTOMATED COUNT: 14.3 % (ref 11.5–14.5)
EST. AVERAGE GLUCOSE BLD GHB EST-MCNC: 120 MG/DL
GLUCOSE SERPL-MCNC: 89 MG/DL (ref 65–100)
HBA1C MFR BLD: 5.8 % (ref 4–5.6)
HCT VFR BLD AUTO: 46.9 % (ref 35–47)
HDLC SERPL-MCNC: 41 MG/DL
HDLC SERPL: 5.2 {RATIO} (ref 0–5)
HGB BLD-MCNC: 14.7 G/DL (ref 11.5–16)
LDLC SERPL CALC-MCNC: 132.8 MG/DL (ref 0–100)
LIPID PROFILE,FLP: ABNORMAL
MCH RBC QN AUTO: 29.8 PG (ref 26–34)
MCHC RBC AUTO-ENTMCNC: 31.3 G/DL (ref 30–36.5)
MCV RBC AUTO: 94.9 FL (ref 80–99)
NRBC # BLD: 0 K/UL (ref 0–0.01)
NRBC BLD-RTO: 0 PER 100 WBC
PLATELET # BLD AUTO: 245 K/UL (ref 150–400)
PMV BLD AUTO: 11.5 FL (ref 8.9–12.9)
POTASSIUM SERPL-SCNC: 4.3 MMOL/L (ref 3.5–5.1)
RBC # BLD AUTO: 4.94 M/UL (ref 3.8–5.2)
SODIUM SERPL-SCNC: 140 MMOL/L (ref 136–145)
TRIGL SERPL-MCNC: 206 MG/DL (ref ?–150)
VLDLC SERPL CALC-MCNC: 41.2 MG/DL
WBC # BLD AUTO: 11.9 K/UL (ref 3.6–11)

## 2020-01-18 VITALS
RESPIRATION RATE: 20 BRPM | HEART RATE: 80 BPM | DIASTOLIC BLOOD PRESSURE: 85 MMHG | WEIGHT: 293 LBS | TEMPERATURE: 98.7 F | OXYGEN SATURATION: 95 % | HEIGHT: 60 IN | SYSTOLIC BLOOD PRESSURE: 138 MMHG | BODY MASS INDEX: 57.52 KG/M2

## 2020-01-21 ENCOUNTER — HOSPITAL ENCOUNTER (OUTPATIENT)
Dept: LAB | Age: 55
Discharge: HOME | End: 2020-01-21

## 2020-01-21 DIAGNOSIS — Z12.11 COLON CANCER SCREENING: ICD-10-CM

## 2020-01-22 LAB — HEMOCCULT STL QL IA: NEGATIVE

## 2020-01-22 RX ORDER — ATORVASTATIN CALCIUM 20 MG/1
20 TABLET, FILM COATED ORAL DAILY
Qty: 90 TAB | Refills: 0 | Status: SHIPPED | OUTPATIENT
Start: 2020-01-22 | End: 2020-03-12 | Stop reason: SDUPTHER

## 2020-01-22 NOTE — PROGRESS NOTES
Discussed with pt. Will start moderate intensity statin as ASCVD risk is 9.3%. pt comfortable and aware of SE profile of medication. Prediabetic. Continue metformin with lifestyle and diet modification. Leukocytosis improving. Pt asymptomatic. Discussed work-up but pt opted to monitor. She accepted risk.

## 2020-01-29 NOTE — PROGRESS NOTES
3/19/2018   Met with daughter and patient to offer information on [address], gave number. Also suggested they  where they live and try to get help. Will continue to follow. WILIAN Stein            1:57 PM  Called family this am to see about ability to pay for oxygen. It turns out patient does not need. They had stated they could pay 150.00 for oxygen. Regarding above consult, if it is about ability to pay for medications give $4.00 RX. Will continue to follow if needed.   Lizbeth De, Carlos N Ole Obrien patient

## 2020-03-05 ENCOUNTER — TELEPHONE (OUTPATIENT)
Dept: FAMILY MEDICINE CLINIC | Age: 55
End: 2020-03-05

## 2020-03-05 NOTE — TELEPHONE ENCOUNTER
Patient /Anyaan calling    Notes his wife is having a hard time breathing.     Nurse Nani Lerma took call

## 2020-03-05 NOTE — TELEPHONE ENCOUNTER
Patient spouse stated when patient walk she have shortness of breath, he stated this is an Emergency, I advised him to take her to the ER. He declined and wanted to schedule an appointment for today.     Appointment 3/5/2020 6:30 pm.

## 2020-03-12 ENCOUNTER — OFFICE VISIT (OUTPATIENT)
Dept: FAMILY MEDICINE CLINIC | Age: 55
End: 2020-03-12

## 2020-03-12 VITALS
HEIGHT: 60 IN | WEIGHT: 293 LBS | RESPIRATION RATE: 20 BRPM | DIASTOLIC BLOOD PRESSURE: 82 MMHG | TEMPERATURE: 98.6 F | BODY MASS INDEX: 57.52 KG/M2 | OXYGEN SATURATION: 93 % | SYSTOLIC BLOOD PRESSURE: 147 MMHG | HEART RATE: 71 BPM

## 2020-03-12 DIAGNOSIS — I10 ESSENTIAL HYPERTENSION: ICD-10-CM

## 2020-03-12 DIAGNOSIS — E66.01 CLASS 3 SEVERE OBESITY DUE TO EXCESS CALORIES WITH SERIOUS COMORBIDITY AND BODY MASS INDEX (BMI) OF 50.0 TO 59.9 IN ADULT (HCC): ICD-10-CM

## 2020-03-12 DIAGNOSIS — R06.02 SHORTNESS OF BREATH: Primary | ICD-10-CM

## 2020-03-12 RX ORDER — ATORVASTATIN CALCIUM 20 MG/1
20 TABLET, FILM COATED ORAL DAILY
Qty: 90 TAB | Refills: 0 | Status: SHIPPED | OUTPATIENT
Start: 2020-03-12 | End: 2020-05-27 | Stop reason: SDUPTHER

## 2020-03-12 RX ORDER — FLUTICASONE PROPIONATE 50 MCG
2 SPRAY, SUSPENSION (ML) NASAL DAILY
Qty: 1 BOTTLE | Refills: 1 | Status: SHIPPED | OUTPATIENT
Start: 2020-03-12 | End: 2021-09-07 | Stop reason: ALTCHOICE

## 2020-03-12 RX ORDER — ALBUTEROL SULFATE 0.83 MG/ML
2.5 SOLUTION RESPIRATORY (INHALATION) ONCE
Qty: 1 EACH | Refills: 0
Start: 2020-03-12 | End: 2020-03-12

## 2020-03-12 RX ORDER — METOPROLOL TARTRATE 50 MG/1
TABLET ORAL
Qty: 90 TAB | Refills: 1 | Status: SHIPPED | OUTPATIENT
Start: 2020-03-12 | End: 2020-05-27 | Stop reason: SDUPTHER

## 2020-03-12 RX ORDER — HYDRALAZINE HYDROCHLORIDE 10 MG/1
TABLET, FILM COATED ORAL
Qty: 180 TAB | Refills: 1 | Status: SHIPPED | OUTPATIENT
Start: 2020-03-12 | End: 2020-05-27 | Stop reason: SDUPTHER

## 2020-03-12 RX ORDER — ALBUTEROL SULFATE 90 UG/1
1 AEROSOL, METERED RESPIRATORY (INHALATION)
Qty: 2 INHALER | Refills: 2 | Status: SHIPPED | OUTPATIENT
Start: 2020-03-12 | End: 2022-01-05 | Stop reason: SDUPTHER

## 2020-03-12 RX ORDER — GUAIFENESIN 600 MG/1
600 TABLET, EXTENDED RELEASE ORAL 2 TIMES DAILY
Qty: 60 TAB | Refills: 1 | Status: SHIPPED | OUTPATIENT
Start: 2020-03-12 | End: 2021-09-07 | Stop reason: ALTCHOICE

## 2020-03-12 NOTE — PROGRESS NOTES
Georgina Gandara is an 48 y.o. female who presents for SOB. Pt states she has been having SOB, nasal congestion, and dry cough for 3 days. Notes she has been having URI. No sick contacts but have been under lots of stress. No sinus pain, syncope, sore throat, increasd LE edema. Worsening dyspnea on exertion, worsening. She did not trial any therapy. Continues to smoke. No hx of asthma or COPD. Does have tiny nodules in the left upper lobe on CT chest 2018. Does have hx of TIMOTHY, not using CPAP. Allergies - reviewed: Allergies   Allergen Reactions    Hydrochlorothiazide Anaphylaxis and Unproven on Challenge    Losartan Anaphylaxis and Unproven on Challenge    Sulfa (Sulfonamide Antibiotics) Anaphylaxis     When she was on Losartan (8/2017), and then again when she was on HCTZ (9/2017). She was also on lasix since 2016.  Eggplant Other (comments)     Severe stomach pain     Lasix [Furosemide] Unproven on Challenge     May lead to anaphylaxis          Medications - reviewed:   Current Outpatient Medications   Medication Sig    albuterol (PROVENTIL HFA, VENTOLIN HFA, PROAIR HFA) 90 mcg/actuation inhaler Take 1 Puff by inhalation every four (4) hours as needed for Wheezing.  guaiFENesin ER (MUCINEX) 600 mg ER tablet Take 1 Tab by mouth two (2) times a day.  hydrALAZINE (APRESOLINE) 10 mg tablet TAKE ONE TABLET BY MOUTH THREE TIMES A DAY    metoprolol tartrate (LOPRESSOR) 50 mg tablet TAKE ONE TABLET BY MOUTH TWICE A DAY    atorvastatin (LIPITOR) 20 mg tablet Take 1 Tab by mouth daily.  fluticasone propionate (FLONASE) 50 mcg/actuation nasal spray 2 Sprays by Both Nostrils route daily. Indications: inflammation of the nose due to an allergy    cyclobenzaprine (FLEXERIL) 5 mg tablet Take 1 Tab by mouth nightly as needed for Muscle Spasm(s).  DULoxetine (CYMBALTA) 30 mg capsule Take 1 Cap by mouth daily.  Indications: Chronic Muscle or Bone Pain    metFORMIN (GLUCOPHAGE) 1,000 mg tablet Take 1 Tab by mouth daily. Indications: prevention of type 2 diabetes mellitus    diphenhydrAMINE (BENADRYL) 25 mg tablet Take 50 mg by mouth every six (6) hours as needed for Itching (allergic reaction). No current facility-administered medications for this visit. Past Medical History - reviewed:  Past Medical History:   Diagnosis Date    Anxiety and depression     Flu     2 wks ago    GERD (gastroesophageal reflux disease)     Headache(784.0)     Hypertension     Incidental lung nodule, > 3mm and < 8mm 2016    left lung nodule in smoker, needs follow up CT in Oct 2016          Past Surgical History - reviewed:   Past Surgical History:   Procedure Laterality Date    APPENDECTOMY      HX APPENDECTOMY      HX  SECTION      X 3 Births    HX  SECTION      HX CHOLECYSTECTOMY      HX WRIST FRACTURE [address]      metal    REMOVAL GALLBLADDER           Social History - reviewed:  Social History     Tobacco Use    Smoking status: Current Every Day Smoker     Packs/day: 0.50     Years: 25.00     Pack years: 12.50     Types: Cigarettes    Smokeless tobacco: Never Used   Substance and Sexual Activity    Alcohol use: No     Alcohol/week: 0.0 standard drinks    Drug use: No    Sexual activity: Yes     Partners: Male         Immunizations - reviewed:   Immunization History   Administered Date(s) Administered    Influenza Vaccine 10/22/2012    Pneumococcal Conjugate (PCV-7) 2013    Pneumococcal Polysaccharide (PPSV-23) 2017    Tdap 2013       ROS  Headaches:  no  Chest Pain:  no  SOB:  yes  Fevers:  no      Physical Exam  Visit Vitals  /82   Pulse 71   Temp 98.6 °F (37 °C)   Resp 20   Ht 5' (1.524 m)   Wt 298 lb (135.2 kg)   LMP 2012   SpO2 93%   BMI 58.20 kg/m²     GEN: No apparent distress. Alert and oriented and responds to all questions appropriately. Morbidly Obese. Vietnamese speaker.  Not ready to quit smoking  EYES: Conjunctiva clear; pupils round and reactive to light; extraocular movements are intact. NOSE: Turbinates are within normal limits. No drainage  OROPHYARYNX: No oral lesions or exudates. NECK: Supple; no masses; thyromegaly noted on left. Large neck circumference. LUNGS: Respirations unlabored; mild wheeze throughout. No cough. Sat 92% while seating and 86-88% while walking  CARDIOVASCULAR: Regular, rate, and rhythm without murmurs, gallops or rubs   ABDOMEN: Soft; nontender; nondistended; normoactive bowel sounds  NEUROLOGIC: No focal neurologic deficits. Strength and sensation grossly intact. Coordination and gait grossly intact. EXT: 2+ pitting edema in lower ext extending to thighs bilaterally. Non-pitting edema in BUE extending to shoulder. SKIN: No obvious rashes or erythema or hives. Edema present without signs of infection. Assessment/Plan    ICD-10-CM ICD-9-CM    1. Shortness of breath R06.02 786.05 albuterol (PROVENTIL VENTOLIN) 2.5 mg /3 mL (0.083 %) nebu      ALBUTEROL, INHAL. SOL., FDA-APPROVED FINAL, NON-COMPOUND UNIT DOSE, 1 MG      INHAL RX, AIRWAY OBST/DX SPUTUM INDUCT      albuterol (PROVENTIL HFA, VENTOLIN HFA, PROAIR HFA) 90 mcg/actuation inhaler      guaiFENesin ER (MUCINEX) 600 mg ER tablet      fluticasone propionate (FLONASE) 50 mcg/actuation nasal spray   2. Essential hypertension I10 401.9 hydrALAZINE (APRESOLINE) 10 mg tablet      metoprolol tartrate (LOPRESSOR) 50 mg tablet   3. Class 3 severe obesity due to excess calories with serious comorbidity and body mass index (BMI) of 50.0 to 59.9 in adult ([facility]) E66.01 278.01 REFERRAL TO BARIATRIC SURGERY    Z68.43 V85.43    4. BMI 50.0-59.9, adult ([facility] Z68.43 V85.43 REFERRAL TO BARIATRIC SURGERY     Likely etiology is a viral URI. Gave one breathing treatment and pt noted improvement in NOVAK and pulse ox improved to 95% at rest and 93% after walking the clinic halls. Will prescribe albuterol inhaler to use for SOB q4h PRN.  Based on evaluation may not benefit for pred burst, but if not improving she will return for re-eval and at that time will reconsider. Precautions given of when to seek medical attn. Advised she stops smoking but she is not ready, understanding of risks. BP slightly above goal today in clinic. Will refill BP medication and have pt return next week for review of home BP log and BP check. Precautions given. Parameters discussed. I have reviewed/discussed the above normal BMI with the patient. I have recommended the following interventions: dietary management education, guidance, and counseling, encourage exercise and monitor weight . Will refer to bariatric surgery. Discussed with attending    I have discussed the diagnosis with the patient and the intended plan as seen in the above orders. Patient verbalized understanding of the plan and agrees with the plan. The patient has received an after-visit summary and questions were answered concerning future plans. I have discussed medication side effects and warnings with the patient as well. Informed patient to return to the office if new symptoms arise.         Amina Silva, DO  Family Medicine Resident

## 2020-03-15 NOTE — PATIENT INSTRUCTIONS
Shortness of Breath: Care Instructions  Your Care Instructions  Shortness of breath has many causes. Sometimes conditions such as anxiety can lead to shortness of breath. Some people get mild shortness of breath when they exercise. Trouble breathing also can be a symptom of a serious problem, such as asthma, lung disease, emphysema, heart problems, and pneumonia. If your shortness of breath continues, you may need tests and treatment. Watch for any changes in your breathing and other symptoms. Follow-up care is a key part of your treatment and safety. Be sure to make and go to all appointments, and call your doctor if you are having problems. It's also a good idea to know your test results and keep a list of the medicines you take. How can you care for yourself at home? · Do not smoke or allow others to smoke around you. If you need help quitting, talk to your doctor about stop-smoking programs and medicines. These can increase your chances of quitting for good. · Get plenty of rest and sleep. · Take your medicines exactly as prescribed. Call your doctor if you think you are having a problem with your medicine. · Find healthy ways to deal with stress. ? Exercise daily. ? Get plenty of sleep. ? Eat regularly and well. When should you call for help? Call 911 anytime you think you may need emergency care. For example, call if:    · You have severe shortness of breath.     · You have symptoms of a heart attack. These may include:  ? Chest pain or pressure, or a strange feeling in the chest.  ? Sweating. ? Shortness of breath. ? Nausea or vomiting. ? Pain, pressure, or a strange feeling in the back, neck, jaw, or upper belly or in one or both shoulders or arms. ? Lightheadedness or sudden weakness. ? A fast or irregular heartbeat. After you call  911, the  may tell you to chew 1 adult-strength or 2 to 4 low-dose aspirin. Wait for an ambulance.  Do not try to drive yourself.    Call your doctor now or seek immediate medical care if:    · Your shortness of breath gets worse or you start to wheeze. Wheezing is a high-pitched sound when you breathe.     · You wake up at night out of breath or have to prop your head up on several pillows to breathe.     · You are short of breath after only light activity or while at rest.    Watch closely for changes in your health, and be sure to contact your doctor if:    · You do not get better over the next 1 to 2 days. Where can you learn more? Go to http://shima-alyssa.info/  Enter S780 in the search box to learn more about \"Shortness of Breath: Care Instructions. \"  Current as of: June 9, 2019Content Version: 12.4  © 6918-8704 Healthwise, Incorporated. Care instructions adapted under license by [employer] (which disclaims liability or warranty for this information). If you have questions about a medical condition or this instruction, always ask your healthcare professional. [address] any warranty or liability for your use of this information.

## 2020-03-26 DIAGNOSIS — R06.02 SHORTNESS OF BREATH: ICD-10-CM

## 2020-03-26 DIAGNOSIS — M79.10 MUSCLE PAIN: ICD-10-CM

## 2020-03-26 RX ORDER — DULOXETIN HYDROCHLORIDE 30 MG/1
30 CAPSULE, DELAYED RELEASE ORAL DAILY
Qty: 90 CAP | Refills: 0 | Status: SHIPPED | OUTPATIENT
Start: 2020-03-26 | End: 2020-05-27 | Stop reason: SDUPTHER

## 2020-05-27 DIAGNOSIS — I10 ESSENTIAL HYPERTENSION: ICD-10-CM

## 2020-05-27 DIAGNOSIS — E11.9 CONTROLLED TYPE 2 DIABETES MELLITUS WITHOUT COMPLICATION, WITHOUT LONG-TERM CURRENT USE OF INSULIN (HCC): ICD-10-CM

## 2020-05-27 DIAGNOSIS — M79.10 MUSCLE PAIN: ICD-10-CM

## 2020-05-27 RX ORDER — ATORVASTATIN CALCIUM 20 MG/1
20 TABLET, FILM COATED ORAL DAILY
Qty: 90 TAB | Refills: 0 | Status: SHIPPED | OUTPATIENT
Start: 2020-05-27 | End: 2020-10-29 | Stop reason: SDUPTHER

## 2020-05-27 RX ORDER — METOPROLOL TARTRATE 50 MG/1
TABLET ORAL
Qty: 90 TAB | Refills: 1 | Status: SHIPPED | OUTPATIENT
Start: 2020-05-27 | End: 2020-09-03 | Stop reason: SDUPTHER

## 2020-05-27 RX ORDER — METFORMIN HYDROCHLORIDE 1000 MG/1
1000 TABLET ORAL DAILY
Qty: 90 TAB | Refills: 0 | Status: SHIPPED | OUTPATIENT
Start: 2020-05-27 | End: 2020-10-29 | Stop reason: SDUPTHER

## 2020-05-27 RX ORDER — DULOXETIN HYDROCHLORIDE 30 MG/1
30 CAPSULE, DELAYED RELEASE ORAL DAILY
Qty: 90 CAP | Refills: 0 | Status: SHIPPED | OUTPATIENT
Start: 2020-05-27 | End: 2020-09-01 | Stop reason: SDUPTHER

## 2020-05-27 RX ORDER — HYDRALAZINE HYDROCHLORIDE 10 MG/1
TABLET, FILM COATED ORAL
Qty: 180 TAB | Refills: 1 | Status: SHIPPED | OUTPATIENT
Start: 2020-05-27 | End: 2020-09-03 | Stop reason: SDUPTHER

## 2020-09-01 DIAGNOSIS — M79.10 MUSCLE PAIN: ICD-10-CM

## 2020-09-03 DIAGNOSIS — I10 ESSENTIAL HYPERTENSION: ICD-10-CM

## 2020-09-03 RX ORDER — DULOXETIN HYDROCHLORIDE 30 MG/1
30 CAPSULE, DELAYED RELEASE ORAL DAILY
Qty: 90 CAP | Refills: 0 | Status: SHIPPED | OUTPATIENT
Start: 2020-09-03 | End: 2020-10-29 | Stop reason: SDUPTHER

## 2020-09-03 RX ORDER — METOPROLOL TARTRATE 50 MG/1
TABLET ORAL
Qty: 90 TAB | Refills: 1 | Status: SHIPPED | OUTPATIENT
Start: 2020-09-03 | End: 2020-10-21 | Stop reason: SDUPTHER

## 2020-09-03 RX ORDER — HYDRALAZINE HYDROCHLORIDE 10 MG/1
TABLET, FILM COATED ORAL
Qty: 180 TAB | Refills: 1 | Status: SHIPPED | OUTPATIENT
Start: 2020-09-03 | End: 2020-10-21 | Stop reason: SDUPTHER

## 2020-09-03 NOTE — TELEPHONE ENCOUNTER
DR Pam Arellano / TELEPHONE   Received: 3 days ago   Message Contents   Min Garcia [facility] Front Office               General Message/Vendor Calls       Pt' s  Layne Wild is requesting to speak with nurse in regard to obtaining a refill on B/P medication. Juanita Mejias was unsure the name or strength.          Callback required   Best contact number(s): [phone]               Gamaliel Chanel

## 2020-09-03 NOTE — TELEPHONE ENCOUNTER
----- Message from Evan Mailman sent at 9/1/2020  3:31 PM EDT -----  Regarding: Dr. Wanda Martinez refill  Contact: [phone]  [address] [place] [address] (if not patient): Canelo Mckeon   Relationship of caller (if not patient): spouse  Best contact number(s): [phone]  Name of medication and dosage if known: \"metoprolol\" 50mg/ \"hydralazine\" 10mg/ \"duloxetine\" 30mg  Is patient out of this medication (yes/no): yes, a few more capsules of hydralazine  Pharmacy name: [facility] ([address], [address] [address] [id number])  Pharmacy listed in chart? (yes/no):   Pharmacy phone number:  [phone]  Date of last visit: Thu 03/12/2020 w/ Dr. Ulysses Jones for [address]  Details to clarify the request: [address] [place] [address] is upset that pt does not have any medication

## 2020-09-03 NOTE — TELEPHONE ENCOUNTER
Duloxetine would not let me choose as saying it is is already in PENDING status. Review previous message.

## 2020-10-19 ENCOUNTER — TELEPHONE (OUTPATIENT)
Dept: FAMILY MEDICINE CLINIC | Age: 55
End: 2020-10-19

## 2020-10-19 NOTE — TELEPHONE ENCOUNTER
Anya Tiwari () [phone]      Pt  calling, notes his wife has 1 pill left on blood pressure medication. Didn't know name of medication at all. Patient has VV with Dr. Zina Clemente for 1st available of 10/29/20. Pt will need emergency supply to last until then.       thanks

## 2020-10-19 NOTE — TELEPHONE ENCOUNTER
Return call to patient, left message to call office. Patient should have a refill on metoprolol, last filled 9/3/2020 with one refill.

## 2020-10-20 ENCOUNTER — TELEPHONE (OUTPATIENT)
Dept: FAMILY MEDICINE CLINIC | Age: 55
End: 2020-10-20

## 2020-10-20 NOTE — TELEPHONE ENCOUNTER
----- Message from [employer] sent at 10/20/2020  1:00 PM EDT -----  Regarding: Office Staff/Telephone  General Message/Vendor Calls    Caller's first and last name:Anya Tiwari patient's       Reason for call:blood pressure medicine      Callback required yes/no and why: yes      Best contact number(s):[phone]      Details to clarify the request: please call the patient's  she is out of her blood pressure medicine       [employer]

## 2020-10-21 DIAGNOSIS — I10 ESSENTIAL HYPERTENSION: ICD-10-CM

## 2020-10-21 RX ORDER — METOPROLOL TARTRATE 50 MG/1
TABLET ORAL
Qty: 20 TAB | Refills: 0 | Status: SHIPPED | OUTPATIENT
Start: 2020-10-21 | End: 2020-10-29 | Stop reason: SDUPTHER

## 2020-10-21 RX ORDER — HYDRALAZINE HYDROCHLORIDE 10 MG/1
TABLET, FILM COATED ORAL
Qty: 30 TAB | Refills: 0 | Status: SHIPPED | OUTPATIENT
Start: 2020-10-21 | End: 2020-10-29 | Stop reason: SDUPTHER

## 2020-10-29 ENCOUNTER — VIRTUAL VISIT (OUTPATIENT)
Dept: FAMILY MEDICINE CLINIC | Age: 55
End: 2020-10-29

## 2020-10-29 DIAGNOSIS — E66.01 MORBID OBESITY WITH BMI OF 50.0-59.9, ADULT (HCC): ICD-10-CM

## 2020-10-29 DIAGNOSIS — E11.9 CONTROLLED TYPE 2 DIABETES MELLITUS WITHOUT COMPLICATION, WITHOUT LONG-TERM CURRENT USE OF INSULIN (HCC): Primary | ICD-10-CM

## 2020-10-29 DIAGNOSIS — M79.10 MUSCLE PAIN: ICD-10-CM

## 2020-10-29 DIAGNOSIS — I10 ESSENTIAL HYPERTENSION: ICD-10-CM

## 2020-10-29 PROCEDURE — 99214 OFFICE O/P EST MOD 30 MIN: CPT | Performed by: STUDENT IN AN ORGANIZED HEALTH CARE EDUCATION/TRAINING PROGRAM

## 2020-10-29 RX ORDER — ATORVASTATIN CALCIUM 20 MG/1
20 TABLET, FILM COATED ORAL DAILY
Qty: 90 TAB | Refills: 1 | Status: SHIPPED | OUTPATIENT
Start: 2020-10-29 | End: 2021-09-07

## 2020-10-29 RX ORDER — DULOXETIN HYDROCHLORIDE 30 MG/1
30 CAPSULE, DELAYED RELEASE ORAL DAILY
Qty: 90 CAP | Refills: 2 | Status: SHIPPED | OUTPATIENT
Start: 2020-10-29 | End: 2021-03-03

## 2020-10-29 RX ORDER — METFORMIN HYDROCHLORIDE 1000 MG/1
1000 TABLET ORAL DAILY
Qty: 90 TAB | Refills: 2 | Status: SHIPPED | OUTPATIENT
Start: 2020-10-29 | End: 2022-01-05 | Stop reason: SDUPTHER

## 2020-10-29 RX ORDER — METOPROLOL TARTRATE 50 MG/1
TABLET ORAL
Qty: 90 TAB | Refills: 2 | Status: SHIPPED | OUTPATIENT
Start: 2020-10-29 | End: 2021-05-25

## 2020-10-29 RX ORDER — HYDRALAZINE HYDROCHLORIDE 10 MG/1
TABLET, FILM COATED ORAL
Qty: 90 TAB | Refills: 2 | Status: SHIPPED | OUTPATIENT
Start: 2020-10-29 | End: 2021-09-07

## 2020-10-29 NOTE — PROGRESS NOTES
Nicolas Ortiz  50 y.o. female  1965  [address]  [id number]   [address]:    Telemedicine Progress Note  Julia Grant MD       Encounter Date and Time: October 29, 2020 at 1:03 PM    Consent: Nicolas Ortiz, who was seen by synchronous (real-time) audio-video technology, and/or her healthcare decision maker, is aware that this patient-initiated, Telehealth encounter on 10/29/2020 is a billable service, with coverage as determined by her insurance carrier. She is aware that she may receive a bill and has provided verbal consent to proceed: Yes. Chief Complaint   Patient presents with    Medication Refill     History of Present Illness   Nicolas Ortiz is a 50 y.o. female was evaluated by synchronous (real-time) audio-video technology from home, through a secure patient portal.  Patient has no acute concerns today. She has been doing well and is requesting medication refills for her chronic medications. She has had no changes in her medical history. She started on the Keto Diet and has seen good results. She has lost 33lbs with diet alone. Is unable to incorporate exercise at this time. She feels much better and is not having shortness of breath anymore. She has not been checking her BP lately but has not had any headaches which normally would signify an elevated BP. She is compliant with her medications. She is also doing well on her Cymbalta which she started taking 1 year ago after both her parents passed around the same time. She underwent extensive testing for various symptoms but all improved after starting the cymbalta and she would like to stay on it. Review of Systems   Review of Systems   Constitutional: Positive for weight loss. HENT: Negative for congestion. Eyes: Negative for blurred vision. Respiratory: Negative for cough and shortness of breath. Cardiovascular: Negative for chest pain.    Gastrointestinal: Negative for abdominal pain and blood in stool. Genitourinary: Negative for hematuria. Musculoskeletal: Negative for falls. Neurological: Negative for dizziness, weakness and headaches. Psychiatric/Behavioral: Negative for depression. Vitals/Objective:     General: alert, cooperative, no distress   Mental  status: mental status: alert, oriented to person, place, and time, normal mood, behavior, speech, dress, motor activity, and thought processes   Resp: resp: normal effort and no respiratory distress   Neuro: neuro: no gross deficits   Skin: skin: no discoloration or lesions of concern on visible areas   Due to this being a TeleHealth evaluation, many elements of the physical examination are unable to be assessed. Assessment and Plan:   Nicole Schaeffer is a pleasant 50 y.o. female who presents for medication refill. 1. Essential hypertension  - stable. Tolerating BP medication regimen. Encouraged to keep log of BP readings. - Return for lab work in 3 months (at annual visit)   - metoprolol tartrate (LOPRESSOR) 50 mg tablet; TAKE ONE TABLET BY MOUTH TWICE A DAY  Dispense: 90 Tab; Refill: 2  - hydrALAZINE (APRESOLINE) 10 mg tablet; TAKE ONE TABLET BY MOUTH THREE TIMES A DAY  Dispense: 90 Tab; Refill: 2    2. Controlled type 2 diabetes mellitus without complication, without long-term current use of insulin ([facility]  - last A1c 5.8% (01/2020). Will repeat labwork at annual visit in 3 months   - metFORMIN (GLUCOPHAGE) 1,000 mg tablet; Take 1 Tab by mouth daily. Indications: prevention of type 2 diabetes mellitus  Dispense: 90 Tab; Refill: 2    3. Muscle pain  - Stable/resolved. - Continue Cymbalta daily, initially prescribed for muscle pain in the setting of low mood and grief. - DULoxetine (CYMBALTA) 30 mg capsule; Take 1 Cap by mouth daily. Indications: chronic muscle or bone pain  Dispense: 90 Cap; Refill: 2    4.  Morbid obesity with BMI of 50.0-59.9, adult ([facility]  - Patient on keto diet, has lost 33 lbs. Motivated to continue work on her weight. - Will continue to monitor. We discussed the expected course, resolution and complications of the diagnosis(es) in detail. Medication risks, benefits, costs, interactions, and alternatives were discussed as indicated. I advised her to contact the office if her condition worsens, changes or fails to improve as anticipated. She expressed understanding with the diagnosis(es) and plan. Patient understands that this encounter was a temporary measure, and the importance of further follow up and examination was emphasized. Patient verbalized understanding. Patient informed to follow up: 3 months for lab work and annual check. Electronically Signed: Sharin Rubinstein, MD    Daya Pizarro is a 50 y.o. female who was evaluated by an audio-video encounter for concerns as above. Patient identification was verified prior to start of the visit. A caregiver was present when appropriate. Due to this being a TeleHealth encounter (During [facility] public [facility] emergency), evaluation of the following organ systems was limited: Vitals/Constitutional/EENT/Resp/CV/GI//MS/Neuro/Skin/Heme-Lymph-Imm. Pursuant to the emergency declaration under the [facility][note 3][facility], [facility] waiver authority and the [employer] and [employer] Act, this Virtual Visit was conducted, with patient's (and/or legal guardian's) consent, to reduce the patient's risk of exposure to COVID-19 and provide necessary medical care. Services were provided through a synchronous discussion virtually to substitute for in-person clinic visit. I was at home. The patient was at home. History   Patients past medical, surgical and family histories were reviewed and updated.       Past Medical History:   Diagnosis Date    Anxiety and depression     Flu     2 wks ago    GERD (gastroesophageal reflux disease)     Headache(784.0)     Hypertension     Incidental lung nodule, > 3mm and < 8mm 2016    left lung nodule in smoker, needs follow up CT in Oct 2016      Past Surgical History:   Procedure Laterality Date    APPENDECTOMY      HX APPENDECTOMY      HX  SECTION      X 3 Births    HX  SECTION      HX CHOLECYSTECTOMY      HX WRIST FRACTURE [place]      metal    REMOVAL GALLBLADDER       Family History   Problem Relation Age of Onset    Diabetes Mother     Hypertension Brother     Hypertension Brother     Hypertension Brother      Social History     Tobacco Use    Smoking status: Current Every Day Smoker     Packs/day: 0.50     Years: 25.00     Pack years: 12.50     Types: Cigarettes    Smokeless tobacco: Never Used   Substance Use Topics    Alcohol use: No     Alcohol/week: 0.0 standard drinks    Drug use: No       Patient Active Problem List   Diagnosis Code    History of normal resting EKG [id number]    Gallstone K80.20    Appendicitis K37    H/O  section Z98.891    Left hand fracture S62. 92XA    H. pylori infection A04.8    Thyromegaly E01.0    Tobacco abuse Z72.0    DDD (degenerative disc disease), lumbar M51.36    Incidental lung nodule, > 3mm and < 8mm R91.1    Morbid obesity with BMI of 50.0-59.9, adult ([facility]) E66.01, Z68.43    Venous insufficiency of both lower extremities I87.2    Elevated hemoglobin A1c R73.09    Wheezing R06.2    Angioedema T78. 3XXA    Swelling of throat J39.2    Dyspnea R06.00    Morbid obesity ([facility]) E66.01    Hypoxia R09.02    Patient noncompliance Z91.19    Anaphylactic reaction T78. 2XXA    Adrenal nodule ([facility]) E27.8    Prediabetes R73.03    Idiopathic angioedema T78. 3XXA          Current Medications/Allergies   Medications and Allergies reviewed:    Current Outpatient Medications   Medication Sig Dispense Refill    hydrALAZINE (APRESOLINE) 10 mg tablet TAKE ONE TABLET BY MOUTH THREE TIMES A DAY 30 Tab 0    metoprolol tartrate (LOPRESSOR) 50 mg tablet TAKE ONE TABLET BY MOUTH TWICE A DAY 20 Tab 0    DULoxetine (CYMBALTA) 30 mg capsule Take 1 Cap by mouth daily. Indications: chronic muscle or bone pain 90 Cap 0    metFORMIN (GLUCOPHAGE) 1,000 mg tablet Take 1 Tab by mouth daily. Indications: prevention of type 2 diabetes mellitus 90 Tab 0    atorvastatin (LIPITOR) 20 mg tablet Take 1 Tab by mouth daily. 90 Tab 0    albuterol (PROVENTIL HFA, VENTOLIN HFA, PROAIR HFA) 90 mcg/actuation inhaler Take 1 Puff by inhalation every four (4) hours as needed for Wheezing. 2 Inhaler 2    guaiFENesin ER (MUCINEX) 600 mg ER tablet Take 1 Tab by mouth two (2) times a day. 60 Tab 1    fluticasone propionate (FLONASE) 50 mcg/actuation nasal spray 2 Sprays by Both Nostrils route daily. Indications: inflammation of the nose due to an allergy 1 Bottle 1    cyclobenzaprine (FLEXERIL) 5 mg tablet Take 1 Tab by mouth nightly as needed for Muscle Spasm(s). 10 Tab 0    diphenhydrAMINE (BENADRYL) 25 mg tablet Take 50 mg by mouth every six (6) hours as needed for Itching (allergic reaction). Allergies   Allergen Reactions    Hydrochlorothiazide Anaphylaxis and Unproven on Challenge    Losartan Anaphylaxis and Unproven on Challenge    Sulfa (Sulfonamide Antibiotics) Anaphylaxis     When she was on Losartan (8/2017), and then again when she was on HCTZ (9/2017). She was also on lasix since 2016.     Eggplant Other (comments)     Severe stomach pain     Lasix [Furosemide] Unproven on Challenge     May lead to anaphylaxis

## 2020-12-01 ENCOUNTER — TELEPHONE (OUTPATIENT)
Dept: FAMILY MEDICINE CLINIC | Age: 55
End: 2020-12-01

## 2020-12-01 NOTE — TELEPHONE ENCOUNTER
----- Message from Rohit León MD sent at 11/29/2020  9:49 AM EST -----  Regarding: In Clinic appt  Please schedule follow up appointment in clinic for Right Lower hip and leg pain. Last Seen by Dr. Gregory Antonio. COVID questioning negative.     Thank you,  Pradeep San

## 2020-12-02 ENCOUNTER — OFFICE VISIT (OUTPATIENT)
Dept: FAMILY MEDICINE CLINIC | Age: 55
End: 2020-12-02

## 2020-12-02 VITALS
HEART RATE: 63 BPM | SYSTOLIC BLOOD PRESSURE: 131 MMHG | BODY MASS INDEX: 52.22 KG/M2 | RESPIRATION RATE: 18 BRPM | WEIGHT: 266 LBS | TEMPERATURE: 96.9 F | HEIGHT: 60 IN | DIASTOLIC BLOOD PRESSURE: 80 MMHG | OXYGEN SATURATION: 96 %

## 2020-12-02 DIAGNOSIS — M16.11 PRIMARY OSTEOARTHRITIS OF RIGHT HIP: Primary | ICD-10-CM

## 2020-12-02 DIAGNOSIS — M25.551 RIGHT HIP PAIN: ICD-10-CM

## 2020-12-02 PROCEDURE — 99213 OFFICE O/P EST LOW 20 MIN: CPT | Performed by: PHYSICAL MEDICINE & REHABILITATION

## 2020-12-02 RX ORDER — DICLOFENAC SODIUM 50 MG/1
50 TABLET, DELAYED RELEASE ORAL 2 TIMES DAILY
Qty: 28 TAB | Refills: 0 | Status: SHIPPED | OUTPATIENT
Start: 2020-12-02 | End: 2020-12-16

## 2020-12-02 NOTE — PROGRESS NOTES
[address]     CHIEF COMPLAINT:   Chief Complaint   Patient presents with    Hip Pain     pain in right hip, lower back, and leg x1 month. 5/10 pain currently. HISTORY OF PRESENT ILLNESS:  Catarina Kramer is a left-hand-dominant 50 y.o. female who presents with her duaghter for right hip pain. Pain started about a month ago with no known trauma. It is localized to her right anterior hip. The patient describes it as a severe pain. They do report radiating/refering symptoms from the left anterior hip around to the buttocks and then proceeding down to the back of the knee joint. This pain is made worse by moving from sitting to standing. It is made better by sitting and has zero out of 10 pain at rest.    It is worse in the evening and after standing for long periods. She uses her hands to get up to avoid pain and denies any significant weakness. They have taken IBU and tylenol but do not help her pain.  # [id number] and [id number] assisted with this visit. PMHx:  Past Medical History:   Diagnosis Date    Anxiety and depression     Flu     2 wks ago    GERD (gastroesophageal reflux disease)     Headache(784.0)     Hypertension     Incidental lung nodule, > 3mm and < 8mm 4/20/2016    left lung nodule in smoker, needs follow up CT in Oct 2016      Meds:   Current Outpatient Medications   Medication Sig Dispense Refill    diclofenac EC (VOLTAREN) 50 mg EC tablet Take 1 Tab by mouth two (2) times a day for 14 days. 28 Tab 0    metoprolol tartrate (LOPRESSOR) 50 mg tablet TAKE ONE TABLET BY MOUTH TWICE A DAY 90 Tab 2    hydrALAZINE (APRESOLINE) 10 mg tablet TAKE ONE TABLET BY MOUTH THREE TIMES A DAY 90 Tab 2    DULoxetine (CYMBALTA) 30 mg capsule Take 1 Cap by mouth daily.  Indications: chronic muscle or bone pain 90 Cap 2    albuterol (PROVENTIL HFA, VENTOLIN HFA, PROAIR HFA) 90 mcg/actuation inhaler Take 1 Puff by inhalation every four (4) hours as needed for Wheezing. 2 Inhaler 2    fluticasone propionate (FLONASE) 50 mcg/actuation nasal spray 2 Sprays by Both Nostrils route daily. Indications: inflammation of the nose due to an allergy 1 Bottle 1    cyclobenzaprine (FLEXERIL) 5 mg tablet Take 1 Tab by mouth nightly as needed for Muscle Spasm(s). 10 Tab 0    diphenhydrAMINE (BENADRYL) 25 mg tablet Take 50 mg by mouth every six (6) hours as needed for Itching (allergic reaction).  metFORMIN (GLUCOPHAGE) 1,000 mg tablet Take 1 Tab by mouth daily. Indications: prevention of type 2 diabetes mellitus 90 Tab 2    atorvastatin (LIPITOR) 20 mg tablet Take 1 Tab by mouth daily. 90 Tab 1    guaiFENesin ER (MUCINEX) 600 mg ER tablet Take 1 Tab by mouth two (2) times a day. 60 Tab 1     Allergies: Allergies   Allergen Reactions    Hydrochlorothiazide Anaphylaxis and Unproven on Challenge    Losartan Anaphylaxis and Unproven on Challenge    Sulfa (Sulfonamide Antibiotics) Anaphylaxis     When she was on Losartan (8/2017), and then again when she was on HCTZ (9/2017). She was also on lasix since 2016.     Eggplant Other (comments)     Severe stomach pain     Lasix [Furosemide] Unproven on Challenge     May lead to anaphylaxis      Smoker:  Social History     Tobacco Use   Smoking Status Current Every Day Smoker    Packs/day: 0.50    Years: 25.00    Pack years: 12.50    Types: Cigarettes   Smokeless Tobacco Never Used     ETOH:   Social History     Substance and Sexual Activity   Alcohol Use No    Alcohol/week: 0.0 standard drinks     FH:   Family History   Problem Relation Age of Onset    Diabetes Mother     Hypertension Brother     Hypertension Brother     Hypertension Brother      ROS:  Review of Systems:   Constitutional: No fevers, chills, night sweats,   Eyes: No vision loss, diplopia, blurry vision, redness, pruritus  ENT: No hearing loss, smell, swallowing difficulties  CV: No chest pain, edema, palpitations  Lungs: No SOB, NOVAK, wheeze, cough, hemoptysis  GI: No nausea, vomiting, diarrhea, constipation, hematochezia, melena, abdominal pain  : No dysuria, hematuria, nocturia, frequency, retention  Endocrine: No hot or cold intolerance, polyuria, polydipsia, polyphagia  Hematologic: No Lymphadenopathy, excess bleeding, bruising   Immunologic: No Hives, sinus congestion, allergies  Integument: No new rashes, pruritus, alopecia  Psychiatric: No hallucinations, depression, anxiety, suicidal ideation, insomnia  Neurological: No head ache, dizziness, weakness, numbness, tingling  Musculoskeletal: Right-sided hip pain    PHYSICAL EXAM:  VITAL SIGNS:   Visit Vitals  Blood Pressure 131/80 (BP 1 Location: Left arm, BP Patient Position: Sitting)   Pulse 63   Temperature 96.9 °F (36.1 °C) (Temporal)   Respiration 18   Height 5' (1.524 m)   Weight 266 lb (120.7 kg)   Last Menstrual Period 12/31/2012   Oxygen Saturation 96%   Body Mass Index 51.95 kg/m²       GENERAL:  WN/WD, morbidly obese female, in NAD,   HEENT:  Atraumatic, normocephalic, no auricular deformities, hearing intact,   NECK:  Adequate ROM  HEART:  No edema, no JVD, circulation intact, extremities warm and well perfused  LUNGS: No resp dist, SoB, audible wheeze, accessory muscle use or air hunger  SKIN:   Warm, dry, intact, non-erythemic, no ulcers, or rashes   NEURO Mental Status: Alert, Intelligible & fluent speech,   normal mentation & congruent affect     answers questions, follows commands, comprehension intact  CN: CN 2-12 grossly intact    Sensory: intact LT  lower extremities symmetric bilaterally        Manual Muscle Testing Right Left    Hip Flexion   5 5    Knee Extension  5 5    Knee Flexion   5 5    Ankle Dorsiflexion  5 5    Ankle Plantar Flexion  5 5      Reflexes:   Right Left    Patellar (L4):    2/4  2/4    Achilles (S1):    2/4  2/4    Clonus:    absent absent      Gait: non-Ataxic,Antalgic favoring the right side, ambulates with no assistive devices    MSK:     Hip Exam: Evaluation of the right hip  No gross deformities on inspection. Palpation: There is no tenderness of the ASIS, AIIS, greater trochanter on the left, SI joint on the left, or sciatic notch on the left. She demonstrates tenderness on her right SI joint, PSIS, and greater trochanter. There is full passive ROM of the hip with flexion, internal and external rotation. There is tightness the hamstrings, but not the quadriceps, gastrocnemius soleus complex. Negative log roll. There is stable posterior, lateral, and anterior pain with pain with GENOVEVA on the right. There is pain with Fabere on the right also. LABORATORY STUDIES:  No pertinent labs at this time    IMAGING STUDIES:  X-rays of the right hip taken 12/2/2020 reviewed by me personally and independently and reviewed with the patient. These images demonstrate mild bilateral hip osteoarthritis right greater than left. ASSESSMENT/PLAN:    ICD-10-CM ICD-9-CM    1. Right hip pain  M25.551 719.45 XR HIP RT W OR WO PELV 2-3 VWS       Right hip osteoarthritis versus greater trochanteric pain syndrome: Nonfocal diffuse pain over the entire right hip. Discussed with the patient trial of oral anti-inflammatories. She preferred to do home exercise program at this time as she cannot get to physical therapy due to transportation problem. Discussed with the patient that if this is not successful then at a follow-up visit we will further discuss physical therapy versus a potential intra-articular/greater trochanteric bursa injection depending on where her focal pain was.     Treatment options discussed with patient at length, including risks, benefits, alternatives, and the nature of any potential procedures for the problem    Patient Management  Laboratory Studies: None  Imaging Studies: Reviewed  Past Records: None  Ordered Referrals: None  Ordered Physical Therapy/HEP: We will start home exercise program  Ordered Medications: Diclofenac 50 twice daily x14 days  Follow-up: 8 weeks, if not improved formal physical therapy versus corticosteroid injection of the greater trochanter versus intra-articular hip at that time. Apollo Vasquez D.O.   Physical Medicine & Rehabilitation  Sports Medicine Fellow

## 2020-12-02 NOTE — PATIENT INSTRUCTIONS
1.  Take diclofenac twice a day with food  2. Do home exercises as given  3. Follow-up with me in 8 weeks, if no improvement at that time we will discuss formal physical therapy versus joint injection at that time.

## 2020-12-02 NOTE — PROGRESS NOTES
Patient roomed with [place]  # [id number]    Identified Patient with two Patient identifiers (Name and ). Two Patient Identifiers confirmed. Reviewed record in preparation for visit and have obtained necessary documentation. Chief Complaint   Patient presents with    Hip Pain     pain in right hip, lower back, and leg x1 month. 5/10 pain currently. Visit Vitals  /80 (BP 1 Location: Left arm, BP Patient Position: Sitting)   Pulse 63   Temp 96.9 °F (36.1 °C) (Temporal)   Resp 18   Ht 5' (1.524 m)   Wt 266 lb (120.7 kg)   SpO2 96%   BMI 51.95 kg/m²       1. Have you been to the ER, urgent care clinic since your last visit? Hospitalized since your last visit? No    2. Have you seen or consulted any other health care providers outside of the [address] [place] [address] since your last visit? Include any pap smears or colon screening.  No

## 2021-01-04 ENCOUNTER — TELEPHONE (OUTPATIENT)
Dept: FAMILY MEDICINE CLINIC | Age: 56
End: 2021-01-04

## 2021-01-04 NOTE — TELEPHONE ENCOUNTER
----- Message from [place] sent at 1/2/2021 11:56 AM EST -----  Regarding: Dr. Clark Hurtado first and last name: Catina Jennings      Reason for call: Requesting a call back to discuss pt's medications.       Callback required yes/no and why: yes      Best contact number(s): [phone]      Details to clarify the request: [id number]

## 2021-03-01 ENCOUNTER — TELEPHONE (OUTPATIENT)
Dept: FAMILY MEDICINE CLINIC | Age: 56
End: 2021-03-01

## 2021-03-01 NOTE — TELEPHONE ENCOUNTER
----- Message from Deneen Ryan sent at 2/22/2021  1:18 PM EST -----  Regarding: Dr Stacie Alonzo (if not patient): N/A      Relationship of caller (if not patient): Marlon Lacy,       Best contact number(s): [phone]      Name of medication and dosage if known: \"Rx x3 - caller does not know the name of them\"      Is patient out of this medication (yes/no): Yes      Pharmacy name: [id number] listed in chart? (yes/no): Yes  Pharmacy phone number: on chart per caller      Details to clarify the request: Pt would like a callback in regard to the status of these being called in and per her , she is still having hip pain and this is worse.        Deneen Ryan

## 2021-03-02 ENCOUNTER — TELEPHONE (OUTPATIENT)
Dept: FAMILY MEDICINE CLINIC | Age: 56
End: 2021-03-02

## 2021-03-02 NOTE — TELEPHONE ENCOUNTER
From: Denis Dalal   Sent: 3/1/2021   4:08 PM EST   To: [facility] Front Office   Subject: Dr. Souleymane Mccarthy                             Level 1/Escalated Issue     Caller's first and last name and relationship (if not the patient):Ragini/Daughter   Best contact number(s): [phone]   What are the symptoms: chest pain   Transfer successful - yes/no (include outcome):no   Transfer declined - yes/no (include reason):no   Was caller advised to seek appropriate level of care - yes/no: yes   Details to clarify the request: Bala Khan advising her mother Jeny Peace is having chest pain and sometimes limping due to hip pain

## 2021-03-02 NOTE — TELEPHONE ENCOUNTER
----- Message from [employer] sent at 3/1/2021  4:08 PM EST -----  Regarding: Dr. Edda De 1/Escalated Issue    Caller's first and last name and relationship (if not the patient):Ragini/Daughter  Best contact number(s): [phone]  What are the symptoms: chest pain   Transfer successful - yes/no (include outcome):no  Transfer declined - yes/no (include reason):no  Was caller advised to seek appropriate level of care - yes/no: yes  Details to clarify the request: Adriana Warner advising her mother Moises Levi is having chest pain and sometimes limping due to hip pain

## 2021-03-02 NOTE — TELEPHONE ENCOUNTER
Forwarded to nurse lizette to be triaged I also verbally notified the nurse this was sent to her and to please address it.    Thanks

## 2021-03-02 NOTE — TELEPHONE ENCOUNTER
----- Message from Denae Chowdary sent at 3/1/2021  4:08 PM EST -----  Regarding: Dr. Martinez Ireland 1/Escalated Issue    Caller's first and last name and relationship (if not the patient):Ragini/Daughter  Best contact number(s): [phone]  What are the symptoms: chest pain   Transfer successful - yes/no (include outcome):no  Transfer declined - yes/no (include reason):no  Was caller advised to seek appropriate level of care - yes/no: yes  Details to clarify the request: Kayli Crews advising her mother Francetta Favre is having chest pain and sometimes limping due to hip pain

## 2021-03-02 NOTE — TELEPHONE ENCOUNTER
Called pt's daughter back. She stated that her mom was not having chest pain. She is having hip pain. Appt made.

## 2021-03-03 ENCOUNTER — OFFICE VISIT (OUTPATIENT)
Dept: FAMILY MEDICINE CLINIC | Age: 56
End: 2021-03-03

## 2021-03-03 VITALS
OXYGEN SATURATION: 96 % | DIASTOLIC BLOOD PRESSURE: 73 MMHG | HEIGHT: 60 IN | RESPIRATION RATE: 20 BRPM | WEIGHT: 266 LBS | HEART RATE: 71 BPM | TEMPERATURE: 97.5 F | SYSTOLIC BLOOD PRESSURE: 147 MMHG | BODY MASS INDEX: 52.22 KG/M2

## 2021-03-03 DIAGNOSIS — E66.01 MORBID OBESITY WITH BMI OF 50.0-59.9, ADULT (HCC): ICD-10-CM

## 2021-03-03 DIAGNOSIS — G89.29 CHRONIC MUSCULOSKELETAL PAIN: ICD-10-CM

## 2021-03-03 DIAGNOSIS — M54.41 RIGHT-SIDED LOW BACK PAIN WITH RIGHT-SIDED SCIATICA, UNSPECIFIED CHRONICITY: Primary | ICD-10-CM

## 2021-03-03 DIAGNOSIS — M70.61 GREATER TROCHANTERIC BURSITIS OF RIGHT HIP: ICD-10-CM

## 2021-03-03 DIAGNOSIS — M79.10 MUSCLE PAIN: ICD-10-CM

## 2021-03-03 DIAGNOSIS — R73.03 PREDIABETES: ICD-10-CM

## 2021-03-03 DIAGNOSIS — M79.18 CHRONIC MUSCULOSKELETAL PAIN: ICD-10-CM

## 2021-03-03 PROBLEM — R06.00 DYSPNEA: Status: RESOLVED | Noted: 2017-09-18 | Resolved: 2021-03-03

## 2021-03-03 PROBLEM — R06.2 WHEEZING: Status: RESOLVED | Noted: 2017-03-04 | Resolved: 2021-03-03

## 2021-03-03 PROCEDURE — 99213 OFFICE O/P EST LOW 20 MIN: CPT | Performed by: FAMILY MEDICINE

## 2021-03-03 RX ORDER — PREDNISONE 10 MG/1
TABLET ORAL
Qty: 21 TAB | Refills: 0 | Status: SHIPPED | OUTPATIENT
Start: 2021-03-03 | End: 2021-09-07

## 2021-03-03 RX ORDER — DULOXETIN HYDROCHLORIDE 30 MG/1
60 CAPSULE, DELAYED RELEASE ORAL DAILY
Qty: 90 CAP | Refills: 1 | Status: SHIPPED | OUTPATIENT
Start: 2021-03-03 | End: 2021-06-23 | Stop reason: SDUPTHER

## 2021-03-03 NOTE — PROGRESS NOTES
Chief Complaint   Patient presents with    Hip Pain     right. No injury noted. 1. Have you been to the ER, urgent care clinic since your last visit? Hospitalized since your last visit? No    2. Have you seen or consulted any other health care providers outside of the [address] [place] [address] since your last visit? Include any pap smears or colon screening.  No

## 2021-03-03 NOTE — PROGRESS NOTES
[address]     CHIEF COMPLAINT:   Chief Complaint   Patient presents with    Hip Pain     right. No injury noted. HISTORY OF PRESENT ILLNESS:  Janes Burgos is a left-hand-dominant 50 y.o. female who presents with her daughter for right hip pain. Pain started over 3 months ago with no known trauma. It is localized to her right anterior, lateral, and posterior hip. The patient describes it as a severe pain with radiation of symptoms to the lower back and buttocks and then proceeding down to the back of the knee joint. This pain is made worse by moving from sitting to standing. It is made better by sitting and has zero out of 10 pain at rest.  It is worse in the evening and after standing for long periods. She uses her hands to get up to avoid pain and denies any significant weakness. Prior therapies include duloxetine, acetaminophen. She has not done physical therapy. PMHx:  Past Medical History:   Diagnosis Date    Anxiety and depression     Dyspnea 9/18/2017    Flu     2 wks ago    GERD (gastroesophageal reflux disease)     Headache(784.0)     Hypertension     Incidental lung nodule, > 3mm and < 8mm 4/20/2016    left lung nodule in smoker, needs follow up CT in Oct 2016      Meds:   Current Outpatient Medications   Medication Sig Dispense Refill    predniSONE (STERAPRED DS) 10 mg dose pack See administration instruction per 10mg dose pack 21 Tab 0    DULoxetine (CYMBALTA) 30 mg capsule Take 2 Caps by mouth daily. Indications: chronic muscle or bone pain 90 Cap 1    metoprolol tartrate (LOPRESSOR) 50 mg tablet TAKE ONE TABLET BY MOUTH TWICE A DAY 90 Tab 2    hydrALAZINE (APRESOLINE) 10 mg tablet TAKE ONE TABLET BY MOUTH THREE TIMES A DAY 90 Tab 2    cyclobenzaprine (FLEXERIL) 5 mg tablet Take 1 Tab by mouth nightly as needed for Muscle Spasm(s).  10 Tab 0    diphenhydrAMINE (BENADRYL) 25 mg tablet Take 50 mg by mouth every six (6) hours as needed for Itching (allergic reaction).  metFORMIN (GLUCOPHAGE) 1,000 mg tablet Take 1 Tab by mouth daily. Indications: prevention of type 2 diabetes mellitus 90 Tab 2    atorvastatin (LIPITOR) 20 mg tablet Take 1 Tab by mouth daily. 90 Tab 1    albuterol (PROVENTIL HFA, VENTOLIN HFA, PROAIR HFA) 90 mcg/actuation inhaler Take 1 Puff by inhalation every four (4) hours as needed for Wheezing. 2 Inhaler 2    guaiFENesin ER (MUCINEX) 600 mg ER tablet Take 1 Tab by mouth two (2) times a day. 60 Tab 1    fluticasone propionate (FLONASE) 50 mcg/actuation nasal spray 2 Sprays by Both Nostrils route daily. Indications: inflammation of the nose due to an allergy 1 Bottle 1     Allergies: Allergies   Allergen Reactions    Hydrochlorothiazide Anaphylaxis and Unproven on Challenge    Losartan Anaphylaxis and Unproven on Challenge    Sulfa (Sulfonamide Antibiotics) Anaphylaxis     When she was on Losartan (8/2017), and then again when she was on HCTZ (9/2017). She was also on lasix since 2016.     Eggplant Other (comments)     Severe stomach pain     Lasix [Furosemide] Unproven on Challenge     May lead to anaphylaxis      Smoker:  Social History     Tobacco Use   Smoking Status Current Every Day Smoker    Packs/day: 0.50    Years: 25.00    Pack years: 12.50    Types: Cigarettes   Smokeless Tobacco Never Used     ETOH:   Social History     Substance and Sexual Activity   Alcohol Use No    Alcohol/week: 0.0 standard drinks     FH:   Family History   Problem Relation Age of Onset    Diabetes Mother     Hypertension Brother     Hypertension Brother     Hypertension Brother      ROS:  Review of Systems:   Constitutional: No fevers, chills, night sweats,   Eyes: No vision loss, diplopia, blurry vision, redness, pruritus  ENT: No hearing loss, smell, swallowing difficulties  CV: No chest pain, edema, palpitations  Lungs: No SOB, NOVAK, wheeze, cough, hemoptysis  GI: No nausea, vomiting, diarrhea, constipation, hematochezia, melena, abdominal pain  : No dysuria, hematuria, nocturia, frequency, retention  Endocrine: No hot or cold intolerance, polyuria, polydipsia, polyphagia  Hematologic: No Lymphadenopathy, excess bleeding, bruising   Immunologic: No Hives, sinus congestion, allergies  Integument: No new rashes, pruritus, alopecia  Psychiatric: No hallucinations, depression, anxiety, suicidal ideation, insomnia  Neurological: No head ache, dizziness  Musculoskeletal: As above    PHYSICAL EXAM:  VITAL SIGNS:   Visit Vitals  BP (!) 147/73   Pulse 71   Temp 97.5 °F (36.4 °C) (Temporal)   Resp 20   Ht 5' (1.524 m)   Wt 266 lb (120.7 kg)   LMP 12/31/2012   SpO2 96%   BMI 51.95 kg/m²       GENERAL:  WN/WD, morbidly obese female, in NAD,   HEENT:  Atraumatic, normocephalic, no auricular deformities, hearing intact,   NECK:  Adequate ROM  HEART:  No edema, no JVD, circulation intact, extremities warm and well perfused  LUNGS: No resp dist, SoB, audible wheeze, accessory muscle use or air hunger  SKIN:   Warm, dry, intact, non-erythemic, no ulcers, or rashes   NEURO Mental Status: Alert, Intelligible & fluent speech,   normal mentation & congruent affect     answers questions, follows commands, comprehension intact  CN: CN 2-12 grossly intact    Sensory: intact LT  lower extremities symmetric bilaterally        Manual Muscle Testing Right Left    Hip Flexion   5 5    Knee Extension  5 5    Knee Flexion   5 5    Ankle Dorsiflexion  5 5    Ankle Plantar Flexion  5 5      Reflexes:   Right Left    Patellar (L4):    2/4  2/4    Achilles (S1):    2/4  2/4    Clonus:    absent absent      Gait: non-Ataxic, Antalgic favoring the right side, ambulates with no assistive devices    MSK:     Hip Exam:   Evaluation of the right hip  No gross deformities on inspection. Palpation: There is no tenderness of the ASIS, AIIS, greater trochanter on the left, SI joint on the left, or sciatic notch on the left.   She demonstrates tenderness on her right SI joint, PSIS, and greater trochanter. There is full passive ROM of the hip with flexion, internal and external rotation. There is tightness the hamstrings, but not the quadriceps, gastrocnemius soleus complex. Negative log roll. There is posterior, lateral, and anterior pain with pain with GENOVEVA on the right. LABORATORY STUDIES:  No pertinent labs at this time    IMAGING STUDIES:  X-rays of the right hip taken 12/2/2020 reviewed by me personally and independently and reviewed with the patient. These images demonstrate mild bilateral hip osteoarthritis right greater than left. ASSESSMENT/PLAN:    ICD-10-CM ICD-9-CM    1. Right-sided low back pain with right-sided sciatica, unspecified chronicity  M54.41 724.3    2. Greater trochanteric bursitis of right hip  M70.61 726.5    3. Chronic musculoskeletal pain  M79.18 729.1     G89.29 338.29    4. Morbid obesity with BMI of 50.0-59.9, adult ([facility])  E66.01 278.01     Z68.43 V85.43    5. Prediabetes  R73.03 790.29    6. Muscle pain  M79.10 729.1 DULoxetine (CYMBALTA) 30 mg capsule       #Sciatica  -Prednisone  -HEP    #History of myofascial pain  -Increase Duloxetine to 60 mg daily    #Right hip osteoarthritis versus greater trochanteric pain syndrome:   -Discussed with the patient trial of oral anti-inflammatories.    -She preferred to do home exercise program at this time as she cannot get to physical therapy due to transportation problem. -Discussed with the patient that if this is not successful then at a follow-up visit we will further discuss physical therapy versus a potential intra-articular/greater trochanteric bursa injection depending on where her focal pain was.     Treatment options discussed with patient at length, including risks, benefits, alternatives, and the nature of any potential procedures for the problem    Patient Management  Laboratory Studies: None  Imaging Studies: Reviewed  Ordered Physical Therapy/HEP: Continue home exercise program  Ordered Medications: Increase Duloxetine to 60 mg daily;Prednisone taper  Patient deferred physical therapy or injections. Follow-up: 4 weeks, consider referral for formal physical therapy versus corticosteroid injection of the greater trochanter versus intra-articular hip at that time.       Betty Garrido MD  Sports Medicine Fellow

## 2021-05-22 DIAGNOSIS — I10 ESSENTIAL HYPERTENSION: ICD-10-CM

## 2021-05-25 RX ORDER — METOPROLOL TARTRATE 50 MG/1
TABLET ORAL
Qty: 180 TABLET | Refills: 0 | Status: SHIPPED | OUTPATIENT
Start: 2021-05-25 | End: 2021-06-23 | Stop reason: SDUPTHER

## 2021-06-23 ENCOUNTER — TELEPHONE (OUTPATIENT)
Dept: FAMILY MEDICINE CLINIC | Age: 56
End: 2021-06-23

## 2021-06-23 DIAGNOSIS — I10 ESSENTIAL HYPERTENSION: ICD-10-CM

## 2021-06-23 DIAGNOSIS — M79.10 MUSCLE PAIN: ICD-10-CM

## 2021-06-23 RX ORDER — DULOXETIN HYDROCHLORIDE 30 MG/1
CAPSULE, DELAYED RELEASE ORAL
Qty: 90 CAPSULE | Refills: 1 | Status: SHIPPED
Start: 2021-06-23 | End: 2021-09-07

## 2021-06-23 RX ORDER — METOPROLOL TARTRATE 50 MG/1
TABLET ORAL
Qty: 180 TABLET | Refills: 0 | Status: SHIPPED | OUTPATIENT
Start: 2021-06-23 | End: 2021-12-07 | Stop reason: SDUPTHER

## 2021-06-23 NOTE — TELEPHONE ENCOUNTER
----- Message from Richard Phillip sent at 6/23/2021 12:15 PM EDT -----  Regarding: / telephone  General Message/Vendor Calls    Caller's first and last name: Pt      Reason for call: Discuss hip problem pervious addressed during last visit.       Callback required yes/no and why: yes      Best contact number(s): [phone]      Details to clarify the request: n/a      Richard Phillip

## 2021-07-23 NOTE — PATIENT INSTRUCTIONS

## 2021-09-07 ENCOUNTER — OFFICE VISIT (OUTPATIENT)
Dept: FAMILY MEDICINE CLINIC | Age: 56
End: 2021-09-07

## 2021-09-07 VITALS
WEIGHT: 267.2 LBS | BODY MASS INDEX: 52.46 KG/M2 | RESPIRATION RATE: 16 BRPM | DIASTOLIC BLOOD PRESSURE: 74 MMHG | HEIGHT: 60 IN | SYSTOLIC BLOOD PRESSURE: 148 MMHG | OXYGEN SATURATION: 98 %

## 2021-09-07 DIAGNOSIS — R06.09 DOE (DYSPNEA ON EXERTION): ICD-10-CM

## 2021-09-07 DIAGNOSIS — I87.2 VENOUS INSUFFICIENCY OF BOTH LOWER EXTREMITIES: ICD-10-CM

## 2021-09-07 DIAGNOSIS — M25.551 RIGHT HIP PAIN: Primary | ICD-10-CM

## 2021-09-07 DIAGNOSIS — R73.03 PREDIABETES: ICD-10-CM

## 2021-09-07 DIAGNOSIS — I10 HYPERTENSION, UNSPECIFIED TYPE: ICD-10-CM

## 2021-09-07 DIAGNOSIS — M79.10 MUSCLE PAIN: ICD-10-CM

## 2021-09-07 PROCEDURE — 99214 OFFICE O/P EST MOD 30 MIN: CPT | Performed by: STUDENT IN AN ORGANIZED HEALTH CARE EDUCATION/TRAINING PROGRAM

## 2021-09-07 RX ORDER — FUROSEMIDE 40 MG/1
40 TABLET ORAL DAILY
Qty: 30 TABLET | Refills: 0 | Status: SHIPPED | OUTPATIENT
Start: 2021-09-07 | End: 2021-12-06 | Stop reason: SDUPTHER

## 2021-09-07 RX ORDER — TRAMADOL HYDROCHLORIDE 50 MG/1
50 TABLET ORAL
Qty: 12 TABLET | Refills: 0 | Status: SHIPPED | OUTPATIENT
Start: 2021-09-07 | End: 2021-09-10

## 2021-09-07 RX ORDER — OLMESARTAN MEDOXOMIL 40 MG/1
40 TABLET ORAL DAILY
Qty: 30 TABLET | Refills: 0 | Status: SHIPPED | OUTPATIENT
Start: 2021-09-07 | End: 2021-12-07

## 2021-09-07 RX ORDER — DULOXETIN HYDROCHLORIDE 60 MG/1
60 CAPSULE, DELAYED RELEASE ORAL DAILY
Qty: 90 CAPSULE | Refills: 0 | Status: SHIPPED | OUTPATIENT
Start: 2021-09-07 | End: 2021-12-06 | Stop reason: SDUPTHER

## 2021-09-07 NOTE — PROGRESS NOTES
Subjective:   History: This patient is a 50 y.o. female with a chief complaint of back pain. No bowel or bladder incontinence. No fever, night sweats. Yes weight loss - intentional. No saddle anesthesia. Patient denies waking up with pain. Chronic R hip/back pain: Chronic, previously seen by sports medicine in the spring. Pain never went away but worsened after 13 hour flight from [place], came back 2 weeks ago. Pain located in R hip and radiates to lower back and down R leg to knee. Alleviated slightly by standing. Aggravated by standing from sitting. Internally rotating hip helps patient decrease pain on standing. Pain 5/10 while sitting, 10/10 on first standing. Pain present constantly after trip. Unable to tolerate exercises due to pain. Previously evaluated by sports medicine who recommended therapy and if no improvement consider injection. Flexeril did not help sx in the spring. Prescribed duloxetine for chronic pain. HTN, NOVAK, BLE edema: Previously on hydralazine and metoprolol for HTN. While in [facility] Dr. Inés Khan stopped hydralazine and started Lasix 40mg and olmesartan 40mg. Patient ran out of the medication's prescribed by the [facility] doctor and currently only taking metoprolol. Patient brought written letter from Dr. Inés Khan which states TTE was done for NOVAK and BLE and showed no signs of CHF; started on Lasix and olmesartan. Patient reports symptomatic improvement of dyspnea and BLE edema after meds started by [facility]. Review of Systems:  General/Constitutional:  No fever, chills, sweats, fatigue, night sweats, weakness, weight loss or weight gain   Head: No headache, no trauma   Neck: No swelling, masses, stiffness, pain, or limited movement   Cardiac: No chest pain   Respiratory: No cough  GI: No incontinence. No nausea/vomiting, diarrhea, abdominal pain  Peripheral Vascular: BLE edema  Musculoskeletal: As per HPI  Neurological: No saddle distribution paresthesia.  No loss of consciousness, dizziness, seizures, dysarthria, cognitive changes, problems with balance, or unilateral weakness. Past Medical History:   Diagnosis Date    Anxiety and depression     Dyspnea 9/18/2017    Flu     2 wks ago    GERD (gastroesophageal reflux disease)     Headache(784.0)     Hypertension     Incidental lung nodule, > 3mm and < 8mm 04/20/2016    left lung nodule in smoker, stable on CT in 2018     Family History   Problem Relation Age of Onset    Diabetes Mother     Hypertension Brother     Hypertension Brother     Hypertension Brother      Current Outpatient Medications   Medication Sig Dispense Refill    DULoxetine (CYMBALTA) 60 mg capsule Take 1 Capsule by mouth daily. 90 Capsule 0    furosemide (LASIX) 40 mg tablet Take 1 Tablet by mouth daily. 30 Tablet 0    olmesartan (BENICAR) 40 mg tablet Take 1 Tablet by mouth daily. 30 Tablet 0    traMADoL (ULTRAM) 50 mg tablet Take 1 Tablet by mouth every six (6) hours as needed for Pain for up to 3 days. Max Daily Amount: 200 mg. 12 Tablet 0    metoprolol tartrate (LOPRESSOR) 50 mg tablet TAKE ONE TABLET BY MOUTH TWICE A  Tablet 0    metFORMIN (GLUCOPHAGE) 1,000 mg tablet Take 1 Tab by mouth daily. Indications: prevention of type 2 diabetes mellitus 90 Tab 2    albuterol (PROVENTIL HFA, VENTOLIN HFA, PROAIR HFA) 90 mcg/actuation inhaler Take 1 Puff by inhalation every four (4) hours as needed for Wheezing. 2 Inhaler 2     Allergies   Allergen Reactions    Hydrochlorothiazide Anaphylaxis and Unproven on Challenge    Losartan Anaphylaxis and Unproven on Challenge    Sulfa (Sulfonamide Antibiotics) Anaphylaxis     When she was on Losartan (8/2017), and then again when she was on HCTZ (9/2017). She was also on lasix since 2016.     Eggplant Other (comments)     Severe stomach pain     Lasix [Furosemide] Unproven on Challenge     May lead to anaphylaxis      Social History     Socioeconomic History    Marital status:  Spouse name: Not on file    Number of children: Not on file    Years of education: Not on file    Highest education level: Not on file   Occupational History    Not on file   Tobacco Use    Smoking status: Current Every Day Smoker     Packs/day: 0.50     Years: 25.00     Pack years: 12.50     Types: Cigarettes    Smokeless tobacco: Never Used   Vaping Use    Vaping Use: Never used   Substance and Sexual Activity    Alcohol use: No     Alcohol/week: 0.0 standard drinks    Drug use: No    Sexual activity: Yes     Partners: Male   Other Topics Concern    Not on file   Social History Narrative    ** Merged History Encounter **         ** Merged History Encounter **          Social Determinants of Health     Financial Resource Strain:     Difficulty of Paying Living Expenses:    Food Insecurity:     Worried About Running Out of Food in the Last Year:     Ran Out of Food in the Last Year:    Transportation Needs:     Lack of Transportation (Medical):  Lack of Transportation (Non-Medical):    Physical Activity:     Days of Exercise per Week:     Minutes of Exercise per Session:    Stress:     Feeling of Stress :    Social Connections:     Frequency of Communication with Friends and Family:     Frequency of Social Gatherings with Friends and Family:     Attends Tenriism Services:     Active Member of Clubs or Organizations:     Attends Club or Organization Meetings:     Marital Status:    Intimate Partner Violence:     Fear of Current or Ex-Partner:     Emotionally Abused:     Physically Abused:     Sexually Abused:        Objective:     Visit Vitals  BP (!) 148/74 (BP 1 Location: Right arm, BP Patient Position: Sitting, BP Cuff Size: Thigh)   Resp 16   Ht 5' (1.524 m)   Wt 267 lb 3.2 oz (121.2 kg)   LMP 12/31/2012   SpO2 98%   BMI 52.18 kg/m²       General: Alert and oriented and in no acute distress. Responds to all questions appropriately. Skin: No obvious rash.   MSK:    Posture: Normal   Deformity: None    ROM:     Flexion: Normal    Extension: Normal     Lateral bending: Normal      Gait: Normal       Palpation:    L1-L5: No tenderness    Sacrum: TTP    Coccyx: No tenderness    Left Paraspinal: No tenderness    Right Paraspinal: TTP in L4-5    R hip greater trochanter: TTP     Strength (0-5/5):     Hip Flexion:   Left: 5/5  Right: 5/5    Hip Extension:  Left: 5/5  Right: 5/5    Hip Abduction:  Left: 5/5  Right: 5/5    Hip Adduction:  Left: 5/5  Right: 5/5    Knee Extension:  Left: 5/5  Right: 5/5    Knee Flexion:   Left: 5/5  Right: 5/5    Ankle dorsiflexion:  Left: 5/5  Right: 5/5    Ankle plantarflexion:  Left: 5/5  Right: 5/5    Great toe extension:  Left: 5/5  Right: 5/5     Sensation: Intact, no deficits      DTR:    Patella:  Left: +2  Right: +2    Achilles:  Left: +2  Right: +2     Special test:    Straight leg: Left: Negative  Right: Negative    Taiwos: Left: Negative  Right: Positive    Piriformis: Left: Negative  Right: Negative       Physical Exam  Cardiovascular:      Rate and Rhythm: Normal rate and regular rhythm. Pulmonary:      Effort: Pulmonary effort is normal.      Breath sounds: Normal breath sounds. Musculoskeletal:      Right lower leg: Edema present. Left lower leg: Edema present. Skin:     General: Skin is warm and dry. Capillary Refill: Capillary refill takes less than 2 seconds. Comments: 2+ BLE non pitting edema                 Assessment and Plan:       Diagnoses and all orders for this visit:    1. Right hip pain  Assessment & Plan:  2/2 joint dysfunction. Will refer to ortho for further management  - Tylenol prn, apply warm/cold   - Tramadol, then switch to ibuprofen as needed      Orders:  -     REFERRAL TO ORTHOPEDIC SURGERY  -     XR HIP RT W OR WO PELV 2-3 VWS; Future  -     traMADoL (ULTRAM) 50 mg tablet; Take 1 Tablet by mouth every six (6) hours as needed for Pain for up to 3 days.  Max Daily Amount: 200 mg.    2. Muscle pain  - DULoxetine (CYMBALTA) 60 mg capsule; Take 1 Capsule by mouth daily. 3. Hypertension, unspecified type  Assessment & Plan:  Improved in clinic on recheck. - Restart BP medications  - Will check labs after restarting medications d/t potential electrolyte disturbance 2/2 meds  - F/u 2-3 weeks    Orders:  -     furosemide (LASIX) 40 mg tablet; Take 1 Tablet by mouth daily. -     olmesartan (BENICAR) 40 mg tablet; Take 1 Tablet by mouth daily. 4. NOVAK (dyspnea on exertion)  Assessment & Plan:  Ddx includes 2/2 obesity or CHF. Will attempt to get Echo results from Dr. Jodi Rangel. If unsuccessful will need to repeat test.     Orders:  -     XR CHEST PA LAT; Future  -     NT-PRO BNP; Future  -     MICROALBUMIN, UR, RAND W/ MICROALB/CREAT RATIO; Future    5. Prediabetes  Assessment & Plan: On metformin    Orders:  -     HEMOGLOBIN A1C WITH EAG; Future    6. Venous insufficiency of both lower extremities  Assessment & Plan:  Noted previously and potential etio of BLE edema. Will restart lasix given pt reported improvement of swelling with lasix  - Leg elevation and putting on compression socks limited 2/2 hip pain. Continue counseling          I have discussed the aforementioned diagnoses and plan with the patient in detail. I have provided information in person and/or in AVS. All questions answered prior to discharge.      I discussed this patient with Dr. Adolfo Witt (Attending Physician)   Signed By:  Queen David MD     Family Medicine Resident

## 2021-09-07 NOTE — PATIENT INSTRUCTIONS
Dr. Ramirez Raid [phone]      Please call to schedule your appointment with provider/location then call our office back @ #[phone] to leave a message for our referral coordinator with the appointment information (date/time/providers full name) for whom you will be seeing for this referral order so that we can authorize your visit(s) with your insurance if needed and referral tracking purposes of this order.

## 2021-09-07 NOTE — PROGRESS NOTES
Chief Complaint   Patient presents with    Back Pain     Patient states she is having terrible back pain that radiates down her R leg x 4-5 months. She was given medication when she was here in March but it did not help at all. States she sometimes cries out in pain. Also bp reading at home-about 2:30pm was 204/111.

## 2021-09-08 ENCOUNTER — LAB ONLY (OUTPATIENT)
Dept: FAMILY MEDICINE CLINIC | Age: 56
End: 2021-09-08

## 2021-09-08 DIAGNOSIS — R06.09 DOE (DYSPNEA ON EXERTION): ICD-10-CM

## 2021-09-08 LAB
BNP SERPL-MCNC: 438 PG/ML
CREAT UR-MCNC: 53.7 MG/DL
MICROALBUMIN UR-MCNC: 53.3 MG/DL
MICROALBUMIN/CREAT UR-RTO: 993 MG/G (ref 0–30)

## 2021-09-09 ENCOUNTER — TELEPHONE (OUTPATIENT)
Dept: FAMILY MEDICINE CLINIC | Age: 56
End: 2021-09-09

## 2021-09-09 PROBLEM — R73.09 ELEVATED HEMOGLOBIN A1C: Status: RESOLVED | Noted: 2017-03-04 | Resolved: 2021-09-09

## 2021-09-09 PROBLEM — J39.2 SWELLING OF THROAT: Status: RESOLVED | Noted: 2017-09-18 | Resolved: 2021-09-09

## 2021-09-09 PROBLEM — I10 HYPERTENSION: Status: ACTIVE | Noted: 2021-09-09

## 2021-09-09 PROBLEM — R09.02 HYPOXIA: Status: RESOLVED | Noted: 2018-03-16 | Resolved: 2021-09-09

## 2021-09-09 PROBLEM — E66.01 MORBID OBESITY (HCC): Status: RESOLVED | Noted: 2017-09-18 | Resolved: 2021-09-09

## 2021-09-09 PROBLEM — R06.09 DOE (DYSPNEA ON EXERTION): Status: ACTIVE | Noted: 2017-09-18

## 2021-09-09 PROBLEM — M25.551 RIGHT HIP PAIN: Status: ACTIVE | Noted: 2021-09-09

## 2021-09-09 NOTE — TELEPHONE ENCOUNTER
[phone]  Called x 3, not accepting calls at this time. Rosa Barrios (Spouse)      [phone]       Spoke with spouse who scheduled appt and said will come with patient to the visit.

## 2021-09-09 NOTE — ASSESSMENT & PLAN NOTE
Improved in clinic on recheck.    - Restart BP medications  - Will check labs after restarting medications d/t potential electrolyte disturbance 2/2 meds  - F/u 2-3 weeks

## 2021-09-09 NOTE — ASSESSMENT & PLAN NOTE
Noted previously and potential etio of BLE edema. Will restart lasix given pt reported improvement of swelling with lasix  - Leg elevation and putting on compression socks limited 2/2 hip pain.  Continue counseling

## 2021-09-09 NOTE — PROGRESS NOTES
Severely elevated Microalbumin/Cr ratio. Started ARB.  Will need repeat in 3 months (12/2021)  BNP slightly elevated, no sig change compared to 3 years ago

## 2021-09-09 NOTE — TELEPHONE ENCOUNTER
----- Message from Gonzalez Ortiz MD sent at 9/9/2021  2:41 PM EDT -----  Regarding: HTN follow up  Mario Goldman,    Please call patient to follow up with me in 2-3 weeks for in clinic visit. Please ask them to bring blood pressure cuff to appointment if she has one at home    Thanks!

## 2021-09-09 NOTE — ASSESSMENT & PLAN NOTE
2/2 joint dysfunction.  Will refer to ortho for further management  - Tylenol prn, apply warm/cold   - Tramadol, then switch to ibuprofen as needed

## 2021-09-09 NOTE — ASSESSMENT & PLAN NOTE
Ddx includes 2/2 obesity or CHF. Will attempt to get Echo results from Dr. Cloretta Olszewski.  If unsuccessful will need to repeat test.

## 2021-12-06 DIAGNOSIS — I10 HYPERTENSION, UNSPECIFIED TYPE: ICD-10-CM

## 2021-12-06 DIAGNOSIS — M79.10 MUSCLE PAIN: ICD-10-CM

## 2021-12-06 DIAGNOSIS — I10 ESSENTIAL HYPERTENSION: ICD-10-CM

## 2021-12-07 DIAGNOSIS — I10 HYPERTENSION, UNSPECIFIED TYPE: ICD-10-CM

## 2021-12-07 DIAGNOSIS — R73.03 PREDIABETES: Primary | ICD-10-CM

## 2021-12-07 DIAGNOSIS — E66.01 MORBID OBESITY WITH BMI OF 50.0-59.9, ADULT (HCC): ICD-10-CM

## 2021-12-07 RX ORDER — OLMESARTAN MEDOXOMIL 40 MG/1
TABLET ORAL
Qty: 30 TABLET | Refills: 0 | Status: SHIPPED | OUTPATIENT
Start: 2021-12-07 | End: 2022-01-14 | Stop reason: SDUPTHER

## 2021-12-07 RX ORDER — FUROSEMIDE 40 MG/1
40 TABLET ORAL DAILY
Qty: 30 TABLET | Refills: 0 | Status: SHIPPED | OUTPATIENT
Start: 2021-12-07 | End: 2022-01-05 | Stop reason: SDUPTHER

## 2021-12-07 RX ORDER — METOPROLOL TARTRATE 50 MG/1
50 TABLET ORAL 2 TIMES DAILY
Qty: 60 TABLET | Refills: 0 | Status: SHIPPED | OUTPATIENT
Start: 2021-12-07 | End: 2022-01-05 | Stop reason: SDUPTHER

## 2021-12-07 RX ORDER — DULOXETIN HYDROCHLORIDE 60 MG/1
60 CAPSULE, DELAYED RELEASE ORAL DAILY
Qty: 30 CAPSULE | Refills: 0 | Status: SHIPPED | OUTPATIENT
Start: 2021-12-07 | End: 2022-01-05 | Stop reason: SDUPTHER

## 2022-01-05 DIAGNOSIS — R06.02 SHORTNESS OF BREATH: ICD-10-CM

## 2022-01-05 DIAGNOSIS — E11.9 CONTROLLED TYPE 2 DIABETES MELLITUS WITHOUT COMPLICATION, WITHOUT LONG-TERM CURRENT USE OF INSULIN (HCC): ICD-10-CM

## 2022-01-05 DIAGNOSIS — M79.10 MUSCLE PAIN: ICD-10-CM

## 2022-01-05 DIAGNOSIS — I10 ESSENTIAL HYPERTENSION: ICD-10-CM

## 2022-01-05 DIAGNOSIS — I10 HYPERTENSION, UNSPECIFIED TYPE: ICD-10-CM

## 2022-01-05 RX ORDER — DULOXETIN HYDROCHLORIDE 60 MG/1
60 CAPSULE, DELAYED RELEASE ORAL DAILY
Qty: 30 CAPSULE | Refills: 0 | Status: SHIPPED | OUTPATIENT
Start: 2022-01-05 | End: 2022-01-14 | Stop reason: SDUPTHER

## 2022-01-05 RX ORDER — METOPROLOL TARTRATE 50 MG/1
50 TABLET ORAL 2 TIMES DAILY
Qty: 60 TABLET | Refills: 0 | Status: SHIPPED | OUTPATIENT
Start: 2022-01-05 | End: 2022-01-14 | Stop reason: SDUPTHER

## 2022-01-05 RX ORDER — ALBUTEROL SULFATE 90 UG/1
1 AEROSOL, METERED RESPIRATORY (INHALATION)
Qty: 1 EACH | Refills: 3 | Status: SHIPPED
Start: 2022-01-05 | End: 2022-02-03

## 2022-01-05 RX ORDER — METFORMIN HYDROCHLORIDE 1000 MG/1
1000 TABLET ORAL DAILY
Qty: 90 TABLET | Refills: 2 | Status: SHIPPED
Start: 2022-01-05 | End: 2022-02-03

## 2022-01-05 RX ORDER — FUROSEMIDE 40 MG/1
40 TABLET ORAL DAILY
Qty: 30 TABLET | Refills: 0 | Status: SHIPPED | OUTPATIENT
Start: 2022-01-05 | End: 2022-01-14 | Stop reason: SDUPTHER

## 2022-01-05 NOTE — TELEPHONE ENCOUNTER
I sent in the requested refills.  Please call to schedule pt for appt as she needs to be seen for further refills

## 2022-01-14 ENCOUNTER — OFFICE VISIT (OUTPATIENT)
Dept: FAMILY MEDICINE CLINIC | Age: 57
End: 2022-01-14

## 2022-01-14 VITALS
RESPIRATION RATE: 24 BRPM | SYSTOLIC BLOOD PRESSURE: 137 MMHG | OXYGEN SATURATION: 95 % | HEIGHT: 60 IN | WEIGHT: 281 LBS | TEMPERATURE: 98.3 F | HEART RATE: 68 BPM | BODY MASS INDEX: 55.17 KG/M2 | DIASTOLIC BLOOD PRESSURE: 76 MMHG

## 2022-01-14 DIAGNOSIS — R73.03 PREDIABETES: ICD-10-CM

## 2022-01-14 DIAGNOSIS — Z72.0 TOBACCO ABUSE: ICD-10-CM

## 2022-01-14 DIAGNOSIS — R60.0 LOWER EXTREMITY EDEMA: Chronic | ICD-10-CM

## 2022-01-14 DIAGNOSIS — R06.01 ORTHOPNEA: ICD-10-CM

## 2022-01-14 DIAGNOSIS — M79.10 MUSCLE PAIN: ICD-10-CM

## 2022-01-14 DIAGNOSIS — I10 HYPERTENSION, UNSPECIFIED TYPE: ICD-10-CM

## 2022-01-14 DIAGNOSIS — R06.09 DOE (DYSPNEA ON EXERTION): Primary | ICD-10-CM

## 2022-01-14 DIAGNOSIS — E66.01 MORBID OBESITY WITH BMI OF 50.0-59.9, ADULT (HCC): ICD-10-CM

## 2022-01-14 PROCEDURE — 99214 OFFICE O/P EST MOD 30 MIN: CPT | Performed by: STUDENT IN AN ORGANIZED HEALTH CARE EDUCATION/TRAINING PROGRAM

## 2022-01-14 PROCEDURE — 93000 ELECTROCARDIOGRAM COMPLETE: CPT | Performed by: STUDENT IN AN ORGANIZED HEALTH CARE EDUCATION/TRAINING PROGRAM

## 2022-01-14 RX ORDER — METOPROLOL TARTRATE 50 MG/1
50 TABLET ORAL 2 TIMES DAILY
Qty: 120 TABLET | Refills: 4 | Status: SHIPPED | OUTPATIENT
Start: 2022-01-14 | End: 2022-02-18 | Stop reason: SDUPTHER

## 2022-01-14 RX ORDER — OLMESARTAN MEDOXOMIL 40 MG/1
40 TABLET ORAL DAILY
Qty: 90 TABLET | Refills: 3 | Status: SHIPPED | OUTPATIENT
Start: 2022-01-14

## 2022-01-14 RX ORDER — FUROSEMIDE 40 MG/1
40 TABLET ORAL DAILY
Qty: 90 TABLET | Refills: 3 | Status: SHIPPED | OUTPATIENT
Start: 2022-01-14

## 2022-01-14 RX ORDER — FUROSEMIDE 40 MG/1
40 TABLET ORAL DAILY
Qty: 90 TABLET | Refills: 3 | Status: SHIPPED | OUTPATIENT
Start: 2022-01-14 | End: 2022-01-14

## 2022-01-14 RX ORDER — DULOXETIN HYDROCHLORIDE 60 MG/1
60 CAPSULE, DELAYED RELEASE ORAL DAILY
Qty: 90 CAPSULE | Refills: 3 | Status: SHIPPED | OUTPATIENT
Start: 2022-01-14 | End: 2022-02-03 | Stop reason: SDUPTHER

## 2022-01-14 NOTE — PATIENT INSTRUCTIONS
ECHO - ultrasound of heart:   Please call the central scheduling department to schedule your test at [phone]    Please try to get larger compression socks      ÇáÅÞáÇÚ Úä ÇáÊÏÎíä: ÅÑÔÇÏÇÊ ÇáÑÚÇíÉ  Stopping Smoking: Care Instructions  ÅÑÔÇÏÇÊ ÇáÚäÇíÉ ÇáÎÇÕÉ Èß  íÊæÞ ãÏÎäæ ÇáÓÌÇÆÑ Åáì ãÇÏÉ ÇáäíßæÊíä ÇáãæÌæÏÉ Ýí ÇáÓÌÇÆÑ. ÝÇáÅÞáÇÚ Úä ÇáÊÏÎíä ÃÕÚÈ ÈßËíÑ ãä ãÌÑÏ ÊÛííÑ ÚÇÏÉ. ÅÐ íáÒã Ãä íÊæÞÝ ÌÓãß Úä ÇáÊæÞÇä Åáì ÇáäíßæÊíä. æãä ÇáÕÚÈ ÇáÅÞáÇÚ Úä ÇáÊÏÎíä¡ æáßä íãßäß Ðáß. ÍíË íæÌÏ ÇáÚÏíÏ ãä ÇáÃÏæÇÊ ÇáÊí íÓÊÎÏãåÇ ÇáäÇÓ ááÅÞáÇÚ Úä ÇáÊÏÎíä. æÞÏ ÊÌÏ Ãä ÏãÌ åÐå ÇáÃÏæÇÊ ãÚðÇ åæ ÇáÃäÓÈ áß. ÊæÌÏ ÚÏÉ ÎØæÇÊ ááÅÞáÇÚ Úä ÇáÊÏÎíä. ÃæáÇð [id number]. Ëã ÊáÞí ÇáÏÚã áãÓÇÚÏÊß. æÈÚÏ Ðáß¡ ÊÚáã ãåÇÑÇÊ æÓáæßíÇÊ ÌÏíÏÉ ßí ÊÕÈÍ ÛíÑ ãÏÎä. SBSYNLJ ÇááÇÒãÉ ÈÇáäÓÈÉ ááÚÏíÏ ãä ÇáÃÔÎÇÕ åí PKAORW Úáì ÇáÏæÇÁ MTSJGXU. ÓíÓÇÚÏß ØÈíÈß Ýí æÖÚ ÇáÎØÉ ÇáÊí ÊáÈí ÇÍÊíÇÌÇÊß Úáì Ãßãá æÌå. ÞÏ ÊÑÛÈ Ýí ÍÖæÑ ÈÑäÇãÌ ÇáÊæÞÝ Úä ÇáÊÏÎíä áãÓÇÚÏÊß Ýí ÇáÅÞáÇÚ Úä ÇáÊÏÎíä. æÚäÏ ÇÎÊíÇÑß áÈÑäÇãÌ¡ ÇÈÍË Úä JCSOEGGN ÇáÐí ËÈÊ äÌÇÍå. ÇÓÃá ØÈíÈß Úä ÃÝßÇÑ. æÓÊÒíÏ ÝÑÕ äÌÇÍß ÈÔßá ßÈíÑ ÅÐÇ ÊäÇæáÊ ÇáÏæÇÁ ãÚ UKAPRP Úáì ÇáÇÓÊÔÇÑÉ ÇáØÈíÉ Ãæ SVYZZAVY ÈÈÑäÇãÌ ááÊæÞÝ Úä ÇáÊÏÎíä. Åä ÈÚÖ ÇáÊÛíÑÇÊ ÇáÊí ÊÔÚÑ ÈåÇ ÚäÏ ÇáÅÞáÇÚ Úä ÇáÊÏÎíä ááãÑÉ ÇáÃæáì ÛíÑ ãÑíÍÉ. ÓíÝÊÞÏ ÌÓãß ááäíßæÊíä ÃæáÇð¡ æÞÏ ÊÔÚÑ ÈÃäß ÓÑíÚ ÇáÛÖÈ æßËíÑ ÇáÊÐãÑ. ÞÏ ÊÚÇäí ãä ãÔßáÇÊ Ýí Çáäæã Ãæ ÇáÊÑßíÒ. íãßä Ãä íÓÇÚÏß ÇáÏæÇÁ Ýí DZIRBRS ãÚ åÐå ÇáÃÚÑÇÖ. ÞÏ ÊáÇÞí ÕÚæÈÇÊ Ýí ÊÛííÑ ÚÇÏÇÊ ÇáÊÏÎíä æØÞæÓå. ÇáÎØæÉ ÇáÃÎíÑÉ åí ÇáÎØæÉ ÇáÎÇÏÚÉ: ßä ãåíÃð áÇÓÊãÑÇÑ ÇáÑÛÈÉ ÇáãáÍÉ Ýí ÇáÊÏÎíä áÝÊÑÉ ãä ÇáæÞÊ. æíãËá åÐÇ ÇáßËíÑ ááÊÚÇãá ãÚå¡ æáßä Úáíß ÈÇáãËÇÈÑÉ. ÓÊÔÚÑ ÈÇáÊÍÓä. Åä ãÊÇÈÚÉ ÇáÑÚÇíÉ ÌÒÁ ÑÆíÓí Ýí ÚáÇÌß æÓáÇãÊß. ÊÃßÏ ãä ÅÌÑÇÁ ÊÑÊíÈÇÊ ÌãíÚ ãæÇÚíÏ ÒíÇÑÉ ÇáØÈíÈ YIUEOEP ÅáíåÇ LWJGFZHO ÈØÈíÈß ÅÐÇ ßÇäÊ áÏíß ãÔßáÇÊ. æãä ÇáÃÝßÇÑ ÇáÌíÏÉ ÃíÖðÇ ãÚÑÝÉ äÊÇÆÌ EZUJDTXT JVRREGXER ÈÞÇÆãÉ TBHLLSTL ÇáÊí TYLOFHHI. ßíÝ íãßäß ÇáÇÚÊäÇÁ ÈäÝÓß Ýí HGIVCR¿   ÇØáÈ ÇáÏÚã ãä ÚÇÆáÊß æÃÕÏÞÇÆß JSYKIWX Ýí ÇáÚãá.  ÓÊßæä áÏíß ÝÑÕÉ ÃÝÖá ááÅÞáÇÚ Úä ÇáÊÏÎíä ÚäÏ ÊæÝÑ ÇáãÓÇÚÏÉ æÇáÏÚã.  ÇäÖã Åáì ãÌãæÚÉ ÏÚã¡ ãËá Nicotine Anonymous¡ ááÃÔÎÇÕ ÇáÐíä íÍÇæáæä ÇáÅÞáÇÚ Úä ÇáÊÏÎíä.  ÝßøöÑ Ýí ÇáÊÓÌíá Ýí ÈÑäÇãÌ ãä ÈÑÇãÌ ÇáÊæÞÝ Úä ÇáÊÏÎíä ßÈÑäÇãÌ ÇáÊÍÑÑ ãä ÇáÊÏÎíä ÇáÊÇÈÚ ááÌãÚíÉ ÇáÃãÑíßíÉ áÃãÑÇÖ ÇáÑÆÉ ([employer]).  ÍÏÏ ÊÇÑíÎðÇ ááÅÞáÇÚ Úä ÇáÊÏÎíä. ÍÏÏ ÊÇÑíÎß ÈÚäÇíÉ¡ Ýãä ÛíÑ ÇáãäÇÓÈ Ãä íßæä Ýí ãäÊÕÝ ãæÚÏ äåÇÆí ÖíÞ Ãæ æÞÊ ÚÕíÈ. æÈãÌÑÏ ÇáÅÞáÇÚ Úä ÇáÊÏÎíä¡ áÇ ÊÃÎÐ æáæ ÍÊì äÝÓðÇ æÇÍÏðÇ. ÊÎáÕ ãä ßÇÝÉ ØÝÇíÇÊ ÇáÓÌÇÆÑ BMVNYIGWH ÈÚÏ ÂÎÑ ÓíÌÇÑÉ áß. äÙÝ ãäÒáß æãáÇÈÓß ÈÍíË áÇ íÔÊã ãäåÇ ÑÇÆÍÉ ÇáÏÎÇä.  ÊÚáã ßíÝíÉ Ãä Êßæä ÛíÑ ãÏÎä. ÝßøöÑ Ýí ÇáØÑÞ ÇáÊí íãßä ÈåÇ ÊÌäÈ Êáß ÇáÃÔíÇÁ ÇáÊí ÊÌÚáß ÊÕá Åáì ÓíÌÇÑÉ. o ÊÌäÈ ÇáãæÇÞÝ ÇáÊí ÊÒíÏ ãä ÎØæÑÉ Íãáß Úáì ÇáÊÏÎíä. ÝíÕÚÈ Úáì ÈÚÖ ÇáäÇÓ ÊäÇæá ãÔÑæÈ ãÚ ÇáÃÕÏÞÇÁ Ïæä ÊÏÎíä. TVTKTXRI ááÈÚÖ ÇáÂÎÑ¡ ÞÏ íÊÛíÈæä Úä ÇÓÊÑÇÍÉ ÊäÇæá ÇáÞåæì ãÚ ÒãáÇÁ ÇáÚãá ÇáÐíä íÏÎäæä. o ÛíøöÑ ÑæÊíäß Çáíæãí. ÇÓáß ØÑíÞðÇ ãÎÊáÝðÇ ááÚãá Ãæ ÊäÇæá æÌÈÉ Ýí ãßÇä ãÎÊáÝ.  Þáøöá ãä ÇáÖÛæØ. ÇåÏÃ Ãæ äÝøöÓ Úä ÇáÊæÊÑ ãä ÎáÇá ããÇÑÓÉ äÔÇØ ÊÓÊãÊÚ Èå ãËá ÞÑÇÁÉ ßÊÇÈ Ãæ ÃÎÐ ÍãÇã ÓÇÎä Ãæ ÇáÈÓÊäÉ.  ÊÍÏË Åáì ÇáØÈíÈ Ãæ ÇáÕíÏáí Úä ÇáÚáÇÌ ÇáÈÏíá ááäíßæÊíä ÇáÐí íÓÊÈÏá ÇáäíßæÊíä Ýí ÌÓãß. áÇ ÒáÊ ÊÍÕá Úáì ÇáäíßæÊíä ÛíÑ Ãäß áÇ ÊÓÊÎÏã ÇáÊÈÛ. æÊÓÇÚÏß ÇáãäÊÌÇÊ ÇáÈÏíáÉ ááäíßæÊíä Ýí ÊÞáíá ßãíÉ ÇáäíßæÊíä ÇáÊí ÊÍÊÇÌåÇ ÈÔßá ÊÏÑíÌí. æÊÊæÝÑ åÐå ÇáãäÊÌÇÊ Ýí ÃÔßÇá ÚÏíÏÉ ÇáßËíÑ ãäåÇ ãÊæÝÑ ÈÏæä æÕÝÉ ØÈíÉ:   o áÇÕÞÇÊ ÇáäíßæÊíä  o áÈÇä æÃÞÑÇÕ ãÕ äíßæÊíäíÉ  o ÌåÇÒ ÇÓÊäÔÇÞ ÇáäíßæÊíä   ÇÓÃá ØÈíÈß Úä ÈæÈÑæÈíæä (æíáÈæÊÑíä) Ãæ ÝÇÑíäíßáíä (ÔÇäÊíßÓ) æåí ÃÏæíÉ ÊõÕÑÝ ÈæÕÝÉ ØÈíÉ. æáÇ ÊÍÊæí Úáì ÇáäíßæÊíä. æåí ÊÓÇÚÏß ãä FHRH ÎÝÖ ÃÚÑÇÖ QLIWHVED ãËá ÇáÖÛØ æÇáÞáÞ.  æíÌÏ ÈÚÖ ÇáäÇÓ Ãä ÇáÊäæíã ÇáãÛäÇØíÓí æÇáæÎÒ ÈÇáÅÈÑ æÇáÊÏáíß æÓÇÆá ãÝíÏÉ Ýí ÇáÊæÞÝ Úä ÚÇÏÉ ÇáÊÏÎíä.  ÊäÇæá æÌÈÉ ÕÍíÉ æãÇÑÓ ÇáÊãÇÑíä ÈÔßá ãäÊÙã. ÓíÓÇÚÏ Êßæíä ÇáÚÇÏÇÊ ÇáÕÍíÉ ÌÓãß Ýí ÊÎØí ÇáÊæÞÇä Åáì ÇáäíßæÊíä.  ßä ãÄåáÇð áÊßÑÇÑ AVKYLZWS. áÇ íÍÇáÝ ÇáäÌÇÍ ãÚÙã ÇáäÇÓ Ýí ÇáãÑÇÊ ÇáÞáíáÉ ÇáÃæáì ÇáÊí íÍÇæáæä ÝíåÇ ÇáÅÞáÇÚ Úä ÇáÊÏÎíä. ÊãÇáß äÝÓß ÅÐÇ ãÇ ßÑÑÊ ÇáÊÏÎíä.  Þã ÈÅÚÏÇÏ ÞÇÆãÉ ÈÇáÃÔíÇÁ ÇáÊí ÊÚáãÊåÇ æÝßøöÑ Ýí ÇáÃæÞÇÊ ÇáÊí ÊÑíÏ [id number] ÝíåÇ ËÇäíÉð ãËá ÇáÃÓÈæÚ ÇáÞÇÏã Ãæ ÇáÔåÑ ÇáÞÇÏã Ãæ ÇáÓäÉ ÇáÞÇÏãÉ. Ãíä íãßäß ãÚÑÝÉ ÇáãÒíÏ¿  ÇäÊÞÇá Åáì   [email or web address]  ÃÏÎá Y36 Ýí ãÑÈÚ ÇáÈÍË áãÚÑÝÉ ÇáãÒíÏ Íæá \"ÇáÅÞáÇÚ Úä ÇáÊÏÎíä: ÅÑÔÇÏÇÊ ÇáÑÚÇíÉ. \"  ÓÇÑò [id number] ãä: 11 ÔÈÇØ 9424               äÓÎÉ LQMMLQQ: 13.0  © 1072-2053 [employer]. Êã ÊÚÏíá ÅÑÔÇÏÇÊ ÇáÑÚÇíÉ ÈãæÌÈ ÊÑÎíÕ ÕÇÏÑ ãä ÇÎÊÕÇÕí ÇáÑÚÇíÉ ÇáÕÍíÉ ÇáÎÇÕ Èß. ÅÐÇ ßÇäÊ áÏíß ÃÓÆáÉ FMXWI ÈÍÇáÉ ãÑÖíÉ Ãæ ÈåÐå ÇáÊÚáíãÇÊ¡ ÝÇÍÑÕ Úáì ÇáÑÌæÚ ÏÇÆãðÇ Åáì ÇÎÊÕÇÕí ÇáÑÚÇíÉ ÇáÕÍíÉ. ÊõÎáí ÔÑßÉ Viamericaswise [id number] Úä Ãí ÖãÇä Ãæ ÇáÊÒÇã íÊÚáÞ [id number] áåÐå [id number].

## 2022-01-14 NOTE — PROGRESS NOTES
[address] [facility] [address] [place] [address]   Office [phone]  Fax [phone]     2022   Chief Complaint   Patient presents with    Follow Up Chronic Condition     Patient with chronic pain in legs/hips, pain and tingling in bilateral arms, headaches, elevated BP       HPI:  Dacia Rivas is a 49 y.o. female who presents to clinic today for follow up for medication. NOVAK: +Continued NOVAK, orthopnea, BLE swelling. Reports NOVAK is same as before, but endorses worsening BLE swelling. Sleeps in upright position with several pillows. Unable to get ECHO results from Dr. Ariadna Muñoz. Denies CP. LE swelling: Redness and swelling in BLE extremities worse in evening. Redness has been present for 3 weeks. Could not tolerate compression socks (XL size) d/t discomfort. No fever. Tobacco: 2 packs in 3 days. Tried nicotine patches before, which did not help. Has not thought about stopping. Not interested in     HTN:  Reviewed home BP lo/80s in the morning. In the evening can be 420I-161M systolic   Meds: Olmesartan, metoprolol and furosemide (has been out of olmesartan for 5 days)  ROS: Denies chest pain on exertion, cough, orthostatic dizziness/lightheadedness   Diet: Does not add salt to food  Exercise: No  Tobacco use: Yes  Alcohol use: No    Obesity: Weight gain. Not exercising d/t chronic hip/back pain. Does not drink sugary sodas. Patients past medical, surgical and family histories were reviewed. Allergies and Medications reviewed and updated. Review of Systems   Respiratory: Negative for shortness of breath. +NOVAK   Cardiovascular: Positive for leg swelling. Negative for chest pain. Gastrointestinal: Negative for abdominal pain. Psychiatric/Behavioral: Positive for substance abuse.        Objective:  Vitals:    22 1028   BP: 137/76   Pulse: 68   Resp: 24   Temp: 98.3 °F (36.8 °C)   TempSrc: Oral   SpO2: 95%   Weight: 281 lb (127.5 kg)   Height: 5' (1.524 m)     Body mass index is 54.88 kg/m². Physical Exam  General: Patient alert and oriented and in NAD  HEENT: PER/EOMI, no conjunctival pallor or scleral icterus. Heart: Regular rate and rhythm, No murmurs, rubs or gallops. 2+ peripheral pulses  Lungs: Clear to auscultation bilaterally, no wheezing, rales or rhonchi  Abd: +BS, non-tender, non-distended  Ext: 3+ BLE nonpitting edema. Bilateral erythema of LE up to mid shins. No warmth. John's sign neg b/l   Skin: No rashes or lesions noted on exposed skin,  Psych: Appropriate mood and affect    EKG Reviewed with Dr. Fontanez Hence: Sinus rhythm with no acute ischemic changes     Assessment and Plan:  Diagnoses and all orders for this visit:    1. NOVAK (dyspnea on exertion)  Assessment & Plan:  Ddx includes HF, obesity hypoventilation sy, COPD, TIMOTHY  - Given pt does not have insurance, will start w/CXR and ECHO    Orders:  -     ECHO ADULT COMPLETE; Future  -     AMB POC EKG ROUTINE W/ 12 LEADS, INTER & REP    2. Lower extremity edema  Assessment & Plan:  2/2 lymphedema vs HF  - Check ECHO, if normal refer to lymphedema clinic  - Counseling leg elevation and compression socks    Orders:  -     furosemide (LASIX) 40 mg tablet; Take 1 Tablet by mouth daily. 3. Hypertension, unspecified type  Assessment & Plan:  Well controlled in office  - Refill meds  - Bring BP cuff to next visit  - Collect previously ordered lab work    Orders:  -     olmesartan (BENICAR) 40 mg tablet; Take 1 Tablet by mouth daily. -     metoprolol tartrate (LOPRESSOR) 50 mg tablet; Take 1 Tablet by mouth two (2) times a day. -     furosemide (LASIX) 40 mg tablet; Take 1 Tablet by mouth daily. 4. Orthopnea  -     ECHO ADULT COMPLETE; Future    5. Tobacco abuse  Assessment & Plan:  Not ready to stop. Declined nicotine replacement products and Chantix. - Counseling risks of smoking. Advised to quit  - Continue to readdress at future visits        6.  Muscle pain  -     DULoxetine (CYMBALTA) 60 mg capsule; Take 1 Capsule by mouth daily. Provided bon [facility] care card information    F/u with sports med for chronic back and hip pain       Follow-up and Dispositions    · Return in about 2 weeks (around 1/28/2022) for Sports medicine . I have discussed the aforementioned diagnoses and plan with the patient in detail. I have provided information in person and/or in AVS. All questions answered prior to discharge.      I discussed this patient with Dr. Jaron Joshi (Attending Physician)   Signed By:  Candida Diaz MD     Family Medicine Resident

## 2022-01-14 NOTE — PROGRESS NOTES
Identified Patient with two Patient identifiers (Name and ). Two Patient Identifiers confirmed. Reviewed record in preparation for visit and have obtained necessary documentation. Chief Complaint   Patient presents with    Follow Up Chronic Condition     Patient with chronic pain in legs/hips, pain and tingling in bilateral arms, headaches, elevated BP       Visit Vitals  /76 (BP 1 Location: Right arm, BP Patient Position: Sitting, BP Cuff Size: Large adult)   Pulse 68   Temp 98.3 °F (36.8 °C) (Oral)   Resp 24   Ht 5' (1.524 m)   Wt 281 lb (127.5 kg)   SpO2 95%   BMI 54.88 kg/m²       1. Have you been to the ER, urgent care clinic since your last visit? Hospitalized since your last visit? No    2. Have you seen or consulted any other health care providers outside of the [address] [place] [address] since your last visit? Include any pap smears or colon screening.  No

## 2022-01-15 LAB
ALBUMIN SERPL-MCNC: 3.7 G/DL (ref 3.5–5)
ALBUMIN/GLOB SERPL: 0.9 {RATIO} (ref 1.1–2.2)
ALP SERPL-CCNC: 89 U/L (ref 45–117)
ALT SERPL-CCNC: 22 U/L (ref 12–78)
ANION GAP SERPL CALC-SCNC: 3 MMOL/L (ref 5–15)
AST SERPL-CCNC: 16 U/L (ref 15–37)
BILIRUB SERPL-MCNC: 0.7 MG/DL (ref 0.2–1)
BUN SERPL-MCNC: 18 MG/DL (ref 6–20)
BUN/CREAT SERPL: 31 (ref 12–20)
CALCIUM SERPL-MCNC: 9.6 MG/DL (ref 8.5–10.1)
CHLORIDE SERPL-SCNC: 108 MMOL/L (ref 97–108)
CHOLEST SERPL-MCNC: 221 MG/DL
CO2 SERPL-SCNC: 31 MMOL/L (ref 21–32)
CREAT SERPL-MCNC: 0.58 MG/DL (ref 0.55–1.02)
CREAT UR-MCNC: 127 MG/DL
ERYTHROCYTE [DISTWIDTH] IN BLOOD BY AUTOMATED COUNT: 14.3 % (ref 11.5–14.5)
EST. AVERAGE GLUCOSE BLD GHB EST-MCNC: 123 MG/DL
GLOBULIN SER CALC-MCNC: 4 G/DL (ref 2–4)
GLUCOSE SERPL-MCNC: 90 MG/DL (ref 65–100)
HBA1C MFR BLD: 5.9 % (ref 4–5.6)
HCT VFR BLD AUTO: 48.4 % (ref 35–47)
HDLC SERPL-MCNC: 43 MG/DL
HDLC SERPL: 5.1 {RATIO} (ref 0–5)
HGB BLD-MCNC: 15.2 G/DL (ref 11.5–16)
LDLC SERPL CALC-MCNC: 130 MG/DL (ref 0–100)
MCH RBC QN AUTO: 30 PG (ref 26–34)
MCHC RBC AUTO-ENTMCNC: 31.4 G/DL (ref 30–36.5)
MCV RBC AUTO: 95.7 FL (ref 80–99)
MICROALBUMIN UR-MCNC: 67.5 MG/DL
MICROALBUMIN/CREAT UR-RTO: 531 MG/G (ref 0–30)
NRBC # BLD: 0 K/UL (ref 0–0.01)
NRBC BLD-RTO: 0 PER 100 WBC
PLATELET # BLD AUTO: 273 K/UL (ref 150–400)
PMV BLD AUTO: 10.9 FL (ref 8.9–12.9)
POTASSIUM SERPL-SCNC: 4.5 MMOL/L (ref 3.5–5.1)
PROT SERPL-MCNC: 7.7 G/DL (ref 6.4–8.2)
RBC # BLD AUTO: 5.06 M/UL (ref 3.8–5.2)
SODIUM SERPL-SCNC: 142 MMOL/L (ref 136–145)
TRIGL SERPL-MCNC: 240 MG/DL (ref ?–150)
VLDLC SERPL CALC-MCNC: 48 MG/DL
WBC # BLD AUTO: 12.6 K/UL (ref 3.6–11)

## 2022-01-15 NOTE — ASSESSMENT & PLAN NOTE
Well controlled in office  - Refill meds  - Bring BP cuff to next visit  - Collect previously ordered lab work

## 2022-01-15 NOTE — ASSESSMENT & PLAN NOTE
2/2 lymphedema vs HF  - Check ECHO, if normal refer to lymphedema clinic  - Counseling leg elevation and compression socks

## 2022-01-15 NOTE — ASSESSMENT & PLAN NOTE
Not ready to stop. Declined nicotine replacement products and Chantix. - Counseling risks of smoking.  Advised to quit  - Continue to readdress at future visits

## 2022-01-15 NOTE — ASSESSMENT & PLAN NOTE
Ddx includes HF, obesity hypoventilation sy, COPD, TIMOTHY  - Given pt does not have insurance, will start w/CXR and ECHO

## 2022-01-17 DIAGNOSIS — D72.829 LEUKOCYTOSIS, UNSPECIFIED TYPE: Primary | ICD-10-CM

## 2022-01-18 PROBLEM — E78.2 MIXED HYPERLIPIDEMIA: Chronic | Status: ACTIVE | Noted: 2022-01-18

## 2022-01-18 NOTE — PROGRESS NOTES
Persistent microalbuminuria. On ARB  Mixed HLD. ASCVD risk 9.8%. Will discuss starting statin with pt  A1C preDM. Discuss metformin use  CMP unremarkable   CBC notable for persistent leukocytosis.  Will get diff and peripheral smear

## 2022-01-19 ENCOUNTER — OFFICE VISIT (OUTPATIENT)
Dept: FAMILY MEDICINE CLINIC | Age: 57
End: 2022-01-19

## 2022-01-19 VITALS
SYSTOLIC BLOOD PRESSURE: 138 MMHG | RESPIRATION RATE: 16 BRPM | HEIGHT: 60 IN | DIASTOLIC BLOOD PRESSURE: 84 MMHG | TEMPERATURE: 98.7 F | BODY MASS INDEX: 55.87 KG/M2 | OXYGEN SATURATION: 95 % | WEIGHT: 284.6 LBS | HEART RATE: 63 BPM

## 2022-01-19 DIAGNOSIS — M54.16 LUMBAR RADICULOPATHY: ICD-10-CM

## 2022-01-19 DIAGNOSIS — M25.551 CHRONIC PAIN OF RIGHT HIP: Primary | ICD-10-CM

## 2022-01-19 DIAGNOSIS — G89.29 CHRONIC PAIN OF RIGHT HIP: Primary | ICD-10-CM

## 2022-01-19 DIAGNOSIS — D72.829 LEUKOCYTOSIS, UNSPECIFIED TYPE: ICD-10-CM

## 2022-01-19 PROCEDURE — 99213 OFFICE O/P EST LOW 20 MIN: CPT | Performed by: STUDENT IN AN ORGANIZED HEALTH CARE EDUCATION/TRAINING PROGRAM

## 2022-01-19 RX ORDER — MELOXICAM 15 MG/1
15 TABLET ORAL DAILY
Qty: 30 TABLET | Refills: 0 | Status: SHIPPED | OUTPATIENT
Start: 2022-01-19 | End: 2022-02-18 | Stop reason: SDUPTHER

## 2022-01-19 NOTE — PROGRESS NOTES
Chief Complaint   Patient presents with    Hip Pain     Patient is coming in for pain that starts on her back and radiates into her right hip and down her right leg. This started about 5 months ago. Pain is 4-5 when sitting and standing is a pain 5- 10. She tstae sthat when she stands up from sitting is the worse. when she goes tolay down is much better. No other concerns. Katelyn Davis is a 49 y.o. female who presents for 6mo L anterior pain that has progressively worsened over the past few wks. Pain is sharp and radiates to the posterior hip and down her leg. Worse w/ standing. She also c/o intermittent numbness and burning in both feet. She also c/o midline low back pain that started over the past month. No heavy lifting at home or trauma. She has not experienced back pain before this episode. She is frustrated that she is unable to exercise anymore due to pain and has gained 20lb as a result of that. She has tried NSAIDs and tylenol, which have not helped. She feels unable to participate in PT at this time and would like some pain relief so she can do formal PT. No unintentional weightloss/nightsweats/saddle anesthesia/urinary or bowel incontinence. She is currently being worked up for leukocytosis by her PCP. PMHx:  Past Medical History:   Diagnosis Date    Anxiety and depression     Dyspnea 9/18/2017    Flu     2 wks ago    GERD (gastroesophageal reflux disease)     Headache(784.0)     Hypertension     Incidental lung nodule, > 3mm and < 8mm 04/20/2016    left lung nodule in smoker, stable on CT in 2018       Meds:   Current Outpatient Medications   Medication Sig Dispense Refill    meloxicam (MOBIC) 15 mg tablet Take 1 Tablet by mouth daily. 30 Tablet 0    olmesartan (BENICAR) 40 mg tablet Take 1 Tablet by mouth daily. 90 Tablet 3    DULoxetine (CYMBALTA) 60 mg capsule Take 1 Capsule by mouth daily.  90 Capsule 3    metoprolol tartrate (LOPRESSOR) 50 mg tablet Take 1 Tablet by mouth two (2) times a day. 120 Tablet 4    furosemide (LASIX) 40 mg tablet Take 1 Tablet by mouth daily. 90 Tablet 3    albuterol (PROVENTIL HFA, VENTOLIN HFA, PROAIR HFA) 90 mcg/actuation inhaler Take 1 Puff by inhalation every four (4) hours as needed for Wheezing. (Patient not taking: Reported on 1/14/2022) 1 Each 3    metFORMIN (GLUCOPHAGE) 1,000 mg tablet Take 1 Tablet by mouth daily. Indications: prevention of type 2 diabetes mellitus (Patient not taking: Reported on 1/14/2022) 90 Tablet 2       Allergies: Allergies   Allergen Reactions    Hydrochlorothiazide Anaphylaxis and Unproven on Challenge    Losartan Anaphylaxis and Unproven on Challenge    Sulfa (Sulfonamide Antibiotics) Anaphylaxis     When she was on Losartan (8/2017), and then again when she was on HCTZ (9/2017). She was also on lasix since 2016.     Eggplant Other (comments)     Severe stomach pain     Lasix [Furosemide] Unproven on Challenge     May lead to anaphylaxis        Smoker:  Social History     Tobacco Use   Smoking Status Current Every Day Smoker    Packs/day: 0.50    Years: 25.00    Pack years: 12.50    Types: Cigarettes   Smokeless Tobacco Never Used       ETOH:   Social History     Substance and Sexual Activity   Alcohol Use No    Alcohol/week: 0.0 standard drinks       FH:   Family History   Problem Relation Age of Onset    Diabetes Mother     Hypertension Brother     Hypertension Brother     Hypertension Brother        ROS:  General/Constitutional:   No headache, fever, fatigue, weight loss or weight gain       Neurological:   No LE weakness   Skin: No rash     Physical Exam:  Visit Vitals  /84 (BP 1 Location: Left upper arm, BP Patient Position: Sitting) Comment: Personal machine  Comment (BP 1 Location): Personal machine  Comment (BP Patient Position): Personal Machine   Pulse 63   Temp 98.7 °F (37.1 °C) (Oral)   Resp 16   Ht 5' (1.524 m)   Wt 284 lb 9.6 oz (129.1 kg)   LMP 12/31/2012   SpO2 95%   BMI 55.58 kg/m²     Gen: NAD. Responds to all questions appropriately. Lungs: No labored respirations. Skin: No obvious rash  Ext: Well perfused. No edema. MSK: Back + R hip   Posture: Normal   Deformity: None    ROM: unable to assess 2/2 pain     Gait: Normal       Palpation:    L1-L5: +ttp    Sacrum: No tenderness    Coccyx: No tenderness    Left Paraspinal: No tenderness    Right Paraspinal: No tenderness    Greater trochanter: No tenderness    Ischial Tuberosity: No tenderness    Piriformis: No tenderness     Strength (0-5/5)    Hip Flexion:   Left: 5/5  Right: 5/5    Hip Extension:  Left: 5/5  Right: 5/5    Hip Abduction:  Left: 5/5  Right: 5/5    Hip Adduction:  Left: 5/5  Right: 5/5    Knee Extension:  Left: 5/5  Right: 5/5    Knee Flexion:   Left: 5/5  Right: 5/5    Ankle dorsiflexion:  Left: 5/5  Right: 5/5    Ankle plantarflexion:  Left: 5/5  Right: 5/5    Great toe extension:  Left: 5/5  Right: 5/5     Sensation: Intact, no deficits      DTR:    Patella:  Left: +2  Right: +2    Achilles:  Left: +2  Right: +2     Special test:    Straight leg: Left: Negative  Right: Negative    Taiwos: Left: Negative  Right: Negative    Fadir's: Left: Negative  Right: Positive    Piriformis: Left: Negative  Right: Negative          Assessment:     ICD-10-CM ICD-9-CM    1. Chronic pain of right hip  M25.551 719.45 meloxicam (MOBIC) 15 mg tablet    G89.29 338.29 XR HIP RT W OR WO PELV 2-3 VWS      PERIPHERAL SMEAR   2. Lumbar radiculopathy  M54.16 724.4 XR SPINE LUMB 2 OR 3 V       Plan:  1. Chronic pain of right hip  Acutely worsened over the past 2-3wks, hip XR done 2yrs ago shows OA of R hip. Will get new XR given acute worsening; w/ morbid obesity would like to r/o femoral neck stress fx  - Will try NSAIDs (mobic 15mg daily) and voltaren gel and f/up for R hip CSI on 2/3/22  - XR HIP RT W OR WO PELV 2-3 VWS; Future    2. Lumbar radiculopathy  Intermittent radicular sx radiating down the legs into her feet.  Unable to fully assess w/ straight leg test today 2/2 extreme hip pain. Will get L spine XR for further eval. No paraspinal tenderness on exam today. She is currently being worked up for leukocytosis by her PCP and is in need to catch up on cancer screenings.   - XR SPINE LUMB 2 OR 3 V; Future      Pt discussed w/ Dr. Janet Hernandez, Sports/Family Medicine Attending    Violeta Dalton MD  Sports Medicine Fellow

## 2022-01-19 NOTE — PROGRESS NOTES
Reji De Anda is a 49 y.o. female    Chief Complaint   Patient presents with    Hip Pain     Patient is coming in for pain that starts on her back and radiates into her right hip and down her right leg. This started about 5 months ago. Pain is 4-5 when sitting and standing is a pain 5- 10. She tstae sthat when she stands up from sitting is the worse. when she goes tolay down is much better. No other concerns. 1. Have you been to the ER, urgent care clinic since your last visit? Hospitalized since your last visit? No  2. Have you seen or consulted any other health care providers outside of the [address] [place] [address] since your last visit? Include any pap smears or colon screening. No      Visit Vitals  /84 (BP 1 Location: Left upper arm, BP Patient Position: Sitting)   Pulse 63   Temp 98.7 °F (37.1 °C) (Oral)   Resp 16   Ht 5' (1.524 m)   Wt 284 lb 9.6 oz (129.1 kg)   SpO2 95%   BMI 55.58 kg/m²           Health Maintenance Due   Topic Date Due    Hepatitis C Screening  Never done    COVID-19 Vaccine (1) Never done    Cervical cancer screen  Never done    Shingrix Vaccine Age 50> (1 of 2) Never done    Colorectal Cancer Screening Combo  01/21/2021    Breast Cancer Screen Mammogram  04/15/2021    Flu Vaccine (1) 09/01/2021         Medication Reconciliation completed, changes noted.   Please  Update medication list.

## 2022-01-20 LAB
BASOPHILS # BLD: 0.1 K/UL (ref 0–0.1)
BASOPHILS NFR BLD: 1 % (ref 0–1)
DIFFERENTIAL METHOD BLD: ABNORMAL
EOSINOPHIL # BLD: 0.4 K/UL (ref 0–0.4)
EOSINOPHIL NFR BLD: 3 % (ref 0–7)
ERYTHROCYTE [DISTWIDTH] IN BLOOD BY AUTOMATED COUNT: 13.8 % (ref 11.5–14.5)
HCT VFR BLD AUTO: 45.9 % (ref 35–47)
HGB BLD-MCNC: 14.8 G/DL (ref 11.5–16)
IMM GRANULOCYTES # BLD AUTO: 0 K/UL (ref 0–0.04)
IMM GRANULOCYTES NFR BLD AUTO: 0 % (ref 0–0.5)
LYMPHOCYTES # BLD: 4.7 K/UL (ref 0.8–3.5)
LYMPHOCYTES NFR BLD: 38 % (ref 12–49)
MCH RBC QN AUTO: 30 PG (ref 26–34)
MCHC RBC AUTO-ENTMCNC: 32.2 G/DL (ref 30–36.5)
MCV RBC AUTO: 93.1 FL (ref 80–99)
MONOCYTES # BLD: 0.9 K/UL (ref 0–1)
MONOCYTES NFR BLD: 7 % (ref 5–13)
NEUTS SEG # BLD: 6.4 K/UL (ref 1.8–8)
NEUTS SEG NFR BLD: 51 % (ref 32–75)
NRBC # BLD: 0 K/UL (ref 0–0.01)
NRBC BLD-RTO: 0 PER 100 WBC
PERIPHERAL SMEAR,PSM: NORMAL
PLATELET # BLD AUTO: 262 K/UL (ref 150–400)
PMV BLD AUTO: 11.2 FL (ref 8.9–12.9)
RBC # BLD AUTO: 4.93 M/UL (ref 3.8–5.2)
WBC # BLD AUTO: 12.5 K/UL (ref 3.6–11)

## 2022-01-21 NOTE — PROGRESS NOTES
Persistent leukocytosis  Peripheral smear - mild nonspecific leukocytosis w/unremarkable morphology, maybe reactive.  Acute/Chronic infection sites should be ruled out and if leukocytosis persists/worsens consider flow cytometry

## 2022-01-31 ENCOUNTER — HOSPITAL ENCOUNTER (OUTPATIENT)
Dept: GENERAL RADIOLOGY | Age: 57
Discharge: HOME | End: 2022-01-31

## 2022-01-31 DIAGNOSIS — M54.16 LUMBAR RADICULOPATHY: ICD-10-CM

## 2022-01-31 DIAGNOSIS — G89.29 CHRONIC PAIN OF RIGHT HIP: ICD-10-CM

## 2022-01-31 DIAGNOSIS — M25.551 CHRONIC PAIN OF RIGHT HIP: ICD-10-CM

## 2022-01-31 PROCEDURE — 73502 X-RAY EXAM HIP UNI 2-3 VIEWS: CPT

## 2022-01-31 PROCEDURE — 72100 X-RAY EXAM L-S SPINE 2/3 VWS: CPT

## 2022-02-03 ENCOUNTER — OFFICE VISIT (OUTPATIENT)
Dept: FAMILY MEDICINE CLINIC | Age: 57
End: 2022-02-03

## 2022-02-03 VITALS
WEIGHT: 282 LBS | RESPIRATION RATE: 20 BRPM | BODY MASS INDEX: 55.36 KG/M2 | SYSTOLIC BLOOD PRESSURE: 158 MMHG | HEART RATE: 62 BPM | DIASTOLIC BLOOD PRESSURE: 84 MMHG | HEIGHT: 60 IN | OXYGEN SATURATION: 95 %

## 2022-02-03 DIAGNOSIS — M16.11 ARTHRITIS OF RIGHT HIP: Primary | ICD-10-CM

## 2022-02-03 DIAGNOSIS — M79.10 MUSCLE PAIN: ICD-10-CM

## 2022-02-03 PROCEDURE — 99213 OFFICE O/P EST LOW 20 MIN: CPT | Performed by: FAMILY MEDICINE

## 2022-02-03 PROCEDURE — 20611 DRAIN/INJ JOINT/BURSA W/US: CPT | Performed by: FAMILY MEDICINE

## 2022-02-03 RX ORDER — DULOXETIN HYDROCHLORIDE 60 MG/1
60 CAPSULE, DELAYED RELEASE ORAL DAILY
Qty: 90 CAPSULE | Refills: 3 | Status: SHIPPED | OUTPATIENT
Start: 2022-02-03

## 2022-02-03 RX ORDER — TRIAMCINOLONE ACETONIDE 40 MG/ML
40 INJECTION, SUSPENSION INTRA-ARTICULAR; INTRAMUSCULAR ONCE
Qty: 1 ML | Refills: 0
Start: 2022-02-03 | End: 2022-02-03

## 2022-02-03 RX ORDER — LIDOCAINE HYDROCHLORIDE 10 MG/ML
3 INJECTION INFILTRATION; PERINEURAL ONCE
Qty: 3 ML | Refills: 0
Start: 2022-02-03 | End: 2022-02-03

## 2022-02-03 NOTE — PROGRESS NOTES
Chief Complaint   Patient presents with    Hip Pain     Follow up on right hip pain per Dr. Arthur Mojica. Currenly pain is 6/10 and goes into the back. Miguel Cotton is a 49 y.o. female who presents for chronic R hip pain. She was referred by Dr. Arthur Mojica for possible hip CSI. Her sx are unchanged from her last visit, where she reports predominantly anterior R hip/groin pain worse w/ internal rotation and bearing weight. No numbness/tingling down the R leg. No weakness. She has been taking NSAIDs which provide minimal relief. PMHx:  Past Medical History:   Diagnosis Date    Anxiety and depression     Dyspnea 9/18/2017    Flu     2 wks ago    GERD (gastroesophageal reflux disease)     H. pylori infection 1/26/2013 1/2013 Referred to Dr. Bisi Banegas who speaks [place]ian to explain treatments Stool test after Abx negative 2013      Headache(784.0)     Hypertension     Incidental lung nodule, > 3mm and < 8mm 04/20/2016    left lung nodule in smoker, stable on CT in 2018       Meds:   Current Outpatient Medications   Medication Sig Dispense Refill    meloxicam (MOBIC) 15 mg tablet Take 1 Tablet by mouth daily. 30 Tablet 0    olmesartan (BENICAR) 40 mg tablet Take 1 Tablet by mouth daily. 90 Tablet 3    metoprolol tartrate (LOPRESSOR) 50 mg tablet Take 1 Tablet by mouth two (2) times a day. 120 Tablet 4    furosemide (LASIX) 40 mg tablet Take 1 Tablet by mouth daily. 90 Tablet 3    DULoxetine (CYMBALTA) 60 mg capsule Take 1 Capsule by mouth daily. 90 Capsule 3       Allergies: Allergies   Allergen Reactions    Hydrochlorothiazide Anaphylaxis and Unproven on Challenge    Losartan Anaphylaxis and Unproven on Challenge    Sulfa (Sulfonamide Antibiotics) Anaphylaxis     When she was on Losartan (8/2017), and then again when she was on HCTZ (9/2017). She was also on lasix since 2016.     Eggplant Other (comments)     Severe stomach pain     Lasix [Furosemide] Unproven on Challenge     May lead to anaphylaxis        Smoker:  Social History     Tobacco Use   Smoking Status Current Every Day Smoker    Packs/day: 0.50    Years: 25.00    Pack years: 12.50    Types: Cigarettes   Smokeless Tobacco Never Used       ETOH:   Social History     Substance and Sexual Activity   Alcohol Use No    Alcohol/week: 0.0 standard drinks       FH:   Family History   Problem Relation Age of Onset    Diabetes Mother     Hypertension Brother     Hypertension Brother     Hypertension Brother        ROS:  General/Constitutional:   No fever    Neurological:   No numbness, tingling   Skin: No rash     Physical Exam:  Visit Vitals  BP (!) 158/84 (BP 1 Location: Right arm, BP Patient Position: Sitting, BP Cuff Size: Large adult)   Pulse 62   Resp 20   Ht 5' (1.524 m)   Wt 282 lb (127.9 kg)   LMP 12/31/2012   SpO2 95%   BMI 55.07 kg/m²     Gen: NAD. Responds to all questions appropriately. Lungs: No labored respirations. Skin: No obvious rash  Ext: Well perfused. No edema.   MSK: Back + R hip  Posture: Normal  Deformity: None   ROM: unable to assess 2/2 pain     Gait: Normal       Palpation:              L1-L5: +ttp              Sacrum: No tenderness              Coccyx: No tenderness              Left Paraspinal: No tenderness              Right Paraspinal: No tenderness              Greater trochanter: No tenderness              Ischial Tuberosity: No tenderness              Piriformis: No tenderness     Strength (0-5/5)              Hip Flexion:                 Left: 5/5                       Right: 5/5              Hip Extension:             Left: 5/5                       Right: 5/5              Hip Abduction:             Left: 5/5                       Right: 5/5              Hip Adduction:            Left: 5/5                       Right: 5/5              Knee Extension:          Left: 5/5                       Right: 5/5              Knee Flexion:              Left: 5/5                       Right: 5/5              Ankle dorsiflexion:       Left: 5/5                       Right: 5/5              Ankle plantarflexion:   Left: 5/5                       Right: 5/5              Great toe extension:   Left: 5/5                       Right: 5/5     Sensation: Intact, no deficits      Special test:              Straight leg:     Left: Negative              Right: Negative              Taiwos:           Left: Negative              Right: Negative              Fadir's: Left:  Negative               Right: Positive              Piriformis:        Left: Negative              Right: Negative    [facility]  OFFICE PROCEDURE PROGRESS NOTE        Chart reviewed for the following:   Angela Doss MD, have reviewed the History, Physical and updated the Allergic reactions for [address] performed immediately prior to start of procedure:   Angela Doss MD, have performed the following reviews on [address] prior to the start of the procedure:            * Patient was identified by name and date of birth   * Agreement on procedure being performed was verified  * Risks and Benefits explained to the patient  * Procedure site verified and marked as necessary  * Patient was positioned for comfort  * Consent was signed and verified     Time: 3:40pm      Date of procedure: 2/3/2022    Procedure performed by:  Jesus Farnsworth MD    Provider assisted by: Luca Al LPN    Patient assisted by: self    How tolerated by patient: tolerated the procedure well with no complications    Post Procedural Pain Scale: 0 - No Hurt    Comments: none    Ultrasound Guided Right Joint Injection    Treatment options discussed with patient at length, including risks, benefits, alternatives, and the nature of any potential procedures for the problem.     Using the NanoPack system, I performed a MSK US guided aspiration and/or injection of the above target via an in-plane approach after Chlorhexidine prep and needle localization with the linear Probe using a 22G needle injecting 40 mg Kenalog and 3 ml Lidocaine 1% w/o Epi. Pt reported 80 percent relief of symptoms after injection. The injection was performed for diagnostic and prognostic purposes. Ultrasound Performed and Interpreted by:  Gretchen Cantu MD, CAESTERM, RMSK      Assessment:    ICD-10-CM ICD-9-CM    1. Arthritis of right hip  M16.11 716.95 TRIAMCINOLONE ACETONIDE INJ      triamcinolone acetonide (Kenalog) 40 mg/mL injection      lidocaine (XYLOCAINE) 10 mg/mL (1 %) injection      AMB POS US DRAIN/INJECT LARGE JOINT/BURSA       Plan:  1. Arthritis of right hip  XR R hip reviewed showing significant joint space narrowing. CSI of the R hip done today resulted in 80% improvement of her pain. Discussed importance of lifestyle modifications and weightloss to BMI <40 if she may require any surgical intervention in the future  - TRIAMCINOLONE ACETONIDE INJ  - triamcinolone acetonide (Kenalog) 40 mg/mL injection; 1 mL by IntraBURSal route once for 1 dose. Dispense: 1 mL; Refill: 0  - lidocaine (XYLOCAINE) 10 mg/mL (1 %) injection; 3 mL by IntraBURSal route once for 1 dose. Dispense: 3 mL; Refill: 0  - AMB POS US DRAIN/INJECT LARGE JOINT/BURSA      Follow-up and Dispositions    · Return in about 6 months (around 8/3/2022) for Hip Pain if needed .

## 2022-02-03 NOTE — PROGRESS NOTES
Identified Patient with two Patient identifiers (Name and ). Two Patient Identifiers confirmed. Reviewed record in preparation for visit and have obtained necessary documentation. Chief Complaint   Patient presents with    Hip Pain     Follow up on right hip pain per Dr. eVl Deras. Currenly pain is 6/10 and goes into the back. Visit Vitals  BP (!) 158/84 (BP 1 Location: Right arm, BP Patient Position: Sitting, BP Cuff Size: Large adult)   Pulse 62   Resp 20   Ht 5' (1.524 m)   Wt 282 lb (127.9 kg)   SpO2 95%   BMI 55.07 kg/m²       1. Have you been to the ER, urgent care clinic since your last visit? Hospitalized since your last visit? No    2. Have you seen or consulted any other health care providers outside of the [address] [place] [address] since your last visit? Include any pap smears or colon screening.  No

## 2022-02-14 ENCOUNTER — TELEPHONE (OUTPATIENT)
Dept: FAMILY MEDICINE CLINIC | Age: 57
End: 2022-02-14

## 2022-02-14 ENCOUNTER — HOSPITAL ENCOUNTER (OUTPATIENT)
Dept: NON INVASIVE DIAGNOSTICS | Age: 57
Discharge: HOME | End: 2022-02-14
Attending: STUDENT IN AN ORGANIZED HEALTH CARE EDUCATION/TRAINING PROGRAM

## 2022-02-14 VITALS
SYSTOLIC BLOOD PRESSURE: 150 MMHG | HEIGHT: 60 IN | BODY MASS INDEX: 55.36 KG/M2 | DIASTOLIC BLOOD PRESSURE: 82 MMHG | WEIGHT: 282 LBS

## 2022-02-14 DIAGNOSIS — R06.01 ORTHOPNEA: ICD-10-CM

## 2022-02-14 DIAGNOSIS — R06.09 DOE (DYSPNEA ON EXERTION): ICD-10-CM

## 2022-02-14 DIAGNOSIS — R60.0 LOWER EXTREMITY EDEMA: Primary | ICD-10-CM

## 2022-02-14 LAB
ECHO AO ROOT DIAM: 3.1 CM
ECHO AO ROOT INDEX: 1.44 CM/M2
ECHO AV AREA PEAK VELOCITY: 2.6 CM2
ECHO AV AREA PEAK VELOCITY: 2.6 CM2
ECHO AV PEAK GRADIENT: 10 MMHG
ECHO AV PEAK VELOCITY: 1.6 M/S
ECHO AV VELOCITY RATIO: 0.75
ECHO EST RA PRESSURE: 5 MMHG
ECHO LA DIAMETER INDEX: 1.94 CM/M2
ECHO LA DIAMETER: 4.2 CM
ECHO LA TO AORTIC ROOT RATIO: 1.35
ECHO LA VOL 2C: 41 ML (ref 22–52)
ECHO LA VOL 4C: 54 ML (ref 22–52)
ECHO LA VOL BP: 50 ML (ref 22–52)
ECHO LA VOL BP: 50 ML (ref 22–52)
ECHO LA VOLUME AREA LENGTH: 53 ML
ECHO LA VOLUME INDEX A2C: 19 ML/M2 (ref 16–34)
ECHO LA VOLUME INDEX A4C: 25 ML/M2 (ref 16–34)
ECHO LA VOLUME INDEX AREA LENGTH: 25 ML/M2 (ref 16–34)
ECHO LV E' LATERAL VELOCITY: 6 CM/S
ECHO LV E' SEPTAL VELOCITY: 4 CM/S
ECHO LV FRACTIONAL SHORTENING: 30 % (ref 28–44)
ECHO LV INTERNAL DIMENSION DIASTOLE INDEX: 2.13 CM/M2
ECHO LV INTERNAL DIMENSION DIASTOLIC: 4.6 CM (ref 3.9–5.3)
ECHO LV INTERNAL DIMENSION SYSTOLIC INDEX: 1.48 CM/M2
ECHO LV INTERNAL DIMENSION SYSTOLIC: 3.2 CM
ECHO LV IVSD: 1.2 CM (ref 0.6–0.9)
ECHO LV IVSS: 1.5 CM
ECHO LV MASS 2D: 205 G (ref 67–162)
ECHO LV MASS INDEX 2D: 94.9 G/M2 (ref 43–95)
ECHO LV POSTERIOR WALL DIASTOLIC: 1.2 CM (ref 0.6–0.9)
ECHO LV POSTERIOR WALL SYSTOLIC: 1.7 CM
ECHO LV RELATIVE WALL THICKNESS RATIO: 0.52
ECHO LVOT AREA: 3.5 CM2
ECHO LVOT DIAM: 2.1 CM
ECHO LVOT PEAK GRADIENT: 5 MMHG
ECHO LVOT PEAK VELOCITY: 1.2 M/S
ECHO MV A VELOCITY: 0.54 M/S
ECHO MV E DECELERATION TIME (DT): 196.5 MS
ECHO MV E VELOCITY: 0.82 M/S
ECHO MV E/A RATIO: 1.52
ECHO MV E/E' LATERAL: 13.67
ECHO MV E/E' RATIO (AVERAGED): 17.08
ECHO MV E/E' SEPTAL: 20.5
ECHO PV MAX VELOCITY: 1.1 M/S
ECHO PV PEAK GRADIENT: 5 MMHG
ECHO RV FREE WALL PEAK S': 12 CM/S
ECHO RV INTERNAL DIMENSION: 3.8 CM
ECHO RV TAPSE: 2.2 CM (ref 1.5–2)

## 2022-02-14 PROCEDURE — 93306 TTE W/DOPPLER COMPLETE: CPT | Performed by: INTERNAL MEDICINE

## 2022-02-14 PROCEDURE — 93306 TTE W/DOPPLER COMPLETE: CPT

## 2022-02-14 NOTE — TELEPHONE ENCOUNTER
Hello, this pt would like to know if she can get her heart test results sent over to her cardiology appt that's schedule for today at 3:00 pm.  Thank you  kerry

## 2022-02-15 ENCOUNTER — PATIENT MESSAGE (OUTPATIENT)
Dept: FAMILY MEDICINE CLINIC | Age: 57
End: 2022-02-15

## 2022-02-15 NOTE — TELEPHONE ENCOUNTER
Called patient to go over lab results and ask where pt would like me to send results with help Georgian . No answer. Left hipaa compliant vm. Will send my chart message.      Mohsen Flynn MD

## 2022-02-18 DIAGNOSIS — G89.29 CHRONIC PAIN OF RIGHT HIP: ICD-10-CM

## 2022-02-18 DIAGNOSIS — I10 HYPERTENSION, UNSPECIFIED TYPE: ICD-10-CM

## 2022-02-18 DIAGNOSIS — M25.551 CHRONIC PAIN OF RIGHT HIP: ICD-10-CM

## 2022-02-18 RX ORDER — MELOXICAM 15 MG/1
15 TABLET ORAL DAILY
Qty: 30 TABLET | Refills: 0 | Status: SHIPPED | OUTPATIENT
Start: 2022-02-18 | End: 2022-03-24 | Stop reason: SDUPTHER

## 2022-02-18 RX ORDER — METOPROLOL TARTRATE 50 MG/1
50 TABLET ORAL 2 TIMES DAILY
Qty: 120 TABLET | Refills: 4 | Status: SHIPPED | OUTPATIENT
Start: 2022-02-18

## 2022-03-02 ENCOUNTER — OFFICE VISIT (OUTPATIENT)
Dept: FAMILY MEDICINE CLINIC | Age: 57
End: 2022-03-02

## 2022-03-02 VITALS
SYSTOLIC BLOOD PRESSURE: 165 MMHG | BODY MASS INDEX: 55.68 KG/M2 | TEMPERATURE: 98 F | OXYGEN SATURATION: 93 % | WEIGHT: 283.6 LBS | DIASTOLIC BLOOD PRESSURE: 90 MMHG | HEART RATE: 67 BPM | HEIGHT: 60 IN

## 2022-03-02 DIAGNOSIS — F40.240 CLAUSTROPHOBIA: Primary | ICD-10-CM

## 2022-03-02 DIAGNOSIS — E66.01 CLASS 3 SEVERE OBESITY DUE TO EXCESS CALORIES WITH SERIOUS COMORBIDITY AND BODY MASS INDEX (BMI) OF 50.0 TO 59.9 IN ADULT (HCC): ICD-10-CM

## 2022-03-02 DIAGNOSIS — M25.551 CHRONIC RIGHT HIP PAIN: Primary | ICD-10-CM

## 2022-03-02 DIAGNOSIS — M62.830 PARASPINAL MUSCLE SPASM: ICD-10-CM

## 2022-03-02 DIAGNOSIS — G89.29 CHRONIC RIGHT HIP PAIN: Primary | ICD-10-CM

## 2022-03-02 PROCEDURE — 99214 OFFICE O/P EST MOD 30 MIN: CPT | Performed by: FAMILY MEDICINE

## 2022-03-02 PROCEDURE — 20553 NJX 1/MLT TRIGGER POINTS 3/>: CPT | Performed by: FAMILY MEDICINE

## 2022-03-02 RX ORDER — ALPRAZOLAM 0.5 MG/1
0.5 TABLET ORAL ONCE
Qty: 2 TABLET | Refills: 0 | Status: SHIPPED | OUTPATIENT
Start: 2022-03-02 | End: 2022-03-02

## 2022-03-02 RX ORDER — ALPRAZOLAM 0.5 MG/1
0.5 TABLET ORAL
Qty: 2 TABLET | Refills: 0 | Status: SHIPPED | OUTPATIENT
Start: 2022-03-02 | End: 2022-03-02

## 2022-03-02 RX ORDER — LIDOCAINE HYDROCHLORIDE 10 MG/ML
5 INJECTION INFILTRATION; PERINEURAL ONCE
Status: COMPLETED | OUTPATIENT
Start: 2022-03-02 | End: 2022-03-02

## 2022-03-02 RX ADMIN — LIDOCAINE HYDROCHLORIDE 5 ML: 10 INJECTION INFILTRATION; PERINEURAL at 15:30

## 2022-03-02 NOTE — PROGRESS NOTES
Chief Complaint   Patient presents with    Hip Pain     radiating to lower back pain currently: 10/10       Khang Hanley is a 49 y.o. female who presents for f/up on R hip pain. She has known OA of the R hip seen on XR. She had an intraarticular CSI of the R hip done last month, which only eased the pain for 2d. Over the past 10d, she has had worsening anterior R hip/deep groin pain, affecting her mobility. Subsequently she feels that her low back has spasmed over the past 5d. She denies any numbness/tingling/weakness. She is now walking w/ a limp 2/2 pain. She is unable to do HEP 2/2 pain. Tylenol/ibuprofen have not provided any pain relief. She rates the R hip pain 10/10 and radiates to the anterior thigh. She recognizes that her weight has impacted her health and increased her pain. She has tried diets, weightloss clinics w/ pharmacological treatment, and has been unable to maintain weightloss. She is pre-diabetic, has HLD, and HTN. PMHx:  Past Medical History:   Diagnosis Date    Anxiety and depression     Dyspnea 9/18/2017    Flu     2 wks ago    GERD (gastroesophageal reflux disease)     H. pylori infection 1/26/2013 1/2013 Referred to Dr. Hector Hill who speaks Yakut to explain treatments Stool test after Abx negative 2013      Headache(784.0)     Hypertension     Incidental lung nodule, > 3mm and < 8mm 04/20/2016    left lung nodule in smoker, stable on CT in 2018       Meds:   Current Outpatient Medications   Medication Sig Dispense Refill    metoprolol tartrate (LOPRESSOR) 50 mg tablet Take 1 Tablet by mouth two (2) times a day. 120 Tablet 4    meloxicam (MOBIC) 15 mg tablet Take 1 Tablet by mouth daily. 30 Tablet 0    DULoxetine (CYMBALTA) 60 mg capsule Take 1 Capsule by mouth daily. 90 Capsule 3    olmesartan (BENICAR) 40 mg tablet Take 1 Tablet by mouth daily. 90 Tablet 3    furosemide (LASIX) 40 mg tablet Take 1 Tablet by mouth daily.  90 Tablet 3     Current Facility-Administered Medications   Medication Dose Route Frequency Provider Last Rate Last Admin    lidocaine (XYLOCAINE) 10 mg/mL (1 %) injection 5 mL  5 mL Other ONCE Vivi Hill MD           Allergies: Allergies   Allergen Reactions    Hydrochlorothiazide Anaphylaxis and Unproven on Challenge    Losartan Anaphylaxis and Unproven on Challenge    Sulfa (Sulfonamide Antibiotics) Anaphylaxis     When she was on Losartan (8/2017), and then again when she was on HCTZ (9/2017). She was also on lasix since 2016.  Eggplant Other (comments)     Severe stomach pain     Lasix [Furosemide] Unproven on Challenge     May lead to anaphylaxis        Smoker:  Social History     Tobacco Use   Smoking Status Current Every Day Smoker    Packs/day: 0.50    Years: 25.00    Pack years: 12.50    Types: Cigarettes   Smokeless Tobacco Never Used       ETOH:   Social History     Substance and Sexual Activity   Alcohol Use No    Alcohol/week: 0.0 standard drinks       FH:   Family History   Problem Relation Age of Onset    Diabetes Mother     Hypertension Brother     Hypertension Brother     Hypertension Brother        ROS:  General/Constitutional:   No headache, fever, fatigue, weight loss or weight gain       Eyes:   No redness, pruritis, pain, visual changes, swelling, or discharge      Ears:    No pain, loss or changes in hearing     Neck:   No swelling, masses, stiffness, pain, or limited movement     Cardiac:    No chest pain      Respiratory:   No cough or shortness of breath     GI:   No nausea/vomiting, diarrhea, abdominal pain, bloody or dark stools       :   No dysuria or  hematuria    Neurological:   No numbness/tingling  Skin: No rash     Physical Exam:  Visit Vitals  BP (!) 165/90 (BP 1 Location: Left upper arm, BP Patient Position: Sitting)   Pulse 67   Temp 98 °F (36.7 °C) (Oral)   Ht 5' (1.524 m)   Wt 283 lb 9.6 oz (128.6 kg)   LMP 12/31/2012   SpO2 93%   BMI 55.39 kg/m²     Gen: NAD.  Responds to all questions appropriately. Lungs: No labored respirations. Skin: No obvious rash  Ext: Well perfused. No edema.   MSK: Back + R hip  Posture: Normal  Deformity: None   ROM: unable to assess 2/2 pain     Gait: Normal       Palpation:              L1-L5: No tenderness              Sacrum: No tenderness              Coccyx: No tenderness              Left Paraspinal: +ttp              Right Paraspinal: +ttp              Greater trochanter: No tenderness              Ischial Tuberosity: No tenderness              Piriformis: No tenderness     Strength (0-5/5)              Hip Flexion:                 Left: 5/5                     Right: 5/5              Hip Extension:             Left: 5/5                     Right: 5/5              Hip Abduction:             Left: 5/5                     Right: 5/5              Hip Adduction:            Left: 5/5                      Right: 5/5              Knee Extension:          Left: 5/5                     Right: 5/5              Knee Flexion:              Left: 5/5                      Right: 5/5              Ankle dorsiflexion:       Left: 5/5                      Right: 5/5              Ankle plantarflexion:   Left: 5/5                       Right: 5/5              Great toe extension:   Left: 5/5                       Right: 5/5     Sensation: Intact, no deficits      DTR:              Patella:            Left: +2                        Right: +2              Achilles:           Left: +2                        Right: +2     Special test:              Straight leg:     Left: Negative              Right: Negative              Taiwos:           Left: Negative              Right: Negative              Fadir's:  Left: Negative              Right: Positive              Piriformis:        Left: Negative              Right: Negative    [facility]  OFFICE PROCEDURE PROGRESS NOTE        Chart reviewed for the following:   Tolu Boyd MD, have reviewed the History, Physical and updated the Allergic reactions for [address] performed immediately prior to start of procedure:   Trudy Bumpers, MD, have performed the following reviews on [address] prior to the start of the procedure:            * Patient was identified by name and date of birth   * Agreement on procedure being performed was verified  * Risks and Benefits explained to the patient  * Procedure site verified and marked as necessary  * Patient was positioned for comfort  * Consent was signed and verified     Time: 15:00      Date of procedure: 3/2/2022    Procedure performed by:  Richa Kumar MD    Provider assisted by: Lino Brumfield MA    Patient assisted by: self    How tolerated by patient: tolerated the procedure well with no complications    Post Procedural Pain Scale: 0 - No Hurt    Comments: none          Trigger Point Injections     Preparation - Cleaned and prepped with chlorhexidine swab x3. Anesthesia - Ethyl chloride spray used to anesthitise the skin prior to injection. Description of procedure - 10 trigger points were identified in the bilateral paraspinals and each site was injected with 0.5 ml of 1% Lidocaine and 0.5ml of D5 as a  (50:50) mixture. Patient tolerated the procedure well and there were no complications. Patient reports 60% pain relief following the injections. Assessment:    ICD-10-CM ICD-9-CM    1. Chronic right hip pain  M25.551 719.45 MRI HIP  RT  WO CONT    G89.29 338.29    2. Paraspinal muscle spasm  M62.830 724.8 lidocaine (XYLOCAINE) 10 mg/mL (1 %) injection 5 mL      WA INJECT TRIGGER POINTS, > 3       Plan:  1. Chronic right hip pain  +OA, intra-articular CSI done last month provided some relief for 2d. Pain is now limiting her mobility and not responding to tylenol/NSAIDs. Will get MRI to r/o AVN. - MRI HIP  RT  WO CONT; Future    2. Paraspinal muscle spasm  Responded well to trigger point injections.    - lidocaine (XYLOCAINE) 10 mg/mL (1 %) injection 5 mL  - MS INJECT TRIGGER POINTS, > 3    3. Class 3 severe obesity due to excess calories with serious comorbidity and body mass index (BMI) of 50.0 to 59.9 in adult [facility])  Body mass index is 55.39 kg/m². +pre-DM, HTN, HLD. Weight is heavily impacting joint health, mobility, and worsening her pain. Discussed importance of lifestyle modifications.  She has tried weightloss programs w/ pharm therapy and was not able to maintain weightloss.  - REFERRAL TO WEIGHT LOSS    Pt discussed w/ Dr. Aly Turk, Sports/Family Medicine Attending    Kadeem Warren MD  Sports Medicine Fellow

## 2022-03-02 NOTE — PROGRESS NOTES
Chief Complaint   Patient presents with    Hip Pain     radiating to lower back pain currently: 10/10     Visit Vitals  BP (!) 165/90 (BP 1 Location: Left upper arm, BP Patient Position: Sitting)   Pulse 67   Temp 98 °F (36.7 °C) (Oral)   Ht 5' (1.524 m)   Wt 283 lb 9.6 oz (128.6 kg)   SpO2 93%   BMI 55.39 kg/m²

## 2022-03-18 PROBLEM — Z91.199 PATIENT NONCOMPLIANCE: Status: ACTIVE | Noted: 2018-03-19

## 2022-03-18 PROBLEM — E78.2 MIXED HYPERLIPIDEMIA: Status: ACTIVE | Noted: 2022-01-18

## 2022-03-18 PROBLEM — R06.09 DOE (DYSPNEA ON EXERTION): Status: ACTIVE | Noted: 2017-09-18

## 2022-03-19 PROBLEM — E27.9 ADRENAL NODULE (HCC): Status: ACTIVE | Noted: 2018-12-30

## 2022-03-19 PROBLEM — T78.3XXA IDIOPATHIC ANGIOEDEMA: Status: ACTIVE | Noted: 2018-12-30

## 2022-03-19 PROBLEM — E27.8 ADRENAL NODULE (HCC): Status: ACTIVE | Noted: 2018-12-30

## 2022-03-19 PROBLEM — T78.2XXA ANAPHYLACTIC REACTION: Status: ACTIVE | Noted: 2018-12-30

## 2022-03-19 PROBLEM — T78.3XXA ANGIOEDEMA: Status: ACTIVE | Noted: 2017-09-18

## 2022-03-20 PROBLEM — R73.03 PREDIABETES: Status: ACTIVE | Noted: 2018-12-30

## 2022-03-20 PROBLEM — I10 ESSENTIAL HYPERTENSION: Status: ACTIVE | Noted: 2021-09-09

## 2022-03-20 PROBLEM — M25.551 RIGHT HIP PAIN: Status: ACTIVE | Noted: 2021-09-09

## 2022-03-24 DIAGNOSIS — G89.29 CHRONIC PAIN OF RIGHT HIP: ICD-10-CM

## 2022-03-24 DIAGNOSIS — M25.551 CHRONIC PAIN OF RIGHT HIP: ICD-10-CM

## 2022-03-24 NOTE — TELEPHONE ENCOUNTER
----- Message from Gail Fisher sent at 3/21/2022 12:53 PM EDT -----  Subject: Refill Request    QUESTIONS  Name of Medication? meloxicam (MOBIC) 15 mg tablet  Patient-reported dosage and instructions? 15 MG 1 QD  How many days do you have left? 0  Preferred Pharmacy? [facility] [id number]  Pharmacy phone number (if available)? [phone]  ---------------------------------------------------------------------------  --------------  Arlen SANDERS  What is the best way for the office to contact you? OK to leave message on   voicemail  Preferred Call Back Phone Number?  [phone]

## 2022-03-29 RX ORDER — MELOXICAM 15 MG/1
15 TABLET ORAL DAILY
Qty: 30 TABLET | Refills: 0 | Status: SHIPPED | OUTPATIENT
Start: 2022-03-29

## 2022-04-02 ENCOUNTER — HOSPITAL ENCOUNTER (EMERGENCY)
Age: 57
Discharge: HOME | End: 2022-04-02
Attending: EMERGENCY MEDICINE

## 2022-04-02 ENCOUNTER — APPOINTMENT (OUTPATIENT)
Dept: CT IMAGING | Age: 57
End: 2022-04-02
Attending: EMERGENCY MEDICINE

## 2022-04-02 VITALS
HEIGHT: 63 IN | TEMPERATURE: 98.6 F | BODY MASS INDEX: 46.95 KG/M2 | OXYGEN SATURATION: 98 % | SYSTOLIC BLOOD PRESSURE: 174 MMHG | DIASTOLIC BLOOD PRESSURE: 92 MMHG | WEIGHT: 265 LBS | HEART RATE: 65 BPM | RESPIRATION RATE: 20 BRPM

## 2022-04-02 DIAGNOSIS — R10.9 FLANK PAIN: Primary | ICD-10-CM

## 2022-04-02 DIAGNOSIS — R10.11 ABDOMINAL PAIN, RIGHT UPPER QUADRANT: ICD-10-CM

## 2022-04-02 LAB
ALBUMIN SERPL-MCNC: 3.4 G/DL (ref 3.5–5)
ALBUMIN/GLOB SERPL: 0.8 {RATIO} (ref 1.1–2.2)
ALP SERPL-CCNC: 67 U/L (ref 45–117)
ALT SERPL-CCNC: 20 U/L (ref 12–78)
ANION GAP SERPL CALC-SCNC: 5 MMOL/L (ref 5–15)
APPEARANCE UR: ABNORMAL
AST SERPL-CCNC: 12 U/L (ref 15–37)
BACTERIA URNS QL MICRO: ABNORMAL /HPF
BASOPHILS # BLD: 0.1 K/UL (ref 0–0.1)
BASOPHILS NFR BLD: 1 % (ref 0–1)
BILIRUB SERPL-MCNC: 0.8 MG/DL (ref 0.2–1)
BILIRUB UR QL: NEGATIVE
BUN SERPL-MCNC: 14 MG/DL (ref 6–20)
BUN/CREAT SERPL: 22 (ref 12–20)
CALCIUM SERPL-MCNC: 9.5 MG/DL (ref 8.5–10.1)
CHLORIDE SERPL-SCNC: 105 MMOL/L (ref 97–108)
CO2 SERPL-SCNC: 32 MMOL/L (ref 21–32)
COLOR UR: ABNORMAL
CREAT SERPL-MCNC: 0.63 MG/DL (ref 0.55–1.02)
DIFFERENTIAL METHOD BLD: ABNORMAL
EOSINOPHIL # BLD: 0.4 K/UL (ref 0–0.4)
EOSINOPHIL NFR BLD: 4 % (ref 0–7)
EPITH CASTS URNS QL MICRO: ABNORMAL /LPF
ERYTHROCYTE [DISTWIDTH] IN BLOOD BY AUTOMATED COUNT: 15.4 % (ref 11.5–14.5)
GLOBULIN SER CALC-MCNC: 4.3 G/DL (ref 2–4)
GLUCOSE SERPL-MCNC: 113 MG/DL (ref 65–100)
GLUCOSE UR STRIP.AUTO-MCNC: NEGATIVE MG/DL
HCT VFR BLD AUTO: 45.9 % (ref 35–47)
HGB BLD-MCNC: 15.3 G/DL (ref 11.5–16)
HGB UR QL STRIP: NEGATIVE
HYALINE CASTS URNS QL MICRO: ABNORMAL /LPF (ref 0–2)
IMM GRANULOCYTES # BLD AUTO: 0.1 K/UL (ref 0–0.04)
IMM GRANULOCYTES NFR BLD AUTO: 1 % (ref 0–0.5)
KETONES UR QL STRIP.AUTO: NEGATIVE MG/DL
LEUKOCYTE ESTERASE UR QL STRIP.AUTO: NEGATIVE
LIPASE SERPL-CCNC: 120 U/L (ref 73–393)
LYMPHOCYTES # BLD: 3.9 K/UL (ref 0.8–3.5)
LYMPHOCYTES NFR BLD: 36 % (ref 12–49)
MCH RBC QN AUTO: 30.2 PG (ref 26–34)
MCHC RBC AUTO-ENTMCNC: 33.3 G/DL (ref 30–36.5)
MCV RBC AUTO: 90.5 FL (ref 80–99)
MONOCYTES # BLD: 0.9 K/UL (ref 0–1)
MONOCYTES NFR BLD: 9 % (ref 5–13)
NEUTS SEG # BLD: 5.4 K/UL (ref 1.8–8)
NEUTS SEG NFR BLD: 49 % (ref 32–75)
NITRITE UR QL STRIP.AUTO: NEGATIVE
NRBC # BLD: 0 K/UL (ref 0–0.01)
NRBC BLD-RTO: 0 PER 100 WBC
PH UR STRIP: 6 [PH] (ref 5–8)
PLATELET # BLD AUTO: 260 K/UL (ref 150–400)
PMV BLD AUTO: 10.1 FL (ref 8.9–12.9)
POTASSIUM SERPL-SCNC: 4.3 MMOL/L (ref 3.5–5.1)
PROT SERPL-MCNC: 7.7 G/DL (ref 6.4–8.2)
PROT UR STRIP-MCNC: 30 MG/DL
RBC # BLD AUTO: 5.07 M/UL (ref 3.8–5.2)
RBC #/AREA URNS HPF: ABNORMAL /HPF (ref 0–5)
SODIUM SERPL-SCNC: 142 MMOL/L (ref 136–145)
SP GR UR REFRACTOMETRY: 1.02 (ref 1–1.03)
UROBILINOGEN UR QL STRIP.AUTO: 1 EU/DL (ref 0.2–1)
WBC # BLD AUTO: 10.7 K/UL (ref 3.6–11)
WBC URNS QL MICRO: ABNORMAL /HPF (ref 0–4)

## 2022-04-02 PROCEDURE — 96374 THER/PROPH/DIAG INJ IV PUSH: CPT

## 2022-04-02 PROCEDURE — 83690 ASSAY OF LIPASE: CPT

## 2022-04-02 PROCEDURE — 85025 COMPLETE CBC W/AUTO DIFF WBC: CPT

## 2022-04-02 PROCEDURE — 36415 COLL VENOUS BLD VENIPUNCTURE: CPT

## 2022-04-02 PROCEDURE — 80053 COMPREHEN METABOLIC PANEL: CPT

## 2022-04-02 PROCEDURE — 74176 CT ABD & PELVIS W/O CONTRAST: CPT

## 2022-04-02 PROCEDURE — 81001 URINALYSIS AUTO W/SCOPE: CPT

## 2022-04-02 PROCEDURE — 74011250636 HC RX REV CODE- 250/636: Performed by: EMERGENCY MEDICINE

## 2022-04-02 PROCEDURE — 99284 EMERGENCY DEPT VISIT MOD MDM: CPT

## 2022-04-02 PROCEDURE — 96375 TX/PRO/DX INJ NEW DRUG ADDON: CPT

## 2022-04-02 RX ORDER — ONDANSETRON 4 MG/1
4 TABLET, FILM COATED ORAL
Qty: 12 TABLET | Refills: 0 | Status: SHIPPED | OUTPATIENT
Start: 2022-04-02

## 2022-04-02 RX ORDER — MORPHINE SULFATE 4 MG/ML
4 INJECTION INTRAVENOUS
Status: COMPLETED | OUTPATIENT
Start: 2022-04-02 | End: 2022-04-02

## 2022-04-02 RX ORDER — KETOROLAC TROMETHAMINE 10 MG/1
10 TABLET, FILM COATED ORAL
Qty: 15 TABLET | Refills: 0 | Status: SHIPPED | OUTPATIENT
Start: 2022-04-02

## 2022-04-02 RX ORDER — ONDANSETRON 2 MG/ML
4 INJECTION INTRAMUSCULAR; INTRAVENOUS
Status: COMPLETED | OUTPATIENT
Start: 2022-04-02 | End: 2022-04-02

## 2022-04-02 RX ORDER — HYDROCODONE BITARTRATE AND ACETAMINOPHEN 7.5; 325 MG/1; MG/1
1 TABLET ORAL
Qty: 10 TABLET | Refills: 0 | Status: SHIPPED | OUTPATIENT
Start: 2022-04-02 | End: 2022-04-05

## 2022-04-02 RX ORDER — KETOROLAC TROMETHAMINE 30 MG/ML
15 INJECTION, SOLUTION INTRAMUSCULAR; INTRAVENOUS
Status: COMPLETED | OUTPATIENT
Start: 2022-04-02 | End: 2022-04-02

## 2022-04-02 RX ADMIN — KETOROLAC TROMETHAMINE 15 MG: 30 INJECTION, SOLUTION INTRAMUSCULAR at 11:58

## 2022-04-02 RX ADMIN — MORPHINE SULFATE 4 MG: 4 INJECTION, SOLUTION INTRAMUSCULAR; INTRAVENOUS at 11:59

## 2022-04-02 RX ADMIN — ONDANSETRON 4 MG: 2 INJECTION INTRAMUSCULAR; INTRAVENOUS at 11:59

## 2022-04-02 NOTE — ED TRIAGE NOTES
Pt presents to ER with c/o right flank pain x2 days and minimal pain in lt flank. Pt denies urinary sx - no dysuria, urgency, frequency. Denies n/v/d, fevers or other sx. Pt denies prior kidney infection or kidney stone. Pt last dose Tylenol 0500am today.

## 2022-04-02 NOTE — ED PROVIDER NOTES
HPI patient is a 82-year-old Persian female who presents to the ED with right flank area pain for 2 days. She has a past medical history for (3) C-sections, anxiety/depression, GERD, hypertension, headaches and previous H. pylori infection. The pain has been intermittent and she states has been occasionally on the left side. She denies any fever,rash, nausea, vomiting or diarrhea. She has been taking Tylenol without relief. She has not had any solid food today but has had 1 cup of coffee this morning. Pain does not change with rest or with movement. Denies any difficulty breathing, difficulty swallowing, SOB or chest pain.       Past Medical History:   Diagnosis Date    Anxiety and depression     Dyspnea 2017    Flu     2 wks ago    GERD (gastroesophageal reflux disease)     H. pylori infection 2013 Referred to Dr. Halle Riddle who speaks Frisian to explain treatments Stool test after Abx negative       Headache(784.0)     Hypertension     Incidental lung nodule, > 3mm and < 8mm 2016    left lung nodule in smoker, stable on CT in 2018       Past Surgical History:   Procedure Laterality Date    HX APPENDECTOMY      HX  SECTION      X 3 Births    HX  SECTION      HX CHOLECYSTECTOMY      HX WRIST FRACTURE TX      metal    [place] APPENDECTOMY      [place] REMOVAL GALLBLADDER           Family History:   Problem Relation Age of Onset    Diabetes Mother     Hypertension Brother     Hypertension Brother     Hypertension Brother        Social History     Socioeconomic History    Marital status:      Spouse name: Not on file    Number of children: Not on file    Years of education: Not on file    Highest education level: Not on file   Occupational History    Not on file   Tobacco Use    Smoking status: Current Every Day Smoker     Packs/day: 0.50     Years: 25.00     Pack years: 12.50     Types: Cigarettes    Smokeless tobacco: Never Used   Vaping Use    Vaping Use: Never used   Substance and Sexual Activity    Alcohol use: No     Alcohol/week: 0.0 standard drinks    Drug use: No    Sexual activity: Yes     Partners: Male   Other Topics Concern    Not on file   Social History Narrative    ** Merged History Encounter **         ** Merged History Encounter **          Social Determinants of Health     Financial Resource Strain:     Difficulty of Paying Living Expenses: Not on file   Food Insecurity:     Worried About Running Out of Food in the Last Year: Not on file    Jennifer of Food in the Last Year: Not on file   Transportation Needs:     Lack of Transportation (Medical): Not on file    Lack of Transportation (Non-Medical): Not on file   Physical Activity:     Days of Exercise per Week: Not on file    Minutes of Exercise per Session: Not on file   Stress:     Feeling of Stress : Not on file   Social Connections:     Frequency of Communication with Friends and Family: Not on file    Frequency of Social Gatherings with Friends and Family: Not on file    Attends [employer] Services: Not on file    Active Member of [address] [place] [address] or Organizations: Not on file    Attends Club or Organization Meetings: Not on file    Marital Status: Not on file   Intimate Partner Violence:     Fear of Current or Ex-Partner: Not on file    Emotionally Abused: Not on file    Physically Abused: Not on file    Sexually Abused: Not on file   Housing Stability:     Unable to Pay for Housing in the Last Year: Not on file    Number of [place] in the Last Year: Not on file    Unstable Housing in the Last Year: Not on file         ALLERGIES: Hydrochlorothiazide, Losartan, Sulfa (sulfonamide antibiotics), Eggplant, and Lasix [furosemide]    Review of Systems   Constitutional: Negative for activity change, appetite change, fever and unexpected weight change. HENT: Negative for congestion, sore throat and trouble swallowing. Eyes: Negative for visual disturbance.    Respiratory: Negative for cough and shortness of breath. Cardiovascular: Negative for chest pain, palpitations and leg swelling. Gastrointestinal: Positive for abdominal pain and nausea. Negative for diarrhea and vomiting. Genitourinary: Positive for flank pain. Negative for difficulty urinating and dysuria. Musculoskeletal: Positive for back pain. Negative for arthralgias, myalgias and neck pain. Skin: Negative for rash. Neurological: Negative for headaches. All other systems reviewed and are negative. Vitals:    04/02/22 1113   BP: (!) 174/92   Pulse: 65   Resp: 20   Temp: 98.6 °F (37 °C)   SpO2: 98%   Weight: 120.2 kg (265 lb)   Height: 5' 3\" (1.6 m)            Physical Exam  Vitals and nursing note reviewed. Constitutional:       General: She is not in acute distress. Appearance: She is obese. She is not ill-appearing, toxic-appearing or diaphoretic. Comments: Pashto female, , non-smoker, appears uncomfortable   HENT:      Head: Normocephalic. Cardiovascular:      Rate and Rhythm: Normal rate and regular rhythm. Heart sounds: Normal heart sounds. Pulmonary:      Effort: Pulmonary effort is normal.      Breath sounds: Normal breath sounds. Abdominal:      Tenderness: There is abdominal tenderness. Comments: Reports right upper quadrant tenderness; patient had a cholecystectomy many years ago   Musculoskeletal:         General: Tenderness present. No swelling or signs of injury. Cervical back: Normal range of motion and neck supple. Right lower leg: No edema. Left lower leg: No edema. Skin:     Findings: No rash. Neurological:      Mental Status: She is alert. MDM       Procedures    CT ABD PELV WO CONT    Result Date: 4/2/2022  No acute findings.     Labs Reviewed   URINALYSIS W/MICROSCOPIC - Abnormal; Notable for the following components:       Result Value    Appearance CLOUDY (*)     Protein 30 (*)     Epithelial cells MANY (*)     Bacteria TOO NUMEROUS TO COUNT (*)     All other components within normal limits   CBC WITH AUTOMATED DIFF - Abnormal; Notable for the following components:    RDW 15.4 (*)     IMMATURE GRANULOCYTES 1 (*)     ABS. LYMPHOCYTES 3.9 (*)     ABS. IMM. GRANS. 0.1 (*)     All other components within normal limits   METABOLIC PANEL, COMPREHENSIVE - Abnormal; Notable for the following components:    Glucose 113 (*)     BUN/Creatinine ratio 22 (*)     AST (SGOT) 12 (*)     Albumin 3.4 (*)     Globulin 4.3 (*)     A-G Ratio 0.8 (*)     All other components within normal limits   LIPASE   SAMPLES BEING HELD     Patient has been reexamined and reports some relief of symptoms from medications given. Recommend close follow-up with PCP and/or gastroenterology if symptoms persist.    1:12 PM  Patient's results and plan of care have been reviewed with the pt and her . Patient and/or family have verbally conveyed their understanding and agreement of the patient's signs, symptoms, diagnosis, treatment and prognosis and additionally agree to follow up as recommended or return to the Emergency Room should her condition change prior to follow-up. Discharge instructions have also been provided to the patient with some educational information regarding her diagnosis as well a list of reasons why she would want to return to the ER prior to her follow-up appointment should her condition change. Jc Guerra NP

## 2022-04-21 DIAGNOSIS — M25.551 CHRONIC PAIN OF RIGHT HIP: ICD-10-CM

## 2022-04-21 DIAGNOSIS — G89.29 CHRONIC PAIN OF RIGHT HIP: ICD-10-CM

## 2022-04-21 RX ORDER — MELOXICAM 15 MG/1
15 TABLET ORAL DAILY
Qty: 30 TABLET | Refills: 0 | OUTPATIENT
Start: 2022-04-21

## 2022-04-28 DIAGNOSIS — M25.551 CHRONIC PAIN OF RIGHT HIP: ICD-10-CM

## 2022-04-28 DIAGNOSIS — G89.29 CHRONIC PAIN OF RIGHT HIP: ICD-10-CM

## 2022-05-02 RX ORDER — MELOXICAM 15 MG/1
15 TABLET ORAL DAILY
Qty: 30 TABLET | Refills: 0 | OUTPATIENT
Start: 2022-05-02

## 2023-01-18 DIAGNOSIS — I10 HYPERTENSION, UNSPECIFIED TYPE: ICD-10-CM

## 2023-01-19 RX ORDER — METOPROLOL TARTRATE 50 MG/1
50 TABLET ORAL 2 TIMES DAILY
Qty: 60 TABLET | Refills: 0 | Status: SHIPPED | OUTPATIENT
Start: 2023-01-19

## 2023-02-13 DIAGNOSIS — I10 HYPERTENSION, UNSPECIFIED TYPE: ICD-10-CM

## 2023-02-13 RX ORDER — OLMESARTAN MEDOXOMIL 40 MG/1
40 TABLET ORAL DAILY
Qty: 15 TABLET | Refills: 0 | Status: SHIPPED | OUTPATIENT
Start: 2023-02-13

## 2023-04-26 ENCOUNTER — OFFICE VISIT (OUTPATIENT)
Dept: FAMILY MEDICINE CLINIC | Age: 58
End: 2023-04-26

## 2023-04-26 VITALS
TEMPERATURE: 98.3 F | RESPIRATION RATE: 20 BRPM | OXYGEN SATURATION: 94 % | DIASTOLIC BLOOD PRESSURE: 71 MMHG | HEART RATE: 63 BPM | BODY MASS INDEX: 48.55 KG/M2 | WEIGHT: 274 LBS | SYSTOLIC BLOOD PRESSURE: 131 MMHG | HEIGHT: 63 IN

## 2023-04-26 DIAGNOSIS — M25.559 GREATER TROCHANTERIC PAIN SYNDROME: ICD-10-CM

## 2023-04-26 DIAGNOSIS — M25.551 CHRONIC RIGHT HIP PAIN: Primary | ICD-10-CM

## 2023-04-26 DIAGNOSIS — G89.29 CHRONIC RIGHT HIP PAIN: Primary | ICD-10-CM

## 2023-04-26 RX ORDER — LIDOCAINE 50 MG/G
PATCH TOPICAL
Qty: 30 EACH | Refills: 0 | Status: SHIPPED | OUTPATIENT
Start: 2023-04-26

## 2023-04-26 RX ORDER — BETAMETHASONE SODIUM PHOSPHATE AND BETAMETHASONE ACETATE 3; 3 MG/ML; MG/ML
6 INJECTION, SUSPENSION INTRA-ARTICULAR; INTRALESIONAL; INTRAMUSCULAR; SOFT TISSUE ONCE
Status: COMPLETED | OUTPATIENT
Start: 2023-04-26 | End: 2023-04-27

## 2023-04-26 RX ORDER — MELOXICAM 15 MG/1
15 TABLET ORAL DAILY
Qty: 30 TABLET | Refills: 1 | Status: SHIPPED | OUTPATIENT
Start: 2023-04-26

## 2023-04-26 NOTE — PROGRESS NOTES
Identified Patient with two Patient identifiers (Name and ). Two Patient Identifiers confirmed. Reviewed record in preparation for visit and have obtained necessary documentation. Chief Complaint   Patient presents with    Hip Pain     Right hip pain, follow up per Dr. Bella Castellanos. Patient here to get an injection today. Visit Vitals  /71 (BP 1 Location: Right arm, BP Patient Position: Sitting, BP Cuff Size: Large adult)   Pulse 63   Temp 98.3 °F (36.8 °C) (Oral)   Resp 20   Ht 5' 3\" (1.6 m)   Wt 274 lb (124.3 kg)   SpO2 94%   BMI 48.54 kg/m²       1. Have you been to the ER, urgent care clinic since your last visit? Hospitalized since your last visit? No    2. Have you seen or consulted any other health care providers outside of the [address] [place] [address] since your last visit? Include any pap smears or colon screening.  No

## 2023-04-27 RX ADMIN — BETAMETHASONE SODIUM PHOSPHATE AND BETAMETHASONE ACETATE 6 MG: 3; 3 INJECTION, SUSPENSION INTRA-ARTICULAR; INTRALESIONAL; INTRAMUSCULAR; SOFT TISSUE at 18:48

## 2023-05-09 NOTE — TELEPHONE ENCOUNTER
Received a faxed medication refill request from [facility]. Patient is requesting a 90 day supply of duloxetine HCL DR 60mg caps. You last saw the patient 4/11/23, medication last filled by Dr. Zuri Walsh 2/2022.

## 2023-05-10 RX ORDER — DULOXETIN HYDROCHLORIDE 60 MG/1
60 CAPSULE, DELAYED RELEASE ORAL DAILY
Qty: 30 CAPSULE | Refills: 0 | Status: SHIPPED | OUTPATIENT
Start: 2023-05-10

## 2023-05-19 ENCOUNTER — INITIAL CONSULT (OUTPATIENT)
Age: 58
End: 2023-05-19

## 2023-05-19 VITALS
BODY MASS INDEX: 48.9 KG/M2 | SYSTOLIC BLOOD PRESSURE: 185 MMHG | HEART RATE: 63 BPM | RESPIRATION RATE: 17 BRPM | OXYGEN SATURATION: 95 % | DIASTOLIC BLOOD PRESSURE: 87 MMHG | TEMPERATURE: 98.1 F | WEIGHT: 276 LBS | HEIGHT: 63 IN

## 2023-05-19 DIAGNOSIS — I10 ESSENTIAL HYPERTENSION: ICD-10-CM

## 2023-05-19 DIAGNOSIS — E78.2 MIXED HYPERLIPIDEMIA: ICD-10-CM

## 2023-05-19 DIAGNOSIS — R73.03 PREDIABETES: Primary | ICD-10-CM

## 2023-05-19 PROCEDURE — 99215 OFFICE O/P EST HI 40 MIN: CPT | Performed by: STUDENT IN AN ORGANIZED HEALTH CARE EDUCATION/TRAINING PROGRAM

## 2023-05-19 PROCEDURE — 3074F SYST BP LT 130 MM HG: CPT | Performed by: STUDENT IN AN ORGANIZED HEALTH CARE EDUCATION/TRAINING PROGRAM

## 2023-05-19 PROCEDURE — 3078F DIAST BP <80 MM HG: CPT | Performed by: STUDENT IN AN ORGANIZED HEALTH CARE EDUCATION/TRAINING PROGRAM

## 2023-05-19 ASSESSMENT — PATIENT HEALTH QUESTIONNAIRE - PHQ9
SUM OF ALL RESPONSES TO PHQ QUESTIONS 1-9: 2
SUM OF ALL RESPONSES TO PHQ QUESTIONS 1-9: 2
2. FEELING DOWN, DEPRESSED OR HOPELESS: 1
SUM OF ALL RESPONSES TO PHQ QUESTIONS 1-9: 2
1. LITTLE INTEREST OR PLEASURE IN DOING THINGS: 1
SUM OF ALL RESPONSES TO PHQ9 QUESTIONS 1 & 2: 2
SUM OF ALL RESPONSES TO PHQ QUESTIONS 1-9: 2

## 2023-05-19 NOTE — PROGRESS NOTES
Subjective  Jeni Olvera is an 56 y.o. female who presents for initial lifestyle medicine visit    Tell us some about your basic goals for coming to this clinic: Lose weight to improve my overall health and been able to be eligible for a hip surgery too. What are your main goals: weight loss ~ 100 pounds, pain control, feel overall healthy. Tell us some about your basic life: Working: No Kids: one daughter Who lives with you:  and daughter    Briefly talk through the six domains of health living    SLEEP:  - Sleep has been affected by her back and hip pain. - \"If I sleep in my back I do snore\". - Denies waking up gasping for air.   - Has been diagnosed with JIMMIE and used CIPAP before but not anymore. STRESS MANAGEMENT:   - No history of Depression or Anxiety per pt - however is in problem list.   - No medications before  - Pt mentioned she feel sad/uncomfortable about her weight and body. SOCIAL SUPPORT: Family. Will discuss more at next visit - does mention she feels like she can't be everything she wants to be for her family in current state of health. PHYS ACTIVITY: Not at all. - Pt with MSK pain in R hip, hand, and shoulder which make physical activity impossible at this time. - Tried to lose weight for 10 years. - Has tried diet and medication dose not remember the name - about 5 years ago. NUTRITION/DIET:  - Tried a program and PO medications before ~ 5 years ago. - Has worked with Nutritionist before. - Does not remember name of Doctors office or medication  - Mentioned it did help - lost ~ 40 pounds but as soon as she stop the weight came back. - Lowest weight: 50 kg. Highest weight: 276 lb (125 kg)  - Pt eating normally once a day - dinner time, mentioned this is more a behavior pattern and no due to lack of appetite. She sometimes eat a cookie in morning time or apples. - Drinks 3 bottles/glasses of water/day  - Drinks 1 diet coke a day.   - Eats a variety of

## 2023-05-19 NOTE — PROGRESS NOTES
Chief Complaint   Patient presents with    Weight Loss        Vitals:    05/19/23 1120   Pulse: 63   Resp: 17   Temp: 98.1 °F (36.7 °C)   SpO2: 95%       1. Have you been to the ER, urgent care clinic since your last visit? Hospitalized since your last visit? No    2. Have you seen or consulted any other health care providers outside of the [address] [place] [address] since your last visit? Include any pap smears or colon screening. No    Pt given Medicaid, finance and food resources as requested on SDOH form.

## 2023-05-19 NOTE — CONSULTS
Session ID: [id number]  Request ID: [id number]  Language: Persian  Status: Fulfilled   ID: #[id number]   Name: Beau Kirk

## 2023-05-19 NOTE — PROGRESS NOTES
Subjective  Shira Dorman is an 56 y.o. female who presents for initial lifestyle medicine visit    ***    Tell us some about your basic goals for coming to this clinic:   What are your main goals? Tell us some about your basic life: Working? Kids? Who lives with you? Briefly talk through the six domains of health living  SLEEP: (h/o sleep apnea? Tired in the morning? Wake up in the middle of the night? Screen time before bed? Regular schedule? Sleeping extra on the weekend? nocturia?)   ***    STRESS MANAGEMENT: (h/o depression/anxiety? Ever meditated? Are you in therapy or counselling? Meds for depression? hospitalizations?)   ***    SOCIAL SUPPORT: (who do you call when you need help? Do you feel connected? Do you feel lonely?)   ***    PHYS ACTIVITY: (How would you describe your activity level? Barriers to exercise? Current exercise routine?)   ***    NUTRITION/DIET: (previous weight loss program, diets, barriers to dietary changes, food allergies, percent of meals cooked at home, if goal is weight loss *** hx of eating disorders; highest weight, lowest weight, goal)   ***      SUBSTANCE ABUSE:(Smoking, Alcohol Use,Illicit drug use?)   ***    Allergies - reviewed: Allergies   Allergen Reactions    Hydrochlorothiazide Anaphylaxis     Other reaction(s): Unproven on Challenge    Losartan Anaphylaxis     Other reaction(s): Unproven on Challenge    Sulfa Antibiotics Anaphylaxis     When she was on Losartan (8/2017), and then again when she was on HCTZ (9/2017). She was also on lasix since 2016.     Furosemide      Other reaction(s): Unproven on Challenge  May lead to anaphylaxis          Medications - reviewed:   Current Outpatient Medications   Medication Sig    DULoxetine (CYMBALTA) 60 MG extended release capsule Take 1 capsule by mouth daily    furosemide (LASIX) 40 MG tablet Take 40 mg by mouth daily    ketorolac (TORADOL) 10 MG tablet Take 10 mg by mouth 3 times daily as needed    meloxicam (MOBIC) 15

## 2023-05-21 LAB
ALBUMIN SERPL-MCNC: 3.7 G/DL (ref 3.5–5)
ALBUMIN/GLOB SERPL: 1 (ref 1.1–2.2)
ALP SERPL-CCNC: 69 U/L (ref 45–117)
ALT SERPL-CCNC: 19 U/L (ref 12–78)
ANION GAP SERPL CALC-SCNC: 3 MMOL/L (ref 5–15)
AST SERPL-CCNC: 11 U/L (ref 15–37)
BILIRUB SERPL-MCNC: 0.9 MG/DL (ref 0.2–1)
BUN SERPL-MCNC: 16 MG/DL (ref 6–20)
BUN/CREAT SERPL: 25 (ref 12–20)
CALCIUM SERPL-MCNC: 9.6 MG/DL (ref 8.5–10.1)
CHLORIDE SERPL-SCNC: 106 MMOL/L (ref 97–108)
CHOLEST SERPL-MCNC: 234 MG/DL
CO2 SERPL-SCNC: 31 MMOL/L (ref 21–32)
CREAT SERPL-MCNC: 0.63 MG/DL (ref 0.55–1.02)
ERYTHROCYTE [DISTWIDTH] IN BLOOD BY AUTOMATED COUNT: 13.5 % (ref 11.5–14.5)
EST. AVERAGE GLUCOSE BLD GHB EST-MCNC: 123 MG/DL
GLOBULIN SER CALC-MCNC: 3.7 G/DL (ref 2–4)
GLUCOSE SERPL-MCNC: 96 MG/DL (ref 65–100)
HBA1C MFR BLD: 5.9 % (ref 4–5.6)
HCT VFR BLD AUTO: 47.6 % (ref 35–47)
HDLC SERPL-MCNC: 41 MG/DL
HDLC SERPL: 5.7 (ref 0–5)
HGB BLD-MCNC: 15.2 G/DL (ref 11.5–16)
LDLC SERPL CALC-MCNC: 151.6 MG/DL (ref 0–100)
MCH RBC QN AUTO: 29.8 PG (ref 26–34)
MCHC RBC AUTO-ENTMCNC: 31.9 G/DL (ref 30–36.5)
MCV RBC AUTO: 93.3 FL (ref 80–99)
NRBC # BLD: 0 K/UL (ref 0–0.01)
NRBC BLD-RTO: 0 PER 100 WBC
PLATELET # BLD AUTO: 254 K/UL (ref 150–400)
PMV BLD AUTO: 11.2 FL (ref 8.9–12.9)
POTASSIUM SERPL-SCNC: 4.4 MMOL/L (ref 3.5–5.1)
PROT SERPL-MCNC: 7.4 G/DL (ref 6.4–8.2)
RBC # BLD AUTO: 5.1 M/UL (ref 3.8–5.2)
SODIUM SERPL-SCNC: 140 MMOL/L (ref 136–145)
TRIGL SERPL-MCNC: 207 MG/DL
TSH SERPL DL<=0.05 MIU/L-ACNC: 0.84 UIU/ML (ref 0.36–3.74)
VLDLC SERPL CALC-MCNC: 41.4 MG/DL
WBC # BLD AUTO: 10.7 K/UL (ref 3.6–11)

## 2023-05-30 RX ORDER — LIDOCAINE 50 MG/G
PATCH TOPICAL
COMMUNITY
Start: 2023-04-26

## 2023-05-31 RX ORDER — FUROSEMIDE 40 MG/1
TABLET ORAL
Qty: 30 TABLET | Refills: 0 | Status: SHIPPED | OUTPATIENT
Start: 2023-05-31 | End: 2023-06-29

## 2023-05-31 RX ORDER — DULOXETIN HYDROCHLORIDE 60 MG/1
CAPSULE, DELAYED RELEASE ORAL
Qty: 30 CAPSULE | Refills: 0 | Status: SHIPPED | OUTPATIENT
Start: 2023-05-31 | End: 2023-07-18 | Stop reason: SDUPTHER

## 2023-05-31 RX ORDER — OLMESARTAN MEDOXOMIL 40 MG/1
TABLET ORAL
Qty: 30 TABLET | Refills: 0 | Status: SHIPPED | OUTPATIENT
Start: 2023-05-31 | End: 2023-06-29

## 2023-05-31 RX ORDER — METOPROLOL TARTRATE 50 MG/1
TABLET, FILM COATED ORAL
Qty: 60 TABLET | Refills: 0 | Status: SHIPPED | OUTPATIENT
Start: 2023-05-31 | End: 2023-06-29

## 2023-06-29 RX ORDER — FUROSEMIDE 40 MG/1
TABLET ORAL
Qty: 30 TABLET | Refills: 0 | Status: SHIPPED | OUTPATIENT
Start: 2023-06-29

## 2023-06-29 RX ORDER — OLMESARTAN MEDOXOMIL 40 MG/1
TABLET ORAL
Qty: 30 TABLET | Refills: 0 | Status: SHIPPED | OUTPATIENT
Start: 2023-06-29

## 2023-06-29 RX ORDER — METOPROLOL TARTRATE 50 MG/1
TABLET, FILM COATED ORAL
Qty: 60 TABLET | Refills: 0 | Status: SHIPPED | OUTPATIENT
Start: 2023-06-29

## 2023-07-11 RX ORDER — OLMESARTAN MEDOXOMIL 40 MG/1
40 TABLET ORAL DAILY
Qty: 30 TABLET | Refills: 0 | OUTPATIENT
Start: 2023-07-11

## 2023-07-11 RX ORDER — DULOXETIN HYDROCHLORIDE 60 MG/1
60 CAPSULE, DELAYED RELEASE ORAL DAILY
Qty: 30 CAPSULE | Refills: 0 | OUTPATIENT
Start: 2023-07-11

## 2023-07-11 RX ORDER — METOPROLOL TARTRATE 50 MG/1
50 TABLET, FILM COATED ORAL 2 TIMES DAILY
Qty: 60 TABLET | Refills: 0 | OUTPATIENT
Start: 2023-07-11

## 2023-07-11 RX ORDER — FUROSEMIDE 40 MG/1
40 TABLET ORAL DAILY
Qty: 30 TABLET | Refills: 0 | OUTPATIENT
Start: 2023-07-11

## 2023-07-20 RX ORDER — DULOXETIN HYDROCHLORIDE 60 MG/1
60 CAPSULE, DELAYED RELEASE ORAL DAILY
Qty: 30 CAPSULE | Refills: 0 | Status: SHIPPED | OUTPATIENT
Start: 2023-07-20

## 2023-07-26 NOTE — TELEPHONE ENCOUNTER
Attempted to reach patient with Korean  from [facility].  Left vm for patient to give us a call back to schedule

## 2023-08-11 ENCOUNTER — OFFICE VISIT (OUTPATIENT)
Age: 58
End: 2023-08-11

## 2023-08-11 VITALS
HEART RATE: 87 BPM | HEIGHT: 63 IN | OXYGEN SATURATION: 93 % | RESPIRATION RATE: 18 BRPM | WEIGHT: 281 LBS | BODY MASS INDEX: 49.79 KG/M2 | DIASTOLIC BLOOD PRESSURE: 80 MMHG | SYSTOLIC BLOOD PRESSURE: 128 MMHG

## 2023-08-11 DIAGNOSIS — I10 PRIMARY HYPERTENSION: ICD-10-CM

## 2023-08-11 DIAGNOSIS — G89.29 CHRONIC BILATERAL LOW BACK PAIN, UNSPECIFIED WHETHER SCIATICA PRESENT: Primary | ICD-10-CM

## 2023-08-11 DIAGNOSIS — M79.10 MYALGIA, UNSPECIFIED SITE: ICD-10-CM

## 2023-08-11 DIAGNOSIS — M54.50 CHRONIC BILATERAL LOW BACK PAIN, UNSPECIFIED WHETHER SCIATICA PRESENT: Primary | ICD-10-CM

## 2023-08-11 DIAGNOSIS — E78.2 MIXED HYPERLIPIDEMIA: ICD-10-CM

## 2023-08-11 PROCEDURE — 3078F DIAST BP <80 MM HG: CPT | Performed by: FAMILY MEDICINE

## 2023-08-11 PROCEDURE — 99214 OFFICE O/P EST MOD 30 MIN: CPT | Performed by: STUDENT IN AN ORGANIZED HEALTH CARE EDUCATION/TRAINING PROGRAM

## 2023-08-11 PROCEDURE — 3074F SYST BP LT 130 MM HG: CPT | Performed by: FAMILY MEDICINE

## 2023-08-11 RX ORDER — MELOXICAM 15 MG/1
15 TABLET ORAL DAILY PRN
Qty: 30 TABLET | Refills: 0 | Status: SHIPPED | OUTPATIENT
Start: 2023-08-11

## 2023-08-11 RX ORDER — ROSUVASTATIN CALCIUM 20 MG/1
20 TABLET, COATED ORAL NIGHTLY
Qty: 30 TABLET | Refills: 3 | Status: SHIPPED | OUTPATIENT
Start: 2023-08-11

## 2023-08-11 RX ORDER — METOPROLOL TARTRATE 50 MG/1
50 TABLET, FILM COATED ORAL 2 TIMES DAILY
Qty: 60 TABLET | Refills: 2 | Status: SHIPPED | OUTPATIENT
Start: 2023-08-11

## 2023-08-11 RX ORDER — METOPROLOL TARTRATE 50 MG/1
50 TABLET, FILM COATED ORAL 2 TIMES DAILY
Qty: 60 TABLET | Refills: 0 | OUTPATIENT
Start: 2023-08-11

## 2023-08-11 RX ORDER — DULOXETIN HYDROCHLORIDE 60 MG/1
60 CAPSULE, DELAYED RELEASE ORAL DAILY
Qty: 90 CAPSULE | Refills: 0 | Status: SHIPPED | OUTPATIENT
Start: 2023-08-11

## 2023-08-11 NOTE — PROGRESS NOTES
[facility], [address] [place] [address]   Office [phone], Fax [phone]    Subjective   CC:  Bibiana Beltran is a 56 y.o. female who presents for f/up chronic conditions. Hip pain: B/l hip pain reported. Also has diffuse lower extremity, hip, low back pains. Seen by Dr. Carmen Gonsalez in April, given greater trochanter bursal CSI and recommended to pursue weight loss to help with chronic right hip pain. Suspect much of it d/t OA and would be better surgical candidate following weight loss. Also given referral to PT. Pt states she went once to physical therapy and did not get any relief of pain so stopped going. Now having continued pain. No accidents or trauma. No saddle anesthesia, bowel/bladder incontinence, fevers, chills. HTN: BP well controlled, doesn't check at home. Compliant with Olmesartan, Lasix. No HA, vision changes, CP, palp, dyspnea, new/worse pedal edema. Complete ROS performed and pertinent positives/negatives are documented in HPI.     Past Medical History  Past Medical History:   Diagnosis Date    Anxiety and depression     Dyspnea 9/18/2017    Flu     2 wks ago    GERD (gastroesophageal reflux disease)     H. pylori infection 1/26/2013 1/2013 Referred to Dr. Victor Hugo Crespo who speaks Bengali to explain treatments Stool test after Abx negative 2013      Headache(784.0)     Hypertension     Incidental lung nodule, > 3mm and < 8mm 04/20/2016    left lung nodule in smoker, stable on CT in 2018       Current Medications  Current Outpatient Medications   Medication Sig Dispense Refill    rosuvastatin (CRESTOR) 20 MG tablet Take 1 tablet by mouth nightly 30 tablet 3    meloxicam (MOBIC) 15 MG tablet Take 1 tablet by mouth daily as needed for Pain 30 tablet 0    DULoxetine (CYMBALTA) 60 MG extended release capsule Take 1 capsule by mouth daily 90 capsule 0    metoprolol tartrate (LOPRESSOR) 50 MG tablet Take 1 tablet by mouth 2 times daily 60 tablet 2    olmesartan (BENICAR) 40 MG tablet TAKE 1

## 2023-08-11 NOTE — PROGRESS NOTES
Jesus Vences is a 56 y.o. female in room: 22    Chief Complaint   Patient presents with    Chronic Pain        Vitals:    08/11/23 1114   BP: 128/80   Site: Left Upper Arm   Position: Sitting   Pulse: 87   Resp: 18   SpO2: 93%   Weight: 281 lb (127.5 kg)   Height: 5' 3\" (1.6 m)       PHQ-9  5/19/2023 4/13/2023   Little interest or pleasure in doing things 1 0   Feeling down, depressed, or hopeless 1 3   Trouble falling or staying asleep, or sleeping too much - 3   Feeling tired or having little energy - 3   Poor appetite or overeating - 0   Feeling bad about yourself - or that you are a failure or have let yourself or your family down - 3   Trouble concentrating on things, such as reading the newspaper or watching television - 0   Moving or speaking so slowly that other people could have noticed. Or the opposite - being so fidgety or restless that you have been moving around a lot more than usual - 0   PHQ-2 Score 2 3   PHQ-9 Total Score 2 12   How difficult have these problems made it for you to do your work, take care of your home and get along with others - Not difficult at all        Health Maintenance Due   Topic    HIV screen     Hepatitis C screen     Cervical cancer screen     Shingles vaccine (1 of 2)    Pneumococcal 0-64 years Vaccine (2 - PCV)    Colorectal Cancer Screen     Breast cancer screen     COVID-19 Vaccine (3 - Booster for Moderna series)    Flu vaccine (1)        Coordination of Care Questionnaire:  :   1. Have you been to the ER, urgent care clinic since your last visit? Hospitalized since your last visit? No    2. Have you seen or consulted any other health care providers outside of the [address] [place] [address] since your last visit? Include any pap smears or colon screening. No    This patient is accompanied in the office by her self  I have received verbal consent from Jesus Vences to discuss any/all medical information while they are present in the room.

## 2023-08-16 DIAGNOSIS — M79.10 MYALGIA, UNSPECIFIED SITE: ICD-10-CM

## 2023-08-18 RX ORDER — DULOXETIN HYDROCHLORIDE 60 MG/1
60 CAPSULE, DELAYED RELEASE ORAL DAILY
Qty: 30 CAPSULE | OUTPATIENT
Start: 2023-08-18

## 2023-09-11 DIAGNOSIS — I10 PRIMARY HYPERTENSION: ICD-10-CM

## 2023-09-11 DIAGNOSIS — E78.2 MIXED HYPERLIPIDEMIA: ICD-10-CM

## 2023-09-11 DIAGNOSIS — M54.50 CHRONIC BILATERAL LOW BACK PAIN, UNSPECIFIED WHETHER SCIATICA PRESENT: ICD-10-CM

## 2023-09-11 DIAGNOSIS — G89.29 CHRONIC BILATERAL LOW BACK PAIN, UNSPECIFIED WHETHER SCIATICA PRESENT: ICD-10-CM

## 2023-09-11 DIAGNOSIS — M79.10 MYALGIA, UNSPECIFIED SITE: ICD-10-CM

## 2023-09-12 RX ORDER — DULOXETIN HYDROCHLORIDE 60 MG/1
60 CAPSULE, DELAYED RELEASE ORAL DAILY
Qty: 90 CAPSULE | Refills: 0 | Status: SHIPPED | OUTPATIENT
Start: 2023-09-12

## 2023-09-12 RX ORDER — OLMESARTAN MEDOXOMIL 40 MG/1
40 TABLET ORAL DAILY
Qty: 30 TABLET | Refills: 0 | Status: SHIPPED | OUTPATIENT
Start: 2023-09-12

## 2023-09-12 RX ORDER — ROSUVASTATIN CALCIUM 20 MG/1
20 TABLET, COATED ORAL NIGHTLY
Qty: 30 TABLET | Refills: 3 | Status: SHIPPED | OUTPATIENT
Start: 2023-09-12

## 2023-09-12 RX ORDER — MELOXICAM 15 MG/1
15 TABLET ORAL DAILY PRN
Qty: 30 TABLET | Refills: 0 | Status: SHIPPED | OUTPATIENT
Start: 2023-09-12

## 2023-09-12 RX ORDER — FUROSEMIDE 40 MG/1
40 TABLET ORAL DAILY
Qty: 30 TABLET | Refills: 0 | Status: SHIPPED | OUTPATIENT
Start: 2023-09-12

## 2023-09-12 RX ORDER — METOPROLOL TARTRATE 50 MG/1
50 TABLET, FILM COATED ORAL 2 TIMES DAILY
Qty: 60 TABLET | Refills: 2 | Status: SHIPPED | OUTPATIENT
Start: 2023-09-12

## 2023-09-13 ENCOUNTER — OFFICE VISIT (OUTPATIENT)
Age: 58
End: 2023-09-13
Payer: MEDICAID

## 2023-09-13 VITALS
OXYGEN SATURATION: 95 % | BODY MASS INDEX: 49.26 KG/M2 | DIASTOLIC BLOOD PRESSURE: 75 MMHG | WEIGHT: 278 LBS | RESPIRATION RATE: 18 BRPM | HEART RATE: 64 BPM | HEIGHT: 63 IN | TEMPERATURE: 98.2 F | SYSTOLIC BLOOD PRESSURE: 121 MMHG

## 2023-09-13 DIAGNOSIS — M54.50 CHRONIC BILATERAL LOW BACK PAIN, UNSPECIFIED WHETHER SCIATICA PRESENT: Primary | ICD-10-CM

## 2023-09-13 DIAGNOSIS — M54.16 LUMBAR RADICULOPATHY: ICD-10-CM

## 2023-09-13 DIAGNOSIS — G89.29 CHRONIC BILATERAL LOW BACK PAIN, UNSPECIFIED WHETHER SCIATICA PRESENT: Primary | ICD-10-CM

## 2023-09-13 PROCEDURE — 99214 OFFICE O/P EST MOD 30 MIN: CPT | Performed by: STUDENT IN AN ORGANIZED HEALTH CARE EDUCATION/TRAINING PROGRAM

## 2023-09-13 RX ORDER — GABAPENTIN 300 MG/1
300 CAPSULE ORAL 2 TIMES DAILY
Qty: 30 CAPSULE | Refills: 1 | Status: SHIPPED | OUTPATIENT
Start: 2023-09-13 | End: 2023-10-13

## 2023-09-13 RX ORDER — PREDNISONE 20 MG/1
20 TABLET ORAL DAILY
Qty: 5 TABLET | Refills: 0 | Status: SHIPPED | OUTPATIENT
Start: 2023-09-13 | End: 2023-09-18

## 2023-09-13 RX ORDER — GABAPENTIN 300 MG/1
300 CAPSULE ORAL 2 TIMES DAILY
Qty: 30 CAPSULE | Refills: 1 | Status: SHIPPED | OUTPATIENT
Start: 2023-09-13 | End: 2023-09-13 | Stop reason: SDUPTHER

## 2023-09-13 RX ORDER — PREDNISONE 20 MG/1
20 TABLET ORAL DAILY
Qty: 5 TABLET | Refills: 0 | Status: SHIPPED | OUTPATIENT
Start: 2023-09-13 | End: 2023-09-13 | Stop reason: SDUPTHER

## 2023-09-13 ASSESSMENT — PATIENT HEALTH QUESTIONNAIRE - PHQ9
SUM OF ALL RESPONSES TO PHQ QUESTIONS 1-9: 22
3. TROUBLE FALLING OR STAYING ASLEEP: 3
6. FEELING BAD ABOUT YOURSELF - OR THAT YOU ARE A FAILURE OR HAVE LET YOURSELF OR YOUR FAMILY DOWN: 3
SUM OF ALL RESPONSES TO PHQ QUESTIONS 1-9: 22
8. MOVING OR SPEAKING SO SLOWLY THAT OTHER PEOPLE COULD HAVE NOTICED. OR THE OPPOSITE, BEING SO FIGETY OR RESTLESS THAT YOU HAVE BEEN MOVING AROUND A LOT MORE THAN USUAL: 0
9. THOUGHTS THAT YOU WOULD BE BETTER OFF DEAD, OR OF HURTING YOURSELF: 1
7. TROUBLE CONCENTRATING ON THINGS, SUCH AS READING THE NEWSPAPER OR WATCHING TELEVISION: 3
SUM OF ALL RESPONSES TO PHQ QUESTIONS 1-9: 21
2. FEELING DOWN, DEPRESSED OR HOPELESS: 3
1. LITTLE INTEREST OR PLEASURE IN DOING THINGS: 3
SUM OF ALL RESPONSES TO PHQ9 QUESTIONS 1 & 2: 6
5. POOR APPETITE OR OVEREATING: 3
SUM OF ALL RESPONSES TO PHQ QUESTIONS 1-9: 22
4. FEELING TIRED OR HAVING LITTLE ENERGY: 3

## 2023-09-13 ASSESSMENT — ANXIETY QUESTIONNAIRES
3. WORRYING TOO MUCH ABOUT DIFFERENT THINGS: 3
IF YOU CHECKED OFF ANY PROBLEMS ON THIS QUESTIONNAIRE, HOW DIFFICULT HAVE THESE PROBLEMS MADE IT FOR YOU TO DO YOUR WORK, TAKE CARE OF THINGS AT HOME, OR GET ALONG WITH OTHER PEOPLE: SOMEWHAT DIFFICULT
2. NOT BEING ABLE TO STOP OR CONTROL WORRYING: 1
GAD7 TOTAL SCORE: 13
4. TROUBLE RELAXING: 3
7. FEELING AFRAID AS IF SOMETHING AWFUL MIGHT HAPPEN: 0
5. BEING SO RESTLESS THAT IT IS HARD TO SIT STILL: 3
6. BECOMING EASILY ANNOYED OR IRRITABLE: 0
1. FEELING NERVOUS, ANXIOUS, OR ON EDGE: 3

## 2023-09-13 NOTE — PROGRESS NOTES
[address] [facility]      Chief Complaint:   Chief Complaint   Patient presents with    Lower Back Pain     Hip & leg pain worse. Got       Subjective:   History: This patient is a 56 y.o. female with a chief complaint of back pain. - Pain for three years, but worsened over the past year and became exacerbated over the past few weeks. - Feels the most pain in the right low back and buttock. Radiates down right leg posteriorly, into toes. -  Reports numbness and tingling in her right foot  - No bowel or bladder incontinence. No fever, night sweats, or weight changes. No saddle anesthesia. Rogerio Mayer to PT for one visit 1-2 years ago, but did not return. Cannot recall why she did not return. - Has never seen specialist for this pain. - Tried Meloxicam previously, did not help     Review of Systems:  See HPI.     Past Medical History:   Diagnosis Date    Anxiety and depression     Dyspnea 9/18/2017    Flu     2 wks ago    GERD (gastroesophageal reflux disease)     H. pylori infection 1/26/2013 1/2013 Referred to Dr. Farnaz Patel who speaks Yi to explain treatments Stool test after Abx negative 2013      Headache(784.0)     Hypertension     Incidental lung nodule, > 3mm and < 8mm 04/20/2016    left lung nodule in smoker, stable on CT in 2018     Family History   Problem Relation Age of Onset    Hypertension Brother     Hypertension Brother     Diabetes Mother     Hypertension Brother      Current Outpatient Medications   Medication Sig Dispense Refill    olmesartan (BENICAR) 40 MG tablet Take 1 tablet by mouth daily 30 tablet 0    furosemide (LASIX) 40 MG tablet Take 1 tablet by mouth daily 30 tablet 0    rosuvastatin (CRESTOR) 20 MG tablet Take 1 tablet by mouth nightly 30 tablet 3    meloxicam (MOBIC) 15 MG tablet Take 1 tablet by mouth daily as needed for Pain 30 tablet 0    DULoxetine (CYMBALTA) 60 MG extended release capsule

## 2023-09-13 NOTE — PATIENT INSTRUCTIONS
Take Prednisone 20 mg for 5 days. After you finish the Prednisone, start Gabapentin 300 mg nightly for one week, then increase dose to twice per day. I have ordered an MRI of your back. Return to clinic in 2-4 weeks for evaluation of your right hip.

## 2023-09-15 NOTE — MR AVS SNAPSHOT
Visit Information Date & Time Provider Department Dept. Phone Encounter #  
 5/5/2017  9:15 AM Edilberto Mattson DO [address] [place] [address] [phone] [id number] Follow-up Instructions Return in about 2 weeks (around 5/19/2017) for back pain and depression. Upcoming Health Maintenance Date Due INFLUENZA AGE 11 TO ADULT 8/1/2017 PAP AKA CERVICAL CYTOLOGY 3/4/2020 DTaP/Tdap/Td series (2 - Td) 11/25/2023 Allergies as of 5/5/2017  Review Complete On: 5/5/2017 By: Toni Hays LPN No Known Allergies Current Immunizations  Reviewed on 3/4/2017 Name Date Influenza Vaccine 10/22/2012 Pneumococcal Conjugate (PCV-7) 11/25/2013 Pneumococcal Polysaccharide (PPSV-23) 3/4/2017 Tdap 11/25/2013 Not reviewed this visit You Were Diagnosed With   
  
 Codes Comments Acute left-sided low back pain without sciatica    -  Primary ICD-10-CM: M54.5 ICD-9-CM: 724.2 Smoker     ICD-10-CM: F21.041 ICD-9-CM: 305.1 Venous insufficiency of both lower extremities     ICD-10-CM: I87.2 ICD-9-CM: 459.81 Morbid obesity with BMI of 50.0-59.9, adult (HCC)     ICD-10-CM: E66.01, Z68.43 
ICD-9-CM: 278.01, V85.43 Incidental lung nodule, > 3mm and < 8mm     ICD-10-CM: R91.1 ICD-9-CM: 793.11 Severe single current episode of major depressive disorder, without psychotic features ([facility]     ICD-10-CM: F32.2 ICD-9-CM: 296.23 Vitals BP Pulse Temp Resp Height(growth percentile) Weight(growth percentile) 121/75 87 97.7 °F (36.5 °C) (Oral) 16 5' 1\" (1.549 m) 290 lb (131.5 kg) LMP SpO2 BMI OB Status Smoking Status 12/31/2012 93% 54.8 kg/m2 Postmenopausal Current Every Day Smoker Vitals History BMI and BSA Data Body Mass Index Body Surface Area 54.8 kg/m 2 2.38 m 2 Preferred Pharmacy Pharmacy Name Phone  T[phone], [phone] normal appearance , without tenderness upon palpation , no deformities , trachea midline , Thyroid normal size , no masses , thyroid nontender Nick Calixto [phone] Your Updated Medication List  
  
   
This list is accurate as of: 5/5/17 10:41 AM.  Always use your most recent med list.  
  
  
  
  
 albuterol 90 mcg/actuation inhaler Commonly known as:  PROVENTIL HFA, VENTOLIN HFA, PROAIR HFA Take 2 Puffs by inhalation every four (4) hours as needed for Wheezing. buPROPion 100 mg tablet Commonly known as:  [facility] Take 1 Tab by mouth two (2) times a day. fexofenadine 180 mg tablet Commonly known as:  Jearlean Mechelle Take 1 Tab by mouth daily. furosemide 20 mg tablet Commonly known as:  LASIX TAKE 1 TABLET BY MOUTH DAILY losartan 100 mg tablet Commonly known as:  COZAAR Take 1 Tab by mouth daily. methylPREDNISolone 4 mg tablet Commonly known as:  Belvin Nutley Take with food  
  
 naproxen 500 mg tablet Commonly known as:  NAPROSYN Take 1 Tab by mouth two (2) times daily (with meals). Prescriptions Printed Refills buPROPion (WELLBUTRIN) 100 mg tablet 1 Sig: Take 1 Tab by mouth two (2) times a day. Class: Print Route: Oral  
 naproxen (NAPROSYN) 500 mg tablet 0 Sig: Take 1 Tab by mouth two (2) times daily (with meals). Class: Print Route: Oral  
  
We Performed the Following [facility] PATIENT SCREENED FOR DEPRESSION [1220F CPT(R)] Follow-up Instructions Return in about 2 weeks (around 5/19/2017) for back pain and depression. Patient Instructions ÈæÈÑæÈíæä (Úä ØÑíÞ ÇáÝã ÈæÈÑæÈíæä (qqf-LCPM-yaj-on) áÚáÇÌ ÇáÇßÊÆÇÈ æÇáãÓÇÚÏÉ Úáì ÇáÅÞáÇÚ Úä ÇáÊÏÎíä. æíãäÚ ÃíÖð ÇáÇßÊÆÇÈ ÇáäÇÌã Úä [id number] Geo ÇáãæÓãGeo (SAD). ÇÓãÇáãÇÑßÉÇáÊÌÇÑí : Aplenzin, Forfivo XL, Wellbutrin, Wellbutrin SR, Wellbutrin XL, Zyban ÞÏ íßæä åäÇß ÃÓãÇÁ ãÇÑßÇÊ ÃÎÑì áå ÇáÏæÇÁ. ãÇ åí ãæÇäÚ YZCTFEX å ÇáÏæÇÁ:  
áÇ íäÇÓÈ åÐÇ ÇáÏæÇÁ ÌãíÚ ÇáÃÔÎÇÕ.  áÇ ÊÓÊÎÏã ÇáÏæÇÁ ÅÐÇ áÏíß ÊÝÇÚá ÊÍÓÓí ÊÌÇå ÈæÈÑæÈíæä¡ Ãæ Ýí ÍÇá ÊÚÑÖß áäæÈÇÊ¡ Ãæ ÅÕÇÈÊß ÈÝÞÏÇä ÇáÔåíÉ¡ Ãæ ÈÇáäåÇã. Lennox Alisha HYWWWFA åÐÇ ÇáÏæÇÁ:  
Yehuda Reva ããÊÏ JNTPOOW · ÊäÇæá ÇáÏæÇÁ æÝÞÇ UPQKGBWTX. ÞÏ Êßæä åäÇß ÍÇÌÉ Åáì ÊÛííÑ ÌÑÚÊß ÚÏÉ ãÑÇÊ ááæÕæá Åáì ÇáÌÑÚÉ ÇáãËáì áß. · ÞÏ ÊÍÊÇÌ Åáì ÊäÇæá Wellbutrin® áãÏÉ ÊÕá Åáì 4 ÃÓÇÈíÚ ÞÈá Ãä ÊÔÚÑ ÈÊÍÓä. æÞÏ ÊÍÊÇÌ Åáì ÊäÇæá Zyban®? áãÏÉ ÊÊÑÇæÍ ãä ÃÓÈæÚ æÇÍÏ Åáì ÃÓÈæÚíä ÞÈá ÇáÊÇÑíÎ ÇáÐí ÎØØÊ Ýíå ááÊæÞÝ Úä ÇáÊÏÎíä. · íÌÈ ÇÈÊáÇÚ ÇáÞÑÕ ßÇãáÇð. áÇ ÊÞã ÈßÓÑå Ãæ ÓÍÞå Ãæ ÊÞÓíãå Ãæ ãÖÛå. · íÝÖá ÊäÇæá Aplenzin®? Ýí ÇáÕÈÇÍ. · áÇ ÊÊäÇæá Wellbutrin® Ãæ Zyban® KIND ÝÊÑÉ ÞÕíÑÉ ÞÈá Çáäæã ÅÐÇ ßäÊ ÊÚÇäì ãä ÕÚæÈÇÊ Ýì Çáäæã. · íãßäß ÊäÇæá åÐÇ ÇáÏæÇÁ ãÚ ÇáØÚÇã Ãæ ÈÏæäå. ÅÐÇ ßäÊ ÊÚÇäì ãä ÇáÛËíÇä ÝÞÏ íÝíÏ ÊäÇæá ÇáÏæÇÁ ãÚ ÇáØÚÇã. · ÅÐÇ FUHSFG ÃÞÑÇÕðÇ ããÊÏÉ BGCJGPS¡ ÞÏ íäÊÞá ÌÒÁ ãä ÇáÞÑÕ Åáì ÈÑÇÒß. æåÐÇ ÃãÑ ØÈíÚí æáÇ íÏÚæ Åáì ÇáÞáÞ. · íÌÈ Ãä íÃÊí åÐÇ ÇáÏæÇÁ ãÚ Ïáíá ÅÑÔÇÏÇÊ ÇáÏæÇÁ. æÇØáÈ ãä ÇáÕíÏáí äÓÎÉ ãäå ÅÐÇ áã íßä íÊæÝÑ áÏíß. · ÇáÌÑÚÉ ÇáÝÇÆÊÉ: Þã ÈÊÌÇæÒ ÇáÌÑÚÉ ÇáÝÇÆÊÉ æÇáÚæÏÉ Åáì ÌÏæá ÌÑÚÊß ÇáãÚÊÇÏÉ. áÇ ÊÊäÇæá ÇáãÒíÏ ãä ÇáÏæÇÁ áÊÚæíÖ ÌÑÚÉ ãÝÞæÏÉ. · ÇÍÊÝÙ ÈÇáÏæÇÁ Ýí ÍÇæíÉ ãÛáÞÉ Ýí ÏÑÌÉ ÍÑÇÑÉ ÇáÛÑÝÉ¡ ÈÚíÏðÇ Úä ÇáÍÑÇÑÉ¡ æÇáÑØæÈÉ¡ æÇáÖæÁ ÇáãÈÇÔÑ. ÇáÚÞÇÞíÑ æÇáÃÛÐíÉ ÇáæÇÌÈ ÊÌäÈåÇ:  
ÇÓÊÔíÑ ØÈíÈß Ãæ ÇáÕíÏáí áÏíß ÞÈá ÇÓÊÎÏÇã Ãí ÏæÇÁ ÂÎÑ¡ æíÊÖãä Ðáß ÇáÃÏæíÉ ÇáãÊÇÍÉ Ïæä æÕÝÉ¡ æÇáÝíÊÇãíäÇÊ¡ æãäÊÌÇÊ ÇáÃÚÔÇÈ. · áÇ ÊÓÊÎÏã åÐÇ ÇáÏæÇÁ æãËÈØ ÃßÓíÏÇÒ ÃÍÇÏí ÇáÃãíä (MAOI) ãËá áíäíÒæáíÏ¡ Ãæ ÍÞä ÇáãíËáíä ÇáÃÒÑÞ Ýí ÎáÇá 14 íæãðÇ ãä ÊÇÑíÎ ÇÓÊÎÏÇã ÃÍÏ åÐå ÇáÃÏæíÉ. áÇ ÊÓÊÎÏã Zyban® ááÅÞáÇÚ Úä ÇáÊÏÎíä ÅÐÇ ßäÊ OKNOVK ÈÇáÝÚá Aplenzin®? Ãæ Wellbutrin® RZAIV QEXJWQNW¡ äÙÑðÇ áÃäåãÇ ãä ÇáÏæÇÁ äÝÓå. · íãßä Ãä ÊÄËÑ ÈÚÖ ÇáÃÏæíÉ Úáì ãÏì ãÝÚæá ÈæÈÑæÈíæä. ÃÎÈÑ ØÈíÈß ÅÐÇ ßäÊ ÊÓÊÎÏã ÇáÃÏæíÉ ÇáÊÇáíÉ: ¨ ÃãÇäÊÇÏíä (amantadine)¡ ÓíãíÊíÏíä (cimetidine)¡ ßáæÈíÏæÌÑíá (clopidogrel)¡ ÓíßáæÝæÓÝÇãíÏ (cyclophosphamide)¡ áíÝæÏæÈÇ (levodopa)¡ áÕÞÉ ÇáäíßæÊíä¡ ÃæÑÝíäÇÏÑíä (orphenadrine)¡ ÊÇãæßÓíÝíä (tamoxifen)¡ ËíæÝíáíä (theophylline)¡ ËíæÊíÈÇ (thiotepa)¡ ÊíßáæÈíÏíä (ticlopidine) ¨ ãÍÕÑÇÊ ÇáÈíÊÇ (ãËá WGHJFFSPQW)¡ Ãæ ÇáÅäÓæáíä Ãæ ÏæÇÁ ÇáÓßÑí ÇáÐí FXUGMSV Úä ØÑíÞ ÇáÝã¡ Ãæ ÏæÇÁ áÚáÇÌ ãÔßáÇÊ ÈÅíÞÇÚ ÇáÞáÈ (ÈÑæÈÇÝíäæä æÝáíßÇíäíÏ)¡ Ãæ ÏæÇÁ áÚáÇÌ ÝíÑæÓ ÇáÚæÒ ÇáãäÇÚí ÇáÈÔÑí (HIV) Ãæ ÇáÅíÏÒ (ÅíÝÇÝíÑíäÒ¡ áæÈíäÇÝíÑ¡ äíáÝíäÇÝíÑ¡ ÑíÊæäÇÝíÑ)¡ Ãæ ÏæÇÁ áÚáÇÌ ÇáäæÈÇÊ (ßÑÈÇãÇÒíÈíä¡ ÝíäæÈÇÑÈíÊÇá¡ ÝäíÊæíä)¡ Ãæ ÃÏæíÉ ÃÎÑì áÚáÇÌ ÇáÇßÊÆÇÈ (ÏíÓíÈÑÇãíä¡ ÝáæßÓÊíä¡ ÅíãíÈÑÇãíä¡ äæÑÊÑíÈÊíáíä¡ ÈÇÑæßÓíÊíä¡ ÓíÑÊÑÇáíä)¡ Ãæ ÃÏæíÉ áÚáÇÌ ÇáãÑÖ ÇáÚÞáí (åÇáæÈíÑíÏæá¡ ÑíÓÈíÑíÏæä¡ ËíæÑíÏÇÒíä)¡ Ãæ ÃÏæíÉ ÓÊíÑæíÏíÉ (åíÏÑæßæÊíÒæä¡ ãíËíá ÇáÈÑíÏäíÒæáæä¡ ÇáÈÑíÏäíÒæä¡ ÇáÈÑíÏäíÒæáæä¡ ÏíßÓÇãíÊÇÒæä) Ãæ ÏæÇÁ ãÇäÚ ááÊÎËÑ · Þáá ãä ÊäÇæá HWLJEOHJL Ãæ áÇ ÊÊäÇæá TMVGZS ãØáÞÇð ÃËäÇÁ PZQORSB åÐÇ ÇáÏæÇÁ. ÊÍÐíÑÇÊ ÃËäÇÁ BZFANON åÐÇ ÇáÏæÇÁ:  
· ÃÎÈÑí ÇáØÈíÈ ÈÃäß ÍÇãá Ãæ ÊÑÖÚíä ØÝáß ÑÖÇÚÉ ØÈíÚíÉ¡ ßãÇ íÌÈ Úáì ÇáãÑÖì ÚÇãÉð ÅÎÈÇÑ ÇáØÈíÈ Ýí ÍÇáÉ ÇáÅÕÇÈÉ ÈãÑÖ POQWZU Ãæ ÈÇáßÈÏ Ãæ ÇáÓßÑí SRUPBBXS ÇáÒÑÞÇÁ Ãæ ÈãÑÖ KODKVI¡ Ãæ Ýí ÍÇáÉ ÇÑÊÝÇÚ ÖÛØ ÇáÏã · ÃÎÈÑ ØÈíÈß ÅÐÇ ßäÊ ÊÊäÇæá ÇáÈÇÑÈíæÑÇÊ (barbiturates)¡ Ãæ ÇáÈäÒæÏíÇÒíÈíäÇÊ (benzodiazepines)¡ Ãæ ÃÏæíÉ ãÖÇÏÉ ááäæÈÇÊ¡ Ãæ ãåÏÆÇÊ¡ Ãæ Ýí ÍÇáÉ ÊæÞÝß Úä ÊäÇæá åÐå ÇáÃÏæíÉ ãÄÎÑðÇ. ÃÎÈÑ ØÈíÈß ÅÐÇ ßÇä áÏíß ÊÇÑíÎ ãÑÖí áÅÏãÇä IZZRYNXY¡ Ãæ ÅÐÇ ßäÊ MWAXHM LFVINPFFK. · ÞÏ íÒíÏ åÐÇ ÇáÏæÇÁ ãä WBKTZTXT ÇáÚÞáíÉ Ãæ ÇáäÝÓíÉ ÚäÏ ÈÚÖ UHWHUAQ¡ ESFZNALBDK¡ æÇáÔÈÇÈ. æÞÏ íÄÏí Ðáß Åáì ÇáÊÝßíÑ Ýí HUMOKYVN Ãæ ÇáÚäÝ. ÃÎÈÑ ØÈíÈß Úáì ÇáÝæÑ ÅÐÇ ßÇä áÏíß ÃÝßÇÑ Ãæ ÊÛíÑÇÊ ÓáæßíÉ ÊãËá ãÔßáÇÊ áß. ÃÎÈÑ ØÈíÈß ÅÐÇ ßÇä áÏíß Ãæ áÏì Ãí ÝÑÏ ãä ÃÝÑÇÏ ÚÇÆáÊß ÊÇÑíÎ ÈÇÖØÑÇÈ ËäÇÆí ÇáÞØÈ Ãæ OPQUNQL ÇäÊÍÇÑ. · ÞÏ íÓÈÈ åÐÇ ÇáÏæÇÁ KVBRQUHP ÇáÊÇáíÉ: Tonye Anuradha ÚÑÖÉ ááäæÈÇÊ ¨ ÊÛíÑÇÊ ãÒÇÌíÉ æÓáæßíÉ ¨ ÇÑÊÝÇÚ ÖÛØ ÇáÏã Bernardo Porch ENWXRM · ÞÏ EQJNC åÐÇ ÇáÏæÇÁ ÊÔÚÑ ÈÇáÏæÇÑ Ãæ ÇáäÚÇÓ. áÇ ÊÞÏ ÇáÓíÇÑÉ æáÇ ÊÞã ÈÃí ÔíÁ íãßä Ãä íßæä ÎØÑðÇ Åáì Ãä ÊÚÑÝ ßíÝíÉ ÊÃËíÑ åÐÇ ÇáÏæÇÁ Úáíß. · íõÚÊÈÑ Zyban® ÌÒÁðÇ ÝÞØ ãä ÈÑäÇãÌ ßÇãá íÓÇÚÏß Úáì ÇáÅÞáÇÚ Úä ÇáÊÏÎíä. ÞÏ áÇ ÊÒÇá áÏíß ÑÛÈÉ Ýí ÇáÊÏÎíä Ýí ÈÚÖ ÇáÃÍíÇä. ÖÚ ÎØÉ UIGMGKZ ãÚ åÐå ÇáãæÇÞÝ. · áÇ ÊÊæÞÝ Úä ÇÓÊÎÏÇã åÐÇ ÇáÏæÇÁ ÈÕæÑÉ ãÝÇÌÆÉ. ÓíÍÊÇÌ ØÈíÈß Åáì ÊÞáíá ÌÑÚÊß ÊÏÑíÌíÇ ÞÈá ÇáÊæÞÝ ÊãÇãðÇ. · ÃÎÈÑ Ãí ØÈíÈ Ãæ ØÈíÈ ÃÓäÇä íÚÇáÌß ÈÃäß ÊÓÊÎÏã åÐÇ ÇáÏæÇÁ. ÞÏ íÄËÑ åÐÇ ÇáÏæÇÁ Úáì äÊÇÆÌ ÈÚÖ ÇáÝÍæÕÇÊ ÇáØÈíÉ. · ÓíÞæã ØÈíÈß ÈÝÍÕ ÊÞÏãß æÊÃËíÑÇÊ åÐÇ ÇáÏæÇÁ Ýí ÒíÇÑÇÊ ãäÊÙãÉ. ÍÇÝÙ Úáì ÌãíÚ ÇáãæÇÚíÏ. · ÇÍÊÝÙ ÈÌãíÚ ÇáÃÏæíÉ ÈÚíÏðÇ Úä TICGUU ÇáÃØÝÇá. áÇ ÊÊÔÇÑß Ýí ÏæÇÆß ÃÈÏðÇ ãÚ Ãí ÔÎÕ. ÇáÂËÇÑ ÇáÌÇäÈíÉ UYXHDTLY ÃËäÇÁ ÇÓÊÎÏÇã åÐÇ ÇáÏæÇÁ:  
ÇÊÕá ÈØÈíÈß Úáì ÇáÝæÑ ÅÐÇ áÇÍÙÊ ÃíðÇ ãä ÇáÂËÇÑ ÇáÌÇäÈíÉ ÇáÊÇáíÉ: 
· ÇáÍÓÇÓíÉ: NMHXB¡ Ãæ ÇáÔÑì¡ Ãæ ÊæÑã ÈæÌåß¡ Ãæ íÏíß¡ Ãæ ÊæÑã Ãæ æÎÒ ÈÝãß¡ Ãæ ÍáÞß¡ Ãæ ÖíÞ ÈÇáÕÏÑ¡ Ãæ ÕÚæÈÉ Ýí ÇáÊäÝÓ. · Êßæä ÈËæÑ Ãæ ÇáÊÞÔÑ Ãæ ØÝÍ ÌáÏí ÃÍãÑ · Ãáã Ýí ÇáÕÏÑ¡ Ãæ ãÔßáÇÊ Ýí ÇáÊäÝÓ¡ Ãæ äÈÖÇÊ ÞáÈ ÓÑíÚÉ Ãæ ÈØíÆÉ Ãæ ÛíÑ ãäÊÙãÉ · Ãáã Ýí ZJMVEZW Ãæ LDDJPTZ¡ Íãì ãÚ ØÝÍ ÌáÏí · ÑÄíÉ Ãæ ÓãÇÚ ÃÔíÇÁ ÛíÑ ãæÌæÏÉ¡ ÇáÔÚæÑ ÈÃä ÇáÃÔÎÇÕ ÇáÂÎÑíä ÖÏß · ÇáäæÈÇÊ Ãæ ÇáÑÌÝÇÊ · ÒíÇÏÉ ãÝÇÌÆÉ Ýí ÇáØÇÞÉ æÃÝßÇÑ ãÊáÇÍÞÉ æÕÚæÈÉ Ýí Çáäæã · ÇáÊÝßíÑ Ýí ÅíÐÇÁ ÇáäÝÓ¡ ÊÝÇÞã ÇáÇßÊÆÇÈ¡ åíÇÌ Ãæ ÇÑÊÈÇß ÔÏíÏíä [employer] ÅÐÇ OZLKQ ÇáÂËÇÑ ÇáÌÇäÈíÉ ÇáÊÇáíÉ ÇáÃÞá ÎØæÑÉ:  
· ÌÝÇÝ ÇáÝã · Ãáã ÈÇáÚíä¡ ÊÛíÑÇÊ ÈÇáÑÄíÉ¡ ãÔÇåÏÉ åÇáÇÊ Íæá ÇáÃÖæÇÁ · ÇáÕÏÇÚ Ãæ ÇáÏæÇÑ · ÇáÔÚæÑ AGCPTHLV Ãæ ÇáÞíÁ Ãæ ÇáÅãÓÇß Ãæ FLLZDFC Ãæ BZQNPJM Ãæ Ãáã QGTOEEK · ÒíÇÏÉ Ãæ ÝÞÏÇä ÇáæÒä ÃÎÈÑ ØÈíÈß ÅÐÇ áÇÍÙÊ ÂËÇÑðÇ ÌÇäÈíÉ ÃÎÑì ÊÚÊÞÏ ÃäåÇ äÇÊÌÉ Úä åÐÇ ÇáÏæÇÁ. ÇÊÕá ÈØÈíÈß ááÍÕæá Úáì ÇÓÊÔÇÑÉ ØÈíÉ Íæá ÇáÂËÇÑ ÇáÌÇäÈíÉ. íãßä Ãä ÊÈáÛ Úä åÐå ÇáÂËÇÑ ÇáÌÇäÈíÉ Åáì ÅÏÇÑÉ ÇáÃÛÐíÉ æÇáÃÏæíÉ ÇáÃãÑíßíÉ Úáì [phone] © 2017 [address] Information is for End User's use only and may not be sold, redistributed or otherwise used for commercial purposes. The above information is an  only. It is not intended as medical advice for individual conditions or treatments. Talk to your doctor, nurse or pharmacist before following any medical regimen to see if it is safe and effective for you. Bupropion (By mouth) Bupropion (jvx-LTBB-jvu-on) Treats depression and aids in quitting smoking. Also prevents depression caused by seasonal affective disorder (SAD). Brand Name(s): Aplenzin, Forfivo XL, Wellbutrin, Wellbutrin SR, Wellbutrin XL, Zyban There may be other brand names for this medicine. When This Medicine Should Not Be Used: This medicine is not right for everyone. Do not use it if you had an allergic reaction to bupropion, or if you have seizures, anorexia, or bulimia. How to Use This Medicine:  
Tablet, Long Acting Tablet · Take your medicine as directed. Your dose may need to be changed several times to find what works best for you. · You may need to take Wellbutrin® for up to 4 weeks before you feel better. You may need to take Zyban® for 1 to 2 weeks before the date that you plan to stop smoking. · Swallow the tablet whole. Do not break, crush, divide, or chew it. · It is best to take Aplenzin® in the morning. · Do not take Wellbutrin® or Zyban® close to bedtime if you have trouble sleeping. · You may take this medicine with or without food. If you have nausea, it may help to take it with food. · If you take the extended-release tablet, part of the tablet may pass into your stools. This is normal and is nothing to worry about. · This medicine should come with a Medication Guide. Ask your pharmacist for a copy if you do not have one. · Missed dose: Skip the missed dose and go back to your regular dosing schedule. Never take extra medicine to make up for a missed dose. · Store the medicine in a closed container at room temperature, away from heat, moisture, and direct light. Drugs and Foods to Avoid: Ask your doctor or pharmacist before using any other medicine, including over-the-counter medicines, vitamins, and herbal products. · Do not use this medicine and an MAO inhibitor (MAOI), such as linezolid or methylene blue injection, within 14 days of each other.  Do not use Zyban® to quit smoking if you already take Aplenzin® or Wellbutrin® for depression, because they are the same medicine. · Some medicines can affect how bupropion works. Tell your doctor if you are using the following: ¨ Amantadine, cimetidine, clopidogrel, cyclophosphamide, levodopa, nicotine patch, orphenadrine, tamoxifen, theophylline, thiotepa, ticlopidine ¨ Beta blocker (such as metoprolol), insulin or diabetes medicine that you take by mouth, medicine for heart rhythm problems (propafenone, flecainide), medicine to treat HIV or AIDS (efavirenz, lopinavir, nelfinavir, ritonavir), medicine for seizures (carbamazepine, phenobarbital, phenytoin), other medicine for depression (desipramine, fluoxetine, imipramine, nortriptyline, paroxetine, sertraline), medicine to treat mental illness (haloperidol, risperidone, thioridazine), steroid medicine (hydrocortisone, methylprednisolone, prednisone, prednisolone, dexamethasone), or a blood thinner · Limit alcohol, or do not drink alcohol at all while you are using this medicine. Warnings While Using This Medicine: · Tell your doctor if you are pregnant or breastfeeding, or if you have kidney disease, liver disease, diabetes, glaucoma, heart disease, or high blood pressure. · Tell your doctor if you take barbiturates, benzodiazepines, antiseizure medicine, or sedatives, or if you recently stopped taking them. Tell your doctor if you have a history of drug addiction, or if you drink alcohol. · For some children, teenagers, and young adults, this medicine may increase mental or emotional problems. This may lead to thoughts of suicide and violence. Talk with your doctor right away if you have any thoughts or behavior changes that concern you. Tell your doctor if you or anyone in your family has a history of bipolar disorder or suicide attempts. · This medicine may cause the following problems: ¨ An increased risk of seizures ¨ Changes in mood or behavior ¨ High blood pressure ¨ Serious skin reactions · This medicine may make you dizzy or drowsy. Do not drive or do anything that could be dangerous until you know how this medicine affects you. · Zyban® is only part of a complete program to help you quit smoking. You may still want to smoke at times. Have a plan to cope with these situations. · Do not stop using this medicine suddenly. Your doctor will need to slowly decrease your dose before you stop it completely. · Tell any doctor or dentist who treats you that you are using this medicine. This medicine may affect certain medical test results. · Your doctor will check your progress and the effects of this medicine at regular visits. Keep all appointments. · Keep all medicine out of the reach of children. Never share your medicine with anyone. Possible Side Effects While Using This Medicine:  
Call your doctor right away if you notice any of these side effects: · Allergic reaction: Itching or hives, swelling in your face or hands, swelling or tingling in your mouth or throat, chest tightness, trouble breathing · Blistering, peeling, or red skin rash · Chest pain, trouble breathing, fast, slow, or uneven heartbeat · Muscle or joint pain, fever with rash · Seeing or hearing things that are not there, feeling like people are against you · Seizures or tremors · Sudden increase in energy, racing thoughts, trouble sleeping · Thoughts of hurting yourself, worsening depression, severe agitation or confusion If you notice these less serious side effects, talk with your doctor: · Dry mouth · Eye pain, vision changes, seeing halos around lights · Headache or dizziness · Nausea, vomiting, constipation, diarrhea, gas, stomach pain · Weight gain or loss If you notice other side effects that you think are caused by this medicine, tell your doctor. Call your doctor for medical advice about side effects. You may report side effects to FDA at 7-275-JLL-2267 © 2017 [address] Information is for End User's use only and may not be sold, redistributed or otherwise used for commercial purposes. The above information is an  only. It is not intended as medical advice for individual conditions or treatments. Talk to your doctor, nurse or pharmacist before following any medical regimen to see if it is safe and effective for you. Bipolar Disorder: Care Instructions Your Care Instructions Bipolar disorder is an illness that causes extreme mood changes, from times of very high energy (manic episodes) to times of depression. But many people with bipolar disorder show only the symptoms of depression. These moods may cause problems with your work, school, family life, friendships, and how well you function. This disease is also called manic-depression. There is no cure for bipolar disorder, but it can be helped with medicines. Counseling may also help. It is important to take your medicines exactly as prescribed, even when you feel well. You may need lifelong treatment. Follow-up care is a key part of your treatment and safety. Be sure to make and go to all appointments, and call your doctor if you are having problems. It's also a good idea to know your test results and keep a list of the medicines you take. How can you care for yourself at home? · Be safe with medicines. Take your medicines exactly as prescribed. Do not stop or change a medicine without talking to your doctor first. Marina Hager and your doctor may need to try different combinations of medicines to find what works for you. · Take your medicines on schedule to keep your moods even. When you feel good, you may think that you do not need your medicines. But it is important to keep taking them. · Go to your counseling sessions. Call and talk with your counselor if you can't go to a session or if you don't think the sessions are helping. Do not just stop going. · Get at least 30 minutes of activity on most days of the week. Walking is a good choice. You also may want to do other things, such as running, swimming, or cycling. · Get enough sleep. Keep your room dark and quiet. Try to go to bed at the same time every night. · Eat a healthy diet. This includes whole grains, dairy, fruits, vegetables, and protein. Eat foods from each of these groups. · Try to lower your stress. Manage your time, build a strong support system, and lead a healthy lifestyle. To lower your stress, try physical activity, slow deep breathing, or getting a massage. · Do not use alcohol or illegal drugs. · Learn the early signs of your mood changes. You can then take steps to help yourself feel better. · Ask for help from friends and family when you need it. You may need help with daily chores when you are depressed. When you are manic, you may need support to control your high energy levels. What should you do if someone in your family has bipolar disorder? · Learn about the disease and the signs that it is getting worse. · Remind your family member that you love him or her. · Make a plan with all family members about how to take care of your loved one when his or her symptoms are bad. · Talk about your fears and concerns and those of other family members. Seek counseling if needed. · Do not focus attention only on the person who is in treatment. · Remind yourself that it will take time for changes to occur. · Do not blame yourself for the disease. · Know your legal rights and the legal rights of your family member. Support groups or counselors can help you with this information. · Take care of yourself. Keep up with your own interests, such as your career, hobbies, and friends. Use exercise, positive self-talk, deep breathing, and other relaxing exercises to help lower your stress. · Give yourself time to grieve.  You may need to deal with emotions such as anger, fear, and frustration. After you work through your feelings, you will be better able to care for yourself and your family. · If you are having a hard time with your feelings or with your relationship with your family member, talk with a counselor. When should you call for help? Call 911 anytime you think you may need emergency care. For example, call if: 
· You feel like hurting yourself or someone else. · Someone who has bipolar disorder displays dangerous behavior, and you think the person might hurt himself or herself or someone else. Call your doctor now or seek immediate medical care if: 
· You hear voices. · Someone you know has bipolar disorder and talks about suicide. Keep the numbers for these national suicide hotlines: 1-897-273-TALK (6-493.645.9494) and 2-905-HKXUFEH (8-193.496.3890). If a suicide threat seems real, with a specific plan and a way to carry it out, stay with the person, or ask someone you trust to stay with the person, until you can get help. · Someone you know has bipolar disorder and: 
¨ Starts to give away possessions. ¨ Is using illegal drugs or drinking alcohol heavily. ¨ Talks or writes about death, including writing suicide notes or talking about guns, knives, or pills. ¨ Talks or writes about hurting someone else. ¨ Starts to spend a lot of time alone. ¨ Acts very aggressively or suddenly appears calm. ¨ Talks about beliefs that are not based in reality (delusions). Watch closely for changes in your health, and be sure to contact your doctor if: 
· You cannot go to your counseling sessions. Where can you learn more? Go to http://shima-alyssa.info/. Enter K052 in the search box to learn more about \"Bipolar Disorder: Care Instructions. \" Current as of: July 26, 2016 Content Version: 11.2 © 8732-1144 [employer].  Care instructions adapted under license by [employer] (which disclaims liability or warranty for this information). If you have questions about a medical condition or this instruction, always ask your healthcare professional. [address] any warranty or liability for your use of this information. Back Pain: Care Instructions Your Care Instructions Back pain has many possible causes. It is often related to problems with muscles and ligaments of the back. It may also be related to problems with the nerves, discs, or bones of the back. Moving, lifting, standing, sitting, or sleeping in an awkward way can strain the back. Sometimes you don't notice the injury until later. Arthritis is another common cause of back pain. Although it may hurt a lot, back pain usually improves on its own within several weeks. Most people recover in 12 weeks or less. Using good home treatment and being careful not to stress your back can help you feel better sooner. Follow-up care is a key part of your treatment and safety. Be sure to make and go to all appointments, and call your doctor if you are having problems. Its also a good idea to know your test results and keep a list of the medicines you take. How can you care for yourself at home? · Sit or lie in positions that are most comfortable and reduce your pain. Try one of these positions when you lie down: ¨ Lie on your back with your knees bent and supported by large pillows. ¨ Lie on the floor with your legs on the seat of a sofa or chair. Cherylyn Sebring on your side with your knees and hips bent and a pillow between your legs. ¨ Lie on your stomach if it does not make pain worse. · Do not sit up in bed, and avoid soft couches and twisted positions. Bed rest can help relieve pain at first, but it delays healing. Avoid bed rest after the first day of back pain. · Change positions every 30 minutes. If you must sit for long periods of time, take breaks from sitting. Get up and walk around, or lie in a comfortable position. · Try using a heating pad on a low or medium setting for 15 to 20 minutes every 2 or 3 hours. Try a warm shower in place of one session with the heating pad. · You can also try an ice pack for 10 to 15 minutes every 2 to 3 hours. Put a thin cloth between the ice pack and your skin. · Take pain medicines exactly as directed. ¨ If the doctor gave you a prescription medicine for pain, take it as prescribed. ¨ If you are not taking a prescription pain medicine, ask your doctor if you can take an over-the-counter medicine. · Take short walks several times a day. You can start with 5 to 10 minutes, 3 or 4 times a day, and work up to longer walks. Walk on level surfaces and avoid hills and stairs until your back is better. · Return to work and other activities as soon as you can. Continued rest without activity is usually not good for your back. · To prevent future back pain, do exercises to stretch and strengthen your back and stomach. Learn how to use good posture, safe lifting techniques, and proper body mechanics. When should you call for help? Call your doctor now or seek immediate medical care if: 
· You have new or worsening numbness in your legs. · You have new or worsening weakness in your legs. (This could make it hard to stand up.) · You lose control of your bladder or bowels. Watch closely for changes in your health, and be sure to contact your doctor if: 
· Your pain gets worse. · You are not getting better after 2 weeks. Where can you learn more? Go to http://shima-alyssa.info/. Enter P071 in the search box to learn more about \"Back Pain: Care Instructions. \" Current as of: May 23, 2016 Content Version: 11.2 © 9225-2228 [employer]. Care instructions adapted under license by [employer] (which disclaims liability or warranty for this information).  If you have questions about a medical condition or this instruction, always ask your healthcare professional. [place] [address] any warranty or liability for your use of this information. Please provide this summary of care documentation to your next provider. Your primary care clinician is listed as Edilberto Mattson. If you have any questions after today's visit, please call [phone].

## 2023-09-23 NOTE — TELEPHONE ENCOUNTER
[phone]    Metoprolol would not let me choose it.l    Medoxomil 20 mg is not on current list. [place]

## 2023-09-28 ENCOUNTER — TELEPHONE (OUTPATIENT)
Age: 58
End: 2023-09-28

## 2023-09-28 NOTE — TELEPHONE ENCOUNTER
Patient's  called stating that the patient is currently in so much pain and wanted to know if there was a medication that could be prescribed for the pain. He stated that she is scheduled for the MRI on Monday, but wanted medication prior to that appointment. If she can be prescribed any pain medication they would like for the prescription to be sent to [facility] on [address] Thanks!

## 2023-10-01 ENCOUNTER — TELEPHONE (OUTPATIENT)
Age: 58
End: 2023-10-01

## 2023-10-01 DIAGNOSIS — M51.36 DDD (DEGENERATIVE DISC DISEASE), LUMBAR: Primary | ICD-10-CM

## 2023-10-01 DIAGNOSIS — M25.551 RIGHT HIP PAIN: ICD-10-CM

## 2023-10-01 RX ORDER — PREDNISONE 20 MG/1
20 TABLET ORAL DAILY
Qty: 5 TABLET | Refills: 0 | Status: SHIPPED | OUTPATIENT
Start: 2023-10-01 | End: 2023-10-06

## 2023-10-05 ENCOUNTER — TELEPHONE (OUTPATIENT)
Age: 58
End: 2023-10-05

## 2023-10-05 NOTE — TELEPHONE ENCOUNTER
Charly Izaguirre from [facility] on behalf of Raj Brown called to notify you of the denial for the MRI L-Spine. They will send a fax with all information pertaining to the denial. IF you have any questions, you can contact Michael at [phone], option 4; please use reference number [id number].     Thank you

## 2023-10-05 NOTE — TELEPHONE ENCOUNTER
Maria Luz Fiore from [employer] [facility] also called to inform you of the denial. She is requesting a returned phone call asa, due to pt's MRI appt being scheduled for tomorrow. She would like to know if you have plans to complete a peer to peer and who will contact the pt. Her direct phone number is [phone].     Thank you

## 2023-10-06 DIAGNOSIS — M54.16 LUMBAR RADICULOPATHY: Primary | ICD-10-CM

## 2023-10-06 NOTE — TELEPHONE ENCOUNTER
Received further information from patient's insurance. Will place forms in nurse's folder in black box.

## 2023-10-06 NOTE — PROGRESS NOTES
Spoke with pt's  regarding status of MRI. Advised we have not yet received documents from insurance regarding reason for denial. Informed pt that most likely reason for denial was that pt has not yet completed PT. Provided referral for this. Will continue to await further information from [facility] and will inform pt and her  of updates as they occur.

## 2023-10-06 NOTE — TELEPHONE ENCOUNTER
Rosey Schlatter from [address] [place] [address] called wanting an update. I informed her that the doctor attempted to discuss a peer to peer, but was unable to get through. She stated that being that the patient is scheduled for today, she may have to cancel everything. To schedule the peer to peer you can call [phone] option 4 or [phone] option 4 and the reference number is [id number]. If need be Rosey Schlatter can be contacted at [phone]. Thanks!

## 2023-10-06 NOTE — TELEPHONE ENCOUNTER
Shahzad Lopez, pt's daughter called back and requested an update. She was informed that no new updates have been noted in Epic; however, she will be contacted when there are any updates. [place] contact pt's daughter with any updates.     Thank you

## 2023-10-13 ENCOUNTER — TELEPHONE (OUTPATIENT)
Age: 58
End: 2023-10-13

## 2023-10-13 NOTE — TELEPHONE ENCOUNTER
[facility] called stating that they needed the prescription for Gabapentin 300 mg to be sent in again, because the prescribing doctor isn't showing up in their system. Will need a new prescription sent in by the supervising doctor instead, including the [address] number. Would like for prescription to be sent in at your earliest convenience. Thanks!

## 2023-10-18 DIAGNOSIS — G89.29 CHRONIC BILATERAL LOW BACK PAIN, UNSPECIFIED WHETHER SCIATICA PRESENT: ICD-10-CM

## 2023-10-18 DIAGNOSIS — M79.10 MYALGIA, UNSPECIFIED SITE: ICD-10-CM

## 2023-10-18 DIAGNOSIS — M54.50 CHRONIC BILATERAL LOW BACK PAIN, UNSPECIFIED WHETHER SCIATICA PRESENT: ICD-10-CM

## 2023-10-18 DIAGNOSIS — I10 PRIMARY HYPERTENSION: ICD-10-CM

## 2023-10-20 DIAGNOSIS — M54.16 LUMBAR RADICULOPATHY: ICD-10-CM

## 2023-10-20 RX ORDER — GABAPENTIN 300 MG/1
300 CAPSULE ORAL 2 TIMES DAILY
Qty: 30 CAPSULE | Refills: 1 | Status: SHIPPED | OUTPATIENT
Start: 2023-10-20 | End: 2023-11-19

## 2023-10-23 RX ORDER — METOPROLOL TARTRATE 50 MG/1
50 TABLET, FILM COATED ORAL 2 TIMES DAILY
Qty: 60 TABLET | Refills: 2 | Status: CANCELLED | OUTPATIENT
Start: 2023-10-23

## 2023-10-23 RX ORDER — FUROSEMIDE 40 MG/1
40 TABLET ORAL DAILY
Qty: 30 TABLET | Refills: 0 | Status: CANCELLED | OUTPATIENT
Start: 2023-10-23

## 2023-10-23 RX ORDER — DULOXETIN HYDROCHLORIDE 60 MG/1
60 CAPSULE, DELAYED RELEASE ORAL DAILY
Qty: 90 CAPSULE | Refills: 0 | Status: CANCELLED | OUTPATIENT
Start: 2023-10-23

## 2023-10-23 RX ORDER — OLMESARTAN MEDOXOMIL 40 MG/1
40 TABLET ORAL DAILY
Qty: 30 TABLET | Refills: 0 | Status: CANCELLED | OUTPATIENT
Start: 2023-10-23

## 2023-10-23 NOTE — TELEPHONE ENCOUNTER
Pt's spouse called and stated that pt is now out or medication and he is concerned, due to pt's bp beginning to elevate. He requested that the refills are sent to pharmacy today.     Thank you

## 2023-10-25 ENCOUNTER — OFFICE VISIT (OUTPATIENT)
Age: 58
End: 2023-10-25
Payer: COMMERCIAL

## 2023-10-25 VITALS
WEIGHT: 283 LBS | DIASTOLIC BLOOD PRESSURE: 84 MMHG | BODY MASS INDEX: 50.13 KG/M2 | HEART RATE: 85 BPM | TEMPERATURE: 98 F | SYSTOLIC BLOOD PRESSURE: 154 MMHG | OXYGEN SATURATION: 95 %

## 2023-10-25 DIAGNOSIS — M54.16 LUMBAR RADICULOPATHY: ICD-10-CM

## 2023-10-25 DIAGNOSIS — M79.10 MYALGIA, UNSPECIFIED SITE: ICD-10-CM

## 2023-10-25 DIAGNOSIS — I10 PRIMARY HYPERTENSION: ICD-10-CM

## 2023-10-25 PROCEDURE — 99214 OFFICE O/P EST MOD 30 MIN: CPT | Performed by: STUDENT IN AN ORGANIZED HEALTH CARE EDUCATION/TRAINING PROGRAM

## 2023-10-25 RX ORDER — METOPROLOL TARTRATE 50 MG/1
50 TABLET, FILM COATED ORAL 2 TIMES DAILY
Qty: 60 TABLET | Refills: 0 | Status: SHIPPED | OUTPATIENT
Start: 2023-10-25

## 2023-10-25 RX ORDER — FUROSEMIDE 40 MG/1
40 TABLET ORAL DAILY
Qty: 30 TABLET | Refills: 0 | Status: SHIPPED | OUTPATIENT
Start: 2023-10-25

## 2023-10-25 RX ORDER — PREDNISONE 20 MG/1
20 TABLET ORAL 2 TIMES DAILY
Qty: 10 TABLET | Refills: 0 | Status: SHIPPED | OUTPATIENT
Start: 2023-10-25 | End: 2023-10-30

## 2023-10-25 RX ORDER — GABAPENTIN 300 MG/1
600 CAPSULE ORAL 2 TIMES DAILY
Qty: 120 CAPSULE | Refills: 0 | Status: SHIPPED | OUTPATIENT
Start: 2023-10-25 | End: 2023-11-24

## 2023-10-25 RX ORDER — OLMESARTAN MEDOXOMIL 40 MG/1
40 TABLET ORAL DAILY
Qty: 30 TABLET | Refills: 0 | Status: SHIPPED | OUTPATIENT
Start: 2023-10-25

## 2023-10-25 RX ORDER — DULOXETIN HYDROCHLORIDE 60 MG/1
60 CAPSULE, DELAYED RELEASE ORAL DAILY
Qty: 30 CAPSULE | Refills: 0 | Status: SHIPPED | OUTPATIENT
Start: 2023-10-25

## 2023-10-25 NOTE — PATIENT INSTRUCTIONS
Make an appointment with Dr. Joe Ramirez. Take Prednisone 20 mg twice a day for 5 days. Increase Gabapentin to 600 mg twice a day. This may cause drowsiness. I refilled your blood pressure medication. It is important you follow up with your PCP (Dr. Avel Jones) as soon as possible regarding your blood pressure.

## 2023-10-26 RX ORDER — DULOXETIN HYDROCHLORIDE 60 MG/1
60 CAPSULE, DELAYED RELEASE ORAL DAILY
Qty: 30 CAPSULE | Refills: 0 | Status: SHIPPED | OUTPATIENT
Start: 2023-10-26

## 2023-10-26 RX ORDER — MELOXICAM 15 MG/1
15 TABLET ORAL DAILY PRN
Qty: 30 TABLET | Refills: 0 | OUTPATIENT
Start: 2023-10-26

## 2023-10-26 RX ORDER — FUROSEMIDE 40 MG/1
40 TABLET ORAL DAILY
Qty: 30 TABLET | Refills: 1 | Status: SHIPPED | OUTPATIENT
Start: 2023-10-26

## 2023-10-26 RX ORDER — METOPROLOL SUCCINATE 50 MG/1
50 TABLET, EXTENDED RELEASE ORAL DAILY
Qty: 60 TABLET | Refills: 1 | Status: SHIPPED | OUTPATIENT
Start: 2023-10-26

## 2023-10-26 RX ORDER — OLMESARTAN MEDOXOMIL 40 MG/1
40 TABLET ORAL DAILY
Qty: 30 TABLET | Refills: 1 | Status: SHIPPED | OUTPATIENT
Start: 2023-10-26

## 2023-10-30 ENCOUNTER — TELEPHONE (OUTPATIENT)
Age: 58
End: 2023-10-30

## 2023-10-30 NOTE — TELEPHONE ENCOUNTER
Patient's  called stating that the doctor that they were referred to for pain management, Dr. Freedom Ortega at [address] [place] [address], stated that they hadn't received any paperwork for this patient. They would need to receive referral and other documents before scheduling appointment. Office number is [phone] and fax number is [phone]. Thanks!

## 2023-11-01 ENCOUNTER — TELEPHONE (OUTPATIENT)
Age: 58
End: 2023-11-01

## 2023-11-01 NOTE — TELEPHONE ENCOUNTER
----- Message from Nasra Shore sent at 10/31/2023  3:03 PM EDT -----  Subject: Medication Problem    Medication: Other - something to help with MRI  Dosage: n/a  Ordering Provider: marcia    Question/Problem: Lucero Magallon has an MRI scheduled for 11/07/2023 at 4:30 pm and   would like something called in for her to help ease her for the MRI.  She   uses [facility] [address] [place] [address]   [phone] - F [phone], she can be reached at [phone] ok to   leave a message      Pharmacy: [address] [place] [address] Drive   [phone] [facility] [phone]    ---------------------------------------------------------------------------  --------------  Sri Mora INFO  [phone]; OK to leave message on voicemail  ---------------------------------------------------------------------------  --------------    SCRIPT ANSWERS  Relationship to Patient: Spouse/Partner  Representative Name: Susu Galaviz  Is the representative on the Communication Release of Information (LEONCIO)   form in Epic: Yes

## 2023-11-02 NOTE — TELEPHONE ENCOUNTER
Attempted to contact patient to discuss need of pain medication vs anxiety medication for the MRI using Malay  # [id number]. No answer x 2. Message left on VMB to call clinic.

## 2023-11-07 ENCOUNTER — HOSPITAL ENCOUNTER (OUTPATIENT)
Facility: HOSPITAL | Age: 58
Discharge: HOME | End: 2023-11-10
Payer: COMMERCIAL

## 2023-11-07 DIAGNOSIS — M54.16 LUMBAR RADICULOPATHY: ICD-10-CM

## 2023-11-07 PROCEDURE — 72148 MRI LUMBAR SPINE W/O DYE: CPT

## 2023-11-17 ENCOUNTER — OFFICE VISIT (OUTPATIENT)
Age: 58
End: 2023-11-17
Payer: COMMERCIAL

## 2023-11-17 VITALS
DIASTOLIC BLOOD PRESSURE: 70 MMHG | RESPIRATION RATE: 20 BRPM | HEIGHT: 63 IN | SYSTOLIC BLOOD PRESSURE: 109 MMHG | BODY MASS INDEX: 50.13 KG/M2 | HEART RATE: 79 BPM | OXYGEN SATURATION: 95 %

## 2023-11-17 DIAGNOSIS — E66.01 MORBID OBESITY WITH BMI OF 50.0-59.9, ADULT (HCC): Primary | ICD-10-CM

## 2023-11-17 DIAGNOSIS — M79.10 MYALGIA, UNSPECIFIED SITE: ICD-10-CM

## 2023-11-17 DIAGNOSIS — K59.04 CHRONIC IDIOPATHIC CONSTIPATION: ICD-10-CM

## 2023-11-17 PROCEDURE — 99214 OFFICE O/P EST MOD 30 MIN: CPT | Performed by: STUDENT IN AN ORGANIZED HEALTH CARE EDUCATION/TRAINING PROGRAM

## 2023-11-17 RX ORDER — DULOXETIN HYDROCHLORIDE 30 MG/1
90 CAPSULE, DELAYED RELEASE ORAL DAILY
Qty: 180 CAPSULE | Refills: 1 | Status: SHIPPED | OUTPATIENT
Start: 2023-11-17

## 2023-11-17 RX ORDER — POLYETHYLENE GLYCOL 3350 17 G/17G
17 POWDER, FOR SOLUTION ORAL DAILY
Qty: 510 G | Refills: 0 | Status: SHIPPED | OUTPATIENT
Start: 2023-11-17 | End: 2023-12-17

## 2023-11-17 NOTE — PROGRESS NOTES
[facility], [address] [facility]   Office [phone], Fax [phone]    Subjective   CC:  Nura Adamson is a 66 y.o. female who presents for f/up back pain and discuss weight loss. Patient accompanied by  who provides some of the hx. Low back and hip pain:   - Seen by Dr. Dagoberto Lopez who started pt on Gabapentin  - Prior to this, we discussed Cymbalta - feels the Cymbalta has improved pain approx 5%, interested in dose adjustments  - Wonders how much weight loss can help with pain  - No f/c, weight loss, night sweats, saddle anesthesia, bowel/bladder incontinence    Weight management:   - Specifically interested in bariatric surgery, requesting referral  - goal for weight loss is to help with chronic pain    Complete ROS performed and pertinent positives/negatives are documented in HPI. Past Medical History  Past Medical History:   Diagnosis Date    Anxiety and depression     Dyspnea 9/18/2017    Flu     2 wks ago    GERD (gastroesophageal reflux disease)     H. pylori infection 1/26/2013 1/2013 Referred to Dr. Javier Avalos who speaks Frisian to explain treatments Stool test after Abx negative 2013      Headache(784.0)     Hypertension     Incidental lung nodule, > 3mm and < 8mm 04/20/2016    left lung nodule in smoker, stable on CT in 2018       Current Medications  Current Outpatient Medications   Medication Sig Dispense Refill    DULoxetine (CYMBALTA) 30 MG extended release capsule Take 3 capsules by mouth daily 180 capsule 1    polyethylene glycol (GLYCOLAX) 17 GM/SCOOP powder Take 17 g by mouth daily Take 17g by mouth in 8 oz of water, if no bowel movement within 24 hours, can increase to 34g in 16 oz water, can increase up to 68g in 32oz water.  510 g 0    diclofenac (VOLTAREN) 50 MG EC tablet Take 1 tablet by mouth 2 times daily 28 tablet 0    furosemide (LASIX) 40 MG tablet Take 1 tablet by mouth daily 30 tablet 1    metoprolol succinate (TOPROL XL) 50 MG extended release tablet

## 2023-11-17 NOTE — PROGRESS NOTES
[id number]    [facility] InterpretorMarguerite [facility] # [id number]    Chief Complaint   Patient presents with    Back Pain    Hypertension    Follow-up     Vitals:    11/17/23 1610   BP: 109/70   Pulse: 79   Resp: 20   SpO2: 95%   Height: 1.6 m (5' 3\")       1. Have you been to the ER, urgent care clinic since your last visit? Hospitalized since your last visit? No    2. Have you seen or consulted any other health care providers outside of the [address] [place] [address] since your last visit? Include any pap smears or colon screening.  No

## 2023-11-24 ENCOUNTER — TRANSCRIBE ORDERS (OUTPATIENT)
Facility: HOSPITAL | Age: 58
End: 2023-11-24

## 2023-11-24 ENCOUNTER — HOSPITAL ENCOUNTER (OUTPATIENT)
Facility: HOSPITAL | Age: 58
End: 2023-11-24
Payer: COMMERCIAL

## 2023-11-24 DIAGNOSIS — M25.551 PAIN, JOINT, HIP, RIGHT: Primary | ICD-10-CM

## 2023-11-24 DIAGNOSIS — M25.551 PAIN, JOINT, HIP, RIGHT: ICD-10-CM

## 2023-11-24 PROCEDURE — 73502 X-RAY EXAM HIP UNI 2-3 VIEWS: CPT

## 2024-01-03 DIAGNOSIS — I10 PRIMARY HYPERTENSION: ICD-10-CM

## 2024-01-03 DIAGNOSIS — K59.04 CHRONIC IDIOPATHIC CONSTIPATION: ICD-10-CM

## 2024-01-04 RX ORDER — OLMESARTAN MEDOXOMIL 40 MG/1
40 TABLET ORAL DAILY
Qty: 90 TABLET | Refills: 1 | Status: SHIPPED | OUTPATIENT
Start: 2024-01-04

## 2024-01-04 RX ORDER — FUROSEMIDE 40 MG/1
40 TABLET ORAL DAILY
Qty: 90 TABLET | Refills: 1 | Status: SHIPPED | OUTPATIENT
Start: 2024-01-04

## 2024-01-04 RX ORDER — POLYETHYLENE GLYCOL 3350 17 G/DOSE
POWDER (GRAM) ORAL
Qty: 510 G | Refills: 4 | Status: SHIPPED | OUTPATIENT
Start: 2024-01-04

## 2024-01-04 NOTE — TELEPHONE ENCOUNTER
Medication Refill Request    Katrina Holt is requesting a refill of the following medication(s):   Requested Prescriptions     Pending Prescriptions Disp Refills    furosemide (LASIX) 40 MG tablet [Pharmacy Med Name: FUROSEMIDE 40 MG TABLET] 30 tablet 1     Sig: TAKE 1 TABLET BY MOUTH EVERY DAY    [facility] PURELAX 17 GM/SCOOP powder [Pharmacy Med Name: [facility] PURELAX POWDER] 510 g 0     Sig: TAKE 17 G BY MOUTH DAILY TAKE 17G BY MOUTH IN 8 OZ OF WATER, IF NO BOWEL MOVEMENT WITHIN 24 HOURS, CAN INCREASE TO 34G IN 16 OZ WATER, CAN INCREASE UP TO 68G IN 32OZ WATER.    olmesartan (BENICAR) 40 MG tablet [Pharmacy Med Name: OLMESARTAN MEDOXOMIL 40 MG TAB] 30 tablet 1     Sig: TAKE 1 TABLET BY MOUTH EVERY DAY        Listed PCP is Kashmir Garcia MD   Last provider to prescribe medication: Dr. Duran  Last Date of Medication Prescribed:   Benicar-10/26/2023  Furosemide-10/25/2023  Miralax-11/17/2023  Date of Last Office Visit at [facility]: 11/17/2023   Last Labs:   Lab Results   Component Value Date     05/19/2023    K 4.4 05/19/2023     05/19/2023    CO2 31 05/19/2023    BUN 16 05/19/2023    CREATININE 0.63 05/19/2023    GLUCOSE 96 05/19/2023    CALCIUM 9.6 05/19/2023    PROT 7.4 05/19/2023    LABALBU 3.7 05/19/2023    BILITOT 0.9 05/19/2023    ALKPHOS 69 05/19/2023    AST 11 (L) 05/19/2023    ALT 19 05/19/2023    LABGLOM >60 05/19/2023    GFRAA >60 04/02/2022    AGRATIO 0.8 (L) 04/02/2022    GLOB 3.7 05/19/2023         Future Appointment: No future appointments.    Refill Request Note: Recommended to Follow up in 6 Weeks at LOV    Please send refill to:    [facility]/pharmacy #[id number] - [place], [place] - [address] - P [phone] - F [phone]  [address] [facility] [address]  Phone: [phone] Fax: [phone]

## 2024-01-23 ENCOUNTER — TRANSCRIBE ORDERS (OUTPATIENT)
Facility: HOSPITAL | Age: 59
End: 2024-01-23

## 2024-01-23 ENCOUNTER — HOSPITAL ENCOUNTER (OUTPATIENT)
Facility: HOSPITAL | Age: 59
Discharge: HOME | End: 2024-01-26
Payer: COMMERCIAL

## 2024-01-23 DIAGNOSIS — M25.551 BILATERAL HIP PAIN: Primary | ICD-10-CM

## 2024-01-23 DIAGNOSIS — M25.551 BILATERAL HIP PAIN: ICD-10-CM

## 2024-01-23 DIAGNOSIS — M25.552 BILATERAL HIP PAIN: Primary | ICD-10-CM

## 2024-01-23 DIAGNOSIS — M25.552 BILATERAL HIP PAIN: ICD-10-CM

## 2024-01-23 PROCEDURE — 73522 X-RAY EXAM HIPS BI 3-4 VIEWS: CPT

## 2024-01-26 ENCOUNTER — TELEPHONE (OUTPATIENT)
Age: 59
End: 2024-01-26

## 2024-01-26 NOTE — TELEPHONE ENCOUNTER
Patient daughter called regarding SWL. Advised patient needs to complete seminar. Sent seminar to patient via email

## 2024-02-28 DIAGNOSIS — I10 PRIMARY HYPERTENSION: ICD-10-CM

## 2024-03-04 RX ORDER — METOPROLOL SUCCINATE 50 MG/1
50 TABLET, EXTENDED RELEASE ORAL DAILY
Qty: 30 TABLET | Refills: 0 | Status: SHIPPED | OUTPATIENT
Start: 2024-03-04

## 2024-03-04 NOTE — TELEPHONE ENCOUNTER
Medication Refill Request    Katrina Holt is requesting a refill of the following medication(s):   Requested Prescriptions     Pending Prescriptions Disp Refills    metoprolol succinate (TOPROL XL) 50 MG extended release tablet [Pharmacy Med Name: METOPROLOL SUCC ER 50 MG TAB] 30 tablet 3     Sig: TAKE 1 TABLET BY MOUTH EVERY DAY        Listed PCP is Kashmir Garcia MD   Last provider to prescribe medication: Dr. Garcia  Last Date of Medication Prescribed: 10/26/2023   Date of Last Office Visit at [facility]: 11/17/2023   Last Labs:  Lab Results   Component Value Date     05/19/2023    K 4.4 05/19/2023     05/19/2023    CO2 31 05/19/2023    BUN 16 05/19/2023    CREATININE 0.63 05/19/2023    GLUCOSE 96 05/19/2023    CALCIUM 9.6 05/19/2023    PROT 7.4 05/19/2023    LABALBU 3.7 05/19/2023    BILITOT 0.9 05/19/2023    ALKPHOS 69 05/19/2023    AST 11 (L) 05/19/2023    ALT 19 05/19/2023    LABGLOM >60 05/19/2023    GFRAA >60 04/02/2022    AGRATIO 1.0 (L) 05/19/2023    GLOB 3.7 05/19/2023         Future Appointment:   Future Appointments   Date Time Provider Department Center   3/5/2024  3:00 PM Narinder Hooker MD [facility] BS AMB       Refill Request Note: overdue for chronic condition follow up. Message sent to PSR to assist with scheduling.    Please send refill to:    [facility]/pharmacy #[id number] - [place], [place] - [address] [facility] - P [phone] - F [phone]  [address] [facility] [address]  Phone: [phone] Fax: [phone]

## 2024-03-05 ENCOUNTER — OFFICE VISIT (OUTPATIENT)
Age: 59
End: 2024-03-05
Payer: COMMERCIAL

## 2024-03-05 VITALS
TEMPERATURE: 98.4 F | RESPIRATION RATE: 20 BRPM | HEIGHT: 63 IN | OXYGEN SATURATION: 92 % | DIASTOLIC BLOOD PRESSURE: 88 MMHG | SYSTOLIC BLOOD PRESSURE: 168 MMHG | BODY MASS INDEX: 51.91 KG/M2 | WEIGHT: 293 LBS | HEART RATE: 99 BPM

## 2024-03-05 DIAGNOSIS — R06.09 DOE (DYSPNEA ON EXERTION): ICD-10-CM

## 2024-03-05 DIAGNOSIS — K21.9 GASTROESOPHAGEAL REFLUX DISEASE WITHOUT ESOPHAGITIS: ICD-10-CM

## 2024-03-05 DIAGNOSIS — I10 ESSENTIAL HYPERTENSION: ICD-10-CM

## 2024-03-05 DIAGNOSIS — E66.01 MORBID OBESITY WITH BMI OF 50.0-59.9, ADULT (HCC): Primary | ICD-10-CM

## 2024-03-05 DIAGNOSIS — M51.36 DDD (DEGENERATIVE DISC DISEASE), LUMBAR: ICD-10-CM

## 2024-03-05 DIAGNOSIS — E78.2 MIXED HYPERLIPIDEMIA: ICD-10-CM

## 2024-03-05 DIAGNOSIS — I87.2 CHRONIC VENOUS INSUFFICIENCY: ICD-10-CM

## 2024-03-05 PROCEDURE — 99204 OFFICE O/P NEW MOD 45 MIN: CPT | Performed by: SURGERY

## 2024-03-05 PROCEDURE — 3079F DIAST BP 80-89 MM HG: CPT | Performed by: SURGERY

## 2024-03-05 PROCEDURE — 3077F SYST BP >= 140 MM HG: CPT | Performed by: SURGERY

## 2024-03-05 RX ORDER — CELECOXIB 200 MG/1
CAPSULE ORAL
COMMUNITY
Start: 2024-02-21

## 2024-03-05 RX ORDER — OXYCODONE AND ACETAMINOPHEN 7.5; 325 MG/1; MG/1
1 TABLET ORAL 2 TIMES DAILY PRN
COMMUNITY
Start: 2024-02-20

## 2024-03-06 LAB
25(OH)D3+25(OH)D2 SERPL-MCNC: 10.3 NG/ML (ref 30–100)
ALBUMIN SERPL-MCNC: 4.2 G/DL (ref 3.8–4.9)
ALBUMIN/GLOB SERPL: 1.4 {RATIO} (ref 1.2–2.2)
ALP SERPL-CCNC: 80 IU/L (ref 44–121)
ALT SERPL-CCNC: 8 IU/L (ref 0–32)
AST SERPL-CCNC: 12 IU/L (ref 0–40)
BASOPHILS # BLD AUTO: 0.1 X10E3/UL (ref 0–0.2)
BASOPHILS NFR BLD AUTO: 1 %
BILIRUB SERPL-MCNC: 0.6 MG/DL (ref 0–1.2)
BUN SERPL-MCNC: 14 MG/DL (ref 6–24)
BUN/CREAT SERPL: 22 (ref 9–23)
CALCIUM SERPL-MCNC: 9.8 MG/DL (ref 8.7–10.2)
CHLORIDE SERPL-SCNC: 103 MMOL/L (ref 96–106)
CO2 SERPL-SCNC: 27 MMOL/L (ref 20–29)
CREAT SERPL-MCNC: 0.63 MG/DL (ref 0.57–1)
EGFRCR SERPLBLD CKD-EPI 2021: 104 ML/MIN/1.73
EOSINOPHIL # BLD AUTO: 0.5 X10E3/UL (ref 0–0.4)
EOSINOPHIL NFR BLD AUTO: 4 %
ERYTHROCYTE [DISTWIDTH] IN BLOOD BY AUTOMATED COUNT: 13.5 % (ref 11.7–15.4)
FOLATE SERPL-MCNC: 9.3 NG/ML
GLOBULIN SER CALC-MCNC: 3 G/DL (ref 1.5–4.5)
GLUCOSE SERPL-MCNC: 82 MG/DL (ref 70–99)
HBA1C MFR BLD: 6.5 % (ref 4.8–5.6)
HCT VFR BLD AUTO: 45.9 % (ref 34–46.6)
HGB BLD-MCNC: 15.4 G/DL (ref 11.1–15.9)
IMM GRANULOCYTES # BLD AUTO: 0.1 X10E3/UL (ref 0–0.1)
IMM GRANULOCYTES NFR BLD AUTO: 1 %
IRON SATN MFR SERPL: 24 % (ref 15–55)
IRON SERPL-MCNC: 72 UG/DL (ref 27–159)
LYMPHOCYTES # BLD AUTO: 4.6 X10E3/UL (ref 0.7–3.1)
LYMPHOCYTES NFR BLD AUTO: 37 %
MCH RBC QN AUTO: 29.6 PG (ref 26.6–33)
MCHC RBC AUTO-ENTMCNC: 33.6 G/DL (ref 31.5–35.7)
MCV RBC AUTO: 88 FL (ref 79–97)
MONOCYTES # BLD AUTO: 0.9 X10E3/UL (ref 0.1–0.9)
MONOCYTES NFR BLD AUTO: 8 %
NEUTROPHILS # BLD AUTO: 6.2 X10E3/UL (ref 1.4–7)
NEUTROPHILS NFR BLD AUTO: 49 %
PLATELET # BLD AUTO: 283 X10E3/UL (ref 150–450)
POTASSIUM SERPL-SCNC: 4.7 MMOL/L (ref 3.5–5.2)
PROT SERPL-MCNC: 7.2 G/DL (ref 6–8.5)
RBC # BLD AUTO: 5.2 X10E6/UL (ref 3.77–5.28)
SODIUM SERPL-SCNC: 145 MMOL/L (ref 134–144)
TIBC SERPL-MCNC: 294 UG/DL (ref 250–450)
TSH SERPL DL<=0.005 MIU/L-ACNC: 1.53 UIU/ML (ref 0.45–4.5)
UIBC SERPL-MCNC: 222 UG/DL (ref 131–425)
UREA BREATH TEST QL: NEGATIVE
VIT B12 SERPL-MCNC: 184 PG/ML (ref 232–1245)
WBC # BLD AUTO: 12.4 X10E3/UL (ref 3.4–10.8)

## 2024-03-06 NOTE — PROGRESS NOTES
Identified patient with two patient identifiers (name and ). Reviewed chart in preparation for visit and have obtained necessary documentation.    Katrina Holt is a 58 y.o. female  Chief Complaint   Patient presents with    Bariatric, Initial Visit     New bariatric patient interested in lap sleeve gastrectomy     BP (!) 168/88 (Site: Left Lower Arm, Position: Sitting, Cuff Size: Large Adult)   Pulse 99   Temp 98.4 °F (36.9 °C)   Resp 20   Ht 1.6 m (5' 3\")   Wt 133.1 kg (293 lb 8 oz)   SpO2 92%   BMI 51.99 kg/m²     1. Have you been to the ER, urgent care clinic since your last visit?  Hospitalized since your last visit?new patient    2. Have you seen or consulted any other health care providers outside of the [facility] since your last visit?  Include any pap smears or colon screening. New patient  
pain, joint stiffness, and joint swelling  Neurological ROS: no TIA or stroke symptoms  Dermatological ROS: negative    Objective:   BP (!) 168/88 (Site: Left Lower Arm, Position: Sitting, Cuff Size: Large Adult)   Pulse 99   Temp 98.4 °F (36.9 °C)   Resp 20   Ht 1.6 m (5' 3\")   Wt 133.1 kg (293 lb 8 oz)   SpO2 92%   BMI 51.99 kg/m²     Physical Exam:  Physical Examination: General appearance - alert, well appearing, and in no distress and morbidly obese  Mental status - alert, oriented to person, place, and time  Eyes - pupils equal and reactive, extraocular eye movements intact  Mouth - mucous membranes moist, pharynx normal without lesions  Neck - supple, no significant adenopathy  Lymphatics - no palpable lymphadenopathy  Chest - clear to auscultation, no wheezes, rales or rhonchi, symmetric air entry  Heart - normal rate, regular rhythm, normal S1, S2, no murmur  Abdomen - soft, nontender, nondistended, no masses, hernia, or organomegaly  Neurological - alert, oriented, normal speech, no focal findings or movement disorder noted  Extremities - venous stasis dermatitis noted  Skin - venous stasis dermatitis    Assessment:     Morbid obesity with comorbidity; no success with medical management,    Plan:     laparoscopic gastric bypass surgery    This is a 58 y.o. female with a BMI of Body mass index is 51.99 kg/m². and the weight-related co-morbidities listed above. Katrina meets the NIH criteria for bariatric surgery based upon the BMI of Body mass index is 51.99 kg/m². and multiple weight-related co-morbidities. Katrina has elected laparoscopic jose d-en-Y gastric bypass as her intervention of choice for treatment of morbid obesity through surgical means secondary to its long term history of success.      In the office today, through an , following Katrina's history and physical examination, a 30 minute discussion regarding the anatomic alterations for the laparoscopic jose d-en-Y gastric bypass was

## 2024-03-08 ENCOUNTER — TELEPHONE (OUTPATIENT)
Age: 59
End: 2024-03-08

## 2024-03-08 NOTE — TELEPHONE ENCOUNTER
Patient's spouse called to get a schedule with Analilia. I advised they needed to complete the Pysch Eval. First. I sent the psych eval. through email and also the number to call when they need schedule the x-ray.    [email or web address]

## 2024-03-21 ENCOUNTER — HOSPITAL ENCOUNTER (OUTPATIENT)
Facility: HOSPITAL | Age: 59
Discharge: HOME | End: 2024-03-21
Attending: SURGERY
Payer: COMMERCIAL

## 2024-03-21 DIAGNOSIS — K21.9 GASTROESOPHAGEAL REFLUX DISEASE WITHOUT ESOPHAGITIS: ICD-10-CM

## 2024-03-21 PROCEDURE — 74240 X-RAY XM UPR GI TRC 1CNTRST: CPT

## 2024-04-04 ENCOUNTER — TELEPHONE (OUTPATIENT)
Age: 59
End: 2024-04-04

## 2024-04-04 DIAGNOSIS — I10 PRIMARY HYPERTENSION: ICD-10-CM

## 2024-04-04 DIAGNOSIS — M79.10 MYALGIA, UNSPECIFIED SITE: ICD-10-CM

## 2024-04-04 RX ORDER — DULOXETIN HYDROCHLORIDE 30 MG/1
90 CAPSULE, DELAYED RELEASE ORAL DAILY
Qty: 180 CAPSULE | Refills: 1 | Status: SHIPPED | OUTPATIENT
Start: 2024-04-04 | End: 2024-04-05 | Stop reason: SDUPTHER

## 2024-04-04 RX ORDER — METOPROLOL SUCCINATE 50 MG/1
50 TABLET, EXTENDED RELEASE ORAL DAILY
Qty: 30 TABLET | Refills: 0 | Status: SHIPPED | OUTPATIENT
Start: 2024-04-04

## 2024-04-04 NOTE — TELEPHONE ENCOUNTER
Patient's  called requesting refills on Metoprolol Succinate 50 mg, and Duloxetine 30 mg. Would like for prescriptions to be sent to [facility] [address].    Thanks!

## 2024-04-05 ENCOUNTER — OFFICE VISIT (OUTPATIENT)
Age: 59
End: 2024-04-05
Payer: COMMERCIAL

## 2024-04-05 VITALS
HEART RATE: 70 BPM | DIASTOLIC BLOOD PRESSURE: 86 MMHG | HEIGHT: 63 IN | TEMPERATURE: 97.6 F | RESPIRATION RATE: 18 BRPM | SYSTOLIC BLOOD PRESSURE: 165 MMHG | BODY MASS INDEX: 51.91 KG/M2 | OXYGEN SATURATION: 94 % | WEIGHT: 293 LBS

## 2024-04-05 DIAGNOSIS — I10 ESSENTIAL HYPERTENSION: ICD-10-CM

## 2024-04-05 DIAGNOSIS — I10 PRIMARY HYPERTENSION: ICD-10-CM

## 2024-04-05 DIAGNOSIS — E16.2 HYPOGLYCEMIA: ICD-10-CM

## 2024-04-05 DIAGNOSIS — Z12.31 BREAST CANCER SCREENING BY MAMMOGRAM: ICD-10-CM

## 2024-04-05 DIAGNOSIS — M79.10 MYALGIA, UNSPECIFIED SITE: ICD-10-CM

## 2024-04-05 DIAGNOSIS — E11.69 TYPE 2 DIABETES MELLITUS WITH OTHER SPECIFIED COMPLICATION, WITHOUT LONG-TERM CURRENT USE OF INSULIN (HCC): Primary | ICD-10-CM

## 2024-04-05 DIAGNOSIS — E78.2 MIXED HYPERLIPIDEMIA: ICD-10-CM

## 2024-04-05 PROCEDURE — 99214 OFFICE O/P EST MOD 30 MIN: CPT | Performed by: STUDENT IN AN ORGANIZED HEALTH CARE EDUCATION/TRAINING PROGRAM

## 2024-04-05 RX ORDER — GLUCOSAMINE HCL/CHONDROITIN SU 500-400 MG
CAPSULE ORAL
Qty: 200 STRIP | Refills: 4 | Status: SHIPPED | OUTPATIENT
Start: 2024-04-05

## 2024-04-05 RX ORDER — MELOXICAM 15 MG/1
15 TABLET ORAL DAILY
COMMUNITY

## 2024-04-05 RX ORDER — ROSUVASTATIN CALCIUM 20 MG/1
20 TABLET, COATED ORAL NIGHTLY
Qty: 90 TABLET | Refills: 3 | Status: SHIPPED | OUTPATIENT
Start: 2024-04-05

## 2024-04-05 RX ORDER — OLMESARTAN MEDOXOMIL 40 MG/1
40 TABLET ORAL DAILY
Qty: 90 TABLET | Refills: 1 | Status: SHIPPED | OUTPATIENT
Start: 2024-04-05

## 2024-04-05 RX ORDER — FUROSEMIDE 40 MG/1
40 TABLET ORAL DAILY
Qty: 90 TABLET | Refills: 1 | Status: SHIPPED | OUTPATIENT
Start: 2024-04-05

## 2024-04-05 RX ORDER — BLOOD-GLUCOSE METER
1 KIT MISCELLANEOUS DAILY
Qty: 1 KIT | Refills: 0 | Status: SHIPPED | OUTPATIENT
Start: 2024-04-05

## 2024-04-05 RX ORDER — BLOOD PRESSURE TEST KIT
1 KIT MISCELLANEOUS DAILY
Qty: 1 KIT | Refills: 0 | Status: SHIPPED | OUTPATIENT
Start: 2024-04-05

## 2024-04-05 RX ORDER — HYDROCHLOROTHIAZIDE 25 MG/1
25 TABLET ORAL EVERY MORNING
Qty: 30 TABLET | Refills: 0 | Status: SHIPPED | OUTPATIENT
Start: 2024-04-05

## 2024-04-05 RX ORDER — DULOXETIN HYDROCHLORIDE 30 MG/1
90 CAPSULE, DELAYED RELEASE ORAL DAILY
Qty: 180 CAPSULE | Refills: 4 | Status: SHIPPED | OUTPATIENT
Start: 2024-04-05

## 2024-04-05 SDOH — ECONOMIC STABILITY: INCOME INSECURITY: HOW HARD IS IT FOR YOU TO PAY FOR THE VERY BASICS LIKE FOOD, HOUSING, MEDICAL CARE, AND HEATING?: VERY HARD

## 2024-04-05 SDOH — ECONOMIC STABILITY: HOUSING INSECURITY
IN THE LAST 12 MONTHS, WAS THERE A TIME WHEN YOU DID NOT HAVE A STEADY PLACE TO SLEEP OR SLEPT IN A SHELTER (INCLUDING NOW)?: NO

## 2024-04-05 SDOH — ECONOMIC STABILITY: FOOD INSECURITY: WITHIN THE PAST 12 MONTHS, THE FOOD YOU BOUGHT JUST DIDN'T LAST AND YOU DIDN'T HAVE MONEY TO GET MORE.: SOMETIMES TRUE

## 2024-04-05 SDOH — ECONOMIC STABILITY: FOOD INSECURITY: WITHIN THE PAST 12 MONTHS, YOU WORRIED THAT YOUR FOOD WOULD RUN OUT BEFORE YOU GOT MONEY TO BUY MORE.: SOMETIMES TRUE

## 2024-04-05 ASSESSMENT — ANXIETY QUESTIONNAIRES
6. BECOMING EASILY ANNOYED OR IRRITABLE: NOT AT ALL
3. WORRYING TOO MUCH ABOUT DIFFERENT THINGS: MORE THAN HALF THE DAYS
2. NOT BEING ABLE TO STOP OR CONTROL WORRYING: SEVERAL DAYS
5. BEING SO RESTLESS THAT IT IS HARD TO SIT STILL: NEARLY EVERY DAY
GAD7 TOTAL SCORE: 9
1. FEELING NERVOUS, ANXIOUS, OR ON EDGE: NEARLY EVERY DAY
4. TROUBLE RELAXING: NOT AT ALL
7. FEELING AFRAID AS IF SOMETHING AWFUL MIGHT HAPPEN: NOT AT ALL

## 2024-04-05 ASSESSMENT — PATIENT HEALTH QUESTIONNAIRE - PHQ9
SUM OF ALL RESPONSES TO PHQ QUESTIONS 1-9: 20
5. POOR APPETITE OR OVEREATING: MORE THAN HALF THE DAYS
3. TROUBLE FALLING OR STAYING ASLEEP: NEARLY EVERY DAY
9. THOUGHTS THAT YOU WOULD BE BETTER OFF DEAD, OR OF HURTING YOURSELF: NOT AT ALL
SUM OF ALL RESPONSES TO PHQ QUESTIONS 1-9: 20
4. FEELING TIRED OR HAVING LITTLE ENERGY: NEARLY EVERY DAY
SUM OF ALL RESPONSES TO PHQ QUESTIONS 1-9: 20
6. FEELING BAD ABOUT YOURSELF - OR THAT YOU ARE A FAILURE OR HAVE LET YOURSELF OR YOUR FAMILY DOWN: NEARLY EVERY DAY
2. FEELING DOWN, DEPRESSED OR HOPELESS: NEARLY EVERY DAY
7. TROUBLE CONCENTRATING ON THINGS, SUCH AS READING THE NEWSPAPER OR WATCHING TELEVISION: NOT AT ALL
SUM OF ALL RESPONSES TO PHQ QUESTIONS 1-9: 20
1. LITTLE INTEREST OR PLEASURE IN DOING THINGS: NEARLY EVERY DAY
8. MOVING OR SPEAKING SO SLOWLY THAT OTHER PEOPLE COULD HAVE NOTICED. OR THE OPPOSITE, BEING SO FIGETY OR RESTLESS THAT YOU HAVE BEEN MOVING AROUND A LOT MORE THAN USUAL: NEARLY EVERY DAY
SUM OF ALL RESPONSES TO PHQ9 QUESTIONS 1 & 2: 6

## 2024-04-05 NOTE — PROGRESS NOTES
#[id number], Ck  Patient has been identified by name and .    Chief Complaint   Patient presents with    Pain     Pt reports for body ache, labs         Vitals:    24 1105 24 1125   BP: (!) 155/91 (!) 165/86   Site: Left Upper Arm Left Upper Arm   Position: Sitting Sitting   Cuff Size: Large Adult Large Adult   Pulse: 70    Resp: 18    Temp: 97.6 °F (36.4 °C)    TempSrc: Oral    SpO2: 94%    Weight: 132.9 kg (293 lb)    Height: 1.6 m (5' 3\")         \"Have you been to the ER, urgent care clinic since your last visit?  Hospitalized since your last visit?\"    NO    “Have you seen or consulted any other health care providers outside of [facility] since your last visit?”    NO    “Have you had a colorectal cancer screening such as a colonoscopy/FIT/Cologuard?    NO    No colonoscopy on file  No cologuard on file  Date of last FIT: 2020   No flexible sigmoidoscopy on file        Have you had a mammogram?”   NO    Date of last Mammogram: 4/15/2019      “Have you had a pap smear?”    NO    No cervical cancer screening on file

## 2024-04-08 DIAGNOSIS — E16.2 HYPOGLYCEMIA: Primary | ICD-10-CM

## 2024-04-08 DIAGNOSIS — E11.69 TYPE 2 DIABETES MELLITUS WITH OTHER SPECIFIED COMPLICATION, WITHOUT LONG-TERM CURRENT USE OF INSULIN (HCC): ICD-10-CM

## 2024-04-08 NOTE — TELEPHONE ENCOUNTER
Received a call from Rosana at [facility]:    Per her report, the Freetyle [place] Kit has been discontinued. Please order the OneTouch Ultra 2 meter. Patient also needs an order for lancets. Thanks you!

## 2024-04-09 RX ORDER — BLOOD-GLUCOSE METER
1 EACH MISCELLANEOUS DAILY
Qty: 1 KIT | Refills: 0 | Status: SHIPPED | OUTPATIENT
Start: 2024-04-09

## 2024-04-09 RX ORDER — LANCETS 30 GAUGE
1 EACH MISCELLANEOUS 2 TIMES DAILY
Qty: 300 EACH | Refills: 1 | Status: SHIPPED | OUTPATIENT
Start: 2024-04-09

## 2024-04-17 ENCOUNTER — TELEPHONE (OUTPATIENT)
Age: 59
End: 2024-04-17

## 2024-04-17 NOTE — TELEPHONE ENCOUNTER
Patient called with spouse on the phone; provided resource for sooner Psychological Evaluation appointment; advised updated PHI form needed if patient would like to add spouse to release PHI; mailed PHI form

## 2024-04-18 ENCOUNTER — TELEPHONE (OUTPATIENT)
Age: 59
End: 2024-04-18

## 2024-04-18 ENCOUNTER — CLINICAL DOCUMENTATION (OUTPATIENT)
Age: 59
End: 2024-04-18

## 2024-04-18 NOTE — PROGRESS NOTES
Attempted twice to fax PCP support form to PCP verified on file. Result- BUSY. Will try again tomorrow.

## 2024-04-18 NOTE — TELEPHONE ENCOUNTER
Identified patient with two patient identifiers (name and ). Reviewed chart in preparation for encounter and have obtained necessary documentation.     Called and spoke with patient and her daughter regarding her [facility] Insurance Requirements.# [id number]. Patient aware of requirements left. Patient's daughter stated they could not find the PCP support from so I have offered to have that faxed over to PCP verified in file, just asked that patient make her PCP aware it will be send over and needs to be faxed back sooner than later. . Patient understood what was discussed and was thankful.

## 2024-04-19 ENCOUNTER — CLINICAL DOCUMENTATION (OUTPATIENT)
Age: 59
End: 2024-04-19

## 2024-04-23 ENCOUNTER — OFFICE VISIT (OUTPATIENT)
Age: 59
End: 2024-04-23
Payer: COMMERCIAL

## 2024-04-23 ENCOUNTER — TELEPHONE (OUTPATIENT)
Age: 59
End: 2024-04-23

## 2024-04-23 VITALS
RESPIRATION RATE: 18 BRPM | DIASTOLIC BLOOD PRESSURE: 73 MMHG | WEIGHT: 293 LBS | TEMPERATURE: 98 F | HEIGHT: 63 IN | HEART RATE: 91 BPM | OXYGEN SATURATION: 91 % | BODY MASS INDEX: 51.91 KG/M2 | SYSTOLIC BLOOD PRESSURE: 115 MMHG

## 2024-04-23 DIAGNOSIS — Z51.81 ENCOUNTER FOR MONITORING STATIN THERAPY: ICD-10-CM

## 2024-04-23 DIAGNOSIS — G72.9 MYOPATHY: Primary | ICD-10-CM

## 2024-04-23 DIAGNOSIS — E78.2 MIXED HYPERLIPIDEMIA: ICD-10-CM

## 2024-04-23 DIAGNOSIS — R22.43 LOCALIZED SWELLING OF BOTH LOWER LEGS: ICD-10-CM

## 2024-04-23 DIAGNOSIS — Z79.899 ENCOUNTER FOR MONITORING STATIN THERAPY: ICD-10-CM

## 2024-04-23 PROCEDURE — 99214 OFFICE O/P EST MOD 30 MIN: CPT

## 2024-04-23 PROCEDURE — 3074F SYST BP LT 130 MM HG: CPT

## 2024-04-23 PROCEDURE — 3078F DIAST BP <80 MM HG: CPT

## 2024-04-23 NOTE — TELEPHONE ENCOUNTER
Identified patient with two patient identifiers (name and ). Reviewed chart in preparation for encounter and have obtained necessary documentation.     Called and spoke with patient and her daughter. Patient will see PCP today and will have form filled out and faxed back. Patient and her daughter understood what was discussed and was thankful.

## 2024-04-23 NOTE — PROGRESS NOTES
#[id number],Najwa    Patient has been identified by name and .    Chief Complaint   Patient presents with    Diabetes     Pt reports for diabetes F/U, worst pain all over the body since last weeks, heavy tongue, nausea after starting the new meds for BS & cholesterol. Pt denied any allergy to any medication.       Vitals:    24 1600   BP: 115/73   Site: Left Upper Arm   Position: Sitting   Cuff Size: Large Adult   Pulse: 91   Resp: 18   Temp: 98 °F (36.7 °C)   TempSrc: Oral   SpO2: 91%   Weight: 132.9 kg (293 lb)   Height: 1.6 m (5' 3\")        \"Have you been to the ER, urgent care clinic since your last visit?  Hospitalized since your last visit?\"    NO    “Have you seen or consulted any other health care providers outside of [facility] since your last visit?”    NO    “Have you had a colorectal cancer screening such as a colonoscopy/FIT/Cologuard?    NO    No colonoscopy on file  No cologuard on file  Date of last FIT: 2020   No flexible sigmoidoscopy on file        Have you had a mammogram?”   NO    Date of last Mammogram: 4/15/2019      “Have you had a pap smear?”    NO    No cervical cancer screening on file

## 2024-04-24 NOTE — PROGRESS NOTES
Chief Complaint   Patient presents with    Diabetes     Pt reports for diabetes F/U, worst pain all over the body since last weeks, heavy tongue, nausea after starting the new meds for BS & cholesterol. Pt denied any allergy to any medication.      Subjective   Katrina Holt is an 58 y.o. female who presents for follow up on diabetes and hyperlipidemia with complains of generalized pain.    Pain  Complains of generalized pain in all muscles since starting on Crestor a week ago. Pain is worsened by movement and she has been unable to walk due to this pain, no specific relieving factors other than resting.     Leg swelling   Has a history of bilateral leg swelling and erythema which has worsened over the past week. Denies chest pain, SOB, palpitations, dizziness.    Review of Systems   Review of Systems     Allergies   Allergies   Allergen Reactions    Hydrochlorothiazide Anaphylaxis     Other reaction(s): Unproven on Challenge    Losartan Anaphylaxis     Other reaction(s): Unproven on Challenge    Sulfa Antibiotics Anaphylaxis     When she was on Losartan (8/2017), and then again when she was on HCTZ (9/2017). She was also on lasix since 2016.    Furosemide      Other reaction(s): Unproven on Challenge  May lead to anaphylaxis     Pt denied to any medication allergy.       Medications  Current Outpatient Medications   Medication Sig    Blood Glucose Monitoring Suppl (ONE TOUCH ULTRA 2) w/Device KIT 1 kit by Does not apply route daily    Lancets MISC 1 each by Does not apply route 2 times daily    DULoxetine (CYMBALTA) 30 MG extended release capsule Take 3 capsules by mouth daily    furosemide (LASIX) 40 MG tablet Take 1 tablet by mouth daily    olmesartan (BENICAR) 40 MG tablet Take 1 tablet by mouth daily    rosuvastatin (CRESTOR) 20 MG tablet Take 1 tablet by mouth nightly    hydroCHLOROthiazide (HYDRODIURIL) 25 MG tablet Take 1 tablet by mouth every morning    metFORMIN (GLUCOPHAGE) 500 MG tablet Take 1 tablet

## 2024-04-25 ENCOUNTER — TELEPHONE (OUTPATIENT)
Age: 59
End: 2024-04-25

## 2024-04-25 ENCOUNTER — HOSPITAL ENCOUNTER (OUTPATIENT)
Dept: VASCULAR SURGERY | Facility: HOSPITAL | Age: 59
Discharge: HOME | End: 2024-04-27
Payer: COMMERCIAL

## 2024-04-25 DIAGNOSIS — Z79.899 ENCOUNTER FOR MONITORING STATIN THERAPY: ICD-10-CM

## 2024-04-25 DIAGNOSIS — G72.9 MYOPATHY: ICD-10-CM

## 2024-04-25 DIAGNOSIS — Z51.81 ENCOUNTER FOR MONITORING STATIN THERAPY: ICD-10-CM

## 2024-04-25 DIAGNOSIS — R22.43 LOCALIZED SWELLING OF BOTH LOWER LEGS: ICD-10-CM

## 2024-04-25 PROCEDURE — 93970 EXTREMITY STUDY: CPT

## 2024-04-25 NOTE — TELEPHONE ENCOUNTER
Patient daughter called in regards to form that was to be faxed to pcp to be completed asap, but pcp never received, is requesting the form be emailed, if not able to then to refax to pcp     please advise  [phone]

## 2024-04-26 LAB
ALBUMIN SERPL-MCNC: 3.7 G/DL (ref 3.5–5)
ALBUMIN/GLOB SERPL: 1 (ref 1.1–2.2)
ALP SERPL-CCNC: 68 U/L (ref 45–117)
ALT SERPL-CCNC: 17 U/L (ref 12–78)
ANION GAP SERPL CALC-SCNC: 5 MMOL/L (ref 5–15)
AST SERPL-CCNC: 10 U/L (ref 15–37)
BILIRUB SERPL-MCNC: 1.2 MG/DL (ref 0.2–1)
BUN SERPL-MCNC: 51 MG/DL (ref 6–20)
BUN/CREAT SERPL: 34 (ref 12–20)
CALCIUM SERPL-MCNC: 9.9 MG/DL (ref 8.5–10.1)
CHLORIDE SERPL-SCNC: 100 MMOL/L (ref 97–108)
CK SERPL-CCNC: 65 U/L (ref 26–192)
CO2 SERPL-SCNC: 34 MMOL/L (ref 21–32)
CREAT SERPL-MCNC: 1.49 MG/DL (ref 0.55–1.02)
GLOBULIN SER CALC-MCNC: 3.7 G/DL (ref 2–4)
GLUCOSE SERPL-MCNC: 110 MG/DL (ref 65–100)
POTASSIUM SERPL-SCNC: 4.7 MMOL/L (ref 3.5–5.1)
PROT SERPL-MCNC: 7.4 G/DL (ref 6.4–8.2)
SODIUM SERPL-SCNC: 139 MMOL/L (ref 136–145)

## 2024-04-29 ENCOUNTER — TELEPHONE (OUTPATIENT)
Age: 59
End: 2024-04-29

## 2024-04-29 NOTE — TELEPHONE ENCOUNTER
Patient's  called stating that [facility] have sent over a form that they are requiring before surgery. There is tow blank copies in the patient's chart. The facility is requesting for the form to be sent back to them at the earliest convenience. The fax number is [phone]. Also, patient's  stated that they were wanting to know the results from the labs and imaging that was done recently. Patient would like to be contacted at your earliest convenience.    Thanks!

## 2024-04-29 NOTE — TELEPHONE ENCOUNTER
Identified patient with two patient identifiers (name and ). Reviewed chart in preparation for encounter and have obtained necessary documentation.     Called and spoke with patient child Leonides, I have informed her I reached out to PCP office verified on file and spoke with Stephanie. I have successfully re-faxed the patients PCP support form and we are just waiting for the form to be faxed back complete. Patients child understood and was thankful. Fax- [phone]. Will have fax scanned in.

## 2024-05-01 ENCOUNTER — TELEPHONE (OUTPATIENT)
Age: 59
End: 2024-05-01

## 2024-05-02 ENCOUNTER — TELEPHONE (OUTPATIENT)
Age: 59
End: 2024-05-02

## 2024-05-02 NOTE — TELEPHONE ENCOUNTER
Second attempt-LM in regards to My Chart Message sent to patient on 5/1/24. Asked patient to confirm receipt of message.

## 2024-05-03 ENCOUNTER — TELEPHONE (OUTPATIENT)
Age: 59
End: 2024-05-03

## 2024-05-03 NOTE — TELEPHONE ENCOUNTER
Third attempt-LM in regards to My Chart Message sent to patient on 5/1/24. Asked patient to confirm receipt of message.

## 2024-05-06 DIAGNOSIS — I10 PRIMARY HYPERTENSION: ICD-10-CM

## 2024-05-07 RX ORDER — HYDROCHLOROTHIAZIDE 25 MG/1
25 TABLET ORAL EVERY MORNING
Qty: 90 TABLET | Refills: 1 | Status: SHIPPED | OUTPATIENT
Start: 2024-05-07

## 2024-05-07 RX ORDER — METOPROLOL SUCCINATE 50 MG/1
50 TABLET, EXTENDED RELEASE ORAL DAILY
Qty: 90 TABLET | Refills: 1 | Status: SHIPPED | OUTPATIENT
Start: 2024-05-07

## 2024-05-13 ENCOUNTER — TELEPHONE (OUTPATIENT)
Age: 59
End: 2024-05-13

## 2024-05-14 ENCOUNTER — TELEPHONE (OUTPATIENT)
Age: 59
End: 2024-05-14

## 2024-05-23 ENCOUNTER — OFFICE VISIT (OUTPATIENT)
Age: 59
End: 2024-05-23
Payer: COMMERCIAL

## 2024-05-23 VITALS
OXYGEN SATURATION: 90 % | HEART RATE: 62 BPM | DIASTOLIC BLOOD PRESSURE: 63 MMHG | SYSTOLIC BLOOD PRESSURE: 96 MMHG | TEMPERATURE: 98.1 F | RESPIRATION RATE: 16 BRPM

## 2024-05-23 DIAGNOSIS — I10 ESSENTIAL HYPERTENSION: ICD-10-CM

## 2024-05-23 DIAGNOSIS — R79.89 ELEVATED SERUM CREATININE: Primary | ICD-10-CM

## 2024-05-23 DIAGNOSIS — E78.2 MIXED HYPERLIPIDEMIA: ICD-10-CM

## 2024-05-23 LAB
ANION GAP SERPL CALC-SCNC: 3 MMOL/L (ref 5–15)
BUN SERPL-MCNC: 30 MG/DL (ref 6–20)
BUN/CREAT SERPL: 29 (ref 12–20)
CALCIUM SERPL-MCNC: 9.6 MG/DL (ref 8.5–10.1)
CHLORIDE SERPL-SCNC: 104 MMOL/L (ref 97–108)
CO2 SERPL-SCNC: 32 MMOL/L (ref 21–32)
CREAT SERPL-MCNC: 1.02 MG/DL (ref 0.55–1.02)
CRP SERPL HS-MCNC: >9.5 MG/L
GLUCOSE SERPL-MCNC: 121 MG/DL (ref 65–100)
POTASSIUM SERPL-SCNC: 3.7 MMOL/L (ref 3.5–5.1)
SODIUM SERPL-SCNC: 139 MMOL/L (ref 136–145)

## 2024-05-23 PROCEDURE — 3074F SYST BP LT 130 MM HG: CPT | Performed by: STUDENT IN AN ORGANIZED HEALTH CARE EDUCATION/TRAINING PROGRAM

## 2024-05-23 PROCEDURE — 99213 OFFICE O/P EST LOW 20 MIN: CPT | Performed by: STUDENT IN AN ORGANIZED HEALTH CARE EDUCATION/TRAINING PROGRAM

## 2024-05-23 PROCEDURE — 3078F DIAST BP <80 MM HG: CPT | Performed by: STUDENT IN AN ORGANIZED HEALTH CARE EDUCATION/TRAINING PROGRAM

## 2024-05-23 NOTE — PROGRESS NOTES
[address] [place] [address]   Office [phone], Fax [phone]    Subjective   CC:  Katrina Holt is a 58 y.o. female who presents for f/up chronic conditions.    HLD: Did not tolerate statin well, now off of it     Morbid obesity: Working w bariatric surgery  next step is working w psych     Complete ROS performed and pertinent positives/negatives are documented in HPI.    Past Medical History  Past Medical History:   Diagnosis Date    Anxiety and depression     Chronic pain     Dyspnea 9/18/2017    Flu     2 wks ago    GERD (gastroesophageal reflux disease)     H. pylori infection 1/26/2013 1/2013 Referred to Dr. Montero who speaks Belarusian to explain treatments Stool test after Abx negative 2013      Headache(784.0)     Hypertension     Incidental lung nodule, > 3mm and < 8mm 04/20/2016    left lung nodule in smoker, stable on CT in 2018       Current Medications  Current Outpatient Medications   Medication Sig Dispense Refill    metoprolol succinate (TOPROL XL) 50 MG extended release tablet TAKE 1 TABLET BY MOUTH EVERY DAY 90 tablet 1    Blood Glucose Monitoring Suppl (ONE TOUCH ULTRA 2) w/Device KIT 1 kit by Does not apply route daily 1 kit 0    Lancets MISC 1 each by Does not apply route 2 times daily 300 each 1    DULoxetine (CYMBALTA) 30 MG extended release capsule Take 3 capsules by mouth daily 180 capsule 4    furosemide (LASIX) 40 MG tablet Take 1 tablet by mouth daily 90 tablet 1    olmesartan (BENICAR) 40 MG tablet Take 1 tablet by mouth daily 90 tablet 1    rosuvastatin (CRESTOR) 20 MG tablet Take 1 tablet by mouth nightly 90 tablet 3    glucose monitoring kit 1 kit by Does not apply route daily 1 kit 0    blood glucose monitor strips Test  as needed for symptoms of irregular blood glucose. Dispense sufficient amount for indicated testing frequency plus additional to accommodate PRN testing needs. 200 strip 4    Blood Pressure KIT 1 kit by Does not apply route daily 1 kit 0

## 2024-05-25 LAB — LPA SERPL-SCNC: <8.4 NMOL/L

## 2024-05-31 ENCOUNTER — TELEPHONE (OUTPATIENT)
Age: 59
End: 2024-05-31

## 2024-05-31 NOTE — TELEPHONE ENCOUNTER
Tried to contact patient to notify her of her visits that need to be scheduled with the dietitian and that her visit for her midway appointment will be cancelled.  Voicemail full, MyChart message sent .

## 2024-06-03 DIAGNOSIS — E11.69 TYPE 2 DIABETES MELLITUS WITH OTHER SPECIFIED COMPLICATION, WITHOUT LONG-TERM CURRENT USE OF INSULIN (HCC): ICD-10-CM

## 2024-06-05 ENCOUNTER — TELEPHONE (OUTPATIENT)
Age: 59
End: 2024-06-05

## 2024-06-05 NOTE — TELEPHONE ENCOUNTER
Spoke with patients daughter and she would like to get dietitian appointments schedule for her mother, Katrina Holt.  I will have the PSR contact her to schedule.

## 2024-06-07 ENCOUNTER — TELEPHONE (OUTPATIENT)
Age: 59
End: 2024-06-07

## 2024-06-07 NOTE — TELEPHONE ENCOUNTER
Pt has a question regarding metFORMIN (GLUCOPHAGE) 1000 MG tablet and the change in mg/number of tablets taken per day.    Pt also is unclear on why she was given rosuvastatin (CRESTOR) 20 MG tablet and would like clarification    They requested a call back and ask if the 1st number does not answer the 2nd number be called.    P:[phone]  P: [phone]

## 2024-06-24 ENCOUNTER — OFFICE VISIT (OUTPATIENT)
Age: 59
End: 2024-06-24

## 2024-06-24 DIAGNOSIS — E66.01 MORBID OBESITY (HCC): Primary | ICD-10-CM

## 2024-06-24 NOTE — PROGRESS NOTES
Pre-operative Bariatric Nutrition Evaluation     Date: 2024   Physician/Surgeon:Narinder Hooker M.D.   Name: Katrina Holt  :  1965  Age:  58  Gender: Female   Type of Surgery: [x]           Gastric Bypass   []           Sleeve Gastrectomy    Pt was contacted for nutrition evaluation phone appointment today with the use of Estonian . After initial explanation of the bariatric program and process for completing nutrition visits (6 months of consecutive visits), pt informed RD that she will be out of the country from  until mid-September which would prevent her from completing a nutrition visit in August. This would result in patient having to re-start 6 months of visits in September. Therefore, RD recommended that pt cancel today's appointment and reschedule to start in September in order to complete consecutive visits as required by insurance. Pt in agreement and expressed understanding. Pt has been reschedule for  at 3:00 pm.       If further details are desired please feel free to contact me at [phone].  This phone number was also provided to the patient for any further questions or concerns.           Nadege Good RD

## 2024-07-30 DIAGNOSIS — I10 PRIMARY HYPERTENSION: ICD-10-CM

## 2024-07-30 DIAGNOSIS — M79.10 MYALGIA, UNSPECIFIED SITE: ICD-10-CM

## 2024-07-30 RX ORDER — DULOXETIN HYDROCHLORIDE 30 MG/1
90 CAPSULE, DELAYED RELEASE ORAL DAILY
Qty: 180 CAPSULE | Refills: 2 | Status: SHIPPED | OUTPATIENT
Start: 2024-07-30

## 2024-07-30 RX ORDER — METOPROLOL SUCCINATE 50 MG/1
50 TABLET, EXTENDED RELEASE ORAL DAILY
Qty: 90 TABLET | Refills: 1 | OUTPATIENT
Start: 2024-07-30

## 2024-07-30 RX ORDER — METOPROLOL SUCCINATE 50 MG/1
50 TABLET, EXTENDED RELEASE ORAL DAILY
Qty: 90 TABLET | Refills: 1 | Status: SHIPPED | OUTPATIENT
Start: 2024-07-30

## 2024-07-30 RX ORDER — DULOXETIN HYDROCHLORIDE 30 MG/1
90 CAPSULE, DELAYED RELEASE ORAL DAILY
Qty: 180 CAPSULE | Refills: 2 | OUTPATIENT
Start: 2024-07-30

## 2024-07-30 RX ORDER — FUROSEMIDE 40 MG/1
40 TABLET ORAL DAILY
Qty: 90 TABLET | Refills: 1 | OUTPATIENT
Start: 2024-07-30

## 2024-07-30 RX ORDER — FUROSEMIDE 40 MG/1
40 TABLET ORAL DAILY
Qty: 90 TABLET | Refills: 1 | Status: SHIPPED | OUTPATIENT
Start: 2024-07-30

## 2024-07-30 NOTE — TELEPHONE ENCOUNTER
Patient's  called requesting refills on Duloxetine 30 mg, Furosemide 40 mg, and Metoprolol Succinate 50 mg. He stated that the patient is due to go out of town tomorrow evening for a little over a month. Would like for prescriptions to be sent to [facility] on [address]. Patient was last seen by Dr. Garcia on 5/23/24.    Thanks!

## 2024-09-11 ENCOUNTER — TELEPHONE (OUTPATIENT)
Age: 59
End: 2024-09-11

## 2024-11-11 ENCOUNTER — TELEPHONE (OUTPATIENT)
Age: 59
End: 2024-11-11

## 2024-11-11 NOTE — TELEPHONE ENCOUNTER
Bariatric RD contacted pt to confirm upcoming nutrition appointment for Friday 11/15 at 2 pm over the phone.     Pt has previously rescheduled multiple visits with our program. The use of an Syriac  was used for the reminder call.     Pt did not answer and a message was left asking the pt to contact our office to either confirm or cancel the appointment.     Thanks.   Nadege Good  [phone]

## 2024-11-13 ENCOUNTER — TELEPHONE (OUTPATIENT)
Age: 59
End: 2024-11-13

## 2024-11-13 NOTE — TELEPHONE ENCOUNTER
Bariatric RD contacted pt for a 2nd time to confirm upcoming nutrition appointment for Friday 11/15 at 2 pm over the phone. The use of an Mongolian  was used for the reminder call.     Calls were attempted for both phone numbers listed in the chart. No answer. Messages left at both numbers confirming the appointment and requesting the pt contact our office to confirm or cancel.      Pt has previously rescheduled multiple visits with our program including several nutrition visits and psychological evaluation.      Will attempt pt for a final time on the day/time of the scheduled phone appointment.     Thanks.   Nadege Good  [phone]

## 2024-11-15 ENCOUNTER — TELEPHONE (OUTPATIENT)
Age: 59
End: 2024-11-15

## 2024-11-15 NOTE — TELEPHONE ENCOUNTER
Bariatric RD attempted to reach pt for the 2:00 pm nutrition phone appointment scheduled for today (11/15/24) with the use of an Persian .  We previously attempted to reach pt 2 times earlier this week to confirm the appointment as pt has previously canceled multiple appointments with our program/practice.      Today we attempted both phone numbers  on file, no answer. A message was left instructing pt to contact our office at [phone].        Thanks.   Nadege Good  [phone]

## 2024-12-04 ENCOUNTER — TELEPHONE (OUTPATIENT)
Age: 59
End: 2024-12-04

## 2024-12-04 NOTE — TELEPHONE ENCOUNTER
Attempted to contact pt to schedule nutrition restart and review appointment/practice policies w/ pt. LVM for pt to contact our office at earliest convenience.    Yes

## 2025-01-03 ENCOUNTER — HOSPITAL ENCOUNTER (INPATIENT)
Facility: HOSPITAL | Age: 60
LOS: 1 days | Discharge: HOME | DRG: 394 | End: 2025-01-04
Attending: EMERGENCY MEDICINE | Admitting: FAMILY MEDICINE
Payer: COMMERCIAL

## 2025-01-03 ENCOUNTER — APPOINTMENT (OUTPATIENT)
Facility: HOSPITAL | Age: 60
DRG: 394 | End: 2025-01-03
Payer: COMMERCIAL

## 2025-01-03 DIAGNOSIS — R11.2 NAUSEA AND VOMITING, UNSPECIFIED VOMITING TYPE: ICD-10-CM

## 2025-01-03 DIAGNOSIS — R10.13 ABDOMINAL PAIN, EPIGASTRIC: Primary | ICD-10-CM

## 2025-01-03 LAB
ALBUMIN SERPL-MCNC: 3 G/DL (ref 3.5–5)
ALBUMIN/GLOB SERPL: 0.7 (ref 1.1–2.2)
ALP SERPL-CCNC: 75 U/L (ref 45–117)
ALT SERPL-CCNC: 20 U/L (ref 12–78)
ANION GAP SERPL CALC-SCNC: 3 MMOL/L (ref 2–12)
AST SERPL-CCNC: 36 U/L (ref 15–37)
BASOPHILS # BLD: 0 K/UL (ref 0–0.1)
BASOPHILS NFR BLD: 0 % (ref 0–1)
BILIRUB SERPL-MCNC: 1.2 MG/DL (ref 0.2–1)
BUN SERPL-MCNC: 7 MG/DL (ref 6–20)
BUN/CREAT SERPL: 11 (ref 12–20)
CALCIUM SERPL-MCNC: 9.2 MG/DL (ref 8.5–10.1)
CHLORIDE SERPL-SCNC: 107 MMOL/L (ref 97–108)
CO2 SERPL-SCNC: 30 MMOL/L (ref 21–32)
COMMENT:: NORMAL
CREAT SERPL-MCNC: 0.61 MG/DL (ref 0.55–1.02)
DIFFERENTIAL METHOD BLD: ABNORMAL
EOSINOPHIL # BLD: 0.2 K/UL (ref 0–0.4)
EOSINOPHIL NFR BLD: 2 % (ref 0–7)
ERYTHROCYTE [DISTWIDTH] IN BLOOD BY AUTOMATED COUNT: 15.2 % (ref 11.5–14.5)
GLOBULIN SER CALC-MCNC: 4.3 G/DL (ref 2–4)
GLUCOSE SERPL-MCNC: 102 MG/DL (ref 65–100)
HCT VFR BLD AUTO: 46.8 % (ref 35–47)
HGB BLD-MCNC: 16.1 G/DL (ref 11.5–16)
IMM GRANULOCYTES # BLD AUTO: 0 K/UL (ref 0–0.04)
IMM GRANULOCYTES NFR BLD AUTO: 0 % (ref 0–0.5)
LIPASE SERPL-CCNC: 19 U/L (ref 13–75)
LYMPHOCYTES # BLD: 3.5 K/UL (ref 0.8–3.5)
LYMPHOCYTES NFR BLD: 30 % (ref 12–49)
MCH RBC QN AUTO: 30 PG (ref 26–34)
MCHC RBC AUTO-ENTMCNC: 34.4 G/DL (ref 30–36.5)
MCV RBC AUTO: 87.3 FL (ref 80–99)
MONOCYTES # BLD: 0.7 K/UL (ref 0–1)
MONOCYTES NFR BLD: 6 % (ref 5–13)
NEUTS SEG # BLD: 6.9 K/UL (ref 1.8–8)
NEUTS SEG NFR BLD: 62 % (ref 32–75)
NRBC # BLD: 0 K/UL (ref 0–0.01)
NRBC BLD-RTO: 0 PER 100 WBC
PLATELET # BLD AUTO: 318 K/UL (ref 150–400)
PMV BLD AUTO: 10.8 FL (ref 8.9–12.9)
POTASSIUM SERPL-SCNC: 4 MMOL/L (ref 3.5–5.1)
PROT SERPL-MCNC: 7.3 G/DL (ref 6.4–8.2)
RBC # BLD AUTO: 5.36 M/UL (ref 3.8–5.2)
SODIUM SERPL-SCNC: 140 MMOL/L (ref 136–145)
SPECIMEN HOLD: NORMAL
WBC # BLD AUTO: 11.4 K/UL (ref 3.6–11)

## 2025-01-03 PROCEDURE — 6360000002 HC RX W HCPCS

## 2025-01-03 PROCEDURE — 74177 CT ABD & PELVIS W/CONTRAST: CPT

## 2025-01-03 PROCEDURE — 6360000004 HC RX CONTRAST MEDICATION: Performed by: EMERGENCY MEDICINE

## 2025-01-03 PROCEDURE — 6360000002 HC RX W HCPCS: Performed by: EMERGENCY MEDICINE

## 2025-01-03 PROCEDURE — 96374 THER/PROPH/DIAG INJ IV PUSH: CPT

## 2025-01-03 PROCEDURE — 80053 COMPREHEN METABOLIC PANEL: CPT

## 2025-01-03 PROCEDURE — 99285 EMERGENCY DEPT VISIT HI MDM: CPT

## 2025-01-03 PROCEDURE — 36415 COLL VENOUS BLD VENIPUNCTURE: CPT

## 2025-01-03 PROCEDURE — 2580000003 HC RX 258

## 2025-01-03 PROCEDURE — 2580000003 HC RX 258: Performed by: EMERGENCY MEDICINE

## 2025-01-03 PROCEDURE — 96375 TX/PRO/DX INJ NEW DRUG ADDON: CPT

## 2025-01-03 PROCEDURE — 85025 COMPLETE CBC W/AUTO DIFF WBC: CPT

## 2025-01-03 PROCEDURE — 83690 ASSAY OF LIPASE: CPT

## 2025-01-03 PROCEDURE — 1100000000 HC RM PRIVATE

## 2025-01-03 PROCEDURE — 6370000000 HC RX 637 (ALT 250 FOR IP)

## 2025-01-03 RX ORDER — ACETAMINOPHEN 650 MG/1
650 SUPPOSITORY RECTAL EVERY 6 HOURS PRN
Status: DISCONTINUED | OUTPATIENT
Start: 2025-01-03 | End: 2025-01-04 | Stop reason: HOSPADM

## 2025-01-03 RX ORDER — IOPAMIDOL 755 MG/ML
100 INJECTION, SOLUTION INTRAVASCULAR
Status: COMPLETED | OUTPATIENT
Start: 2025-01-03 | End: 2025-01-03

## 2025-01-03 RX ORDER — ONDANSETRON 4 MG/1
4 TABLET, ORALLY DISINTEGRATING ORAL EVERY 8 HOURS PRN
Status: DISCONTINUED | OUTPATIENT
Start: 2025-01-03 | End: 2025-01-04 | Stop reason: HOSPADM

## 2025-01-03 RX ORDER — SODIUM CHLORIDE 0.9 % (FLUSH) 0.9 %
5-40 SYRINGE (ML) INJECTION EVERY 12 HOURS SCHEDULED
Status: DISCONTINUED | OUTPATIENT
Start: 2025-01-03 | End: 2025-01-04 | Stop reason: HOSPADM

## 2025-01-03 RX ORDER — MULTIVITAMIN WITH IRON
2500 TABLET ORAL DAILY
Status: DISCONTINUED | OUTPATIENT
Start: 2025-01-03 | End: 2025-01-04 | Stop reason: HOSPADM

## 2025-01-03 RX ORDER — 0.9 % SODIUM CHLORIDE 0.9 %
1000 INTRAVENOUS SOLUTION INTRAVENOUS ONCE
Status: COMPLETED | OUTPATIENT
Start: 2025-01-03 | End: 2025-01-03

## 2025-01-03 RX ORDER — ONDANSETRON 2 MG/ML
4 INJECTION INTRAMUSCULAR; INTRAVENOUS EVERY 6 HOURS PRN
Status: DISCONTINUED | OUTPATIENT
Start: 2025-01-03 | End: 2025-01-04 | Stop reason: HOSPADM

## 2025-01-03 RX ORDER — KETOROLAC TROMETHAMINE 15 MG/ML
15 INJECTION, SOLUTION INTRAMUSCULAR; INTRAVENOUS ONCE
Status: COMPLETED | OUTPATIENT
Start: 2025-01-03 | End: 2025-01-03

## 2025-01-03 RX ORDER — DULOXETIN HYDROCHLORIDE 30 MG/1
30 CAPSULE, DELAYED RELEASE ORAL 2 TIMES DAILY
Status: DISCONTINUED | OUTPATIENT
Start: 2025-01-03 | End: 2025-01-04 | Stop reason: HOSPADM

## 2025-01-03 RX ORDER — SODIUM CHLORIDE 0.9 % (FLUSH) 0.9 %
5-40 SYRINGE (ML) INJECTION PRN
Status: DISCONTINUED | OUTPATIENT
Start: 2025-01-03 | End: 2025-01-04 | Stop reason: HOSPADM

## 2025-01-03 RX ORDER — SODIUM CHLORIDE, SODIUM LACTATE, POTASSIUM CHLORIDE, CALCIUM CHLORIDE 600; 310; 30; 20 MG/100ML; MG/100ML; MG/100ML; MG/100ML
INJECTION, SOLUTION INTRAVENOUS CONTINUOUS
Status: DISCONTINUED | OUTPATIENT
Start: 2025-01-03 | End: 2025-01-04

## 2025-01-03 RX ORDER — ONDANSETRON 2 MG/ML
4 INJECTION INTRAMUSCULAR; INTRAVENOUS ONCE
Status: COMPLETED | OUTPATIENT
Start: 2025-01-03 | End: 2025-01-03

## 2025-01-03 RX ORDER — ACETAMINOPHEN 325 MG/1
650 TABLET ORAL EVERY 6 HOURS PRN
Status: DISCONTINUED | OUTPATIENT
Start: 2025-01-03 | End: 2025-01-04 | Stop reason: HOSPADM

## 2025-01-03 RX ORDER — SODIUM CHLORIDE 9 MG/ML
INJECTION, SOLUTION INTRAVENOUS PRN
Status: DISCONTINUED | OUTPATIENT
Start: 2025-01-03 | End: 2025-01-04 | Stop reason: HOSPADM

## 2025-01-03 RX ORDER — METOPROLOL SUCCINATE 25 MG/1
50 TABLET, EXTENDED RELEASE ORAL 2 TIMES DAILY
Status: DISCONTINUED | OUTPATIENT
Start: 2025-01-03 | End: 2025-01-04 | Stop reason: HOSPADM

## 2025-01-03 RX ORDER — POLYETHYLENE GLYCOL 3350 17 G/17G
17 POWDER, FOR SOLUTION ORAL DAILY PRN
Status: DISCONTINUED | OUTPATIENT
Start: 2025-01-03 | End: 2025-01-04 | Stop reason: HOSPADM

## 2025-01-03 RX ORDER — ENOXAPARIN SODIUM 100 MG/ML
30 INJECTION SUBCUTANEOUS 2 TIMES DAILY
Status: DISCONTINUED | OUTPATIENT
Start: 2025-01-03 | End: 2025-01-04 | Stop reason: HOSPADM

## 2025-01-03 RX ADMIN — DULOXETINE HYDROCHLORIDE 30 MG: 30 CAPSULE, DELAYED RELEASE PELLETS ORAL at 20:25

## 2025-01-03 RX ADMIN — METOPROLOL SUCCINATE 50 MG: 25 TABLET, FILM COATED, EXTENDED RELEASE ORAL at 20:25

## 2025-01-03 RX ADMIN — SODIUM CHLORIDE, POTASSIUM CHLORIDE, SODIUM LACTATE AND CALCIUM CHLORIDE: 600; 310; 30; 20 INJECTION, SOLUTION INTRAVENOUS at 20:28

## 2025-01-03 RX ADMIN — IOPAMIDOL 100 ML: 755 INJECTION, SOLUTION INTRAVENOUS at 15:49

## 2025-01-03 RX ADMIN — ONDANSETRON 4 MG: 2 INJECTION, SOLUTION INTRAMUSCULAR; INTRAVENOUS at 16:06

## 2025-01-03 RX ADMIN — CYANOCOBALAMIN TAB 500 MCG 2500 MCG: 500 TAB at 20:24

## 2025-01-03 RX ADMIN — SODIUM CHLORIDE 40 MG: 9 INJECTION INTRAMUSCULAR; INTRAVENOUS; SUBCUTANEOUS at 17:51

## 2025-01-03 RX ADMIN — SODIUM CHLORIDE 1000 ML: 9 INJECTION, SOLUTION INTRAVENOUS at 16:06

## 2025-01-03 RX ADMIN — KETOROLAC TROMETHAMINE 15 MG: 15 INJECTION, SOLUTION INTRAMUSCULAR; INTRAVENOUS at 16:06

## 2025-01-03 RX ADMIN — SODIUM CHLORIDE, PRESERVATIVE FREE 40 MG: 5 INJECTION INTRAVENOUS at 20:24

## 2025-01-03 ASSESSMENT — ENCOUNTER SYMPTOMS
ABDOMINAL PAIN: 1
NAUSEA: 1
VOMITING: 1
DIARRHEA: 0

## 2025-01-03 ASSESSMENT — PAIN - FUNCTIONAL ASSESSMENT: PAIN_FUNCTIONAL_ASSESSMENT: 0-10

## 2025-01-03 ASSESSMENT — PAIN SCALES - GENERAL: PAINLEVEL_OUTOF10: 5

## 2025-01-03 ASSESSMENT — PAIN DESCRIPTION - LOCATION: LOCATION: ABDOMEN

## 2025-01-03 NOTE — ED TRIAGE NOTES
Patient arrives ambulatory to the ED for complaints of abdominal pain for the last 10 days which has been increasing. States that any oral intake causes the pain to increase \"to a 10/10\".     Endorses nausea and vomiting after eating.     Denies fever, diarrhea.     States she had a gastric sleeve placed in [place] ~3 months ago.     PMH HTN

## 2025-01-03 NOTE — ED PROVIDER NOTES
[facility] EMERGENCY DEPT  EMERGENCY DEPARTMENT ENCOUNTER      Pt Name: Katrina Holt  MRN: [id number]  Birthdate 1965  Date of evaluation: 1/3/2025  Provider: Robert Fox MD    CHIEF COMPLAINT       Chief Complaint   Patient presents with    Abdominal Pain         HISTORY OF PRESENT ILLNESS   (Location/Symptom, Timing/Onset, Context/Setting, Quality, Duration, Modifying Factors, Severity)  Note limiting factors.   59-year-old with a history of hypertension, hyperlipidemia, morbid obesity, DDD, anxiety, depression, GERD, peptic ulcer disease, status post cholecystectomy.  History obtained with the assistance of the .  She presents with complaints of a 10-day history of \"stomach pain.\"  She points to her epigastrium.  The pain radiates to her bilateral upper quadrants and through to her back.  It has been constant but gets worse after eating.  She has had nausea and vomiting especially after eating.  She states that she had a \"gastric sleeve\" procedure done 3 months ago in [place].  She states that she had been doing well after the procedure until 10 days ago.          Review of External Medical Records:     Nursing Notes were reviewed.    REVIEW OF SYSTEMS    (2-9 systems for level 4, 10 or more for level 5)     Review of Systems    Except as noted above the remainder of the review of systems was reviewed and negative.       PAST MEDICAL HISTORY     Past Medical History:   Diagnosis Date    Anxiety and depression     Chronic pain     Dyspnea 9/18/2017    Flu     2 wks ago    GERD (gastroesophageal reflux disease)     H. pylori infection 1/26/2013 1/2013 Referred to Dr. Montero who speaks Urdu to explain treatments Stool test after Abx negative 2013      Headache(784.0)     Hypertension     Incidental lung nodule, > 3mm and < 8mm 04/20/2016    left lung nodule in smoker, stable on CT in 2018         SURGICAL HISTORY       Past Surgical History:   Procedure Laterality Date    APPENDECTOMY

## 2025-01-03 NOTE — ED NOTES
TRANSFER - OUT REPORT:    Verbal report given to Rachel Holt  being transferred to 4th floor for routine progression of patient care       Report consisted of patient's Situation, Background, Assessment and   Recommendations(SBAR).     Information from the following report(s) Nurse Handoff Report, Intake/Output, and Recent Results was reviewed with the receiving nurse.    [facility] Fall Assessment:    Presents to emergency department  because of falls (Syncope, seizure, or loss of consciousness): No  Age > 70: No  Altered Mental Status, Intoxication with alcohol or substance confusion (Disorientation, impaired judgment, poor safety awaremess, or inability to follow instructions): No  Impaired Mobility: Ambulates or transfers with assistive devices or assistance; Unable to ambulate or transer.: No  Nursing Judgement: No          Lines:   Peripheral IV 01/03/25 Right Antecubital (Active)        Opportunity for questions and clarification was provided.      Patient transported with:  Tech

## 2025-01-03 NOTE — ED NOTES
Bedside and Verbal shift change report given to Jessica (oncoming nurse) by Aminata (offgoing nurse). Report included the following information Nurse Handoff Report, ED Encounter Summary, and Recent Results.

## 2025-01-03 NOTE — H&P
[address] [place] [address]   Office [phone]  Fax [phone]       Admission H&P     Name: Katrina Holt MRN: [id number]  Sex: Female   YOB: 1965  Age: 59 y.o.  PCP: Nate Hopson MD     Source of Information: patient, medical records    Chief complaint: Epigastric pain    History of Present Illness  Katrina Holt is a 59 y.o. female with known history of HTN, class III obesity s/p gastric bypass performed 10/2024 in [place], T2DM, depression who presents to the ER complaining of epigastric pain.    Patient reports 10 day history of ongoing epigastric abdominal pain that radiates to her back. Pain is described as sharp and worsens especially after eating or drinking anything. She associates nausea and vomiting as well as chills, notes worsening symptoms over the last 2 days and has vomiting anything she has eaten. Pain acutely worsened overnight which prompted ER visit. Last meal was last night at 7 PM, had 2 potato fries which she vomited up. Reports medications in ER helped; pain 10/10 at its worst, currently 0/10.     Patient has a history of gastric sleeve 3 months ago performed in [place], reports an uncomplicated postop course. She has made dietary modifications including incorporating more juices/soups and eliminating spicy/sour foods. She is not eating as much as prior to procedure, endorses a 27 kilo weight loss over the last 3 months. Endorses constipation at baseline. Denies associated hematemesis, hematochezia, melena, chest pain, shortness of breath at this time.     In the ER:      Vitals:  Patient Vitals for the past 8 hrs:   Temp Pulse Resp BP SpO2   01/03/25 1524 97.3 °F (36.3 °C) 79 18 (!) 158/92 100 %   01/03/25 1521 97.3 °F (36.3 °C) 73 16 (!) 158/92 97 %         Labs:   Recent Labs     01/03/25  1528   WBC 11.4*   HGB 16.1*   HCT 46.8        Recent Labs     01/03/25  1528      K 4.0      CO2 30   BUN 7     Recent Labs      1 kit by Does not apply route daily 24   Kashmir Garcia MD   celecoxib (CELEBREX) 200 MG capsule  24   ProviderGuillermo MD   oxyCODONE-acetaminophen (PERCOCET) 7.5-325 MG per tablet Take 1 tablet by mouth 2 times daily as needed. 24   Provider, MD Guillermo       Allergies  Allergies   Allergen Reactions    Hydrochlorothiazide Anaphylaxis     Other reaction(s): Unproven on Challenge    Losartan Anaphylaxis     Other reaction(s): Unproven on Challenge    Sulfa Antibiotics Anaphylaxis     When she was on Losartan (2017), and then again when she was on HCTZ (2017). She was also on lasix since .    Furosemide      Other reaction(s): Unproven on Challenge  May lead to anaphylaxis     Pt denied to any medication allergy.       Past Medical History:   Diagnosis Date    Anxiety and depression     Chronic pain     Dyspnea 2017    Flu     2 wks ago    GERD (gastroesophageal reflux disease)     H. pylori infection 2013 Referred to Dr. Montero who speaks Tajik to explain treatments Stool test after Abx negative       Headache(784.0)     Hypertension     Incidental lung nodule, > 3mm and < 8mm 2016    left lung nodule in smoker, stable on CT in 2018       Past Surgical History:   Procedure Laterality Date    APPENDECTOMY       SECTION       SECTION      X 3 Births    CHOLECYSTECTOMY      AL APPENDECTOMY      REMOVAL GALLBLADDER      WRIST FRACTURE SURGERY      metal       Family History   Problem Relation Age of Onset    Hypertension Brother     Hypertension Brother     Diabetes Mother     Hypertension Brother        Social History    Alcohol history: Not at all  Smoking history: Smokes 1 ppd every 2 days x 40 years  Illicit drug history: Not at all  Living arrangement: patient lives with their spouse and children  Ambulates: Independently; sometimes with support from family but no walker/cane use     Review of Systems:   Review of Systems

## 2025-01-04 VITALS
DIASTOLIC BLOOD PRESSURE: 71 MMHG | WEIGHT: 264.55 LBS | OXYGEN SATURATION: 94 % | HEIGHT: 59 IN | RESPIRATION RATE: 15 BRPM | HEART RATE: 73 BPM | TEMPERATURE: 97.9 F | BODY MASS INDEX: 53.33 KG/M2 | SYSTOLIC BLOOD PRESSURE: 143 MMHG

## 2025-01-04 PROBLEM — Z98.84 HISTORY OF ROUX-EN-Y GASTRIC BYPASS: Status: ACTIVE | Noted: 2025-01-04

## 2025-01-04 PROBLEM — R11.10 INTRACTABLE VOMITING: Status: ACTIVE | Noted: 2025-01-04

## 2025-01-04 LAB
ANION GAP SERPL CALC-SCNC: 2 MMOL/L (ref 2–12)
ANION GAP SERPL CALC-SCNC: 4 MMOL/L (ref 2–12)
BASOPHILS # BLD: 0 K/UL (ref 0–0.1)
BASOPHILS NFR BLD: 0 % (ref 0–1)
BUN SERPL-MCNC: 6 MG/DL (ref 6–20)
BUN SERPL-MCNC: 7 MG/DL (ref 6–20)
BUN/CREAT SERPL: 12 (ref 12–20)
BUN/CREAT SERPL: 16 (ref 12–20)
CALCIUM SERPL-MCNC: 8.6 MG/DL (ref 8.5–10.1)
CALCIUM SERPL-MCNC: 9.1 MG/DL (ref 8.5–10.1)
CHLORIDE SERPL-SCNC: 111 MMOL/L (ref 97–108)
CHLORIDE SERPL-SCNC: 112 MMOL/L (ref 97–108)
CO2 SERPL-SCNC: 27 MMOL/L (ref 21–32)
CO2 SERPL-SCNC: 29 MMOL/L (ref 21–32)
CREAT SERPL-MCNC: 0.44 MG/DL (ref 0.55–1.02)
CREAT SERPL-MCNC: 0.52 MG/DL (ref 0.55–1.02)
DIFFERENTIAL METHOD BLD: ABNORMAL
EOSINOPHIL # BLD: 0.3 K/UL (ref 0–0.4)
EOSINOPHIL NFR BLD: 3 % (ref 0–7)
ERYTHROCYTE [DISTWIDTH] IN BLOOD BY AUTOMATED COUNT: 15.1 % (ref 11.5–14.5)
GLUCOSE SERPL-MCNC: 82 MG/DL (ref 65–100)
GLUCOSE SERPL-MCNC: 92 MG/DL (ref 65–100)
HCT VFR BLD AUTO: 41.5 % (ref 35–47)
HGB BLD-MCNC: 13.9 G/DL (ref 11.5–16)
IMM GRANULOCYTES # BLD AUTO: 0 K/UL (ref 0–0.04)
IMM GRANULOCYTES NFR BLD AUTO: 0 % (ref 0–0.5)
LYMPHOCYTES # BLD: 4.1 K/UL (ref 0.8–3.5)
LYMPHOCYTES NFR BLD: 40 % (ref 12–49)
MCH RBC QN AUTO: 29.1 PG (ref 26–34)
MCHC RBC AUTO-ENTMCNC: 33.5 G/DL (ref 30–36.5)
MCV RBC AUTO: 87 FL (ref 80–99)
MONOCYTES # BLD: 0.7 K/UL (ref 0–1)
MONOCYTES NFR BLD: 7 % (ref 5–13)
NEUTS SEG # BLD: 5 K/UL (ref 1.8–8)
NEUTS SEG NFR BLD: 50 % (ref 32–75)
NRBC # BLD: 0 K/UL (ref 0–0.01)
NRBC BLD-RTO: 0 PER 100 WBC
PLATELET # BLD AUTO: 254 K/UL (ref 150–400)
PMV BLD AUTO: 10.1 FL (ref 8.9–12.9)
POTASSIUM SERPL-SCNC: 3 MMOL/L (ref 3.5–5.1)
POTASSIUM SERPL-SCNC: 3.6 MMOL/L (ref 3.5–5.1)
RBC # BLD AUTO: 4.77 M/UL (ref 3.8–5.2)
SODIUM SERPL-SCNC: 142 MMOL/L (ref 136–145)
SODIUM SERPL-SCNC: 143 MMOL/L (ref 136–145)
WBC # BLD AUTO: 10.2 K/UL (ref 3.6–11)

## 2025-01-04 PROCEDURE — 2580000003 HC RX 258

## 2025-01-04 PROCEDURE — 2500000003 HC RX 250 WO HCPCS

## 2025-01-04 PROCEDURE — 80048 BASIC METABOLIC PNL TOTAL CA: CPT

## 2025-01-04 PROCEDURE — 6370000000 HC RX 637 (ALT 250 FOR IP)

## 2025-01-04 PROCEDURE — 94761 N-INVAS EAR/PLS OXIMETRY MLT: CPT

## 2025-01-04 PROCEDURE — 6360000002 HC RX W HCPCS

## 2025-01-04 PROCEDURE — 99238 HOSP IP/OBS DSCHRG MGMT 30/<: CPT | Performed by: STUDENT IN AN ORGANIZED HEALTH CARE EDUCATION/TRAINING PROGRAM

## 2025-01-04 PROCEDURE — 85025 COMPLETE CBC W/AUTO DIFF WBC: CPT

## 2025-01-04 PROCEDURE — 36415 COLL VENOUS BLD VENIPUNCTURE: CPT

## 2025-01-04 RX ORDER — SUCRALFATE 1 G/1
1 TABLET ORAL
Qty: 120 TABLET | Refills: 0 | Status: SHIPPED | OUTPATIENT
Start: 2025-01-04

## 2025-01-04 RX ORDER — POTASSIUM CHLORIDE 750 MG/1
20 TABLET, EXTENDED RELEASE ORAL ONCE
Status: COMPLETED | OUTPATIENT
Start: 2025-01-04 | End: 2025-01-04

## 2025-01-04 RX ORDER — POTASSIUM CHLORIDE 7.45 MG/ML
10 INJECTION INTRAVENOUS
Status: DISCONTINUED | OUTPATIENT
Start: 2025-01-04 | End: 2025-01-04

## 2025-01-04 RX ORDER — PANTOPRAZOLE SODIUM 40 MG/1
40 TABLET, DELAYED RELEASE ORAL
Qty: 90 TABLET | Refills: 0 | Status: SHIPPED | OUTPATIENT
Start: 2025-01-04

## 2025-01-04 RX ADMIN — SODIUM CHLORIDE, POTASSIUM CHLORIDE, SODIUM LACTATE AND CALCIUM CHLORIDE: 600; 310; 30; 20 INJECTION, SOLUTION INTRAVENOUS at 06:39

## 2025-01-04 RX ADMIN — POTASSIUM CHLORIDE 20 MEQ: 750 TABLET, EXTENDED RELEASE ORAL at 16:50

## 2025-01-04 RX ADMIN — POTASSIUM CHLORIDE 10 MEQ: 7.45 INJECTION INTRAVENOUS at 12:30

## 2025-01-04 RX ADMIN — SODIUM CHLORIDE, PRESERVATIVE FREE 10 ML: 5 INJECTION INTRAVENOUS at 10:18

## 2025-01-04 RX ADMIN — POTASSIUM CHLORIDE 10 MEQ: 7.45 INJECTION INTRAVENOUS at 08:51

## 2025-01-04 RX ADMIN — POTASSIUM CHLORIDE 10 MEQ: 7.45 INJECTION INTRAVENOUS at 10:53

## 2025-01-04 RX ADMIN — SODIUM CHLORIDE, PRESERVATIVE FREE 40 MG: 5 INJECTION INTRAVENOUS at 08:41

## 2025-01-04 NOTE — PLAN OF CARE
Problem: Discharge Planning  Goal: Discharge to home or other facility with appropriate resources  Outcome: Progressing  Flowsheets (Taken 1/4/2025 0335)  Discharge to home or other facility with appropriate resources: Identify barriers to discharge with patient and caregiver     Problem: Pain  Goal: Verbalizes/displays adequate comfort level or baseline comfort level  Outcome: Progressing  Flowsheets (Taken 1/4/2025 0335)  Verbalizes/displays adequate comfort level or baseline comfort level: Encourage patient to monitor pain and request assistance

## 2025-01-04 NOTE — DISCHARGE SUMMARY
[place], reports an uncomplicated postop course. She has made dietary modifications including incorporating more juices/soups and eliminating spicy/sour foods. She is not eating as much as prior to procedure, endorses a 27 kilo weight loss over the last 3 months. Endorses constipation at baseline. Denies associated hematemesis, hematochezia, melena, chest pain, shortness of breath at this time.\"      Hospital course  Katrina Holt was admitted into the Family Medicine Service from 1/3/2025 to 1/4/2025 for Abdominal pain, epigastric [R10.13]  Epigastric pain [R10.13]  Nausea and vomiting, unspecified vomiting type [R11.2]. Gastroenterology followed along during the course of this admission.    During the course of this admission, the following conditions were addressed/managed:    Epigastric abdominal pain iso possible marginal ulcer around efferent jejunal loop  Recent gastric bypass: CTAP notable for side loop of jejunum anastomosed to gastric pouch rather than end anastomosis, with inflammation around efferent jejunal loop, concern for possible marginal ulcer. VSS on presentation, labs notable for mild leukocytosis 11.4. Hb 16.1. Lipase 19. S/p zofran, toradol, 1L NS in ER with resolution of pain. Suspect symptoms likely secondary to aforementioned ulcer. Low suspicion for perforation/GI bleed. IVF, Protonix, Zofran, Tylenol given. As per gastroenterology, endoscopy indicated but not emergent.   - Gastroenterology to schedule outpatient EGD   - Protonix 40 mg daily  - Carafate 1 mg TID before meals      HTN: 158/92 on arrival. Home metoprolol 50 mg bid.   - Continue home medication      Class III obesity: BMI 52.6. S/p gastric bypass (10/2024). Endorses 27 kg weight loss over the last 3 months. On B12 daily.   - Continue B12      T2DM: A1c 3/24 6.5. At goal. Discontinued metformin since bypass.      Depression: Stable, home Cymbalta 30 mg bid   - Continue home medication          Physical exam at discharge:  BP  reconstructions were generated. CT dose reduction was achieved through use of a standardized protocol tailored for this examination and automatic exposure control for dose modulation. FINDINGS: LOWER THORAX: No significant abnormality in the incidentally imaged lower chest. LIVER: No mass. BILIARY TREE: Cholecystectomy. CBD is not dilated. SPLEEN: within normal limits. PANCREAS: No mass or ductal dilatation. ADRENALS: A 2.2 cm mass at the left adrenal gland is indeterminate, but unchanged in size compared to CT from 2022 when it measured 2.1 cm. The right adrenal gland is unremarkable. KIDNEYS: No mass, calculus, or hydronephrosis. Renal cysts and other hypodensities too small to characterize. STOMACH/SMALL BOWEL: There has been interval gastric bypass. Surgical anatomy appears atypical, with a side loop of jejunum anastomosis to the stomach (best seen on 6 coronal series 602, images 34-28). There is mild wall thickening and surrounding inflammation around the efferent jejunal loop (axial series 301, image 24). No small bowel dilation. COLON: No dilatation or wall thickening. Scattered diverticula without CT evidence of acute diverticulitis. APPENDIX: Not visualized. PERITONEUM: No ascites or pneumoperitoneum. RETROPERITONEUM: No lymphadenopathy or aortic aneurysm. REPRODUCTIVE ORGANS: Unremarkable uterus. URINARY BLADDER: Decompressed, limiting its evaluation. BONES: No destructive bone lesion. Severe right hip osteoarthrosis. ABDOMINAL WALL: No mass or hernia. ADDITIONAL COMMENTS: N/A     1.  Interval gastric bypass. Gastric bypass anatomy appears atypical, with what appears to be a loop of jejunum anastomosed to the gastric pouch rather than an end anastomosis that is which is typically seen with Darnell-en-Y bypass. 2.  Inflammation around the efferent jejunal loop may reflect sequela of a marginal ulcer. Direct visualization with endoscopy should be considered. Electronically signed by Paige Covarrubias

## 2025-01-04 NOTE — DISCHARGE INSTRUCTIONS
HOME DISCHARGE INSTRUCTIONS    Katrina Holt / [id number] : 1965    Admission date: 1/3/2025 Discharge date: 2025     Please bring this form with you to show your care provider at your follow-up appointment.    Primary care provider:  Nate Hopson MD    Discharging provider:  Yue Medrano DO  - Family Medicine Resident  Benson Reeves MD - Family Medicine Attending      You have been admitted to the hospital with the following diagnoses:    ACUTE DIAGNOSES:  Abdominal pain, epigastric [R10.13]  Epigastric pain [R10.13]  Nausea and vomiting, unspecified vomiting type [R11.2]    . . . . . . . . . . . . . . . . . . . . . . . . . . . . . . . . . . . . . . . . . . . . . . . . . . . . . . . . . . . . . . . . . . . . . . . .   You were hospitalized for epigastric pain which was due to a stomach ulcer. GI evaluated you and determined that you should have an EGD done outpatient, which they will arrange for you. They have started you on medications to help keep the ulcer from bleeding, see below. Stop taking Celebrex which can increase your risk of stomach ulcers and GI bleeding.   . . . . . . . . . . . . . . . . . . . . . . . . . . . . . . . . . . . . . . . . . . . . . . . . . . . . . . . . . . . . . . . . . . . . . . . .     MEDICATION CHANGES  START  Protonix 40mg daily  Carafate 1mg PO TID before meals    STOP Celebrex    No other changes were made to your medications, please take all your other home medicines as previously prescribed.   . . . . . . . . . . . . . . . . . . . . . . . . . . . . . . . . . . . . . . . . . . . . . . . . . . . . . . . . . . . . . . . . . . . . . .   FOLLOW-UP CARE RECOMMENDATIONS:    Appointments  No future appointments.    Follow up with Gastroenterology, call the number below to schedule appointment.   Shaquille Valdez MD  [phone] office phone number      Follow-up tests needed: EGD with GI, repeat BMP with PCP    Pending test results:   At the                                                        Date/Time                                                                                                                                              Patient or Representative Signature                                                          Date/Time

## 2025-01-04 NOTE — PROGRESS NOTES
[facility]    Office [phone], Fax [phone]     Senior Resident Admission Note       HPI:  Katrina Holt is a 59 y.o. female with pmhx obesity s/p gastric bypass, HTN, depression who presents to the ER complaining of epigastric abdominal pain that started around 10 days ago and has gradually worsened. Patient is s/p Darnell-en-Y three months ago in [place]. Since the procedure she has had worsening pain and postprandial vomiting. Reports last meal was two pieces of fries last night -- has not been able to eat / drink due to fear of pain and vomiting. Denies melena, hematochezia, hematemesis, chest pain, dyspnea.     In the ED, vitals and labs unremarkable.   She was given zofran, toradol, protonix, and a 1L fluid bolus      Assessment and Plan:   Admit to medical. Code status: FULL.    Epigastric abdominal pain  recent Darnell-en-Y  marginal ulcer worsening post op abd pain and postprandial vomiting. Lipase normal. Hgb stable. CT showing post op changes and inflammation around the efferent jejunal loop that may reflect sequela of a marginal ulcer, direct visualization with endoscopy is recommended. Given inability to tolerate PO, ER recommending admission to be seen by GI in AM. Suspect symps to be secondary to ulcer.   - continue Protonix 40mg qd IV   - zofran prn   - IVFs given minimal PO intake   - can try GI cocktail if worsening pain   - GI consulted, appreciate recs       Chart reviewed. Patient seen, examined, and discussed with Dr. Matta (PGY-1). I agree with remaining assessment and plan as documented in Dr. Matta's Full H&P.    Discussed with Dr. Rodriguez (on-call attending physician).      Kurt Crandall MD  Family Medicine Resident  
1327-Paged on GI to see when pt will be seen. Consult was called in this morning.     1401-Per family practice resident on call, ok to let patient eat b/c GI wouldn't scope her at this point.   
CHG bath education provided to patient and daughter. Patient states she would like to stay in clothes from home, refusing CHG bath at this time.  
Reached out to resident regarding meds due to NPO, advised to hold all PO meds through secure message   
Report given to Kristin / Eve BULL. RN assumed care of patient at this time.  
MD  Family Medicine Resident       For Billing    Chief Complaint   Patient presents with    Abdominal Pain

## 2025-01-04 NOTE — CONSULTS
Shaquille Valdez MD  [phone] office    I am not permitted to use \"perfect serve\" use above for contact, thanks.   Gastroenterology Consultation Note      Admit Date: 1/3/2025  Consult Date: 1/4/2025   I greatly appreciate your asking me to see Katrina Holt, thank you very much for the opportunity to participate in her care.    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  Includes traditional peptic ulcer disease, ischemic anastomotic ulcer disease, neoplasm, false positive CT, others.  Endoscopy indicated but not emergent.  We negotiated a treatment plan  1) PPI daily, pantoprazole 40mg, omeprazole 40mg, nexium 40mg are all appropriate.    2) Carafate/sucralfate 1g PO TID prior to meals.  Can be tablet or liquid form, whichever is acceptable to her insurance  3) I will arrange upper GI endoscopy as outpatient  4) Minimize or avoid NSAIDs  5) Ideally she would not smoke.    I will arrange endoscopy.  I am signing off, reconsult as needed.      Subjective:     Chief Complaint: Epigastric pain    History of Present Illness: Pain for 4 weeks. Worse in last several days.  No vomiting or blood in stool.  No fever/chills.  Notes since admission pain is improved and she was able to eat without much discomfort today.    PCP:  Nate Hopson MD    Past Medical History:   Diagnosis Date    Anxiety and depression     Chronic pain     Dyspnea 9/18/2017    Flu     2 wks ago    GERD (gastroesophageal reflux disease)     H. pylori infection 1/26/2013 1/2013 Referred to Dr. Montero who speaks Icelandic to explain treatments Stool test after Abx negative 2013      Headache(784.0)     Hypertension     Incidental lung nodule, > 3mm and < 8mm 04/20/2016    left lung nodule in smoker, stable on CT in 2018

## 2025-01-06 ENCOUNTER — TELEPHONE (OUTPATIENT)
Age: 60
End: 2025-01-06

## 2025-01-06 NOTE — TELEPHONE ENCOUNTER
Session Code 48026 / Polish : Shona #[id number]    Department Code: [phone]     Care Transitions Initial Follow Up Call    Outreach made within 2 business days of discharge: Yes    Patient: Katrina Holt Patient : [id number]   MRN: [id number]  Reason for Admission: Abdominal pain, epigastric   Discharge Date: 25       Spoke with: Left Message    Discharge department/facility: [facility]    TCM Interactive Patient Contact:  Called patient; no answer. Left message for patient to return call to the office.     Scheduled appointment with PCP within 7-14 days    Follow Up  No future appointments.    Shaquille Limon RN

## 2025-01-30 ENCOUNTER — TELEPHONE (OUTPATIENT)
Age: 60
End: 2025-01-30

## 2025-01-30 DIAGNOSIS — I10 PRIMARY HYPERTENSION: ICD-10-CM

## 2025-01-30 RX ORDER — METOPROLOL SUCCINATE 50 MG/1
50 TABLET, EXTENDED RELEASE ORAL DAILY
Qty: 90 TABLET | Refills: 1 | Status: SHIPPED | OUTPATIENT
Start: 2025-01-30

## 2025-01-30 NOTE — TELEPHONE ENCOUNTER
Dr. Hopson, you are listed as PCP.    Pt's  called on behalf of pt to request a refill of metoprolol succinate (TOPROL XL) 50 MG extended release tablet to be sent to [facility]/pharmacy #[id number] - [place], [place] - [address] - P [phone] - F [phone].    He was encouraged to schedule pt an appt; however, he replied that he will call next month to schedule.

## 2025-02-17 ENCOUNTER — TELEPHONE (OUTPATIENT)
Age: 60
End: 2025-02-17

## 2025-02-17 NOTE — TELEPHONE ENCOUNTER
Identified patient with two patient identifiers (name and ). Reviewed chart in preparation for encounter and have obtained necessary documentation.    Called and spoke with patient spouse, [place].

## 2025-03-28 ENCOUNTER — TELEPHONE (OUTPATIENT)
Age: 60
End: 2025-03-28

## 2025-03-28 NOTE — TELEPHONE ENCOUNTER
Spouse of pt calling, with Hebrew speaking pt on the line, to request refill help for metoprolol succinate (TOPROL XL) 50 MG extended release tablet. The pharmacy has told pt that she will have to wait until 4/25/25 for next refill.  doesn't understand why she has to wait when she took her last pill this morning. I asked the son to read the date, dosing, and number of tablets from the pill bottle. He reports it was filled on 1/30/25 for 90 tablets, that she is to take one a day. The son then asked pt how many pills has she been taking. Pt responded 2x day, explaining that she was told by the doctor, during her last office visit, to take two. Pt verbalized that she believes she has been taking 2 a day for about 6 months. Son is requesting help with getting her more medicine at this time.    [facility]/pharmacy #[id number] - [place], [place] - [address] - P [phone] - F [phone]

## 2025-03-31 DIAGNOSIS — I10 PRIMARY HYPERTENSION: ICD-10-CM

## 2025-03-31 RX ORDER — METOPROLOL SUCCINATE 50 MG/1
50 TABLET, EXTENDED RELEASE ORAL DAILY
Qty: 25 TABLET | Refills: 0 | Status: SHIPPED | OUTPATIENT
Start: 2025-03-31

## 2025-07-18 DIAGNOSIS — M79.10 MYALGIA, UNSPECIFIED SITE: ICD-10-CM

## 2025-07-18 RX ORDER — DULOXETIN HYDROCHLORIDE 30 MG/1
90 CAPSULE, DELAYED RELEASE ORAL DAILY
Qty: 180 CAPSULE | Refills: 2 | OUTPATIENT
Start: 2025-07-18

## 2025-09-04 ENCOUNTER — OFFICE VISIT (OUTPATIENT)
Age: 60
End: 2025-09-04

## 2025-09-04 VITALS
SYSTOLIC BLOOD PRESSURE: 128 MMHG | WEIGHT: 177 LBS | TEMPERATURE: 98.1 F | DIASTOLIC BLOOD PRESSURE: 75 MMHG | BODY MASS INDEX: 35.21 KG/M2 | OXYGEN SATURATION: 95 % | HEART RATE: 57 BPM

## 2025-09-04 DIAGNOSIS — F41.9 ANXIETY AND DEPRESSION: ICD-10-CM

## 2025-09-04 DIAGNOSIS — Z12.31 ENCOUNTER FOR SCREENING MAMMOGRAM FOR BREAST CANCER: ICD-10-CM

## 2025-09-04 DIAGNOSIS — F32.A ANXIETY AND DEPRESSION: ICD-10-CM

## 2025-09-04 DIAGNOSIS — E11.69 TYPE 2 DIABETES MELLITUS WITH OTHER SPECIFIED COMPLICATION, WITHOUT LONG-TERM CURRENT USE OF INSULIN (HCC): ICD-10-CM

## 2025-09-04 DIAGNOSIS — Z11.59 NEED FOR HEPATITIS C SCREENING TEST: ICD-10-CM

## 2025-09-04 DIAGNOSIS — M16.11 PRIMARY OSTEOARTHRITIS OF RIGHT HIP: ICD-10-CM

## 2025-09-04 DIAGNOSIS — Z12.11 SCREENING FOR COLON CANCER: ICD-10-CM

## 2025-09-04 DIAGNOSIS — Z11.4 SCREENING FOR HIV WITHOUT PRESENCE OF RISK FACTORS: ICD-10-CM

## 2025-09-04 DIAGNOSIS — I10 PRIMARY HYPERTENSION: Primary | ICD-10-CM

## 2025-09-04 DIAGNOSIS — M79.10 MYALGIA, UNSPECIFIED SITE: ICD-10-CM

## 2025-09-04 LAB
CREAT UR-MCNC: 119 MG/DL (ref 28–217)
MICROALBUMIN UR-MCNC: 2.02 MG/DL
MICROALBUMIN/CREAT UR-RTO: 17 MG/G

## 2025-09-04 PROCEDURE — 99214 OFFICE O/P EST MOD 30 MIN: CPT

## 2025-09-04 RX ORDER — METOPROLOL SUCCINATE 50 MG/1
50 TABLET, EXTENDED RELEASE ORAL DAILY
Qty: 25 TABLET | Refills: 0 | Status: SHIPPED | OUTPATIENT
Start: 2025-09-04

## 2025-09-04 RX ORDER — DULOXETIN HYDROCHLORIDE 30 MG/1
90 CAPSULE, DELAYED RELEASE ORAL DAILY
Qty: 180 CAPSULE | Refills: 2 | Status: SHIPPED | OUTPATIENT
Start: 2025-09-04

## 2025-09-04 RX ORDER — OLMESARTAN MEDOXOMIL 40 MG/1
40 TABLET ORAL DAILY
Qty: 90 TABLET | Refills: 1 | Status: SHIPPED | OUTPATIENT
Start: 2025-09-04

## 2025-09-04 RX ORDER — OXYCODONE AND ACETAMINOPHEN 7.5; 325 MG/1; MG/1
1 TABLET ORAL 2 TIMES DAILY PRN
Refills: 0 | Status: CANCELLED | OUTPATIENT
Start: 2025-09-04

## 2025-09-04 SDOH — ECONOMIC STABILITY: FOOD INSECURITY: WITHIN THE PAST 12 MONTHS, THE FOOD YOU BOUGHT JUST DIDN'T LAST AND YOU DIDN'T HAVE MONEY TO GET MORE.: SOMETIMES TRUE

## 2025-09-04 SDOH — ECONOMIC STABILITY: INCOME INSECURITY: IN THE LAST 12 MONTHS, WAS THERE A TIME WHEN YOU WERE NOT ABLE TO PAY THE MORTGAGE OR RENT ON TIME?: NO

## 2025-09-04 SDOH — ECONOMIC STABILITY: TRANSPORTATION INSECURITY
IN THE PAST 12 MONTHS, HAS LACK OF TRANSPORTATION KEPT YOU FROM MEETINGS, WORK, OR FROM GETTING THINGS NEEDED FOR DAILY LIVING?: NO

## 2025-09-04 SDOH — ECONOMIC STABILITY: FOOD INSECURITY: WITHIN THE PAST 12 MONTHS, YOU WORRIED THAT YOUR FOOD WOULD RUN OUT BEFORE YOU GOT MONEY TO BUY MORE.: SOMETIMES TRUE

## 2025-09-04 SDOH — ECONOMIC STABILITY: TRANSPORTATION INSECURITY
IN THE PAST 12 MONTHS, HAS THE LACK OF TRANSPORTATION KEPT YOU FROM MEDICAL APPOINTMENTS OR FROM GETTING MEDICATIONS?: NO

## 2025-09-04 ASSESSMENT — PATIENT HEALTH QUESTIONNAIRE - PHQ9
3. TROUBLE FALLING OR STAYING ASLEEP: NEARLY EVERY DAY
6. FEELING BAD ABOUT YOURSELF - OR THAT YOU ARE A FAILURE OR HAVE LET YOURSELF OR YOUR FAMILY DOWN: NOT AT ALL
10. IF YOU CHECKED OFF ANY PROBLEMS, HOW DIFFICULT HAVE THESE PROBLEMS MADE IT FOR YOU TO DO YOUR WORK, TAKE CARE OF THINGS AT HOME, OR GET ALONG WITH OTHER PEOPLE: NOT DIFFICULT AT ALL
5. POOR APPETITE OR OVEREATING: SEVERAL DAYS
1. LITTLE INTEREST OR PLEASURE IN DOING THINGS: NEARLY EVERY DAY
4. FEELING TIRED OR HAVING LITTLE ENERGY: NEARLY EVERY DAY
SUM OF ALL RESPONSES TO PHQ QUESTIONS 1-9: 13
7. TROUBLE CONCENTRATING ON THINGS, SUCH AS READING THE NEWSPAPER OR WATCHING TELEVISION: NOT AT ALL
SUM OF ALL RESPONSES TO PHQ QUESTIONS 1-9: 13
8. MOVING OR SPEAKING SO SLOWLY THAT OTHER PEOPLE COULD HAVE NOTICED. OR THE OPPOSITE, BEING SO FIGETY OR RESTLESS THAT YOU HAVE BEEN MOVING AROUND A LOT MORE THAN USUAL: NOT AT ALL
2. FEELING DOWN, DEPRESSED OR HOPELESS: NEARLY EVERY DAY
9. THOUGHTS THAT YOU WOULD BE BETTER OFF DEAD, OR OF HURTING YOURSELF: NOT AT ALL

## 2025-09-04 ASSESSMENT — LIFESTYLE VARIABLES
HOW MANY STANDARD DRINKS CONTAINING ALCOHOL DO YOU HAVE ON A TYPICAL DAY: PATIENT DOES NOT DRINK
HOW OFTEN DO YOU HAVE A DRINK CONTAINING ALCOHOL: NEVER
HOW MANY STANDARD DRINKS CONTAINING ALCOHOL DO YOU HAVE ON A TYPICAL DAY: PATIENT DOES NOT DRINK
HOW OFTEN DO YOU HAVE A DRINK CONTAINING ALCOHOL: NEVER

## 2025-09-05 LAB
CHOLEST SERPL-MCNC: 183 MG/DL (ref 0–200)
EST. AVERAGE GLUCOSE BLD GHB EST-MCNC: 103 MG/DL
HBA1C MFR BLD: 5.2 % (ref 4–5.6)
HCV AB SER QL: NONREACTIVE
HDLC SERPL-MCNC: 44 MG/DL (ref 40–60)
HDLC SERPL: 4.1 (ref 0–5)
HIV 1+2 AB+HIV1 P24 AG SERPL QL IA: NONREACTIVE
HIV 1/2 RESULT COMMENT: NORMAL
LDLC SERPL CALC-MCNC: 100 MG/DL (ref 0–100)
TRIGL SERPL-MCNC: 195 MG/DL (ref 0–150)
VLDLC SERPL CALC-MCNC: 39 MG/DL